# Patient Record
Sex: MALE | Race: WHITE | NOT HISPANIC OR LATINO | Employment: FULL TIME | URBAN - METROPOLITAN AREA
[De-identification: names, ages, dates, MRNs, and addresses within clinical notes are randomized per-mention and may not be internally consistent; named-entity substitution may affect disease eponyms.]

---

## 2018-01-25 ENCOUNTER — OFFICE VISIT (OUTPATIENT)
Dept: FAMILY MEDICINE | Facility: CLINIC | Age: 58
End: 2018-01-25
Payer: COMMERCIAL

## 2018-01-25 ENCOUNTER — LAB VISIT (OUTPATIENT)
Dept: LAB | Facility: HOSPITAL | Age: 58
End: 2018-01-25
Attending: FAMILY MEDICINE
Payer: COMMERCIAL

## 2018-01-25 VITALS
DIASTOLIC BLOOD PRESSURE: 71 MMHG | HEIGHT: 67 IN | SYSTOLIC BLOOD PRESSURE: 144 MMHG | WEIGHT: 121.81 LBS | BODY MASS INDEX: 19.12 KG/M2 | TEMPERATURE: 97 F | HEART RATE: 93 BPM | OXYGEN SATURATION: 96 %

## 2018-01-25 DIAGNOSIS — H10.10 ALLERGIC CONJUNCTIVITIS, UNSPECIFIED LATERALITY: ICD-10-CM

## 2018-01-25 DIAGNOSIS — K40.20 BILATERAL INGUINAL HERNIA WITHOUT OBSTRUCTION OR GANGRENE, RECURRENCE NOT SPECIFIED: ICD-10-CM

## 2018-01-25 DIAGNOSIS — J30.9 ACUTE ALLERGIC RHINITIS, UNSPECIFIED SEASONALITY, UNSPECIFIED TRIGGER: ICD-10-CM

## 2018-01-25 DIAGNOSIS — Z00.00 WELL ADULT EXAM: ICD-10-CM

## 2018-01-25 DIAGNOSIS — Z00.00 WELL ADULT EXAM: Primary | ICD-10-CM

## 2018-01-25 DIAGNOSIS — F17.210 SMOKING GREATER THAN 40 PACK YEARS: ICD-10-CM

## 2018-01-25 DIAGNOSIS — Z72.0 NOT CONSIDERING QUITTING USE OF TOBACCO: ICD-10-CM

## 2018-01-25 DIAGNOSIS — M54.12 CERVICAL RADICULOPATHY: ICD-10-CM

## 2018-01-25 DIAGNOSIS — Z12.11 COLON CANCER SCREENING: ICD-10-CM

## 2018-01-25 LAB
ALBUMIN SERPL BCP-MCNC: 3.8 G/DL
ALP SERPL-CCNC: 80 U/L
ALT SERPL W/O P-5'-P-CCNC: 31 U/L
ANION GAP SERPL CALC-SCNC: 12 MMOL/L
AST SERPL-CCNC: 26 U/L
BASOPHILS # BLD AUTO: 0.04 K/UL
BASOPHILS NFR BLD: 0.5 %
BILIRUB SERPL-MCNC: 0.3 MG/DL
BUN SERPL-MCNC: 17 MG/DL
CALCIUM SERPL-MCNC: 9.5 MG/DL
CHLORIDE SERPL-SCNC: 105 MMOL/L
CHOLEST SERPL-MCNC: 256 MG/DL
CHOLEST/HDLC SERPL: 3.7 {RATIO}
CO2 SERPL-SCNC: 23 MMOL/L
COMPLEXED PSA SERPL-MCNC: 0.31 NG/ML
CREAT SERPL-MCNC: 1 MG/DL
DIFFERENTIAL METHOD: ABNORMAL
EOSINOPHIL # BLD AUTO: 0.3 K/UL
EOSINOPHIL NFR BLD: 3.3 %
ERYTHROCYTE [DISTWIDTH] IN BLOOD BY AUTOMATED COUNT: 12.3 %
EST. GFR  (AFRICAN AMERICAN): >60 ML/MIN/1.73 M^2
EST. GFR  (NON AFRICAN AMERICAN): >60 ML/MIN/1.73 M^2
ESTIMATED AVG GLUCOSE: 97 MG/DL
GLUCOSE SERPL-MCNC: 110 MG/DL
HBA1C MFR BLD HPLC: 5 %
HCT VFR BLD AUTO: 39.3 %
HDLC SERPL-MCNC: 70 MG/DL
HDLC SERPL: 27.3 %
HGB BLD-MCNC: 14 G/DL
IMM GRANULOCYTES # BLD AUTO: 0.03 K/UL
IMM GRANULOCYTES NFR BLD AUTO: 0.4 %
LDLC SERPL CALC-MCNC: 124.2 MG/DL
LYMPHOCYTES # BLD AUTO: 2.2 K/UL
LYMPHOCYTES NFR BLD: 29.1 %
MCH RBC QN AUTO: 32.8 PG
MCHC RBC AUTO-ENTMCNC: 35.6 G/DL
MCV RBC AUTO: 92 FL
MONOCYTES # BLD AUTO: 0.5 K/UL
MONOCYTES NFR BLD: 6.8 %
NEUTROPHILS # BLD AUTO: 4.6 K/UL
NEUTROPHILS NFR BLD: 59.9 %
NONHDLC SERPL-MCNC: 186 MG/DL
NRBC BLD-RTO: 0 /100 WBC
PLATELET # BLD AUTO: 306 K/UL
PMV BLD AUTO: 11.2 FL
POTASSIUM SERPL-SCNC: 3.7 MMOL/L
PROT SERPL-MCNC: 7.5 G/DL
RBC # BLD AUTO: 4.27 M/UL
SODIUM SERPL-SCNC: 140 MMOL/L
TRIGL SERPL-MCNC: 309 MG/DL
WBC # BLD AUTO: 7.6 K/UL

## 2018-01-25 PROCEDURE — 80061 LIPID PANEL: CPT

## 2018-01-25 PROCEDURE — 90715 TDAP VACCINE 7 YRS/> IM: CPT | Mod: S$GLB,,, | Performed by: FAMILY MEDICINE

## 2018-01-25 PROCEDURE — 36415 COLL VENOUS BLD VENIPUNCTURE: CPT | Mod: PO

## 2018-01-25 PROCEDURE — 84153 ASSAY OF PSA TOTAL: CPT

## 2018-01-25 PROCEDURE — 83036 HEMOGLOBIN GLYCOSYLATED A1C: CPT

## 2018-01-25 PROCEDURE — 90686 IIV4 VACC NO PRSV 0.5 ML IM: CPT | Mod: S$GLB,,, | Performed by: FAMILY MEDICINE

## 2018-01-25 PROCEDURE — 90471 IMMUNIZATION ADMIN: CPT | Mod: S$GLB,,, | Performed by: FAMILY MEDICINE

## 2018-01-25 PROCEDURE — 85025 COMPLETE CBC W/AUTO DIFF WBC: CPT

## 2018-01-25 PROCEDURE — 90472 IMMUNIZATION ADMIN EACH ADD: CPT | Mod: S$GLB,,, | Performed by: FAMILY MEDICINE

## 2018-01-25 PROCEDURE — 80053 COMPREHEN METABOLIC PANEL: CPT

## 2018-01-25 PROCEDURE — 99386 PREV VISIT NEW AGE 40-64: CPT | Mod: 25,S$GLB,, | Performed by: FAMILY MEDICINE

## 2018-01-25 PROCEDURE — 90732 PPSV23 VACC 2 YRS+ SUBQ/IM: CPT | Mod: S$GLB,,, | Performed by: FAMILY MEDICINE

## 2018-01-25 PROCEDURE — 99999 PR PBB SHADOW E&M-NEW PATIENT-LVL IV: CPT | Mod: PBBFAC,,, | Performed by: FAMILY MEDICINE

## 2018-01-25 RX ORDER — LEVOCETIRIZINE DIHYDROCHLORIDE 5 MG/1
5 TABLET, FILM COATED ORAL NIGHTLY
Qty: 30 TABLET | Refills: 11 | Status: SHIPPED | OUTPATIENT
Start: 2018-01-25 | End: 2018-08-31

## 2018-01-25 RX ORDER — FLUTICASONE PROPIONATE 50 MCG
2 SPRAY, SUSPENSION (ML) NASAL DAILY
Qty: 16 G | Refills: 11 | Status: SHIPPED | OUTPATIENT
Start: 2018-01-25 | End: 2018-08-31

## 2018-01-25 RX ORDER — OXYCODONE AND ACETAMINOPHEN 7.5; 325 MG/1; MG/1
1 TABLET ORAL EVERY 8 HOURS PRN
Qty: 14 TABLET | Refills: 0 | Status: SHIPPED | OUTPATIENT
Start: 2018-01-25 | End: 2018-08-31

## 2018-01-25 RX ORDER — AZELASTINE HYDROCHLORIDE 0.5 MG/ML
1 SOLUTION/ DROPS OPHTHALMIC 2 TIMES DAILY
Qty: 4 ML | Refills: 11 | Status: SHIPPED | OUTPATIENT
Start: 2018-01-25 | End: 2018-08-31

## 2018-01-25 NOTE — PROGRESS NOTES
Chief Complaint:    Chief Complaint   Patient presents with    Eye Pain     pain for about 2 weeks    Establish Care       History of Present Illness:  Patient presents today he did establish care,  For the last week with onset symptoms of congestion postnasal drip and itchy eyes discharge from the eyes.  He denies any fever no sinus pain.  He is a smoker 40 pack year history but does not want to quit at this time is not ready for it.  He's not been to a physician in a long time is due for his age appropriate health maintenance colonoscopy and his immunization and labs.    He has chronic neck pain and disc herniation and cervical radiculopathy.  He went to see Dr. Todd Bradley, he did an MRI and offered to do steroid injection and some labs for doing injection but patient does not want to go there.  He is requesting some pain meds and wanted to go to a different physician for MADISON.  He says the pain starts from the neck and radiates to his arms when he turns his head to the left      ROS:  Review of Systems   Constitutional: Negative for activity change, chills, fatigue, fever and unexpected weight change.   HENT: Positive for congestion and rhinorrhea. Negative for ear discharge, ear pain, hearing loss and postnasal drip.    Eyes: Negative for pain and visual disturbance.   Respiratory: Negative for cough, chest tightness and shortness of breath.    Cardiovascular: Negative for chest pain and palpitations.   Gastrointestinal: Negative for abdominal pain, diarrhea and vomiting.   Endocrine: Negative for heat intolerance.   Genitourinary: Negative for dysuria, flank pain, frequency and hematuria.   Musculoskeletal: Negative for back pain, gait problem and neck pain.   Skin: Negative for color change and rash.   Neurological: Negative for dizziness, tremors, seizures, numbness and headaches.   Psychiatric/Behavioral: Negative for agitation, hallucinations, self-injury, sleep disturbance and suicidal ideas. The  "patient is not nervous/anxious.        History reviewed. No pertinent past medical history.    Social History:  Social History     Social History    Marital status:      Spouse name: N/A    Number of children: N/A    Years of education: N/A     Social History Main Topics    Smoking status: Current Every Day Smoker    Smokeless tobacco: Never Used    Alcohol use 3.6 oz/week     6 Cans of beer per week    Drug use: No    Sexual activity: Yes     Partners: Female     Other Topics Concern    None     Social History Narrative    None       Family History:   family history includes Cancer in his father; Heart disease in his mother.    There are no preventive care reminders to display for this patient.    Physical Exam:    Vital Signs  Temp: 96.6 °F (35.9 °C)  Temp src: Tympanic  Pulse: 93  SpO2: 96 %  BP: (!) 144/71  BP Location: Left arm  Patient Position: Sitting  Pain Score:   6  Pain Loc: Eye  Height and Weight  Height: 5' 7" (170.2 cm)  Weight: 55.3 kg (121 lb 12.9 oz)  BSA (Calculated - sq m): 1.62 sq meters  BMI (Calculated): 19.1  Weight in (lb) to have BMI = 25: 159.3]    Body mass index is 19.08 kg/m².    Physical Exam   Constitutional: He is oriented to person, place, and time. He appears well-developed.   HENT:   Head: Normocephalic.   Right Ear: External ear normal.   Left Ear: External ear normal.   Nose: Nose normal.   Mouth/Throat: Oropharynx is clear and moist.   Eyes: Conjunctivae and EOM are normal. Pupils are equal, round, and reactive to light. Right conjunctiva is not injected. Right conjunctiva has no hemorrhage. Left conjunctiva is not injected. Left conjunctiva has no hemorrhage.   Neck: Neck supple. Decreased range of motion present.   Unable to extend the neck tenderness at the base of the neck and at C1 and C2 levels.   Cardiovascular: Normal rate, regular rhythm and normal heart sounds.    No murmur heard.  Pulmonary/Chest: Effort normal and breath sounds normal. No " respiratory distress. He has no wheezes. He has no rales. He exhibits no tenderness.   Abdominal: Soft. He exhibits no distension and no mass. There is no tenderness. There is no guarding. A hernia is present. Hernia confirmed positive in the right inguinal area and confirmed positive in the left inguinal area.   Genitourinary: Prostate normal.         Musculoskeletal: He exhibits no edema or tenderness.   Lymphadenopathy:     He has no cervical adenopathy.   Neurological: He is alert and oriented to person, place, and time. He has normal reflexes.   Skin: Skin is warm and dry.   Psychiatric: He has a normal mood and affect. His behavior is normal. Judgment and thought content normal.       No results found for: CHOL, TRIG, HDL, TOTALCHOLEST, NONHDLCHOL    No results found for: HGBA1C    Assessment:      ICD-10-CM ICD-9-CM   1. Well adult exam Z00.00 V70.0   2. Smoking greater than 40 pack years F17.210 305.1   3. Allergic conjunctivitis, unspecified laterality H10.10 372.14   4. Acute allergic rhinitis, unspecified seasonality, unspecified trigger J30.9 477.9   5. Bilateral inguinal hernia without obstruction or gangrene, recurrence not specified K40.20 550.92   6. Cervical radiculopathy M54.12 723.4   7. Not considering quitting use of tobacco Z72.0 305.1   8. Colon cancer screening Z12.11 V76.51         Plan:  He will be prescribed Optivar, Flonase and levocetirizine.  Discussed with him that inguinal hernia will need to be addressed at some point right now it's very small but heavy lifting can make things worse if he develops severe pain he needs to be seen immediately.  Whenever he is ready to see a surgeon please let us know.  He is not interested in stopping smoking would discussed the hazards of that  We'll send him for screening colonoscopy.  Check labs as below.  Please monitor blood pressure  We will be referred to pain management for consideration for MADISON, will get copy of his MRI from Dr. Bradley's  office.  Limited supply of Percocet given will not be refilled.  #14 tablets.  With 0 refills  Follow up on the labs and he will follow-up in the clinic.  Orders Placed This Encounter   Procedures    (In Office Administered) Tdap Vaccine    (In Office Administered) Pneumococcal Polysaccharide Vaccine (23 Valent) (SQ/IM)    Influenza - Quadrivalent (3 years & older) (PF)    CBC auto differential    Comprehensive metabolic panel    Hemoglobin A1c    Lipid panel    PSA, Screening    Ambulatory referral to Pain Clinic       Current Outpatient Prescriptions   Medication Sig Dispense Refill    azelastine (OPTIVAR) 0.05 % ophthalmic solution Place 1 drop into both eyes 2 (two) times daily. 4 mL 11    fluticasone (FLONASE) 50 mcg/actuation nasal spray 2 sprays (100 mcg total) by Each Nare route once daily. 16 g 11    levocetirizine (XYZAL) 5 MG tablet Take 1 tablet (5 mg total) by mouth every evening. 30 tablet 11    oxyCODONE-acetaminophen (PERCOCET) 7.5-325 mg per tablet Take 1 tablet by mouth every 8 (eight) hours as needed for Pain. 14 tablet 0     No current facility-administered medications for this visit.        There are no discontinued medications.    No Follow-up on file.      Reuben Banuelos MD

## 2018-01-25 NOTE — LETTER
January 25, 2018    Todd Bradley MD               [facility]  [address]  [facility] [address]  Phone: [phone]  Fax: [phone] January 25, 2018     Patient: Leo Roblero    YOB: 1960   Date of Visit: 1/25/2018       To whom it may concern     We are seeing Leo Roblero, YOB: 1960, at [facility]. Rebuen Banuelos MD is their primary care physician. To help with our [place] maintenance records could you please send the following:     MRI cervical and last clinic note.    Please send fax to [phone], Attention Concetta MUNGUIA MA.    Thank-you in advance for your assistance. If you have any questions or concerns, please don't hesitate to contact me at [phone].    Concetta MUNGUIA MA  [facility]  PH. # : [phone]  Fax #: [phone]

## 2018-01-30 ENCOUNTER — TELEPHONE (OUTPATIENT)
Dept: FAMILY MEDICINE | Facility: CLINIC | Age: 58
End: 2018-01-30

## 2018-01-30 DIAGNOSIS — E78.5 HYPERLIPIDEMIA, UNSPECIFIED HYPERLIPIDEMIA TYPE: Primary | ICD-10-CM

## 2018-01-30 NOTE — TELEPHONE ENCOUNTER
----- Message from Giselle Bravo sent at 1/30/2018 12:34 PM CST -----  Contact: Amanda/wife [phone]  States that she is returning call please call back at [phone]//thank you acc

## 2018-04-27 ENCOUNTER — LAB VISIT (OUTPATIENT)
Dept: LAB | Facility: HOSPITAL | Age: 58
End: 2018-04-27
Attending: FAMILY MEDICINE
Payer: COMMERCIAL

## 2018-04-27 DIAGNOSIS — E78.5 HYPERLIPIDEMIA, UNSPECIFIED HYPERLIPIDEMIA TYPE: ICD-10-CM

## 2018-04-27 LAB
CHOLEST SERPL-MCNC: 223 MG/DL
CHOLEST/HDLC SERPL: 3.1 {RATIO}
HDLC SERPL-MCNC: 72 MG/DL
HDLC SERPL: 32.3 %
LDLC SERPL CALC-MCNC: 117.4 MG/DL
NONHDLC SERPL-MCNC: 151 MG/DL
TRIGL SERPL-MCNC: 168 MG/DL

## 2018-04-27 PROCEDURE — 80061 LIPID PANEL: CPT

## 2018-04-27 PROCEDURE — 36415 COLL VENOUS BLD VENIPUNCTURE: CPT | Mod: PO

## 2018-08-29 ENCOUNTER — TELEPHONE (OUTPATIENT)
Dept: FAMILY MEDICINE | Facility: CLINIC | Age: 58
End: 2018-08-29

## 2018-08-29 NOTE — TELEPHONE ENCOUNTER
Returned patient's call and informed him that he would need to be seen for refill since his last visit on 1/2018. Patient verbalized understanding and made an appointment on 8/31/18 @ 9 am per patient's request. Also informed patient that he needs to sign an involvement of care for anyone to speak with his wife about his medical information. Patient verbalized understanding.

## 2018-08-29 NOTE — TELEPHONE ENCOUNTER
----- Message from Amanda Palacios sent at 8/29/2018  3:36 PM CDT -----  Pt wife(Amanda) at [phone]//states pt is having neck and back pain///Dr had given him a prescription for pain//is calling to ask if possible to get a refill////Percocet 7.5/325 mg//the pain has gotten worse//uses//[facility]//please call//thanks//[facility]

## 2018-08-31 ENCOUNTER — OFFICE VISIT (OUTPATIENT)
Dept: FAMILY MEDICINE | Facility: CLINIC | Age: 58
End: 2018-08-31
Payer: COMMERCIAL

## 2018-08-31 VITALS
HEIGHT: 67 IN | WEIGHT: 116.75 LBS | TEMPERATURE: 97 F | BODY MASS INDEX: 18.33 KG/M2 | SYSTOLIC BLOOD PRESSURE: 130 MMHG | DIASTOLIC BLOOD PRESSURE: 78 MMHG | OXYGEN SATURATION: 97 % | HEART RATE: 79 BPM

## 2018-08-31 DIAGNOSIS — M54.2 CHRONIC MIDLINE POSTERIOR NECK PAIN: Primary | ICD-10-CM

## 2018-08-31 DIAGNOSIS — G89.29 CHRONIC MIDLINE POSTERIOR NECK PAIN: Primary | ICD-10-CM

## 2018-08-31 PROCEDURE — 99214 OFFICE O/P EST MOD 30 MIN: CPT | Mod: S$GLB,,, | Performed by: FAMILY MEDICINE

## 2018-08-31 PROCEDURE — 3008F BODY MASS INDEX DOCD: CPT | Mod: CPTII,S$GLB,, | Performed by: FAMILY MEDICINE

## 2018-08-31 PROCEDURE — 99999 PR PBB SHADOW E&M-EST. PATIENT-LVL III: CPT | Mod: PBBFAC,,, | Performed by: FAMILY MEDICINE

## 2018-08-31 RX ORDER — OXYCODONE AND ACETAMINOPHEN 7.5; 325 MG/1; MG/1
1 TABLET ORAL EVERY 12 HOURS PRN
Qty: 14 TABLET | Refills: 0 | Status: SHIPPED | OUTPATIENT
Start: 2018-08-31 | End: 2019-01-22

## 2018-08-31 NOTE — PROGRESS NOTES
Chief Complaint:    Chief Complaint   Patient presents with    Neck Pain     Left    Shoulder Pain     Left       History of Present Illness:      He presents today says been having more neck pain no tingling or numbness in the arms he said it gets worse he is taking 2 Aleve a day.    He has had MRIs in the past and he was referred for MADISON but he never kept his appointment.  Now he wants to do something about it.  Pain could get as worse is 8/10 neck movement can make it worse.    Denies any fever.    ROS:  Review of Systems   HENT: Negative.    Respiratory: Negative.    Cardiovascular: Negative.    Musculoskeletal: Positive for neck pain and neck stiffness.       History reviewed. No pertinent past medical history.    Social History:  Social History     Socioeconomic History    Marital status:      Spouse name: None    Number of children: None    Years of education: None    Highest education level: None   Social Needs    Financial resource strain: None    Food insecurity - worry: None    Food insecurity - inability: None    Transportation needs - medical: None    Transportation needs - non-medical: None   Occupational History    None   Tobacco Use    Smoking status: Current Every Day Smoker    Smokeless tobacco: Never Used   Substance and Sexual Activity    Alcohol use: Yes     Alcohol/week: 3.6 oz     Types: 6 Cans of beer per week    Drug use: No    Sexual activity: Yes     Partners: Female   Other Topics Concern    None   Social History Narrative    None       Family History:   family history includes Cancer in his father; Heart disease in his mother.    Health Maintenance   Topic Date Due    Hepatitis C Screening  1960    Colonoscopy  09/15/2010    Influenza Vaccine  08/01/2018    Lipid Panel  04/27/2023    Pneumococcal PPSV23 (Medium Risk) (2) 09/15/2025    TETANUS VACCINE  01/25/2028       Physical Exam:    Vital Signs  Temp: 97 °F (36.1 °C)  Temp src: Tympanic  Pulse:  "79  SpO2: 97 %  BP: 130/78  BP Location: Right arm  Patient Position: Sitting  Pain Score:   8  Pain Loc: Neck  Height and Weight  Height: 5' 7" (170.2 cm)  Weight: 53 kg (116 lb 11.7 oz)  BSA (Calculated - sq m): 1.58 sq meters  BMI (Calculated): 18.3  Weight in (lb) to have BMI = 25: 159.3]    Body mass index is 18.28 kg/m².    Physical Exam   Neck: Decreased range of motion present.         Range of motion is there in all quadrants but it is not a complete range of motion and there is some tenderness as shown some pain upon range of motion.    There is no evidence of any cervical radiculopathy at this time.    Left shoulder impingement sign is negative full range of motion.         Assessment:      ICD-10-CM ICD-9-CM   1. Chronic midline posterior neck pain M54.2 723.1    G89.29 338.29         Plan:      Patient will be referred for MADISON he has some degenerative disc disease recommend that he take copy of his old MRI.    Recommend physical therapy   Patient given Percocet 14.  looked up.      Orders Placed This Encounter   Procedures    Ambulatory referral to Pain Clinic    Ambulatory Referral to Physical/Occupational Therapy       Current Outpatient Medications   Medication Sig Dispense Refill    oxyCODONE-acetaminophen (PERCOCET) 7.5-325 mg per tablet Take 1 tablet by mouth every 12 (twelve) hours as needed for Pain. 14 tablet 0     No current facility-administered medications for this visit.        Medications Discontinued During This Encounter   Medication Reason    fluticasone (FLONASE) 50 mcg/actuation nasal spray Patient no longer taking    levocetirizine (XYZAL) 5 MG tablet Patient no longer taking    oxyCODONE-acetaminophen (PERCOCET) 7.5-325 mg per tablet Patient no longer taking    azelastine (OPTIVAR) 0.05 % ophthalmic solution Patient no longer taking       No Follow-up on file.      Reuben Banuelos MD              "

## 2018-11-06 ENCOUNTER — TELEPHONE (OUTPATIENT)
Dept: FAMILY MEDICINE | Facility: CLINIC | Age: 58
End: 2018-11-06

## 2018-11-06 NOTE — TELEPHONE ENCOUNTER
Received letter from [facility] stating they attempted to patient multiple times to set up therapy with no success.

## 2019-01-22 ENCOUNTER — OFFICE VISIT (OUTPATIENT)
Dept: FAMILY MEDICINE | Facility: CLINIC | Age: 59
End: 2019-01-22
Payer: COMMERCIAL

## 2019-01-22 ENCOUNTER — TELEPHONE (OUTPATIENT)
Dept: FAMILY MEDICINE | Facility: CLINIC | Age: 59
End: 2019-01-22

## 2019-01-22 VITALS
HEART RATE: 86 BPM | OXYGEN SATURATION: 97 % | BODY MASS INDEX: 19.03 KG/M2 | TEMPERATURE: 98 F | DIASTOLIC BLOOD PRESSURE: 82 MMHG | SYSTOLIC BLOOD PRESSURE: 139 MMHG | WEIGHT: 121.5 LBS

## 2019-01-22 DIAGNOSIS — M77.8 RIGHT SHOULDER TENDONITIS: Primary | ICD-10-CM

## 2019-01-22 DIAGNOSIS — J40 BRONCHITIS: ICD-10-CM

## 2019-01-22 DIAGNOSIS — F17.200 CURRENT SMOKER: ICD-10-CM

## 2019-01-22 PROCEDURE — 3008F BODY MASS INDEX DOCD: CPT | Mod: CPTII,S$GLB,, | Performed by: FAMILY MEDICINE

## 2019-01-22 PROCEDURE — 3008F PR BODY MASS INDEX (BMI) DOCUMENTED: ICD-10-PCS | Mod: CPTII,S$GLB,, | Performed by: FAMILY MEDICINE

## 2019-01-22 PROCEDURE — 99214 OFFICE O/P EST MOD 30 MIN: CPT | Mod: S$GLB,,, | Performed by: FAMILY MEDICINE

## 2019-01-22 PROCEDURE — 99999 PR PBB SHADOW E&M-EST. PATIENT-LVL III: ICD-10-PCS | Mod: PBBFAC,,, | Performed by: FAMILY MEDICINE

## 2019-01-22 PROCEDURE — 99214 PR OFFICE/OUTPT VISIT, EST, LEVL IV, 30-39 MIN: ICD-10-PCS | Mod: S$GLB,,, | Performed by: FAMILY MEDICINE

## 2019-01-22 PROCEDURE — 99999 PR PBB SHADOW E&M-EST. PATIENT-LVL III: CPT | Mod: PBBFAC,,, | Performed by: FAMILY MEDICINE

## 2019-01-22 RX ORDER — AZITHROMYCIN 250 MG/1
TABLET, FILM COATED ORAL
Qty: 6 TABLET | Refills: 0 | Status: SHIPPED | OUTPATIENT
Start: 2019-01-22 | End: 2019-03-28

## 2019-01-22 RX ORDER — BENZONATATE 100 MG/1
100 CAPSULE ORAL 3 TIMES DAILY PRN
Qty: 30 CAPSULE | Refills: 0 | Status: SHIPPED | OUTPATIENT
Start: 2019-01-22 | End: 2019-02-01

## 2019-01-22 RX ORDER — OXYCODONE AND ACETAMINOPHEN 7.5; 325 MG/1; MG/1
1 TABLET ORAL EVERY 4 HOURS PRN
Qty: 12 TABLET | Refills: 0 | Status: SHIPPED | OUTPATIENT
Start: 2019-01-22 | End: 2019-01-25

## 2019-01-22 RX ORDER — METHYLPREDNISOLONE 4 MG/1
TABLET ORAL
Qty: 30 TABLET | Refills: 0 | Status: SHIPPED | OUTPATIENT
Start: 2019-01-22 | End: 2019-03-28

## 2019-01-22 RX ORDER — FLUTICASONE PROPIONATE 50 MCG
1 SPRAY, SUSPENSION (ML) NASAL DAILY PRN
COMMUNITY
Start: 2018-11-26 | End: 2019-08-29 | Stop reason: SDUPTHER

## 2019-01-22 NOTE — TELEPHONE ENCOUNTER
----- Message from Martina Adames sent at 1/22/2019 12:23 PM CST -----  Contact: wife  States the pt has a cough and wants to be worked in today, can be reached at [phone]///thxMW

## 2019-01-22 NOTE — PATIENT INSTRUCTIONS
Viral Upper Respiratory Illness (Adult)  You have a viral upper respiratory illness (URI), which is another term for the common cold. This illness is contagious during the first few days. It is spread through the air by coughing and sneezing. It may also be spread by direct contact (touching the sick person and then touching your own eyes, nose, or mouth). Frequent handwashing will decrease risk of spread. Most viral illnesses go away within 7 to 10 days with rest and simple home remedies. Sometimes the illness may last for several weeks. Antibiotics will not kill a virus, and they are generally not prescribed for this condition.    Home care  · If symptoms are severe, rest at home for the first 2 to 3 days. When you resume activity, don't let yourself get too tired.  · Avoid being exposed to cigarette smoke (yours or others).  · You may use acetaminophen or ibuprofen to control pain and fever, unless another medicine was prescribed. (Note: If you have chronic liver or kidney disease, have ever had a stomach ulcer or gastrointestinal bleeding, or are taking blood-thinning medicines, talk with your healthcare provider before using these medicines.) Aspirin should never be given to anyone under 18 years of age who is ill with a viral infection or fever. It may cause severe liver or brain damage.  · Your appetite may be poor, so a light diet is fine. Avoid dehydration by drinking 6 to 8 glasses of fluids per day (water, soft drinks, juices, tea, or soup). Extra fluids will help loosen secretions in the nose and lungs.  · Over-the-counter cold medicines will not shorten the length of time youre sick, but they may be helpful for the following symptoms: cough, sore throat, and nasal and sinus congestion. (Note: Do not use decongestants if you have high blood pressure.)  Follow-up care  Follow up with your healthcare provider, or as advised.  When to seek medical advice  Call your healthcare provider right away if any  of these occur:  · Cough with lots of colored sputum (mucus)  · Severe headache; face, neck, or ear pain  · Difficulty swallowing due to throat pain  · Fever of 100.4°F (38°C)  Call 911, or get immediate medical care  Call emergency services right away if any of these occur:  · Chest pain, shortness of breath, wheezing, or difficulty breathing  · Coughing up blood  · Inability to swallow due to throat pain  Date Last Reviewed: 9/13/2015  © 2149-2236 [employer]. [address] [place] [address]. All rights reserved. This information is not intended as a substitute for professional medical care. Always follow your healthcare professional's instructions.

## 2019-01-22 NOTE — PROGRESS NOTES
Subjective:       Patient ID: Leo Roblero is a 58 y.o. male.    Chief Complaint:Persistent cough and right shoulder pain.      HPI     Cough      Additional comments: coughing up thick yellow sputum              Nasal Congestion      Additional comments: blowing yellow mucus          Last edited by Martha Muñiz LPN on 1/22/2019  2:59 PM. (History)     Upper Respiratory Infection x 2 weeks:  Patient complains of symptoms of a URI. Symptoms include nasal  congestion and cough. Onset of symptoms was 2 weeks ago, gradually worsening since that time. He also c/o no  fever but sinus pressure, ear popping and malaise.  He is drinking plenty of fluids. Evaluation to date: none. Treatment to date: nasal steroids.    Right Shoulder pain: acute on chronic. Present for years. Anterior/lateral shoulder pain with radiation to the upper arm and base of the neck. 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   Denies numbness and tingling to the arm. Over the counter med have n ot been effective  History reviewed. No pertinent past medical history.  Family History   Problem Relation Age of Onset    Heart disease Mother     Cancer Father      Social History     Socioeconomic History    Marital status:      Spouse name: Not on file    Number of children: Not on file    Years of education: Not on file    Highest education level: Not on file   Social Needs    Financial resource strain: Not on file    Food insecurity - worry: Not on file    Food insecurity - inability: Not on file    Transportation needs - medical: Not on file    Transportation needs - non-medical: Not on file   Occupational History    Not on file   Tobacco Use    Smoking status: Current Every Day Smoker    Smokeless tobacco: Never Used   Substance and Sexual Activity    Alcohol use: Yes     Alcohol/week: 3.6 oz     Types: 6 Cans of beer per week    Drug use: No    Sexual activity: Yes     Partners: Female   Other Topics Concern    Not on  file   Social History Narrative    Not on file     Outpatient Encounter Medications as of 1/22/2019   Medication Sig Dispense Refill    azithromycin (Z-ANNE MARIE) 250 MG tablet Take 2 tablets by mouth on day 1; Take 1 tablet by mouth on days 2-5 6 tablet 0    benzonatate (TESSALON) 100 MG capsule Take 1 capsule (100 mg total) by mouth 3 (three) times daily as needed for Cough. 30 capsule 0    fluticasone (FLONASE) 50 mcg/actuation nasal spray 1 spray daily as needed.       methylPREDNISolone (MEDROL DOSEPACK) 4 mg tablet Take as directed 30 tablet 0    oxyCODONE-acetaminophen (PERCOCET) 7.5-325 mg per tablet Take 1 tablet by mouth every 4 (four) hours as needed for Pain. 12 tablet 0    [DISCONTINUED] oxyCODONE-acetaminophen (PERCOCET) 7.5-325 mg per tablet Take 1 tablet by mouth every 12 (twelve) hours as needed for Pain. 14 tablet 0     No facility-administered encounter medications on file as of 1/22/2019.        Review of Systems   Constitutional: Negative for appetite change and fever.   HENT: Positive for postnasal drip, rhinorrhea, sinus pressure and sneezing. Negative for congestion, facial swelling, sinus pain, sore throat and voice change.    Eyes: Negative for discharge and itching.   Respiratory: Positive for cough. Negative for chest tightness and wheezing.    Cardiovascular: Negative.  Negative for chest pain and leg swelling.   Gastrointestinal: Negative for abdominal pain, nausea and vomiting.   Endocrine: Negative for cold intolerance and heat intolerance.   Genitourinary: Negative for dysuria and flank pain.   Musculoskeletal: Negative for myalgias and neck stiffness.   Skin: Negative for pallor and rash.   Neurological: Negative for facial asymmetry and weakness.   Psychiatric/Behavioral: Negative for agitation and confusion.       Objective:      /82 (BP Location: Right arm, Patient Position: Sitting, BP Method: Medium (Automatic))   Pulse 86   Temp 98.2 °F (36.8 °C) (Oral)   Wt 55.1 kg  (121 lb 7.6 oz)   SpO2 97%   BMI 19.03 kg/m²   Physical Exam   Constitutional: He is oriented to person, place, and time. He appears well-developed. He appears ill. No distress.   HENT:   Head: Normocephalic and atraumatic.   Right Ear: Tympanic membrane and external ear normal.   Left Ear: Tympanic membrane and external ear normal.   Nose: Mucosal edema present. Right sinus exhibits no maxillary sinus tenderness and no frontal sinus tenderness. Left sinus exhibits no maxillary sinus tenderness and no frontal sinus tenderness.   Mouth/Throat: No posterior oropharyngeal edema or posterior oropharyngeal erythema.   Eyes: Conjunctivae and EOM are normal.   Neck: Neck supple.   Cardiovascular: Normal rate and regular rhythm.   Pulmonary/Chest: Effort normal. No respiratory distress. He has rhonchi.   Abdominal: Soft. Normal appearance and bowel sounds are normal. There is no hepatosplenomegaly.   Genitourinary:   Genitourinary Comments: deferred   Musculoskeletal: He exhibits no edema.   Painful arc pain at 60 degree consistent with subacromial pain   Neurological: He is alert and oriented to person, place, and time.   Skin: Skin is warm and dry.   Psychiatric: He has a normal mood and affect. His behavior is normal.   Nursing note and vitals reviewed.      Results for orders placed or performed in visit on 04/27/18   Lipid panel   Result Value Ref Range    Cholesterol 223 (H) 120 - 199 mg/dL    Triglycerides 168 (H) 30 - 150 mg/dL    HDL 72 40 - 75 mg/dL    LDL Cholesterol 117.4 63.0 - 159.0 mg/dL    HDL/Chol Ratio 32.3 20.0 - 50.0 %    Total Cholesterol/HDL Ratio 3.1 2.0 - 5.0    Non-HDL Cholesterol 151 mg/dL     Assessment:       1. Right shoulder tendonitis    2. Bronchitis    3. Current smoker        Plan:   Bronchitis/current smoker  lingering sxs and ill,  Worsened by smoking  Patient has been counseled  Will treat with abx    Right shoulder tendonitis  Comments:  acute on chronic  Subacromial pain    Other  orders  -     methylPREDNISolone (MEDROL DOSEPACK) 4 mg tablet; Take as directed  Dispense: 30 tablet; Refill: 0  -     benzonatate (TESSALON) 100 MG capsule; Take 1 capsule (100 mg total) by mouth 3 (three) times daily as needed for Cough.  Dispense: 30 capsule; Refill: 0  -     oxyCODONE-acetaminophen (PERCOCET) 7.5-325 mg per tablet; Take 1 tablet by mouth every 4 (four) hours as needed for Pain.  Dispense: 12 tablet; Refill: 0  -     azithromycin (Z-ANNE MARIE) 250 MG tablet; Take 2 tablets by mouth on day 1; Take 1 tablet by mouth on days 2-5  Dispense: 6 tablet; Refill: 0

## 2019-01-22 NOTE — TELEPHONE ENCOUNTER
Spoke with patient and states he is coughing up thick yellow mucus x 1 week and requesting to be seen. Appointment with Dr Chawla made for today at 340 pm due to patient not sure what time he'll get here this afternoon.

## 2019-03-27 ENCOUNTER — TELEPHONE (OUTPATIENT)
Dept: FAMILY MEDICINE | Facility: CLINIC | Age: 59
End: 2019-03-27

## 2019-03-27 NOTE — TELEPHONE ENCOUNTER
Spoke with caregiver/wife Aamnda and offered the patient an appointment with Dr Chawla today but patient refused. Scheduled him an appointment with Dr Chawla tomorrow at 8 am per patient's request.

## 2019-03-27 NOTE — TELEPHONE ENCOUNTER
----- Message from Amanda Palacios sent at 3/27/2019  1:10 PM CDT -----  Type:  Needs Medical Advice    Who Called:  Pt Wife  (Amanda)  Symptoms (please be specific): Pain in has back/arms/shoulders  How long has patient had these symptoms:   For a long time//has gotten worse  Pharmacy name and phone #:    Would the patient rather a call back or a response via [facility]?  Call back  Best Call Back Number:  [phone]  Additional Information:  States she is calling to ask your office about pt seeing a dr for the pain in his back/arms and shoulders//he cannot raise his arms because of the pain//please call to discuss//thanks/josie

## 2019-03-28 ENCOUNTER — HOSPITAL ENCOUNTER (OUTPATIENT)
Dept: RADIOLOGY | Facility: HOSPITAL | Age: 59
Discharge: HOME | End: 2019-03-28
Attending: FAMILY MEDICINE
Payer: COMMERCIAL

## 2019-03-28 ENCOUNTER — OFFICE VISIT (OUTPATIENT)
Dept: FAMILY MEDICINE | Facility: CLINIC | Age: 59
End: 2019-03-28
Payer: COMMERCIAL

## 2019-03-28 VITALS
DIASTOLIC BLOOD PRESSURE: 77 MMHG | BODY MASS INDEX: 18.36 KG/M2 | WEIGHT: 121.13 LBS | SYSTOLIC BLOOD PRESSURE: 159 MMHG | TEMPERATURE: 99 F | OXYGEN SATURATION: 98 % | HEIGHT: 68 IN | HEART RATE: 77 BPM

## 2019-03-28 DIAGNOSIS — F17.200 CURRENT SMOKER: ICD-10-CM

## 2019-03-28 DIAGNOSIS — M25.519 ACROMIOCLAVICULAR JOINT PAIN, UNSPECIFIED LATERALITY: Primary | ICD-10-CM

## 2019-03-28 DIAGNOSIS — M47.812 SPONDYLOSIS OF CERVICAL REGION WITHOUT MYELOPATHY OR RADICULOPATHY: ICD-10-CM

## 2019-03-28 DIAGNOSIS — R03.0 ELEVATED BP WITHOUT DIAGNOSIS OF HYPERTENSION: ICD-10-CM

## 2019-03-28 DIAGNOSIS — Z12.11 SCREENING FOR COLON CANCER: ICD-10-CM

## 2019-03-28 DIAGNOSIS — M75.51 SUBACROMIAL BURSITIS OF RIGHT SHOULDER JOINT: ICD-10-CM

## 2019-03-28 DIAGNOSIS — E78.2 MIXED HYPERLIPIDEMIA: ICD-10-CM

## 2019-03-28 DIAGNOSIS — Z12.5 SCREENING PSA (PROSTATE SPECIFIC ANTIGEN): ICD-10-CM

## 2019-03-28 DIAGNOSIS — Z11.59 ENCOUNTER FOR HEPATITIS C SCREENING TEST FOR LOW RISK PATIENT: ICD-10-CM

## 2019-03-28 PROCEDURE — 20610 DRAIN/INJ JOINT/BURSA W/O US: CPT | Mod: RT,S$GLB,, | Performed by: FAMILY MEDICINE

## 2019-03-28 PROCEDURE — 3008F BODY MASS INDEX DOCD: CPT | Mod: CPTII,S$GLB,, | Performed by: FAMILY MEDICINE

## 2019-03-28 PROCEDURE — 99999 PR PBB SHADOW E&M-EST. PATIENT-LVL IV: ICD-10-PCS | Mod: PBBFAC,,, | Performed by: FAMILY MEDICINE

## 2019-03-28 PROCEDURE — 3008F PR BODY MASS INDEX (BMI) DOCUMENTED: ICD-10-PCS | Mod: CPTII,S$GLB,, | Performed by: FAMILY MEDICINE

## 2019-03-28 PROCEDURE — 73030 X-RAY EXAM OF SHOULDER: CPT | Mod: TC,FY,PO,RT

## 2019-03-28 PROCEDURE — 20610 LARGE JOINT ASPIRATION/INJECTION: R GLENOHUMERAL: ICD-10-PCS | Mod: RT,S$GLB,, | Performed by: FAMILY MEDICINE

## 2019-03-28 PROCEDURE — 99999 PR PBB SHADOW E&M-EST. PATIENT-LVL IV: CPT | Mod: PBBFAC,,, | Performed by: FAMILY MEDICINE

## 2019-03-28 PROCEDURE — 99215 PR OFFICE/OUTPT VISIT, EST, LEVL V, 40-54 MIN: ICD-10-PCS | Mod: 25,S$GLB,, | Performed by: FAMILY MEDICINE

## 2019-03-28 PROCEDURE — 73030 XR SHOULDER COMPLETE 2 OR MORE VIEWS RIGHT: ICD-10-PCS | Mod: 26,RT,, | Performed by: RADIOLOGY

## 2019-03-28 PROCEDURE — 73030 X-RAY EXAM OF SHOULDER: CPT | Mod: 26,RT,, | Performed by: RADIOLOGY

## 2019-03-28 PROCEDURE — 99215 OFFICE O/P EST HI 40 MIN: CPT | Mod: 25,S$GLB,, | Performed by: FAMILY MEDICINE

## 2019-03-28 RX ORDER — OMEGA-3 FATTY ACIDS 1000 MG
1 CAPSULE ORAL NIGHTLY
Refills: 0 | COMMUNITY
Start: 2019-03-28 | End: 2022-03-29

## 2019-03-28 RX ORDER — OXYCODONE AND ACETAMINOPHEN 7.5; 325 MG/1; MG/1
1 TABLET ORAL EVERY 6 HOURS PRN
Qty: 14 TABLET | Refills: 0 | Status: SHIPPED | OUTPATIENT
Start: 2019-03-28 | End: 2019-04-01

## 2019-03-28 RX ORDER — TRIAMCINOLONE ACETONIDE 40 MG/ML
40 INJECTION, SUSPENSION INTRA-ARTICULAR; INTRAMUSCULAR
Status: DISCONTINUED | OUTPATIENT
Start: 2019-03-28 | End: 2019-03-28 | Stop reason: HOSPADM

## 2019-03-28 RX ADMIN — TRIAMCINOLONE ACETONIDE 40 MG: 40 INJECTION, SUSPENSION INTRA-ARTICULAR; INTRAMUSCULAR at 08:03

## 2019-03-28 NOTE — PROCEDURES
Large Joint Aspiration/Injection: R glenohumeral  Date/Time: 3/28/2019 8:00 AM  Performed by: Leonarda Chawla MD  Authorized by: Leonarda Chawla MD     Consent Done?:  Yes (Written)  Indications:  Pain  Procedure site marked: Yes    Timeout: Prior to procedure the correct patient, procedure, and site was verified      Location:  Shoulder  Site:  R glenohumeral  Prep: Patient was prepped and draped in usual sterile fashion    Ultrasonic Guidance for needle placement: No  Needle size:  25 G  Approach:  Anterior  Medications:  40 mg triamcinolone acetonide 40 mg/mL (with 1% lidocaine 8ml)  Patient tolerance:  Patient tolerated the procedure well with no immediate complications

## 2019-03-28 NOTE — PROGRESS NOTES
Subjective:       Patient ID: Leo Roblero is a 58 y.o. male.    Chief Complaint: Neck Pain and Shoulder Pain    Right Shoulder pain: Acute on chronic. Associated with stiffness, popping,  unable to raise arm above 30 degrees. Sharp pain that is shooting to the lateral neck. 10/10.  No numbness and tingling.  Neck pain: Posterior. Acute on chronic. Intermittent ache. Present for years. He was seeing Dr Mccrary in the past and laminectomy was suggested at one point.      BP was found to be elevated and he has no diagnosis of HTN . He is in pain at he moment.  History reviewed. No pertinent past medical history.  Family History   Problem Relation Age of Onset    Heart disease Mother     Cancer Father      Social History     Socioeconomic History    Marital status:      Spouse name: Not on file    Number of children: Not on file    Years of education: Not on file    Highest education level: Not on file   Occupational History    Not on file   Social Needs    Financial resource strain: Not on file    Food insecurity:     Worry: Not on file     Inability: Not on file    Transportation needs:     Medical: Not on file     Non-medical: Not on file   Tobacco Use    Smoking status: Current Every Day Smoker    Smokeless tobacco: Never Used   Substance and Sexual Activity    Alcohol use: Yes     Alcohol/week: 3.6 oz     Types: 6 Cans of beer per week    Drug use: No    Sexual activity: Yes     Partners: Female   Lifestyle    Physical activity:     Days per week: Not on file     Minutes per session: Not on file    Stress: Not on file   Relationships    Social connections:     Talks on phone: Not on file     Gets together: Not on file     Attends [employer] service: Not on file     Active member of club or organization: Not on file     Attends meetings of clubs or organizations: Not on file     Relationship status: Not on file    Intimate partner violence:     Fear of current or ex partner: Not on file     " Emotionally abused: Not on file     Physically abused: Not on file     Forced sexual activity: Not on file   Other Topics Concern    Not on file   Social History Narrative    Not on file     Outpatient Encounter Medications as of 3/28/2019   Medication Sig Dispense Refill    fluticasone (FLONASE) 50 mcg/actuation nasal spray 1 spray daily as needed.       omega-3 fatty acids 1,000 mg Cap Take 1 capsule (1,000 mg total) by mouth every evening.  0    [DISCONTINUED] azithromycin (Z-ANNE MARIE) 250 MG tablet Take 2 tablets by mouth on day 1; Take 1 tablet by mouth on days 2-5 6 tablet 0    [DISCONTINUED] methylPREDNISolone (MEDROL DOSEPACK) 4 mg tablet Take as directed 30 tablet 0     No facility-administered encounter medications on file as of 3/28/2019.        Review of Systems   Constitutional: Negative for appetite change and fever.   HENT: Negative for congestion, facial swelling and voice change.    Eyes: Negative for discharge and itching.   Respiratory: Negative for cough, chest tightness and wheezing.    Cardiovascular: Negative.  Negative for chest pain and leg swelling.   Gastrointestinal: Negative for abdominal pain, nausea and vomiting.   Endocrine: Negative for cold intolerance and heat intolerance.   Genitourinary: Negative for dysuria and flank pain.   Musculoskeletal: Negative for myalgias and neck stiffness.   Skin: Negative for pallor and rash.   Neurological: Negative for facial asymmetry and weakness.   Psychiatric/Behavioral: Negative for agitation and confusion.       Objective:      BP (!) 140/76 (BP Location: Right arm, Patient Position: Sitting, BP Method: Medium (Manual))   Pulse 77   Temp 98.7 °F (37.1 °C) (Oral)   Ht 5' 8" (1.727 m)   Wt 54.9 kg (121 lb 2.3 oz)   SpO2 98%   BMI 18.42 kg/m²   Physical Exam   Constitutional: He is oriented to person, place, and time. He appears well-developed. No distress.   HENT:   Head: Normocephalic and atraumatic.   Right Ear: External ear normal. "   Left Ear: External ear normal.   Eyes: Conjunctivae and EOM are normal.   Neck: Neck supple. No thyromegaly present.   Cardiovascular: Normal rate and regular rhythm.   Pulmonary/Chest: Effort normal. No respiratory distress.   Abdominal: Soft. Normal appearance and bowel sounds are normal. He exhibits no distension. There is no hepatosplenomegaly. There is no tenderness.   Genitourinary:   Genitourinary Comments: deferred   Musculoskeletal: He exhibits no edema or deformity.        Right shoulder: He exhibits decreased range of motion, tenderness and crepitus.        Cervical back: He exhibits tenderness.        Back:         Arms:  Painful arc is positive  Neer's test is positive   Lymphadenopathy:        Head (right side): No submandibular adenopathy present.        Head (left side): No submandibular adenopathy present.     He has no cervical adenopathy.   Neurological: He is alert and oriented to person, place, and time.   Skin: Skin is warm and dry.   Psychiatric: He has a normal mood and affect. His behavior is normal.   Nursing note and vitals reviewed.      Results for orders placed or performed in visit on 04/27/18   Lipid panel   Result Value Ref Range    Cholesterol 223 (H) 120 - 199 mg/dL    Triglycerides 168 (H) 30 - 150 mg/dL    HDL 72 40 - 75 mg/dL    LDL Cholesterol 117.4 63.0 - 159.0 mg/dL    HDL/Chol Ratio 32.3 20.0 - 50.0 %    Total Cholesterol/HDL Ratio 3.1 2.0 - 5.0    Non-HDL Cholesterol 151 mg/dL     Assessment:       1. Acromioclavicular joint pain, unspecified laterality    2. Spondylosis of cervical region without myelopathy or radiculopathy    3. Screening for colon cancer    4. Current smoker    5. Screening PSA (prostate specific antigen)    6. Encounter for hepatitis C screening test for low risk patient    7. Elevated BP without diagnosis of hypertension    8. Mixed hyperlipidemia        Plan:     Acromioclavicular joint pain, unspecified laterality  -     X-ray Shoulder 2 or More  Views Right; Future; Expected date: 03/28/2019  -     oxyCODONE-acetaminophen (PERCOCET) 7.5-325 mg per tablet; Take 1 tablet by mouth every 6 (six) hours as needed for Pain.  Dispense: 14 tablet; Refill: 0  -     Large Joint Aspiration/Injection: R glenohumeral  -     triamcinolone acetonide injection 40 mg    Spondylosis of cervical region without myelopathy or radiculopathy; acute on chronic  -     oxyCODONE-acetaminophen (PERCOCET) 7.5-325 mg per tablet; Take 1 tablet by mouth every 6 (six) hours as needed for Pain.  Dispense: 14 tablet; Refill: 0    Screening for colon cancer  -     Fecal Immunochemical Test (iFOBT); Future; Expected date: 03/28/2019    Current smoker  -     Ambulatory referral to Smoking Cessation Program    Screening PSA (prostate specific antigen)  -     PSA, Screening; Future; Expected date: 03/28/2019    Encounter for hepatitis C screening test for low risk patient  -     Hepatitis C antibody; Future; Expected date: 03/28/2019    Elevated BP without diagnosis of hypertension  -     CBC auto differential; Future; Expected date: 03/28/2019  -     Comprehensive metabolic panel; Future; Expected date: 03/28/2019    Mixed hyperlipidemia  -     Lipid panel; Future; Expected date: 03/28/2019    Other orders  -     omega-3 fatty acids 1,000 mg Cap; Take 1 capsule (1,000 mg total) by mouth every evening.; Refill: 0      Tumeric and ginger root for pain for pain  Type of Reading: myself.  Radiology Procedure Done: Right Shoulder.  Interpretation: No Fracture or dislocation noted. Spurring and joint space narrowing consistent with AC joint osteoarthritis        MEDICAL DECISION MAKING: Moderate to high complexity.  Overall, the multiple problems listed are of moderate to high severity that may impact quality of life and activities of daily living. Side effects of medications, treatment plan as well as options and alternatives reviewed and discussed with patient. There was counseling of patient  concerning these issues.

## 2019-03-28 NOTE — PATIENT INSTRUCTIONS
Facet Joint Injection  Back or neck pain may be caused by a problem with your facet joints. If so, a facet joint injection may help. With this treatment, medicine is injected into certain facet joints. The injection can help your doctor find problem joints. It may also relieve your pain.    What is a facet joint?  Bones called vertebrae make up your spine. Each vertebra has facets (flat surfaces) that touch where the vertebrae fit together. These form a structure called a facet joint on each side of the vertebrae.  What is a facet joint injection?  One or more facet joints in your back or neck can become inflamed (swollen and irritated). This may cause pain. During a facet joint injection, medicine is injected into the inflamed joints. This treatment may help reduce inflammation and relieve pain. Pain relief may last for weeks to months or longer. If the pain returns, you may need a repeat injection.    Getting ready  To get ready for your treatment, do the following:  · At least a week before treatment, tell your healthcare provider what medicines you take. This includes aspirin. Ask whether you should stop taking any of them before treatment.  · Tell your provider if you are pregnant or allergic to any medicines.  · Follow any directions you are given for not eating or drinking before the treatment.  · If asked, bring X-rays, MRIs, or other tests with you on the day of your treatment.  Date Last Reviewed: 9/21/2015 © 2000-2017 [employer]. [address] [place] [address]. All rights reserved. This information is not intended as a substitute for professional medical care. Always follow your healthcare professional's instructions.        Shoulder Impingement Syndrome  The rotator cuff is a group of muscles and tendons that surround the shoulder joint. These muscles and tendons hold the arm in its joint. They help the shoulder move. The rotator cuff muscles and tendons can become irritated  from repeated rubbing against the shoulder bone. This is called shoulder impingement syndrome or rotator cuff tendonitis.     If your case is mild, you may only need to rest the shoulder and then do certain exercises to strengthen the muscles. You can also take anti-inflammatory medicines. Steroid injections into the shoulder can ease inflammation. But you can have only a limited number of these. If the condition gets worse, your shoulder muscles may become thin and weak. This can lead to a rotator cuff tear.  Symptoms of shoulder impingement syndrome may include:  · Shoulder pain that gets worse when you raise your arm overhead  · Weakness of the shoulder muscles when you use your arm overhead  · Popping and clicking when you move your shoulder  · Shoulder pain that wakes you up at night, especially when you sleep on the affected shoulder  · Sudden pain in your shoulder when you lift or reach  Home care  Follow these tips to take care of yourself at home:  · Avoid activities that make your pain worse. These include raising your arms overhead, repeating the same motion over and over, or lifting heavy objects.  · Dont hold your arm in one position for a long time. Keep it moving.  · Put an ice pack on the sore area for 20 minutes every 1 to 2 hours for the first day. You can make an ice pack by putting ice cubes in a plastic bag. Wrap the bag in a towel before putting it on your shoulder. A frozen bag of peas or something similar can also be used as an ice pack. Use the ice packs 3 to 4 times a day for the next 2 days. Continue using the ice to relieve of pain and swelling as needed.  · You may take acetaminophen or ibuprofen to control pain, unless another medicine was prescribed. If prednisone was prescribed, dont take anti-inflammatory medicines. If you have chronic liver or kidney disease or ever had a stomach ulcer or gastrointestinal bleeding, talk with your doctor before using these medicines.  · After your  symptoms ease, you may get physical therapy or start a home exercise program. This can strengthen your shoulder muscles and help your range of motion. Talk with your doctor about what is best for your condition.  Follow-up care  Follow up with your healthcare provider, or as advised.  When to seek medical advice  Call your healthcare provider right away if any of these occur:  · Shoulder pain that gets worse and wakes you up at night  · Your shoulder or arm swells  · Numbness, tingling, or pain that travels down the arm to the hand  · Loss of shoulder strength  · Fever or chills  Date Last Reviewed: 8/1/2016  © 8188-2764 [employer]. [address] [place] [address], [place] [address]. All rights reserved. This information is not intended as a substitute for professional medical care. Always follow your healthcare professional's instructions.

## 2019-04-01 ENCOUNTER — TELEPHONE (OUTPATIENT)
Dept: FAMILY MEDICINE | Facility: CLINIC | Age: 59
End: 2019-04-01

## 2019-04-01 NOTE — TELEPHONE ENCOUNTER
----- Message from Maddi Jerez sent at 4/1/2019 11:50 AM CDT -----  Contact: mireya  Name of Who is Calling: mireya      What is the request in detail: patient is requesting a call back concerning his blood test results       Can the clinic reply by [facility]: no      What Number to Call Back if not in [facility]: [phone]

## 2019-04-04 ENCOUNTER — APPOINTMENT (OUTPATIENT)
Dept: LAB | Facility: HOSPITAL | Age: 59
End: 2019-04-04
Attending: FAMILY MEDICINE
Payer: COMMERCIAL

## 2019-04-04 ENCOUNTER — OFFICE VISIT (OUTPATIENT)
Dept: FAMILY MEDICINE | Facility: CLINIC | Age: 59
End: 2019-04-04
Payer: COMMERCIAL

## 2019-04-04 VITALS
SYSTOLIC BLOOD PRESSURE: 144 MMHG | HEART RATE: 80 BPM | TEMPERATURE: 99 F | OXYGEN SATURATION: 100 % | DIASTOLIC BLOOD PRESSURE: 77 MMHG | WEIGHT: 119.69 LBS | BODY MASS INDEX: 18.2 KG/M2

## 2019-04-04 DIAGNOSIS — E78.00 PURE HYPERCHOLESTEROLEMIA: ICD-10-CM

## 2019-04-04 DIAGNOSIS — R03.0 ELEVATED BP WITHOUT DIAGNOSIS OF HYPERTENSION: Primary | ICD-10-CM

## 2019-04-04 DIAGNOSIS — F17.200 CURRENT SMOKER: ICD-10-CM

## 2019-04-04 PROCEDURE — 99214 OFFICE O/P EST MOD 30 MIN: CPT | Mod: S$GLB,,, | Performed by: FAMILY MEDICINE

## 2019-04-04 PROCEDURE — 3008F PR BODY MASS INDEX (BMI) DOCUMENTED: ICD-10-PCS | Mod: CPTII,S$GLB,, | Performed by: FAMILY MEDICINE

## 2019-04-04 PROCEDURE — 99999 PR PBB SHADOW E&M-EST. PATIENT-LVL III: CPT | Mod: PBBFAC,,, | Performed by: FAMILY MEDICINE

## 2019-04-04 PROCEDURE — 99214 PR OFFICE/OUTPT VISIT, EST, LEVL IV, 30-39 MIN: ICD-10-PCS | Mod: S$GLB,,, | Performed by: FAMILY MEDICINE

## 2019-04-04 PROCEDURE — 3008F BODY MASS INDEX DOCD: CPT | Mod: CPTII,S$GLB,, | Performed by: FAMILY MEDICINE

## 2019-04-04 PROCEDURE — 99999 PR PBB SHADOW E&M-EST. PATIENT-LVL III: ICD-10-PCS | Mod: PBBFAC,,, | Performed by: FAMILY MEDICINE

## 2019-04-04 RX ORDER — IBUPROFEN 200 MG
1 TABLET ORAL DAILY
Qty: 28 PATCH | Refills: 3 | COMMUNITY
Start: 2019-04-04 | End: 2019-05-14

## 2019-04-04 RX ORDER — PRAVASTATIN SODIUM 10 MG/1
10 TABLET ORAL DAILY
Qty: 90 TABLET | Refills: 1 | Status: SHIPPED | OUTPATIENT
Start: 2019-04-04 | End: 2019-07-11 | Stop reason: SINTOL

## 2019-04-04 RX ORDER — ASPIRIN 81 MG/1
81 TABLET ORAL DAILY
Refills: 0 | COMMUNITY
Start: 2019-04-04 | End: 2022-11-29

## 2019-04-04 RX ORDER — OXYCODONE AND ACETAMINOPHEN 7.5; 325 MG/1; MG/1
1 TABLET ORAL EVERY 6 HOURS PRN
COMMUNITY
End: 2019-08-29

## 2019-04-04 NOTE — PATIENT INSTRUCTIONS
How to Quit Smoking  Smoking is one of the hardest habits to break. About half of all people who have ever smoked have been able to quit. Most people who still smoke want to quit. Here are some of the best ways to stop smoking.    Keep trying  Most smokers make many attempts at quitting before they are successful. Its important not to give up.  Go cold turkey  Most former smokers quit cold turkey (all at once). Trying to cut back gradually doesn't seem to work as well, perhaps because it continues the smoking habit. Also, it is possible to inhale more while smoking fewer cigarettes. This results in the same amount of nicotine in your body.  Get support  Support programs can be a big help, especially for heavy smokers. These groups offer lectures, ways to change behavior, and peer support. Here are some ways to find a support program:  · Free national quitline: 800-QUIT-NOW (002-730-7066).  · Hospital quit-smoking programs.  · American Lung Association: (788.187.3539).  · American Cancer Society (432-034-3307).  Support at home is important too. Nonsmokers can offer praise and encouragement. If the smoker in your life finds it hard to quit, encourage them to keep trying.  Over-the-counter medicines  Nicotine replacement therapy may make quitting easier. Certain aids, such as the nicotine patch, gum, and lozenges, are available without a prescription. It is best to use these under a doctors care, though. The skin patch provides a steady supply of nicotine. Nicotine gum and lozenges give temporary bursts of low levels of nicotine. Both methods reduce the craving for cigarettes. Warning: If you have nausea, vomiting, dizziness, weakness, or a fast heartbeat, stop using these products and see your doctor.  Prescription medicines  After reviewing your smoking patterns and past attempts to quit, your doctor may offer a prescription medicine such as bupropion, varenicline, a nicotine inhaler, or nasal spray. Each has  "advantages and side effects. Your doctor can review these with you.  Health benefits of quitting  The benefits of quitting start right away and keep improving the longer you go without smoking. These benefits occur at any age.  So whether you are 17 or 70, quitting is a good decision. Some of the benefits include:  · 20 minutes: Blood pressure and pulse return to normal.  · 8 hours: Oxygen levels return to normal.  · 2 days: Ability to smell and taste begin to improve as damaged nerves regrow.  · 2 to 3 weeks: Circulation and lung function improve.  · 1 to 9 months: Coughing, congestion, and shortness of breath decrease; tiredness decreases.  · 1 year: Risk of heart attack decreases by half.  · 5 years: Risk of lung cancer decreases by half; risk of stroke becomes the same as a nonsmokers.  For more on how to quit smoking, try these online resources:   · Smokefree.gov  · "Clearing the Air" booklet from the National [employer] [place]: smokefree.gov/sites/default/files/pdf/clearing-the-air-accessible.pdf  Date Last Reviewed: 3/1/2017  © 5103-6209 [employer]. [address] [place] [address]. All rights reserved. This information is not intended as a substitute for professional medical care. Always follow your healthcare professional's instructions.        "

## 2019-04-04 NOTE — PROGRESS NOTES
Subjective:       Patient ID: Leo Roblero is a 58 y.o. male.    Chief Complaint: FU Right AC joint osteo, Hypercholesterolemia and elevated BP    Hyperlipidemia: Recent lab was reviewed with patient. Cholesterol is high at 233  And your calculated 10 year ASCVD risk for MI and Stroke is 11.9% due to presence of cig smoking and elevated BP.  He started taking omega 3 FA last week.    Current smoker: He has more than 40 pack smoking history. Smokes 11/2 pack per day and have been for 40 years.  He is very active and works for [employer]    Elevated BP:BP was elevated on previous visit. Today, it is better but still in the hypertensive range. He does not have hypertension .    Right AC joint osteoarthritis: Reports that his shoulder feels so much better after the injection a week ago and he can move it now.  History reviewed. No pertinent past medical history.  Family History   Problem Relation Age of Onset    Heart disease Mother     Cancer Father      Social History     Socioeconomic History    Marital status:      Spouse name: Not on file    Number of children: Not on file    Years of education: Not on file    Highest education level: Not on file   Occupational History    Not on file   Social Needs    Financial resource strain: Not on file    Food insecurity:     Worry: Not on file     Inability: Not on file    Transportation needs:     Medical: Not on file     Non-medical: Not on file   Tobacco Use    Smoking status: Current Every Day Smoker    Smokeless tobacco: Never Used   Substance and Sexual Activity    Alcohol use: Yes     Alcohol/week: 3.6 oz     Types: 6 Cans of beer per week    Drug use: No    Sexual activity: Yes     Partners: Female   Lifestyle    Physical activity:     Days per week: Not on file     Minutes per session: Not on file    Stress: Not on file   Relationships    Social connections:     Talks on phone: Not on file     Gets together: Not on file     Attends  Buddhist service: Not on file     Active member of club or organization: Not on file     Attends meetings of clubs or organizations: Not on file     Relationship status: Not on file   Other Topics Concern    Not on file   Social History Narrative    Not on file     Outpatient Encounter Medications as of 4/4/2019   Medication Sig Dispense Refill    fluticasone (FLONASE) 50 mcg/actuation nasal spray 1 spray daily as needed.       omega-3 fatty acids 1,000 mg Cap Take 1 capsule (1,000 mg total) by mouth every evening.  0    oxyCODONE-acetaminophen (PERCOCET) 7.5-325 mg per tablet Take 1 tablet by mouth every 6 (six) hours as needed for Pain.      aspirin (ECOTRIN) 81 MG EC tablet Take 1 tablet (81 mg total) by mouth once daily.  0    nicotine (NICODERM CQ) 21 mg/24 hr Place 1 patch onto the skin once daily. 28 patch 3    pravastatin (PRAVACHOL) 10 MG tablet Take 1 tablet (10 mg total) by mouth once daily. 90 tablet 1     No facility-administered encounter medications on file as of 4/4/2019.        Review of Systems   Constitutional: Negative for appetite change and fever.   HENT: Negative for congestion, facial swelling and voice change.    Eyes: Negative for discharge and itching.   Respiratory: Negative for cough, chest tightness and wheezing.    Cardiovascular: Negative.  Negative for chest pain and leg swelling.   Gastrointestinal: Negative for abdominal pain, nausea and vomiting.   Endocrine: Negative for cold intolerance and heat intolerance.   Genitourinary: Negative for dysuria and flank pain.   Musculoskeletal: Negative for myalgias and neck stiffness.   Skin: Negative for pallor and rash.   Neurological: Negative for facial asymmetry and weakness.   Psychiatric/Behavioral: Negative for agitation and confusion.       Objective:      BP (!) 144/77 (BP Location: Right arm, Patient Position: Sitting, BP Method: Medium (Automatic))   Pulse 80   Temp 98.5 °F (36.9 °C) (Oral)   Wt 54.3 kg (119 lb 11.4  oz)   SpO2 100%   BMI 18.20 kg/m²   Physical Exam   Constitutional: He is oriented to person, place, and time. He appears well-developed. No distress.   HENT:   Head: Normocephalic and atraumatic.   Right Ear: External ear normal.   Left Ear: External ear normal.   Eyes: Conjunctivae and EOM are normal.   Neck: Neck supple.   Cardiovascular: Normal rate and regular rhythm.   Pulmonary/Chest: Effort normal. No respiratory distress.   Abdominal: Soft. Normal appearance and bowel sounds are normal. There is no hepatosplenomegaly.   Genitourinary:   Genitourinary Comments: deferred   Musculoskeletal: He exhibits no edema.   Neurological: He is alert and oriented to person, place, and time.   Skin: Skin is warm and dry.   Psychiatric: He has a normal mood and affect. His behavior is normal.   Nursing note and vitals reviewed.      Results for orders placed or performed in visit on 03/28/19   CBC auto differential   Result Value Ref Range    WBC 6.00 3.90 - 12.70 K/uL    RBC 4.62 4.60 - 6.20 M/uL    Hemoglobin 15.0 14.0 - 18.0 g/dL    Hematocrit 44.6 40.0 - 54.0 %    MCV 97 82 - 98 fL    MCH 32.5 (H) 27.0 - 31.0 pg    MCHC 33.6 32.0 - 36.0 g/dL    RDW 12.9 11.5 - 14.5 %    Platelets 356 (H) 150 - 350 K/uL    MPV 11.6 9.2 - 12.9 fL    Immature Granulocytes 0.2 0.0 - 0.5 %    Gran # (ANC) 2.7 1.8 - 7.7 K/uL    Immature Grans (Abs) 0.01 0.00 - 0.04 K/uL    Lymph # 2.3 1.0 - 4.8 K/uL    Mono # 0.6 0.3 - 1.0 K/uL    Eos # 0.3 0.0 - 0.5 K/uL    Baso # 0.05 0.00 - 0.20 K/uL    nRBC 0 0 /100 WBC    Gran% 45.2 38.0 - 73.0 %    Lymph% 38.8 18.0 - 48.0 %    Mono% 10.5 4.0 - 15.0 %    Eosinophil% 4.5 0.0 - 8.0 %    Basophil% 0.8 0.0 - 1.9 %    Differential Method Automated    Comprehensive metabolic panel   Result Value Ref Range    Sodium 138 136 - 145 mmol/L    Potassium 4.2 3.5 - 5.1 mmol/L    Chloride 103 95 - 110 mmol/L    CO2 27 23 - 29 mmol/L    Glucose 87 70 - 110 mg/dL    BUN, Bld 13 6 - 20 mg/dL    Creatinine 0.8 0.5 -  1.4 mg/dL    Calcium 9.9 8.7 - 10.5 mg/dL    Total Protein 7.3 6.0 - 8.4 g/dL    Albumin 4.1 3.5 - 5.2 g/dL    Total Bilirubin 0.7 0.1 - 1.0 mg/dL    Alkaline Phosphatase 87 55 - 135 U/L    AST 27 10 - 40 U/L    ALT 28 10 - 44 U/L    Anion Gap 8 8 - 16 mmol/L    eGFR if African American >60.0 >60 mL/min/1.73 m^2    eGFR if non African American >60.0 >60 mL/min/1.73 m^2   Lipid panel   Result Value Ref Range    Cholesterol 233 (H) 120 - 199 mg/dL    Triglycerides 117 30 - 150 mg/dL    HDL 76 (H) 40 - 75 mg/dL    LDL Cholesterol 133.6 63.0 - 159.0 mg/dL    HDL/Chol Ratio 32.6 20.0 - 50.0 %    Total Cholesterol/HDL Ratio 3.1 2.0 - 5.0    Non-HDL Cholesterol 157 mg/dL   PSA, Screening   Result Value Ref Range    PSA, SCREEN 0.27 0.00 - 4.00 ng/mL   Hepatitis C antibody   Result Value Ref Range    Hepatitis C Ab Negative      Assessment:       1. Elevated BP without diagnosis of hypertension    2. Pure hypercholesterolemia    3. Current smoker        Plan:   Pure hypercholesterolemia  You will benefit from taking ASA 81 mg and medication for cholesterol  And reduce your risk further by quitting to smoke, eating a healthy low salt diet and walking for at least 35 mins 5 times a week.  Side effect of statin and monitoring were discussed. He verbalized understanding of info given. He wants to try the nicotine patch first.   -     Lipid panel; Future; Expected date: 07/04/2019  -     Hepatic function panel; Future; Expected date: 05/04/2019  -     pravastatin (PRAVACHOL) 10 MG tablet; Take 1 tablet (10 mg total) by mouth once daily.  Dispense: 90 tablet; Refill: 1    Current smoker  See note above  Elevated BP without diagnosis of hypertension  See note above  Other orders  -     aspirin (ECOTRIN) 81 MG EC tablet; Take 1 tablet (81 mg total) by mouth once daily.; Refill: 0        -     Nicotine (NICODERM CQ) 21 mg/24 hr

## 2019-04-05 ENCOUNTER — TELEPHONE (OUTPATIENT)
Dept: FAMILY MEDICINE | Facility: CLINIC | Age: 59
End: 2019-04-05

## 2019-04-05 NOTE — TELEPHONE ENCOUNTER
----- Message from Danna Ponce sent at 4/5/2019  1:01 PM CDT -----  Contact: Patient   Type:  Patient Returning Call    Who Called: Leo  Who Left Message for Patient: Not sure someone else took the message  Does the patient know what this is regarding?: He is not sure   Would the patient rather a call back or a response via [facility]? Call back   Best Call Back Number: Please call him at [phone]  Additional Information: n/a

## 2019-04-16 ENCOUNTER — CLINICAL SUPPORT (OUTPATIENT)
Dept: FAMILY MEDICINE | Facility: CLINIC | Age: 59
End: 2019-04-16
Payer: COMMERCIAL

## 2019-04-16 VITALS — SYSTOLIC BLOOD PRESSURE: 136 MMHG | DIASTOLIC BLOOD PRESSURE: 73 MMHG

## 2019-04-16 DIAGNOSIS — R03.0 ELEVATED BP WITHOUT DIAGNOSIS OF HYPERTENSION: Primary | ICD-10-CM

## 2019-04-16 NOTE — PROGRESS NOTES
bp checked. Patient denies c/o dizziness/headache/light headedness at this time. Patient states he took his meds early this morning. Patient instructed to continue to take meds as directed by md. Patient verbalized understanding and left in stable condition.

## 2019-05-06 ENCOUNTER — LAB VISIT (OUTPATIENT)
Dept: LAB | Facility: HOSPITAL | Age: 59
End: 2019-05-06
Attending: FAMILY MEDICINE
Payer: COMMERCIAL

## 2019-05-06 DIAGNOSIS — E78.00 PURE HYPERCHOLESTEROLEMIA: ICD-10-CM

## 2019-05-06 LAB
ALBUMIN SERPL BCP-MCNC: 3.9 G/DL (ref 3.5–5.2)
ALP SERPL-CCNC: 84 U/L (ref 55–135)
ALT SERPL W/O P-5'-P-CCNC: 33 U/L (ref 10–44)
AST SERPL-CCNC: 37 U/L (ref 10–40)
BILIRUB DIRECT SERPL-MCNC: 0.3 MG/DL (ref 0.1–0.3)
BILIRUB SERPL-MCNC: 0.7 MG/DL (ref 0.1–1)
PROT SERPL-MCNC: 6.8 G/DL (ref 6–8.4)

## 2019-05-06 PROCEDURE — 36415 COLL VENOUS BLD VENIPUNCTURE: CPT | Mod: PO

## 2019-05-06 PROCEDURE — 80076 HEPATIC FUNCTION PANEL: CPT

## 2019-05-14 ENCOUNTER — OFFICE VISIT (OUTPATIENT)
Dept: FAMILY MEDICINE | Facility: CLINIC | Age: 59
End: 2019-05-14
Payer: COMMERCIAL

## 2019-05-14 VITALS
SYSTOLIC BLOOD PRESSURE: 128 MMHG | WEIGHT: 119.06 LBS | HEART RATE: 85 BPM | HEIGHT: 68 IN | DIASTOLIC BLOOD PRESSURE: 70 MMHG | BODY MASS INDEX: 18.04 KG/M2 | TEMPERATURE: 98 F | OXYGEN SATURATION: 99 %

## 2019-05-14 DIAGNOSIS — M75.80 ROTATOR CUFF TENDINITIS, UNSPECIFIED LATERALITY: ICD-10-CM

## 2019-05-14 DIAGNOSIS — F17.200 CURRENT SMOKER: ICD-10-CM

## 2019-05-14 DIAGNOSIS — Z00.00 WELL ADULT EXAM: Primary | ICD-10-CM

## 2019-05-14 DIAGNOSIS — E78.2 MIXED HYPERLIPIDEMIA: ICD-10-CM

## 2019-05-14 PROCEDURE — 99396 PREV VISIT EST AGE 40-64: CPT | Mod: S$GLB,,, | Performed by: FAMILY MEDICINE

## 2019-05-14 PROCEDURE — 99999 PR PBB SHADOW E&M-EST. PATIENT-LVL III: ICD-10-PCS | Mod: PBBFAC,,, | Performed by: FAMILY MEDICINE

## 2019-05-14 PROCEDURE — 99396 PR PREVENTIVE VISIT,EST,40-64: ICD-10-PCS | Mod: S$GLB,,, | Performed by: FAMILY MEDICINE

## 2019-05-14 PROCEDURE — 99999 PR PBB SHADOW E&M-EST. PATIENT-LVL III: CPT | Mod: PBBFAC,,, | Performed by: FAMILY MEDICINE

## 2019-05-14 RX ORDER — VARENICLINE TARTRATE 0.5 (11)-1
KIT ORAL
Qty: 1 PACKAGE | Refills: 0 | Status: SHIPPED | OUTPATIENT
Start: 2019-05-14 | End: 2019-08-29

## 2019-05-14 NOTE — PROGRESS NOTES
Chief Complaint:    Chief Complaint   Patient presents with    Follow-up       History of Present Illness:  Presents today he is still having trouble with his shoulders he has got a steroid injection the right shoulder  Continues to smoke he does have some desire to quit he is willing to try Chantix  He is on statin therapy denies any side effects , his chemistry panel is normal      ROS:  Review of Systems   Constitutional: Negative for activity change, chills, fatigue, fever and unexpected weight change.   HENT: Negative for congestion, ear discharge, ear pain, hearing loss, postnasal drip and rhinorrhea.    Eyes: Negative for pain and visual disturbance.   Respiratory: Negative for cough, chest tightness and shortness of breath.    Cardiovascular: Negative for chest pain and palpitations.   Gastrointestinal: Negative for abdominal pain, diarrhea and vomiting.   Endocrine: Negative for heat intolerance.   Genitourinary: Negative for dysuria, flank pain, frequency and hematuria.   Musculoskeletal: Negative for back pain, gait problem and neck pain.   Skin: Negative for color change and rash.   Neurological: Negative for dizziness, tremors, seizures, numbness and headaches.   Psychiatric/Behavioral: Negative for agitation, hallucinations, self-injury, sleep disturbance and suicidal ideas. The patient is not nervous/anxious.        History reviewed. No pertinent past medical history.    Social History:  Social History     Socioeconomic History    Marital status:      Spouse name: Not on file    Number of children: Not on file    Years of education: Not on file    Highest education level: Not on file   Occupational History    Not on file   Social Needs    Financial resource strain: Not on file    Food insecurity:     Worry: Not on file     Inability: Not on file    Transportation needs:     Medical: Not on file     Non-medical: Not on file   Tobacco Use    Smoking status: Current Every Day Smoker  "   Smokeless tobacco: Never Used   Substance and Sexual Activity    Alcohol use: Yes     Alcohol/week: 3.6 oz     Types: 6 Cans of beer per week    Drug use: No    Sexual activity: Yes     Partners: Female   Lifestyle    Physical activity:     Days per week: Not on file     Minutes per session: Not on file    Stress: Not on file   Relationships    Social connections:     Talks on phone: Not on file     Gets together: Not on file     Attends Worship service: Not on file     Active member of club or organization: Not on file     Attends meetings of clubs or organizations: Not on file     Relationship status: Not on file   Other Topics Concern    Not on file   Social History Narrative    Not on file       Family History:   family history includes Cancer in his father; Heart disease in his mother.    Health Maintenance   Topic Date Due    Influenza Vaccine  08/01/2019    Fecal Occult Blood Test (FOBT)/FitKit  04/04/2020    Lipid Panel  03/28/2024    TETANUS VACCINE  01/25/2028    Hepatitis C Screening  Completed    Pneumococcal Vaccine (Medium Risk)  Completed       Physical Exam:    Vital Signs  Temp: 98 °F (36.7 °C)  Temp src: Oral  Pulse: 85  SpO2: 99 %  BP: 128/70  BP Location: Left arm  Patient Position: Sitting  Pain Score:   8  Pain Loc: Shoulder  Height and Weight  Height: 5' 8" (172.7 cm)  Weight: 54 kg (119 lb 0.8 oz)  BSA (Calculated - sq m): 1.61 sq meters  BMI (Calculated): 18.1  Weight in (lb) to have BMI = 25: 164.1]    Body mass index is 18.1 kg/m².    Physical Exam   Constitutional: He is oriented to person, place, and time. He appears well-developed.   HENT:   Mouth/Throat: Oropharynx is clear and moist.   Eyes: Pupils are equal, round, and reactive to light. Conjunctivae are normal.   Neck: Normal range of motion. Neck supple.   Cardiovascular: Normal rate, regular rhythm and normal heart sounds.   No murmur heard.  Pulmonary/Chest: Effort normal and breath sounds normal. No " respiratory distress. He has no wheezes. He has no rales. He exhibits no tenderness.   Abdominal: Soft. He exhibits no distension and no mass. There is no tenderness. There is no guarding.   Musculoskeletal: He exhibits no edema or tenderness.   Painful range of motion impingement sign is minimally positive.   Lymphadenopathy:     He has no cervical adenopathy.   Neurological: He is alert and oriented to person, place, and time. He has normal reflexes.   Skin: Skin is warm and dry.   Psychiatric: He has a normal mood and affect. His behavior is normal. Judgment and thought content normal.         Assessment:      ICD-10-CM ICD-9-CM   1. Well adult exam Z00.00 V70.0   2. Rotator cuff tendinitis, unspecified laterality M75.80 726.10   3. Current smoker F17.200 305.1   4. Mixed hyperlipidemia E78.2 272.2         Plan:    Refer for physical therapy for rotator cuff tendinitis for at least 6 weeks  Start on Chantix, side effects of Chantix discussed with patient, watch for nightmares suicidal thoughts or ideation should that happen stop the medicine call emergency services or call office  Continue pravastatin  Do recommend that he get a colonoscopy at some point.      Orders Placed This Encounter   Procedures    Ambulatory Referral to Physical/Occupational Therapy       Current Outpatient Medications   Medication Sig Dispense Refill    aspirin (ECOTRIN) 81 MG EC tablet Take 1 tablet (81 mg total) by mouth once daily.  0    fluticasone (FLONASE) 50 mcg/actuation nasal spray 1 spray daily as needed.       omega-3 fatty acids 1,000 mg Cap Take 1 capsule (1,000 mg total) by mouth every evening.  0    oxyCODONE-acetaminophen (PERCOCET) 7.5-325 mg per tablet Take 1 tablet by mouth every 6 (six) hours as needed for Pain.      pravastatin (PRAVACHOL) 10 MG tablet Take 1 tablet (10 mg total) by mouth once daily. 90 tablet 1    varenicline (CHANTIX STARTING MONTH BOX) 0.5 mg (11)- 1 mg (42) tablet Take one 0.5mg tab by  mouth once daily X3 days,then increase to one 0.5mg tab twice daily X4 days,then increase to one 1mg tab twice daily 1 Package 0     No current facility-administered medications for this visit.        Medications Discontinued During This Encounter   Medication Reason    nicotine (NICODERM CQ) 21 mg/24 hr Patient no longer taking       Follow up in about 6 months (around 11/14/2019).      Reuben Banuelos MD

## 2019-05-31 RX ORDER — FLUTICASONE PROPIONATE 50 MCG
SPRAY, SUSPENSION (ML) NASAL
Qty: 16 G | Refills: 0 | Status: SHIPPED | OUTPATIENT
Start: 2019-05-31 | End: 2019-05-31 | Stop reason: SDUPTHER

## 2019-05-31 RX ORDER — FLUTICASONE PROPIONATE 50 MCG
SPRAY, SUSPENSION (ML) NASAL
Qty: 16 G | Refills: 1 | Status: SHIPPED | OUTPATIENT
Start: 2019-05-31 | End: 2023-11-14 | Stop reason: SDUPTHER

## 2019-06-19 ENCOUNTER — TELEPHONE (OUTPATIENT)
Dept: FAMILY MEDICINE | Facility: CLINIC | Age: 59
End: 2019-06-19

## 2019-06-19 NOTE — TELEPHONE ENCOUNTER
Received fax from [facility] stating they tried contacting him several times to set up therapy from an order for PT from 5/14/19 with no answer/no return call.

## 2019-07-05 ENCOUNTER — LAB VISIT (OUTPATIENT)
Dept: LAB | Facility: HOSPITAL | Age: 59
End: 2019-07-05
Attending: FAMILY MEDICINE
Payer: COMMERCIAL

## 2019-07-05 DIAGNOSIS — E78.00 PURE HYPERCHOLESTEROLEMIA: ICD-10-CM

## 2019-07-05 LAB
CHOLEST SERPL-MCNC: 205 MG/DL (ref 120–199)
CHOLEST/HDLC SERPL: 3.2 {RATIO} (ref 2–5)
HDLC SERPL-MCNC: 65 MG/DL (ref 40–75)
HDLC SERPL: 31.7 % (ref 20–50)
LDLC SERPL CALC-MCNC: 87.4 MG/DL (ref 63–159)
NONHDLC SERPL-MCNC: 140 MG/DL
TRIGL SERPL-MCNC: 263 MG/DL (ref 30–150)

## 2019-07-05 PROCEDURE — 80061 LIPID PANEL: CPT

## 2019-07-05 PROCEDURE — 36415 COLL VENOUS BLD VENIPUNCTURE: CPT | Mod: PO

## 2019-07-10 ENCOUNTER — TELEPHONE (OUTPATIENT)
Dept: FAMILY MEDICINE | Facility: CLINIC | Age: 59
End: 2019-07-10

## 2019-07-10 NOTE — TELEPHONE ENCOUNTER
"Patient is c/o that when he takes his pravastatin 10 mg he gets a "cramp" in his chest, described as like a bubble in his chest . This only happens after taking that pill, he has been taking it every other day due to this but wanted you to know and if he shouldn't take it anymore   "

## 2019-07-11 NOTE — TELEPHONE ENCOUNTER
Spoke with pt's wife and he will stop the medication. He will eat a low fat diet and watch his carbs

## 2019-08-29 ENCOUNTER — OFFICE VISIT (OUTPATIENT)
Dept: FAMILY MEDICINE | Facility: CLINIC | Age: 59
End: 2019-08-29
Payer: COMMERCIAL

## 2019-08-29 VITALS
BODY MASS INDEX: 17.43 KG/M2 | DIASTOLIC BLOOD PRESSURE: 79 MMHG | HEART RATE: 85 BPM | TEMPERATURE: 99 F | OXYGEN SATURATION: 98 % | HEIGHT: 68 IN | WEIGHT: 115 LBS | SYSTOLIC BLOOD PRESSURE: 130 MMHG

## 2019-08-29 DIAGNOSIS — G89.29 CHRONIC PAIN OF BOTH SHOULDERS: Primary | ICD-10-CM

## 2019-08-29 DIAGNOSIS — M25.511 CHRONIC PAIN OF BOTH SHOULDERS: Primary | ICD-10-CM

## 2019-08-29 DIAGNOSIS — M54.2 CHRONIC NECK PAIN: ICD-10-CM

## 2019-08-29 DIAGNOSIS — G89.29 CHRONIC NECK PAIN: ICD-10-CM

## 2019-08-29 DIAGNOSIS — M25.512 CHRONIC PAIN OF BOTH SHOULDERS: Primary | ICD-10-CM

## 2019-08-29 PROCEDURE — 99213 OFFICE O/P EST LOW 20 MIN: CPT | Mod: 25,S$GLB,, | Performed by: FAMILY MEDICINE

## 2019-08-29 PROCEDURE — 3008F PR BODY MASS INDEX (BMI) DOCUMENTED: ICD-10-PCS | Mod: CPTII,S$GLB,, | Performed by: FAMILY MEDICINE

## 2019-08-29 PROCEDURE — 99213 PR OFFICE/OUTPT VISIT, EST, LEVL III, 20-29 MIN: ICD-10-PCS | Mod: 25,S$GLB,, | Performed by: FAMILY MEDICINE

## 2019-08-29 PROCEDURE — 20551 PR INJECT TENDON ORIGIN/INSERT: ICD-10-PCS | Mod: S$GLB,,, | Performed by: FAMILY MEDICINE

## 2019-08-29 PROCEDURE — 99999 PR PBB SHADOW E&M-EST. PATIENT-LVL III: CPT | Mod: PBBFAC,,, | Performed by: FAMILY MEDICINE

## 2019-08-29 PROCEDURE — 20551 NJX 1 TENDON ORIGIN/INSJ: CPT | Mod: S$GLB,,, | Performed by: FAMILY MEDICINE

## 2019-08-29 PROCEDURE — 3008F BODY MASS INDEX DOCD: CPT | Mod: CPTII,S$GLB,, | Performed by: FAMILY MEDICINE

## 2019-08-29 PROCEDURE — 99999 PR PBB SHADOW E&M-EST. PATIENT-LVL III: ICD-10-PCS | Mod: PBBFAC,,, | Performed by: FAMILY MEDICINE

## 2019-08-29 RX ORDER — METHYLPREDNISOLONE ACETATE 40 MG/ML
40 INJECTION, SUSPENSION INTRA-ARTICULAR; INTRALESIONAL; INTRAMUSCULAR; SOFT TISSUE
Status: COMPLETED | OUTPATIENT
Start: 2019-08-29 | End: 2019-08-29

## 2019-08-29 RX ORDER — OXYCODONE AND ACETAMINOPHEN 7.5; 325 MG/1; MG/1
1 TABLET ORAL EVERY 6 HOURS PRN
Qty: 14 TABLET | Refills: 0 | Status: SHIPPED | OUTPATIENT
Start: 2019-08-29 | End: 2020-02-14

## 2019-08-29 RX ADMIN — METHYLPREDNISOLONE ACETATE 40 MG: 40 INJECTION, SUSPENSION INTRA-ARTICULAR; INTRALESIONAL; INTRAMUSCULAR; SOFT TISSUE at 09:08

## 2019-08-29 NOTE — PROGRESS NOTES
Chief Complaint:    Chief Complaint   Patient presents with    Shoulder Pain    Neck Pain       History of Present Illness:    Presents today complaining of pain in the neck and the shoulder he said this is a chronic pain and tears of sometimes turning the neck makes it worse moving the shoulder causes the pain denies any tingling numbness or weakness.  He works at a [employer] and is using his arms pretty actively.  He was asked to see pain management 1 time and he never did.      ROS:  Review of Systems   Constitutional: Negative for activity change, chills, fatigue, fever and unexpected weight change.   HENT: Negative for congestion, ear discharge, ear pain, hearing loss, postnasal drip and rhinorrhea.    Eyes: Negative for pain and visual disturbance.   Respiratory: Negative for cough, chest tightness and shortness of breath.    Cardiovascular: Negative for chest pain and palpitations.   Gastrointestinal: Negative for abdominal pain, diarrhea and vomiting.   Endocrine: Negative for heat intolerance.   Genitourinary: Negative for dysuria, flank pain, frequency and hematuria.   Musculoskeletal: Negative for back pain, gait problem and neck pain.   Skin: Negative for color change and rash.   Neurological: Negative for dizziness, tremors, seizures, numbness and headaches.   Psychiatric/Behavioral: Negative for agitation, hallucinations, self-injury, sleep disturbance and suicidal ideas. The patient is not nervous/anxious.        History reviewed. No pertinent past medical history.    Social History:  Social History     Socioeconomic History    Marital status:      Spouse name: Not on file    Number of children: Not on file    Years of education: Not on file    Highest education level: Not on file   Occupational History    Not on file   Social Needs    Financial resource strain: Not on file    Food insecurity:     Worry: Not on file     Inability: Not on file    Transportation needs:      "Medical: Not on file     Non-medical: Not on file   Tobacco Use    Smoking status: Current Every Day Smoker     Packs/day: 1.00     Years: 40.00     Pack years: 40.00     Types: Cigarettes    Smokeless tobacco: Never Used   Substance and Sexual Activity    Alcohol use: Yes     Alcohol/week: 3.6 oz     Types: 6 Cans of beer per week    Drug use: No    Sexual activity: Yes     Partners: Female   Lifestyle    Physical activity:     Days per week: Not on file     Minutes per session: Not on file    Stress: Not on file   Relationships    Social connections:     Talks on phone: Not on file     Gets together: Not on file     Attends Yazidism service: Not on file     Active member of club or organization: Not on file     Attends meetings of clubs or organizations: Not on file     Relationship status: Not on file   Other Topics Concern    Not on file   Social History Narrative    Not on file       Family History:   family history includes Cancer in his father; Heart disease in his mother.    Health Maintenance   Topic Date Due    LDCT Lung Screen  09/15/2015    Fecal Occult Blood Test (FOBT)/FitKit  04/04/2020    Lipid Panel  07/05/2024    TETANUS VACCINE  01/25/2028    Hepatitis C Screening  Completed    Pneumococcal Vaccine (Medium Risk)  Completed       Physical Exam:    Vital Signs  Temp: 98.8 °F (37.1 °C)  Temp src: Temporal  Pulse: 85  SpO2: 98 %  BP: 130/79  BP Location: Left arm  Patient Position: Sitting  Pain Score:   8  Pain Loc: Neck(Shoulder)  Height and Weight  Height: 5' 8" (172.7 cm)  Weight: 52.2 kg (114 lb 15.5 oz)  BSA (Calculated - sq m): 1.58 sq meters  BMI (Calculated): 17.5  Weight in (lb) to have BMI = 25: 164.1]    Body mass index is 17.48 kg/m².    Physical Exam   Neck: Full passive range of motion without pain. Spinous process tenderness and muscular tenderness present.       Musculoskeletal:   Bilateral shoulder muscle significant atrophy.  Tenderness to palpation of the " subacromial tendon bilaterally and the posterior lateral rotator cuff.  Impingement sign is mildly positive on the left.         Assessment:      ICD-10-CM ICD-9-CM   1. Chronic pain of both shoulders M25.511 719.41    G89.29 338.29    M25.512    2. Chronic neck pain M54.2 723.1    G89.29 338.29         Plan:  Offered steroid injection of his shoulder he accepted  Depo-Medrol injection done in the subacromial tendon bilaterally patient tolerated procedure well  Do recommend physical therapy  Offered steroid injection, risk of steroid injection discussed with the patient which include but not limited to,  1.  Infection  2.  Bleeding  3.  Tendon disruption  4.  Elevation of blood sugar  5.  Fat atrophy  6.  Worsening pain and other unforeseen complication.  Treatment alternatives were also discussed, patient likes to proceed with the steroid injection.  Depo-Medrol 40 mg and lidocaine 2% without epi Cc was used for injection, and the area was identified prepped and injection done sterile condition, patient tolerated procedure well.    Recommend that has see pain clinic for consideration for epidural steroid injection/facet arthropathy injection in the neck.  1 time prescription for Percocet given             Orders Placed This Encounter   Procedures    Ambulatory referral to Pain Clinic       Current Outpatient Medications   Medication Sig Dispense Refill    aspirin (ECOTRIN) 81 MG EC tablet Take 1 tablet (81 mg total) by mouth once daily.  0    fluticasone propionate (FLONASE) 50 mcg/actuation nasal spray 2 PUFFS EACH NOSTRIL ONCE DAILY 16 g 1    omega-3 fatty acids 1,000 mg Cap Take 1 capsule (1,000 mg total) by mouth every evening.  0    oxyCODONE-acetaminophen (PERCOCET) 7.5-325 mg per tablet Take 1 tablet by mouth every 6 (six) hours as needed for Pain. 14 tablet 0     Current Facility-Administered Medications   Medication Dose Route Frequency Provider Last Rate Last Dose    methylPREDNISolone acetate  injection 40 mg  40 mg Intramuscular 1 time in Clinic/HOD Reuben Banuelos MD           Medications Discontinued During This Encounter   Medication Reason    fluticasone (FLONASE) 50 mcg/actuation nasal spray Duplicate Order    oxyCODONE-acetaminophen (PERCOCET) 7.5-325 mg per tablet Patient no longer taking    varenicline (CHANTIX STARTING MONTH BOX) 0.5 mg (11)- 1 mg (42) tablet Patient no longer taking       No follow-ups on file.      Reuben Banuelos MD

## 2019-11-14 ENCOUNTER — OFFICE VISIT (OUTPATIENT)
Dept: FAMILY MEDICINE | Facility: CLINIC | Age: 59
End: 2019-11-14
Payer: COMMERCIAL

## 2019-11-14 VITALS
BODY MASS INDEX: 17.76 KG/M2 | WEIGHT: 117.19 LBS | HEART RATE: 66 BPM | OXYGEN SATURATION: 99 % | SYSTOLIC BLOOD PRESSURE: 128 MMHG | HEIGHT: 68 IN | DIASTOLIC BLOOD PRESSURE: 70 MMHG | TEMPERATURE: 99 F

## 2019-11-14 DIAGNOSIS — Z12.9 SCREENING FOR CANCER: ICD-10-CM

## 2019-11-14 DIAGNOSIS — F17.200 CURRENT SMOKER: Primary | ICD-10-CM

## 2019-11-14 DIAGNOSIS — Z12.2 ENCOUNTER FOR SCREENING FOR LUNG CANCER: ICD-10-CM

## 2019-11-14 DIAGNOSIS — Z78.9 STATIN INTOLERANCE: ICD-10-CM

## 2019-11-14 DIAGNOSIS — E78.2 MIXED HYPERLIPIDEMIA: ICD-10-CM

## 2019-11-14 DIAGNOSIS — Z12.5 SCREENING PSA (PROSTATE SPECIFIC ANTIGEN): ICD-10-CM

## 2019-11-14 DIAGNOSIS — Z23 NEED FOR SHINGLES VACCINE: ICD-10-CM

## 2019-11-14 PROCEDURE — 99999 PR PBB SHADOW E&M-EST. PATIENT-LVL IV: CPT | Mod: PBBFAC,,, | Performed by: FAMILY MEDICINE

## 2019-11-14 PROCEDURE — 99999 PR PBB SHADOW E&M-EST. PATIENT-LVL IV: ICD-10-PCS | Mod: PBBFAC,,, | Performed by: FAMILY MEDICINE

## 2019-11-14 PROCEDURE — 90686 FLU VACCINE (QUAD) GREATER THAN OR EQUAL TO 3YO PRESERVATIVE FREE IM: ICD-10-PCS | Mod: S$GLB,,, | Performed by: FAMILY MEDICINE

## 2019-11-14 PROCEDURE — 99214 PR OFFICE/OUTPT VISIT, EST, LEVL IV, 30-39 MIN: ICD-10-PCS | Mod: 25,S$GLB,, | Performed by: FAMILY MEDICINE

## 2019-11-14 PROCEDURE — 90471 FLU VACCINE (QUAD) GREATER THAN OR EQUAL TO 3YO PRESERVATIVE FREE IM: ICD-10-PCS | Mod: S$GLB,,, | Performed by: FAMILY MEDICINE

## 2019-11-14 PROCEDURE — 3008F PR BODY MASS INDEX (BMI) DOCUMENTED: ICD-10-PCS | Mod: CPTII,S$GLB,, | Performed by: FAMILY MEDICINE

## 2019-11-14 PROCEDURE — 99214 OFFICE O/P EST MOD 30 MIN: CPT | Mod: 25,S$GLB,, | Performed by: FAMILY MEDICINE

## 2019-11-14 PROCEDURE — 3008F BODY MASS INDEX DOCD: CPT | Mod: CPTII,S$GLB,, | Performed by: FAMILY MEDICINE

## 2019-11-14 PROCEDURE — 90686 IIV4 VACC NO PRSV 0.5 ML IM: CPT | Mod: S$GLB,,, | Performed by: FAMILY MEDICINE

## 2019-11-14 PROCEDURE — 90471 IMMUNIZATION ADMIN: CPT | Mod: S$GLB,,, | Performed by: FAMILY MEDICINE

## 2019-11-14 RX ORDER — ROSUVASTATIN CALCIUM 10 MG/1
10 TABLET, COATED ORAL DAILY
Qty: 90 TABLET | Refills: 3 | Status: SHIPPED | OUTPATIENT
Start: 2019-11-14 | End: 2022-03-29

## 2019-11-22 ENCOUNTER — HOSPITAL ENCOUNTER (OUTPATIENT)
Dept: RADIOLOGY | Facility: HOSPITAL | Age: 59
Discharge: HOME | End: 2019-11-22
Attending: FAMILY MEDICINE
Payer: COMMERCIAL

## 2019-11-22 DIAGNOSIS — Z12.9 SCREENING FOR CANCER: ICD-10-CM

## 2019-11-22 PROCEDURE — G0297 LDCT FOR LUNG CA SCREEN: HCPCS | Mod: TC

## 2019-11-22 PROCEDURE — G0297 LDCT FOR LUNG CA SCREEN: HCPCS | Mod: 26,,, | Performed by: RADIOLOGY

## 2019-11-22 PROCEDURE — G0297 CT CHEST LUNG SCREENING LOW DOSE: ICD-10-PCS | Mod: 26,,, | Performed by: RADIOLOGY

## 2019-12-05 ENCOUNTER — OFFICE VISIT (OUTPATIENT)
Dept: FAMILY MEDICINE | Facility: CLINIC | Age: 59
End: 2019-12-05
Payer: COMMERCIAL

## 2019-12-05 VITALS
WEIGHT: 117.19 LBS | OXYGEN SATURATION: 99 % | HEIGHT: 68 IN | TEMPERATURE: 99 F | BODY MASS INDEX: 17.76 KG/M2 | SYSTOLIC BLOOD PRESSURE: 134 MMHG | DIASTOLIC BLOOD PRESSURE: 72 MMHG | HEART RATE: 78 BPM

## 2019-12-05 DIAGNOSIS — M75.81 RIGHT ROTATOR CUFF TENDINITIS: Primary | ICD-10-CM

## 2019-12-05 DIAGNOSIS — M75.82 ROTATOR CUFF TENDINITIS, LEFT: ICD-10-CM

## 2019-12-05 PROCEDURE — 99999 PR PBB SHADOW E&M-EST. PATIENT-LVL III: ICD-10-PCS | Mod: PBBFAC,,, | Performed by: FAMILY MEDICINE

## 2019-12-05 PROCEDURE — 99213 OFFICE O/P EST LOW 20 MIN: CPT | Mod: 25,S$GLB,, | Performed by: FAMILY MEDICINE

## 2019-12-05 PROCEDURE — 99999 PR PBB SHADOW E&M-EST. PATIENT-LVL III: CPT | Mod: PBBFAC,,, | Performed by: FAMILY MEDICINE

## 2019-12-05 PROCEDURE — 20551 PR INJECT TENDON ORIGIN/INSERT: ICD-10-PCS | Mod: 59,LT,S$GLB, | Performed by: FAMILY MEDICINE

## 2019-12-05 PROCEDURE — 99213 PR OFFICE/OUTPT VISIT, EST, LEVL III, 20-29 MIN: ICD-10-PCS | Mod: 25,S$GLB,, | Performed by: FAMILY MEDICINE

## 2019-12-05 PROCEDURE — 3008F BODY MASS INDEX DOCD: CPT | Mod: CPTII,S$GLB,, | Performed by: FAMILY MEDICINE

## 2019-12-05 PROCEDURE — 20551 NJX 1 TENDON ORIGIN/INSJ: CPT | Mod: RT,S$GLB,, | Performed by: FAMILY MEDICINE

## 2019-12-05 PROCEDURE — 3008F PR BODY MASS INDEX (BMI) DOCUMENTED: ICD-10-PCS | Mod: CPTII,S$GLB,, | Performed by: FAMILY MEDICINE

## 2019-12-05 RX ORDER — METHYLPREDNISOLONE ACETATE 40 MG/ML
40 INJECTION, SUSPENSION INTRA-ARTICULAR; INTRALESIONAL; INTRAMUSCULAR; SOFT TISSUE
Status: COMPLETED | OUTPATIENT
Start: 2019-12-05 | End: 2019-12-05

## 2019-12-05 RX ORDER — MELOXICAM 7.5 MG/1
7.5 TABLET ORAL DAILY
Qty: 30 TABLET | Refills: 0 | Status: SHIPPED | OUTPATIENT
Start: 2019-12-05 | End: 2020-02-19 | Stop reason: ALTCHOICE

## 2019-12-05 RX ADMIN — METHYLPREDNISOLONE ACETATE 40 MG: 40 INJECTION, SUSPENSION INTRA-ARTICULAR; INTRALESIONAL; INTRAMUSCULAR; SOFT TISSUE at 05:12

## 2019-12-05 NOTE — PROGRESS NOTES
Chief Complaint:    Chief Complaint   Patient presents with    Shoulder Pain       History of Present Illness:    Presents today complaining of pain in the neck and the shoulder he said this is a chronic pain and tears of sometimes turning the neck makes it worse moving the shoulder causes the pain denies any tingling numbness or weakness.  He works at a [employer] and is using his arms pretty actively.  He was asked to see pain management 1 time and he never did.      ROS:  Review of Systems   Constitutional: Negative for activity change, chills, fatigue, fever and unexpected weight change.   HENT: Negative for congestion, ear discharge, ear pain, hearing loss, postnasal drip and rhinorrhea.    Eyes: Negative for pain and visual disturbance.   Respiratory: Negative for cough, chest tightness and shortness of breath.    Cardiovascular: Negative for chest pain and palpitations.   Gastrointestinal: Negative for abdominal pain, diarrhea and vomiting.   Endocrine: Negative for heat intolerance.   Genitourinary: Negative for dysuria, flank pain, frequency and hematuria.   Musculoskeletal: Negative for back pain, gait problem and neck pain.   Skin: Negative for color change and rash.   Neurological: Negative for dizziness, tremors, seizures, numbness and headaches.   Psychiatric/Behavioral: Negative for agitation, hallucinations, self-injury, sleep disturbance and suicidal ideas. The patient is not nervous/anxious.        History reviewed. No pertinent past medical history.    Social History:  Social History     Socioeconomic History    Marital status:      Spouse name: Not on file    Number of children: Not on file    Years of education: Not on file    Highest education level: Not on file   Occupational History    Not on file   Social Needs    Financial resource strain: Not on file    Food insecurity:     Worry: Not on file     Inability: Not on file    Transportation needs:     Medical: Not on  "file     Non-medical: Not on file   Tobacco Use    Smoking status: Current Every Day Smoker     Packs/day: 1.00     Years: 40.00     Pack years: 40.00     Types: Cigarettes    Smokeless tobacco: Never Used   Substance and Sexual Activity    Alcohol use: Yes     Alcohol/week: 6.0 standard drinks     Types: 6 Cans of beer per week    Drug use: No    Sexual activity: Yes     Partners: Female   Lifestyle    Physical activity:     Days per week: Not on file     Minutes per session: Not on file    Stress: Not on file   Relationships    Social connections:     Talks on phone: Not on file     Gets together: Not on file     Attends Am[employer] service: Not on file     Active member of club or organization: Not on file     Attends meetings of clubs or organizations: Not on file     Relationship status: Not on file   Other Topics Concern    Not on file   Social History Narrative    Not on file       Family History:   family history includes Cancer in his father; Heart disease in his mother.    Health Maintenance   Topic Date Due    Fecal Occult Blood Test (FOBT)/FitKit  04/04/2020    LDCT Lung Screen  11/22/2020    Lipid Panel  07/05/2024    TETANUS VACCINE  01/25/2028    Hepatitis C Screening  Completed    Pneumococcal Vaccine (Medium Risk)  Completed       Physical Exam:    Vital Signs  Temp: 98.8 °F (37.1 °C)  Temp src: Temporal  Pulse: 78  SpO2: 99 %  BP: 134/72  BP Location: Left arm  Patient Position: Sitting  Pain Score:   8  Pain Loc: Shoulder  Height and Weight  Height: 5' 8" (172.7 cm)  Weight: 53.1 kg (117 lb 2.8 oz)  BSA (Calculated - sq m): 1.6 sq meters  BMI (Calculated): 17.8  Weight in (lb) to have BMI = 25: 164.1]    Body mass index is 17.82 kg/m².    Physical Exam   Neck: Full passive range of motion without pain. Spinous process tenderness and muscular tenderness present.       Musculoskeletal:   Bilateral shoulder muscle significant atrophy.  Tenderness to palpation of the subacromial tendon " bilaterally and the posterior lateral rotator cuff.  Impingement sign is mildly positive on the left.         Assessment:      ICD-10-CM ICD-9-CM   1. Right rotator cuff tendinitis M75.81 726.10   2. Rotator cuff tendinitis, left M75.82 726.10         Plan:  Offered steroid injection of his shoulder he accepted  Depo-Medrol injection done in the subacromial tendon bilaterally patient tolerated procedure well  Do recommend physical therapy  Offered steroid injection, risk of steroid injection discussed with the patient which include but not limited to,  1.  Infection  2.  Bleeding  3.  Tendon disruption  4.  Elevation of blood sugar  5.  Fat atrophy  6.  Worsening pain and other unforeseen complication.  Treatment alternatives were also discussed, patient likes to proceed with the steroid injection.  Depo-Medrol 40 mg and lidocaine 2% without epi Cc was used for injection, and the area was identified prepped and injection done sterile condition, patient tolerated procedure well.    R    Injection done in both shoulders in the subacromial tendon  Recommend physical therapy  Meloxicam given for pain        Orders Placed This Encounter   Procedures    Ambulatory Referral to Physical/Occupational Therapy       Current Outpatient Medications   Medication Sig Dispense Refill    aspirin (ECOTRIN) 81 MG EC tablet Take 1 tablet (81 mg total) by mouth once daily.  0    fluticasone propionate (FLONASE) 50 mcg/actuation nasal spray 2 PUFFS EACH NOSTRIL ONCE DAILY 16 g 1    omega-3 fatty acids 1,000 mg Cap Take 1 capsule (1,000 mg total) by mouth every evening.  0    oxyCODONE-acetaminophen (PERCOCET) 7.5-325 mg per tablet Take 1 tablet by mouth every 6 (six) hours as needed for Pain. 14 tablet 0    rosuvastatin (CRESTOR) 10 MG tablet Take 1 tablet (10 mg total) by mouth once daily. 90 tablet 3    meloxicam (MOBIC) 7.5 MG tablet Take 1 tablet (7.5 mg total) by mouth once daily. 30 tablet 0     No current  facility-administered medications for this visit.        There are no discontinued medications.    No follow-ups on file.      Reuben Banuelos MD

## 2019-12-23 ENCOUNTER — LAB VISIT (OUTPATIENT)
Dept: LAB | Facility: HOSPITAL | Age: 59
End: 2019-12-23
Attending: FAMILY MEDICINE
Payer: COMMERCIAL

## 2019-12-23 DIAGNOSIS — E78.2 MIXED HYPERLIPIDEMIA: ICD-10-CM

## 2019-12-23 LAB
ALBUMIN SERPL BCP-MCNC: 3.9 G/DL (ref 3.5–5.2)
ALP SERPL-CCNC: 91 U/L (ref 55–135)
ALT SERPL W/O P-5'-P-CCNC: 33 U/L (ref 10–44)
ANION GAP SERPL CALC-SCNC: 10 MMOL/L (ref 8–16)
AST SERPL-CCNC: 25 U/L (ref 10–40)
BILIRUB SERPL-MCNC: 1 MG/DL (ref 0.1–1)
BUN SERPL-MCNC: 15 MG/DL (ref 6–20)
CALCIUM SERPL-MCNC: 9.3 MG/DL (ref 8.7–10.5)
CHLORIDE SERPL-SCNC: 103 MMOL/L (ref 95–110)
CHOLEST SERPL-MCNC: 198 MG/DL (ref 120–199)
CHOLEST/HDLC SERPL: 2.5 {RATIO} (ref 2–5)
CK SERPL-CCNC: 71 U/L (ref 20–200)
CO2 SERPL-SCNC: 25 MMOL/L (ref 23–29)
CREAT SERPL-MCNC: 0.8 MG/DL (ref 0.5–1.4)
EST. GFR  (AFRICAN AMERICAN): >60 ML/MIN/1.73 M^2
EST. GFR  (NON AFRICAN AMERICAN): >60 ML/MIN/1.73 M^2
GLUCOSE SERPL-MCNC: 131 MG/DL (ref 70–110)
HDLC SERPL-MCNC: 78 MG/DL (ref 40–75)
HDLC SERPL: 39.4 % (ref 20–50)
LDLC SERPL CALC-MCNC: 95.2 MG/DL (ref 63–159)
NONHDLC SERPL-MCNC: 120 MG/DL
POTASSIUM SERPL-SCNC: 3.8 MMOL/L (ref 3.5–5.1)
PROT SERPL-MCNC: 7 G/DL (ref 6–8.4)
SODIUM SERPL-SCNC: 138 MMOL/L (ref 136–145)
TRIGL SERPL-MCNC: 124 MG/DL (ref 30–150)

## 2019-12-23 PROCEDURE — 80053 COMPREHEN METABOLIC PANEL: CPT

## 2019-12-23 PROCEDURE — 80061 LIPID PANEL: CPT

## 2019-12-23 PROCEDURE — 36415 COLL VENOUS BLD VENIPUNCTURE: CPT | Mod: PO

## 2019-12-23 PROCEDURE — 82550 ASSAY OF CK (CPK): CPT

## 2020-02-03 ENCOUNTER — OFFICE VISIT (OUTPATIENT)
Dept: FAMILY MEDICINE | Facility: CLINIC | Age: 60
End: 2020-02-03
Payer: COMMERCIAL

## 2020-02-03 ENCOUNTER — TELEPHONE (OUTPATIENT)
Dept: FAMILY MEDICINE | Facility: CLINIC | Age: 60
End: 2020-02-03

## 2020-02-03 VITALS
TEMPERATURE: 99 F | OXYGEN SATURATION: 96 % | BODY MASS INDEX: 17.26 KG/M2 | SYSTOLIC BLOOD PRESSURE: 130 MMHG | DIASTOLIC BLOOD PRESSURE: 80 MMHG | WEIGHT: 113.56 LBS | HEART RATE: 89 BPM

## 2020-02-03 DIAGNOSIS — M67.911 DISORDER OF ROTATOR CUFF OF BOTH SHOULDERS: Primary | ICD-10-CM

## 2020-02-03 DIAGNOSIS — M67.912 DISORDER OF ROTATOR CUFF OF BOTH SHOULDERS: Primary | ICD-10-CM

## 2020-02-03 PROCEDURE — 3008F BODY MASS INDEX DOCD: CPT | Mod: CPTII,S$GLB,, | Performed by: FAMILY MEDICINE

## 2020-02-03 PROCEDURE — 99213 OFFICE O/P EST LOW 20 MIN: CPT | Mod: S$GLB,,, | Performed by: FAMILY MEDICINE

## 2020-02-03 PROCEDURE — 3008F PR BODY MASS INDEX (BMI) DOCUMENTED: ICD-10-PCS | Mod: CPTII,S$GLB,, | Performed by: FAMILY MEDICINE

## 2020-02-03 PROCEDURE — 99213 PR OFFICE/OUTPT VISIT, EST, LEVL III, 20-29 MIN: ICD-10-PCS | Mod: S$GLB,,, | Performed by: FAMILY MEDICINE

## 2020-02-03 PROCEDURE — 99999 PR PBB SHADOW E&M-EST. PATIENT-LVL III: ICD-10-PCS | Mod: PBBFAC,,, | Performed by: FAMILY MEDICINE

## 2020-02-03 PROCEDURE — 99999 PR PBB SHADOW E&M-EST. PATIENT-LVL III: CPT | Mod: PBBFAC,,, | Performed by: FAMILY MEDICINE

## 2020-02-03 NOTE — PROGRESS NOTES
Chief Complaint:    Chief Complaint   Patient presents with    Injections       History of Present Illness:  He had received steroid injection of the left shoulder he says he continues to have pain that not see much relief it did not go for physical therapy.  This is a pain he had for long time.    ROS:  Review of Systems    History reviewed. No pertinent past medical history.    Social History:  Social History     Socioeconomic History    Marital status:      Spouse name: Not on file    Number of children: Not on file    Years of education: Not on file    Highest education level: Not on file   Occupational History    Not on file   Social Needs    Financial resource strain: Not on file    Food insecurity:     Worry: Not on file     Inability: Not on file    Transportation needs:     Medical: Not on file     Non-medical: Not on file   Tobacco Use    Smoking status: Current Every Day Smoker     Packs/day: 1.00     Years: 40.00     Pack years: 40.00     Types: Cigarettes    Smokeless tobacco: Never Used   Substance and Sexual Activity    Alcohol use: Yes     Alcohol/week: 6.0 standard drinks     Types: 6 Cans of beer per week    Drug use: No    Sexual activity: Yes     Partners: Female   Lifestyle    Physical activity:     Days per week: Not on file     Minutes per session: Not on file    Stress: Not on file   Relationships    Social connections:     Talks on phone: Not on file     Gets together: Not on file     Attends Anabaptism service: Not on file     Active member of club or organization: Not on file     Attends meetings of clubs or organizations: Not on file     Relationship status: Not on file   Other Topics Concern    Not on file   Social History Narrative    Not on file       Family History:   family history includes Cancer in his father; Heart disease in his mother.    Health Maintenance   Topic Date Due    Fecal Occult Blood Test (FOBT)/FitKit  04/04/2020    LDCT Lung Screen   11/22/2020    Lipid Panel  12/23/2024    TETANUS VACCINE  01/25/2028    Hepatitis C Screening  Completed    Pneumococcal Vaccine (Medium Risk)  Completed       Physical Exam:    Vital Signs  Temp: 98.8 °F (37.1 °C)  Temp src: Tympanic  Pulse: 89  SpO2: 96 %  BP: 130/80  BP Location: Left arm  Patient Position: Sitting  Pain Score: 10-Worst pain ever  Pain Loc: Shoulder  Height and Weight  Weight: 51.5 kg (113 lb 8.6 oz)]    Body mass index is 17.26 kg/m².    Physical Exam   Musculoskeletal:   Impingement sign is positive, significant limitation of range of motion of the left shoulder.         Assessment:      ICD-10-CM ICD-9-CM   1. Disorder of rotator cuff of both shoulders M67.911 726.10    M67.912          Plan:        Suspect rotator cuff tear will get MRI of the shoulder and orthopedic consult thereafter.        Orders Placed This Encounter   Procedures    MRI Shoulder With Contrast Left       Current Outpatient Medications   Medication Sig Dispense Refill    aspirin (ECOTRIN) 81 MG EC tablet Take 1 tablet (81 mg total) by mouth once daily.  0    fluticasone propionate (FLONASE) 50 mcg/actuation nasal spray 2 PUFFS EACH NOSTRIL ONCE DAILY 16 g 1    meloxicam (MOBIC) 7.5 MG tablet Take 1 tablet (7.5 mg total) by mouth once daily. 30 tablet 0    omega-3 fatty acids 1,000 mg Cap Take 1 capsule (1,000 mg total) by mouth every evening.  0    oxyCODONE-acetaminophen (PERCOCET) 7.5-325 mg per tablet Take 1 tablet by mouth every 6 (six) hours as needed for Pain. 14 tablet 0    rosuvastatin (CRESTOR) 10 MG tablet Take 1 tablet (10 mg total) by mouth once daily. 90 tablet 3     No current facility-administered medications for this visit.        There are no discontinued medications.    No follow-ups on file.      Reuben Banuelos MD

## 2020-02-03 NOTE — TELEPHONE ENCOUNTER
----- Message from Yuki Pierre sent at 2/3/2020  8:43 AM CST -----  Contact: pt wife   Type:  Same Day Appointment Request    Caller is requesting a same day appointment.  Caller declined first available appointment listed below.    Name of Caller: pt wife   When is the first available appointment?  Symptoms:shoulder injection  Best Call Back Number: [phone] (home)   Additional Information:   Caller is requesting a call back from the nurse in regards to the pt needing to schedule his shoulder injection for today

## 2020-02-10 ENCOUNTER — HOSPITAL ENCOUNTER (OUTPATIENT)
Dept: RADIOLOGY | Facility: HOSPITAL | Age: 60
Discharge: HOME | End: 2020-02-10
Attending: FAMILY MEDICINE
Payer: COMMERCIAL

## 2020-02-10 DIAGNOSIS — M67.911 DISORDER OF ROTATOR CUFF OF BOTH SHOULDERS: ICD-10-CM

## 2020-02-10 DIAGNOSIS — M67.912 DISORDER OF ROTATOR CUFF OF BOTH SHOULDERS: ICD-10-CM

## 2020-02-10 PROCEDURE — 73221 MRI JOINT UPR EXTREM W/O DYE: CPT | Mod: TC,LT

## 2020-02-12 ENCOUNTER — TELEPHONE (OUTPATIENT)
Dept: ORTHOPEDICS | Facility: CLINIC | Age: 60
End: 2020-02-12

## 2020-02-12 ENCOUNTER — TELEPHONE (OUTPATIENT)
Dept: FAMILY MEDICINE | Facility: CLINIC | Age: 60
End: 2020-02-12

## 2020-02-12 DIAGNOSIS — S46.019A TRAUMATIC ROTATOR CUFF TEAR, INITIAL ENCOUNTER: Primary | ICD-10-CM

## 2020-02-12 NOTE — TELEPHONE ENCOUNTER
Called in regards to pt's ortho referral and Spoke with pt's wife. She scheduled apt and were informed to arrive 30 min early for x-ray.

## 2020-02-13 DIAGNOSIS — M25.512 LEFT SHOULDER PAIN, UNSPECIFIED CHRONICITY: Primary | ICD-10-CM

## 2020-02-14 ENCOUNTER — HOSPITAL ENCOUNTER (OUTPATIENT)
Dept: RADIOLOGY | Facility: HOSPITAL | Age: 60
Discharge: HOME | End: 2020-02-14
Attending: ORTHOPAEDIC SURGERY
Payer: COMMERCIAL

## 2020-02-14 ENCOUNTER — OFFICE VISIT (OUTPATIENT)
Dept: ORTHOPEDICS | Facility: CLINIC | Age: 60
End: 2020-02-14
Payer: COMMERCIAL

## 2020-02-14 VITALS
SYSTOLIC BLOOD PRESSURE: 153 MMHG | WEIGHT: 113 LBS | DIASTOLIC BLOOD PRESSURE: 84 MMHG | HEIGHT: 68 IN | HEART RATE: 65 BPM | BODY MASS INDEX: 17.13 KG/M2

## 2020-02-14 DIAGNOSIS — M75.21 TENDONITIS OF UPPER BICEPS TENDON OF RIGHT SHOULDER: ICD-10-CM

## 2020-02-14 DIAGNOSIS — M75.22 TENDONITIS OF UPPER BICEPS TENDON OF LEFT SHOULDER: ICD-10-CM

## 2020-02-14 DIAGNOSIS — M54.12 CERVICAL RADICULOPATHY: ICD-10-CM

## 2020-02-14 DIAGNOSIS — M54.12 CERVICAL RADICULOPATHY: Primary | ICD-10-CM

## 2020-02-14 DIAGNOSIS — S46.019A TRAUMATIC ROTATOR CUFF TEAR, INITIAL ENCOUNTER: Primary | ICD-10-CM

## 2020-02-14 DIAGNOSIS — M25.512 LEFT SHOULDER PAIN, UNSPECIFIED CHRONICITY: ICD-10-CM

## 2020-02-14 PROCEDURE — 99999 PR PBB SHADOW E&M-EST. PATIENT-LVL IV: ICD-10-PCS | Mod: PBBFAC,,, | Performed by: ORTHOPAEDIC SURGERY

## 2020-02-14 PROCEDURE — 73030 X-RAY EXAM OF SHOULDER: CPT | Mod: TC,LT

## 2020-02-14 PROCEDURE — 99999 PR PBB SHADOW E&M-EST. PATIENT-LVL IV: CPT | Mod: PBBFAC,,, | Performed by: ORTHOPAEDIC SURGERY

## 2020-02-14 PROCEDURE — 73030 X-RAY EXAM OF SHOULDER: CPT | Mod: 26,LT,, | Performed by: RADIOLOGY

## 2020-02-14 PROCEDURE — 73030 XR SHOULDER COMPLETE 2 OR MORE VIEWS LEFT: ICD-10-PCS | Mod: 26,LT,, | Performed by: RADIOLOGY

## 2020-02-14 PROCEDURE — 99244 OFF/OP CNSLTJ NEW/EST MOD 40: CPT | Mod: 25,S$GLB,, | Performed by: ORTHOPAEDIC SURGERY

## 2020-02-14 PROCEDURE — 96372 PR INJECTION,THERAP/PROPH/DIAG2ST, IM OR SUBCUT: ICD-10-PCS | Mod: S$GLB,,, | Performed by: ORTHOPAEDIC SURGERY

## 2020-02-14 PROCEDURE — 96372 THER/PROPH/DIAG INJ SC/IM: CPT | Mod: S$GLB,,, | Performed by: ORTHOPAEDIC SURGERY

## 2020-02-14 PROCEDURE — 99244 PR OFFICE CONSULTATION,LEVEL IV: ICD-10-PCS | Mod: 25,S$GLB,, | Performed by: ORTHOPAEDIC SURGERY

## 2020-02-14 RX ORDER — KETOROLAC TROMETHAMINE 30 MG/ML
30 INJECTION, SOLUTION INTRAMUSCULAR; INTRAVENOUS
Status: COMPLETED | OUTPATIENT
Start: 2020-02-14 | End: 2020-02-14

## 2020-02-14 RX ADMIN — KETOROLAC TROMETHAMINE 30 MG: 30 INJECTION, SOLUTION INTRAMUSCULAR; INTRAVENOUS at 08:02

## 2020-02-14 NOTE — PATIENT INSTRUCTIONS
If you are experiencing pain/discomfort or have questions after 5pm and would like to be connected to the [facility] on call team, please call this number [phone] and specify which Orthopedics/Sports Medicine provider is treating you.

## 2020-02-14 NOTE — LETTER
February 14, 2020      Reuben Banuelos MD  [address] [address] [address]           [facility]  [address] [facility] [address]  Phone: [phone]  Fax: [phone]          Patient: Leo Roblero   MR Number: [id number]   YOB: 1960   Date of Visit: 2/14/2020       Dear Dr. Reuben Banuelos:    Thank you for referring Leo Roblero to me for evaluation. Attached you will find relevant portions of my assessment and plan of care.    If you have questions, please do not hesitate to call me. I look forward to following Leo Roblero along with you.    Sincerely,    Kranthi Fernandez MD    Enclosure  CC:  No Recipients    If you would like to receive this communication electronically, please contact [email or web address] or [phone] to request more information on [facility] access.    For providers and/or their staff who would like to refer a patient to [facility], please contact us through our one-stop-shop provider referral line, [facility] , at [phone].    If you feel you have received this communication in error or would no longer like to receive these types of communications, please e-mail [email or web address]

## 2020-02-14 NOTE — PROGRESS NOTES
Subjective:     Patient ID: Leo Roblero is a 59 y.o. male.    Chief Complaint: Pain of the Left Shoulder    Consult from Dr. Reuben Banuelos  will receive report electronically     Leo Roblero, a 59 y.o. MALE is coing in today for LEFT shoulder pain. Patient received a CSI from his PCP on 12/5/19. Patient states that it did not give him any relief. Patient states that it may have lasted him about three weeks. Patient states that he has sharp and burning sensations in his shoulder. Does auto body work.  No hx of PT.     Shoulder Pain    The pain is present in the left shoulder. This is a recurrent problem. The current episode started more than 1 month ago. The problem occurs constantly. The problem has been rapidly worsening. The quality of the pain is described as aching, sharp, shooting, throbbing and tingling. The pain is at a severity of 9/10. Associated symptoms include a limited range of motion and stiffness. Pertinent negatives include no fever or numbness. The symptoms are aggravated by activity, lifting, lying down, twisting and extension. He has tried injection treatment and heat for the symptoms. The treatment provided no relief. Physical therapy was not tried.      History reviewed. No pertinent past medical history.  History reviewed. No pertinent surgical history.  Family History   Problem Relation Age of Onset    Heart disease Mother     Cancer Father      Social History     Socioeconomic History    Marital status:      Spouse name: Not on file    Number of children: Not on file    Years of education: Not on file    Highest education level: Not on file   Occupational History    Not on file   Social Needs    Financial resource strain: Not on file    Food insecurity:     Worry: Not on file     Inability: Not on file    Transportation needs:     Medical: Not on file     Non-medical: Not on file   Tobacco Use    Smoking status: Current Every Day Smoker     Packs/day: 1.00     Years: 40.00      Pack years: 40.00     Types: Cigarettes    Smokeless tobacco: Never Used   Substance and Sexual Activity    Alcohol use: Yes     Alcohol/week: 6.0 standard drinks     Types: 6 Cans of beer per week    Drug use: No    Sexual activity: Yes     Partners: Female   Lifestyle    Physical activity:     Days per week: Not on file     Minutes per session: Not on file    Stress: Not on file   Relationships    Social connections:     Talks on phone: Not on file     Gets together: Not on file     Attends Yazdanism service: Not on file     Active member of club or organization: Not on file     Attends meetings of clubs or organizations: Not on file     Relationship status: Not on file   Other Topics Concern    Not on file   Social History Narrative    Not on file     Medication List with Changes/Refills   Current Medications    ASPIRIN (ECOTRIN) 81 MG EC TABLET    Take 1 tablet (81 mg total) by mouth once daily.    FLUTICASONE PROPIONATE (FLONASE) 50 MCG/ACTUATION NASAL SPRAY    2 PUFFS EACH NOSTRIL ONCE DAILY    MELOXICAM (MOBIC) 7.5 MG TABLET    Take 1 tablet (7.5 mg total) by mouth once daily.    OMEGA-3 FATTY ACIDS 1,000 MG CAP    Take 1 capsule (1,000 mg total) by mouth every evening.    ROSUVASTATIN (CRESTOR) 10 MG TABLET    Take 1 tablet (10 mg total) by mouth once daily.   Discontinued Medications    OXYCODONE-ACETAMINOPHEN (PERCOCET) 7.5-325 MG PER TABLET    Take 1 tablet by mouth every 6 (six) hours as needed for Pain.     Review of patient's allergies indicates:  No Known Allergies  Review of Systems   Constitution: Negative for chills and fever.   HENT: Negative for ear discharge and hearing loss.    Eyes: Negative for blurred vision and visual disturbance.   Cardiovascular: Negative for chest pain and leg swelling.   Respiratory: Negative for cough and shortness of breath.    Endocrine: Negative for polyuria.   Hematologic/Lymphatic: Negative for bleeding problem.   Skin: Negative for rash.    Musculoskeletal: Positive for joint pain, muscle cramps, muscle weakness and stiffness. Negative for back pain and joint swelling.   Gastrointestinal: Negative for nausea and vomiting.   Genitourinary: Negative for hematuria.   Neurological: Negative for loss of balance, numbness and paresthesias.   Psychiatric/Behavioral: Negative for altered mental status.       Objective:   Body mass index is 17.18 kg/m².  Vitals:    02/14/20 0744   BP: (!) 153/84   Pulse: 65                General    Vitals reviewed.  Constitutional: He is oriented to person, place, and time. He appears well-developed and well-nourished. No distress.   HENT:   Head: Atraumatic.   Nose: Nose normal.   Eyes: EOM are normal. Right eye exhibits no discharge. Left eye exhibits no discharge.   Neck: Neck supple.   Cardiovascular: Normal rate and intact distal pulses.    Pulmonary/Chest: Effort normal. No respiratory distress.   Neurological: He is alert and oriented to person, place, and time. Coordination normal.   Psychiatric: He has a normal mood and affect. His behavior is normal. Judgment and thought content normal.         Right Shoulder Exam     Inspection/Observation   Swelling: absent  Bruising: absent  Scars: absent  Deformity: absent  Scapular Winging: absent  Scapular Dyskinesia: negative  Atrophy: present    Tenderness   The patient is tender to palpation of the biceps tendon.    Range of Motion   Active abduction: 90   Passive abduction: 100   Extension: 0   Forward Flexion: 160   Forward Elevation: 160Adduction: 40   External Rotation 0 degrees: 50   Internal rotation 0 degrees: T8     Tests & Signs   Drop arm: negative  Graces test: negative  Impingement: negative  Lift Off Sign: negative  Active Compression Test ([place]'s Sign): positive  Speed's Test: positive    Other   Sensation: normal    Left Shoulder Exam     Inspection/Observation   Swelling: absent  Bruising: absent  Scars: absent  Deformity: absent  Scapular Winging:  absent  Scapular Dyskinesia: negative  Atrophy: present    Tenderness   The patient is tender to palpation of the biceps tendon.    Range of Motion   Active abduction: 90   Passive abduction: 100   Extension: 0   Forward Flexion: 160   Forward Elevation: 160Adduction: 40   External Rotation 0 degrees: 50   Internal rotation 0 degrees: T8     Tests & Signs   Drop arm: negative  Garces test: positive  Impingement: positive  Lift Off Sign: negative  Active Compression Test ([place]'s Sign): positive  Speed's Test: positive  Bear Hug: negative    Other   Sensation: normal       Muscle Strength   Right Upper Extremity   Shoulder Abduction: 5/5   Shoulder Internal Rotation: 5/5   Shoulder External Rotation: 5/5   Supraspinatus: 4/5/5   Subscapularis: 5/5/5   Biceps: 5/5/5   Left Upper Extremity  Shoulder Abduction: 5/5   Shoulder Internal Rotation: 5/5   Shoulder External Rotation: 5/5   Supraspinatus: 4/5/5   Subscapularis: 5/5/5   Biceps: 5/5/5     Reflexes     Left Side  Biceps:  2+  Triceps:  2+    Right Side   Biceps:  2+  Triceps:  2+    Vascular Exam     Right Pulses      Radial:                    2+      Left Pulses      Radial:                    2+      Capillary Refill  Right Hand: normal capillary refill  Left Hand: normal capillary refill      Relevant imaging results reviewed and interpreted by me, discussed with the patient and / or family today MRI Shoulder With Contrast Left  Narrative: EXAMINATION:  MRI SHOULDER WITH CONTRAST LEFT    CLINICAL HISTORY:  Shoulder pain, prior xray, rotator cuff tear / impingement suspected;  Unspecified disorder of synovium and tendon, right shoulder    TECHNIQUE:  MR left shoulder performed multiplanar proton density and T2 weighted sequences.    COMPARISON:  None    FINDINGS:  There is moderate rotator cuff tendinosis, supraspinatus and infraspinatus, with superimposed moderate grade partial thickness tear with fluid-filled defect posterior supraspinatus footprint with  bursal and interstitial fiber tearing.  Supraspinatus tendon articular surface fraying is also noted.    There is also low-grade interstitial partial-thickness tear at the blending of the supraspinatus and infraspinatus.    There are few clustered small subcortical cysts and associated minimal edema like signal greater tuberosity.    The subscapularis tendon is unremarkable.  Long head biceps tendon is intact with mild intra-articular tendinosis.    Superior labral degenerative signal with a sublabral foramen versus SLAP tear.  Labrum is otherwise unremarkable.  Glenohumeral chondral thinning.  No joint effusion.  Normal IGHL.    Type 2 acromion without tilting minimal AC joint spurring.  There is no subacromial subdeltoid bursal fluid collection.    The bone marrow signal intensity is otherwise unremarkable.  The signal intensity of the muscle groups is normal.  No atrophy.  Impression: 1. Moderate supraspinatus and infraspinatus tendinosis.  Moderate grade partial thickness tear posterior supraspinatus footprint with bursal and interstitial fiber fluid-filled defect.  Supraspinatus articular sided fraying.  Low-grade partial-thickness tearing at the blending of the supraspinatus and infraspinatus tendons.  2. Superior labral degenerative signal with a sublabral foramen versus SLAP tear.  3. Mild long head biceps tendinosis.  Minimal AC joint spurring.    Electronically signed by: Derek Jackson MD  Date:    02/11/2020  Time:    07:53     Assessment:     Encounter Diagnoses   Name Primary?    Traumatic rotator cuff tear, initial encounter     Tendonitis of upper biceps tendon of right shoulder Yes    Tendonitis of upper biceps tendon of left shoulder         Plan:     We reviewed with Leo today, the pathology and natural history of his diagnosis. We had an extensive discussion as to the conservative treatment and management of their condition. We also discussed the variety of treatment options to include  medication, physical therapy, diagnostic testing as well as other treatments.The decision was made to go forward with:    -PT/OT  -Toradol injection today  -F/up 4-6wk  -Interventional pain to eval cervical radiculopathy    Procedure Note:  After time out was performed, including verification of patient ID, procedure, site and side, availability of information and equipment, review of safety issues, and agreement with consent, the procedure site was marked and the patient was prepped aseptically. Pt was given Toradol 30 mg IM in right deltoid  The patient had no adverse reactions to the medication. Pain decreased. The patient was instructed to avoid strenuous activities for 24-36 hours following the injection. Patient was warned to not take any additional NSAIDs for 48 hours. Following that time, patient can resume regular activities.               Disclaimer: This note was prepared using a voice recognition system and is likely to have sound alike errors within the text.

## 2020-02-17 ENCOUNTER — TELEPHONE (OUTPATIENT)
Dept: ORTHOPEDICS | Facility: CLINIC | Age: 60
End: 2020-02-17

## 2020-02-17 NOTE — TELEPHONE ENCOUNTER
Faxed PT referral to [phone] with confirmation received, KATHIE WRIGHT.     ----- Message from Syeda Holliday sent at 2/17/2020  1:55 PM CST -----  Contact: Amanda-wife   Would like to inform nurse that pt picked [facility]([phone]) to go do his therapy at. Please give a call back at [phone].          Thanks,  Syeda NOLAN

## 2020-02-17 NOTE — TELEPHONE ENCOUNTER
Unable to contact [facility] to inform them is it both shoulder he needs to be worked on.        .----- Message from Lilian Moore sent at 2/17/2020  2:41 PM CST -----  Contact: [facility]   [facility] is calling in reference to pt rotator cuff, is it right or left shoulder. .[phone] (home) [phone] (work)        .Thank You  Lilian Moore

## 2020-02-19 ENCOUNTER — OFFICE VISIT (OUTPATIENT)
Dept: PAIN MEDICINE | Facility: CLINIC | Age: 60
End: 2020-02-19
Payer: COMMERCIAL

## 2020-02-19 ENCOUNTER — HOSPITAL ENCOUNTER (OUTPATIENT)
Dept: RADIOLOGY | Facility: HOSPITAL | Age: 60
Discharge: HOME | End: 2020-02-19
Attending: PHYSICAL MEDICINE & REHABILITATION
Payer: COMMERCIAL

## 2020-02-19 VITALS
SYSTOLIC BLOOD PRESSURE: 133 MMHG | BODY MASS INDEX: 17.68 KG/M2 | WEIGHT: 116.63 LBS | HEART RATE: 85 BPM | HEIGHT: 68 IN | DIASTOLIC BLOOD PRESSURE: 82 MMHG

## 2020-02-19 DIAGNOSIS — M54.12 CERVICAL RADICULOPATHY: ICD-10-CM

## 2020-02-19 DIAGNOSIS — M75.21 TENDONITIS OF UPPER BICEPS TENDON OF RIGHT SHOULDER: ICD-10-CM

## 2020-02-19 DIAGNOSIS — M47.22 CERVICAL SPONDYLOSIS WITH RADICULOPATHY: ICD-10-CM

## 2020-02-19 DIAGNOSIS — F17.200 CURRENT SMOKER: ICD-10-CM

## 2020-02-19 DIAGNOSIS — S46.012S TRAUMATIC INCOMPLETE TEAR OF LEFT ROTATOR CUFF, SEQUELA: Primary | ICD-10-CM

## 2020-02-19 DIAGNOSIS — M75.22 TENDONITIS OF UPPER BICEPS TENDON OF LEFT SHOULDER: ICD-10-CM

## 2020-02-19 DIAGNOSIS — M79.12 MYALGIA OF AUXILIARY MUSCLES, HEAD AND NECK: ICD-10-CM

## 2020-02-19 DIAGNOSIS — S46.012S TRAUMATIC INCOMPLETE TEAR OF LEFT ROTATOR CUFF, SEQUELA: ICD-10-CM

## 2020-02-19 DIAGNOSIS — M50.30 DDD (DEGENERATIVE DISC DISEASE), CERVICAL: ICD-10-CM

## 2020-02-19 DIAGNOSIS — M54.12 CERVICAL RADICULOPATHY: Primary | ICD-10-CM

## 2020-02-19 PROCEDURE — 99999 PR PBB SHADOW E&M-EST. PATIENT-LVL III: ICD-10-PCS | Mod: PBBFAC,,, | Performed by: PHYSICAL MEDICINE & REHABILITATION

## 2020-02-19 PROCEDURE — 72052 X-RAY EXAM NECK SPINE 6/>VWS: CPT | Mod: TC

## 2020-02-19 PROCEDURE — 99244 OFF/OP CNSLTJ NEW/EST MOD 40: CPT | Mod: S$GLB,,, | Performed by: PHYSICAL MEDICINE & REHABILITATION

## 2020-02-19 PROCEDURE — 72052 XR CERVICAL SPINE 5 VIEW WITH FLEX AND EXT: ICD-10-PCS | Mod: 26,,, | Performed by: RADIOLOGY

## 2020-02-19 PROCEDURE — 99999 PR PBB SHADOW E&M-EST. PATIENT-LVL III: CPT | Mod: PBBFAC,,, | Performed by: PHYSICAL MEDICINE & REHABILITATION

## 2020-02-19 PROCEDURE — 99244 PR OFFICE CONSULTATION,LEVEL IV: ICD-10-PCS | Mod: S$GLB,,, | Performed by: PHYSICAL MEDICINE & REHABILITATION

## 2020-02-19 PROCEDURE — 72052 X-RAY EXAM NECK SPINE 6/>VWS: CPT | Mod: 26,,, | Performed by: RADIOLOGY

## 2020-02-19 RX ORDER — ACETAMINOPHEN 500 MG
500 TABLET ORAL EVERY 8 HOURS PRN
Qty: 90 TABLET | Refills: 11 | Status: SHIPPED | OUTPATIENT
Start: 2020-02-19

## 2020-02-19 RX ORDER — NAPROXEN 500 MG/1
500 TABLET ORAL 2 TIMES DAILY PRN
Qty: 60 TABLET | Refills: 1 | Status: SHIPPED | OUTPATIENT
Start: 2020-02-19 | End: 2021-12-07

## 2020-02-19 NOTE — PATIENT INSTRUCTIONS
- obtain x-ray of the cervical spine for further evaluation  - Continue current medications as prescribed, with the exception of discontinuing Mobic and changing to naproxen 500 mg up to 2 times daily as needed.  Also considered taking acetaminophen 500 mg paired with a naproxen as the may enhance each others pain relieving effects.  - agree with physical therapy prescription  - Continue home based exercises and discussed the importance of a healthy and active lifestyle  - continue follow-up with orthopedics  - Return for follow up on an as-needed basis.    Please do not hesitate to contact the clinic [phone] if you have any questions regarding your treatment plan.     Tavares Monet MD  Interventional Pain Medicine  [facility] [place]

## 2020-02-19 NOTE — LETTER
February 19, 2020      Kranthi Fernandez MD  [address]  [place] [address]           [facility]  [address] [facility] [address]  Phone: [phone]  Fax: [phone]          Patient: Leo Roblero   MR Number: [id number]   YOB: 1960   Date of Visit: 2/19/2020       Dear Dr. Kranthi Fernandez:    Thank you for referring Leo Roblero to me for evaluation. Attached you will find relevant portions of my assessment and plan of care.    If you have questions, please do not hesitate to call me. I look forward to following Leo Roblero along with you.    Sincerely,    Tavares Monet MD    Enclosure  CC:  No Recipients    If you would like to receive this communication electronically, please contact [email or web address] or [phone] to request more information on [employer] access.    For providers and/or their staff who would like to refer a patient to [facility], please contact us through our one-stop-shop provider referral line, [facility], at [phone].    If you feel you have received this communication in error or would no longer like to receive these types of communications, please e-mail [email or web address]

## 2020-02-19 NOTE — PROGRESS NOTES
New Patient Chronic Pain Note (Initial Visit)    Referring Physician: Kranthi Fernandez, *    PCP: Reuben Banuelos MD    Chief Complaint:   Chief Complaint   Patient presents with    Consult    Neck Pain        SUBJECTIVE:  Leo Roblero is a 59 y.o. male who presents to the clinic for the evaluation of neck and bilateral shoulder pain. He was referred by the Orthopedics Department for further evaluation and management of neck pain. Of note, patient has a past medical history of hyperlipidemia, and tobacco abuse, traumatic rotator cuff tear, tendinitis of the bilateral biceps tendon, and other medical comorbidities as listed below.  The pain started several years ago without any significant injury or trauma and symptoms have been worsening.  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  The pain is described as shooting and throbbing and is rated as 6/10. The pain is rated with a score of  4/10 on the BEST day and a score of 10/10 on the WORST day.  Symptoms interfere with daily activity, sleeping and work. The pain is exacerbated by work.  The pain is mitigated by rest. The patient reports spending less than 2 hours per day reclining. The patient reports 6-8 hours of uninterrupted sleep per night.  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    Patient denies night fever/night sweats, urinary incontinence, bowel incontinence, significant weight loss, significant motor weakness and loss of sensations.    Pain Disability Index Review:     Last 3 PDI Scores 2/19/2020   Pain Disability Index (PDI) 11       Non-Pharmacologic Treatments:  Physical Therapy/Home Exercise:  No, pending start  Ice/Heat:yes  TENS: no  Acupuncture: no  Massage: no  Chiropractic: no    Other: no      Pain  Medications:  - Opioids: Percocet (Oxycodone/Acetaminophen)  - Adjuvant Medications: Mobic  - Anti-Coagulants: Aspirin     report:  Reviewed and consistent with medication use as prescribed.        Pain Procedures:   -12/05/2019:  Bilateral subacromial bursa injections, limited relief  -02/14/2020:  IM Toradol to the right deltoid    Imaging:   MRI cervical spine 06/01/2017 ([facility]):  There is a mild cervical levoscoliosis with slight straightening of normal cervical lordosis in the superior aspect of the cervical spine. Cervical vertebral body stature is preserved. No prevertebral edema is noted.    The visualized anatomy at the skull base is normal.    C2-C3: Mild disc space narrowing. Mild bilateral posterior facet and ligamentum flavum hypertrophy which is effacing the posterior theca and causing subtle posterior impression on the cervical spinal cord, although the thecal sac still measures 1.2 cm in AP dimension. There is minimal posterior spondylosis.  C3-C4: Disc desiccation with right greater than left posterior facet arthropathy and with right uncinate hypertrophy, with mild disc space narrowing. There is mild ligamentum flavum hypertrophy. There is mild AP narrowing of the thecal sac, consistent with a spinal stenosis, but there is no significant deformity on the cervical spinal cord. There is right lateral recess/foraminal origin stenosis.  C4-C5: Mild to moderate disc space narrowing with disc desiccation and with right greater than left posterior facet hypertrophy. There is right uncinate hypertrophy at this level. There is bilateral ligamentum flavum hypertrophy. This combination of findings is causing a spinal stenosis, thecal sac measuring only 7.7 mm in AP dimension, and with a trefoil deformity of the thecal sac. There is also some mass effect and deformity on the cervical spinal cord. In addition to a spinal stenosis, there is right greater than left lateral recess/foraminal origin  stenosis.  C5-C6: Prominent disc space narrowing with disc desiccation, bridging anterior bony spurring, right greater than left posterior facet arthropathy, and mild right uncinate hypertrophy. There is a broad-based posterior osteophyte disc complex which effaces the anterior theca and narrows the AP dimension of the cervical thecal sac, but which does not cause any significant mass effect on the cervical spinal cord. There is mild bilateral lateral recess/foraminal origin stenosis at this level, right side greater than left.  C6-C7: Prominent disc space narrowing with disc desiccation, anterior and posterior spondylosis, and right uncinate hypertrophy. There is a broad-based, posterior osteophyte disc complex which partially effaces the anterior theca but which does not deform the cervical spinal cord. There is a mild spinal stenosis with mild, bilateral foraminal origin stenosis, slightly greater on the left.  C7-T1: Mild to moderate, bilateral posterior facet arthropathy. There is mild disc desiccation and disc space narrowing with approximately 2 mm of anterolisthesis C7 on T1. There is no obvious stenosis.    Signal intensity in the cervical thecal sac is normal. There is no convincing evidence of any significant edema or gliosis in the cervical spinal cord.    X-ray left shoulder 02/14/2020:  There is no radiographic evidence of acute osseous, articular, or soft tissue abnormality.  Joint spaces are preserved.    MRI left shoulder 2/10/20:  There is moderate rotator cuff tendinosis, supraspinatus and infraspinatus, with superimposed moderate grade partial thickness tear with fluid-filled defect posterior supraspinatus footprint with bursal and interstitial fiber tearing.  Supraspinatus tendon articular surface fraying is also noted.    There is also low-grade interstitial partial-thickness tear at the blending of the supraspinatus and infraspinatus.    There are few clustered small subcortical cysts and  associated minimal edema like signal greater tuberosity.    The subscapularis tendon is unremarkable.  Long head biceps tendon is intact with mild intra-articular tendinosis.    Superior labral degenerative signal with a sublabral foramen versus SLAP tear.  Labrum is otherwise unremarkable.  Glenohumeral chondral thinning.  No joint effusion.  Normal IGHL.    Type 2 acromion without tilting minimal AC joint spurring.  There is no subacromial subdeltoid bursal fluid collection.    The bone marrow signal intensity is otherwise unremarkable.  The signal intensity of the muscle groups is normal.  No atrophy.    History reviewed. No pertinent past medical history.  History reviewed. No pertinent surgical history.  Social History     Socioeconomic History    Marital status:      Spouse name: Not on file    Number of children: Not on file    Years of education: Not on file    Highest education level: Not on file   Occupational History    Not on file   Social Needs    Financial resource strain: Not on file    Food insecurity:     Worry: Not on file     Inability: Not on file    Transportation needs:     Medical: Not on file     Non-medical: Not on file   Tobacco Use    Smoking status: Current Every Day Smoker     Packs/day: 1.00     Years: 40.00     Pack years: 40.00     Types: Cigarettes    Smokeless tobacco: Never Used   Substance and Sexual Activity    Alcohol use: Yes     Alcohol/week: 6.0 standard drinks     Types: 6 Cans of beer per week    Drug use: No    Sexual activity: Yes     Partners: Female   Lifestyle    Physical activity:     Days per week: Not on file     Minutes per session: Not on file    Stress: Not on file   Relationships    Social connections:     Talks on phone: Not on file     Gets together: Not on file     Attends Latter day service: Not on file     Active member of club or organization: Not on file     Attends meetings of clubs or organizations: Not on file     Relationship  "status: Not on file   Other Topics Concern    Not on file   Social History Narrative    Not on file     Family History   Problem Relation Age of Onset    Heart disease Mother     Cancer Father        Review of patient's allergies indicates:  No Known Allergies    Current Outpatient Medications   Medication Sig    aspirin (ECOTRIN) 81 MG EC tablet Take 1 tablet (81 mg total) by mouth once daily.    fluticasone propionate (FLONASE) 50 mcg/actuation nasal spray 2 PUFFS EACH NOSTRIL ONCE DAILY    omega-3 fatty acids 1,000 mg Cap Take 1 capsule (1,000 mg total) by mouth every evening.    rosuvastatin (CRESTOR) 10 MG tablet Take 1 tablet (10 mg total) by mouth once daily.    acetaminophen (TYLENOL) 500 MG tablet Take 1 tablet (500 mg total) by mouth every 8 (eight) hours as needed for Pain.    naproxen (NAPROSYN) 500 MG tablet Take 1 tablet (500 mg total) by mouth 2 (two) times daily as needed (pain).     No current facility-administered medications for this visit.        Review of Systems     GENERAL:  No weight loss, malaise or fevers.  HEENT:   No recent changes in vision or hearing  NECK:  Negative for lumps, no difficulty with swallowing.  RESPIRATORY:  Negative for cough, wheezing or shortness of breath, patient denies any recent URI.  CARDIOVASCULAR:  Negative for chest pain, leg swelling or palpitations.  GI:  Negative for abdominal discomfort, blood in stools or black stools or change in bowel habits.  MUSCULOSKELETAL:  See HPI.  SKIN:  Negative for lesions, rash, and itching.  PSYCH:  No mood disorder or recent psychosocial stressors.  Patients sleep is not disturbed secondary to pain.  HEMATOLOGY/LYMPHOLOGY:  Negative for prolonged bleeding, bruising easily or swollen nodes.  Patient is currently taking anti-coagulants - ASA  NEURO:   No history of headaches, syncope, paralysis, seizures or tremors.  All other reviewed and negative other than HPI.    OBJECTIVE:    /82   Pulse 85   Ht 5' 8" " (1.727 m)   Wt 52.9 kg (116 lb 10 oz)   BMI 17.73 kg/m²         Physical Exam    GENERAL: Well appearing, in no acute distress, alert and oriented x3.  PSYCH:  Mood and affect appropriate.  SKIN: Skin color, texture, turgor normal, no rashes or lesions.  HEAD/FACE:  Normocephalic, atraumatic. Cranial nerves grossly intact.  NECK: No pain to palpation over the cervical paraspinous muscles. Spurling Negative. No pain with neck flexion, extension, or lateral flexion.   CV: RRR with palpation of the radial artery.  PULM: No evidence of respiratory difficulty, symmetric chest rise.  GI:  Soft and non-tender.  EXTREMITIES: Peripheral joint ROM is full and pain free without obvious instability or laxity in all four extremities with the exception of both shoulders having limited active range of motion with flexion and shoulder AB duction to 100° and 110° respectively on the left and 120 and 100 on the right.  There is also lateral winging of the scapula on the left.. No deformities, edema, or skin discoloration. Good capillary refill.  MUSCULOSKELETAL: Shoulder, hip, and knee provocative maneuvers are negative with the exception of positive impingement signs of both shoulders with Garces, Neer's, and empty can.  There is also a positive [place] test of the left shoulder.  There is tenderness over the bilateral biceps tendons and also positive speed's and Yergason's test bilaterally..  There is no pain with palpation over the sacroiliac joints bilaterally.  FABERs test is negative.  FADIRs test is negative.   Bilateral upper and lower extremity strength is normal and symmetric.  No atrophy or tone abnormalities are noted.  NEURO: Bilateral upper and lower extremity coordination and muscle stretch reflexes are physiologic and symmetric.  Plantar response are downgoing. No clonus.  No loss of sensation is noted.  GAIT: normal.      LABS:  Lab Results   Component Value Date    WBC 6.00 03/28/2019    HGB 15.0 03/28/2019     HCT 44.6 03/28/2019    MCV 97 03/28/2019     (H) 03/28/2019       CMP  Sodium   Date Value Ref Range Status   12/23/2019 138 136 - 145 mmol/L Final     Potassium   Date Value Ref Range Status   12/23/2019 3.8 3.5 - 5.1 mmol/L Final     Chloride   Date Value Ref Range Status   12/23/2019 103 95 - 110 mmol/L Final     CO2   Date Value Ref Range Status   12/23/2019 25 23 - 29 mmol/L Final     Glucose   Date Value Ref Range Status   12/23/2019 131 (H) 70 - 110 mg/dL Final     BUN, Bld   Date Value Ref Range Status   12/23/2019 15 6 - 20 mg/dL Final     Creatinine   Date Value Ref Range Status   12/23/2019 0.8 0.5 - 1.4 mg/dL Final     Calcium   Date Value Ref Range Status   12/23/2019 9.3 8.7 - 10.5 mg/dL Final     Total Protein   Date Value Ref Range Status   12/23/2019 7.0 6.0 - 8.4 g/dL Final     Albumin   Date Value Ref Range Status   12/23/2019 3.9 3.5 - 5.2 g/dL Final     Total Bilirubin   Date Value Ref Range Status   12/23/2019 1.0 0.1 - 1.0 mg/dL Final     Comment:     For infants and newborns, interpretation of results should be based  on gestational age, weight and in agreement with clinical  observations.  Premature Infant recommended reference ranges:  Up to 24 hours.............<8.0 mg/dL  Up to 48 hours............<12.0 mg/dL  3-5 days..................<15.0 mg/dL  6-29 days.................<15.0 mg/dL       Alkaline Phosphatase   Date Value Ref Range Status   12/23/2019 91 55 - 135 U/L Final     AST   Date Value Ref Range Status   12/23/2019 25 10 - 40 U/L Final     ALT   Date Value Ref Range Status   12/23/2019 33 10 - 44 U/L Final     Anion Gap   Date Value Ref Range Status   12/23/2019 10 8 - 16 mmol/L Final     eGFR if    Date Value Ref Range Status   12/23/2019 >60.0 >60 mL/min/1.73 m^2 Final     eGFR if non    Date Value Ref Range Status   12/23/2019 >60.0 >60 mL/min/1.73 m^2 Final     Comment:     Calculation used to obtain the estimated glomerular  filtration  rate (eGFR) is the CKD-EPI equation.          Lab Results   Component Value Date    HGBA1C 5.0 01/25/2018             ASSESSMENT: 59 y.o. year old male with neck and shoulder pain, consistent with     1. Traumatic incomplete tear of left rotator cuff, sequela  X-Ray Cervical Spine 5 View W Flex Extxt    naproxen (NAPROSYN) 500 MG tablet    acetaminophen (TYLENOL) 500 MG tablet   2. DDD (degenerative disc disease), cervical  X-Ray Cervical Spine 5 View W Flex Extxt    naproxen (NAPROSYN) 500 MG tablet    acetaminophen (TYLENOL) 500 MG tablet   3. Myalgia of auxiliary muscles, head and neck  X-Ray Cervical Spine 5 View W Flex Extxt    naproxen (NAPROSYN) 500 MG tablet    acetaminophen (TYLENOL) 500 MG tablet   4. Cervical spondylosis with radiculopathy  X-Ray Cervical Spine 5 View W Flex Extxt    naproxen (NAPROSYN) 500 MG tablet    acetaminophen (TYLENOL) 500 MG tablet   5. Cervical radiculopathy  Ambulatory referral/consult to Pain Clinic    X-Ray Cervical Spine 5 View W Flex Extxt    naproxen (NAPROSYN) 500 MG tablet    acetaminophen (TYLENOL) 500 MG tablet   6. Tendonitis of upper biceps tendon of right shoulder     7. Tendonitis of upper biceps tendon of left shoulder     8. Current smoker           PLAN:   - Interventions: None at this time. Has failed subacromial bursa injections with limited relief for only 3 weeks.    - Anticoagulation use: yes aspirin     - Medications:I have counseled the patient on importance of smoking cessation and specifically poor outcomes related to spine and spine surgery.,  I have stressed the importance of physical activity and a home exercise plan to help with pain and improve health. and Patient can continue with medications for now since they are providing benefits, using them appropriately, and without side effects.  Have patient continue on NSAIDs, but change to naproxen 500 mg up to 2 times daily and discontinue Mobic.  Also advised patient to consider use of  acetaminophen 500 mg paired with the naproxen as it may enhance their pain relieving affects.      - Therapy:  Agree with formal physical therapy which is scheduled start at the end of this week to focus on both shoulders    - Labs:  Reviewed    - Imaging: Reviewed available imaging with patient and answered any questions they had regarding study.Order Flexion-Extension X-rays of the cervical spine to rule out any instability and also evaluate level of arthritic changes.    - Consults/Referrals:  None at this time, continue follow-up with orthopedics    - Records:  Reviewed/Obtain old records from outside physicians and imaging    - Follow up visit: return to clinic on an as-needed basis    - Counseled patient regarding the importance of activity modification, smoking cessation and physical therapy    - This condition does not require this patient to take time off of work, and the primary goal of our Pain Management services is to improve the patient's functional capacity.    - Patient Questions: Answered all of the patient's questions regarding diagnosis, therapy, and treatment        The above plan and management options were discussed at length with patient. Patient is in agreement with the above and verbalized understanding.    I discussed the goals of interventional chronic pain management with the patient on today's visit.  I explained the utility of injections for diagnostic and therapeutic purposes.  We discussed a multimodal approach to pain including treating the patient's given worst pain at any given time.  We will use a systematic approach to addressing pain.  We will also adopt a multimodal approach that includes injections, adjuvant medications, physical therapy, at times psychiatry.  There may be a limited role for opioid use intermittently in the treatment of pain, more particularly for acute pain although no one approach can be used as a sole treatment modality.    I emphasized the importance of  regular exercise, core strengthening and stretching, diet and weight loss as a cornerstone of long-term pain management.      Tavares Monet MD  Interventional Pain Management  [facility] [place]    Disclaimer:  This note was prepared using voice recognition system and is likely to have sound alike errors that may have been overlooked even after proof reading.  Please call me with any questions

## 2020-03-06 ENCOUNTER — OFFICE VISIT (OUTPATIENT)
Dept: PHYSICAL MEDICINE AND REHAB | Facility: CLINIC | Age: 60
End: 2020-03-06
Payer: COMMERCIAL

## 2020-03-06 VITALS
RESPIRATION RATE: 14 BRPM | HEART RATE: 77 BPM | BODY MASS INDEX: 17.43 KG/M2 | HEIGHT: 68 IN | DIASTOLIC BLOOD PRESSURE: 87 MMHG | WEIGHT: 115 LBS | SYSTOLIC BLOOD PRESSURE: 162 MMHG

## 2020-03-06 DIAGNOSIS — G56.03 BILATERAL CARPAL TUNNEL SYNDROME: ICD-10-CM

## 2020-03-06 DIAGNOSIS — M54.12 CERVICAL RADICULOPATHY: Primary | ICD-10-CM

## 2020-03-06 PROCEDURE — 3008F PR BODY MASS INDEX (BMI) DOCUMENTED: ICD-10-PCS | Mod: CPTII,S$GLB,, | Performed by: PHYSICAL MEDICINE & REHABILITATION

## 2020-03-06 PROCEDURE — 3008F BODY MASS INDEX DOCD: CPT | Mod: CPTII,S$GLB,, | Performed by: PHYSICAL MEDICINE & REHABILITATION

## 2020-03-06 PROCEDURE — 95913 NRV CNDJ TEST 13/> STUDIES: CPT | Mod: S$GLB,,, | Performed by: PHYSICAL MEDICINE & REHABILITATION

## 2020-03-06 PROCEDURE — 95885 PR MUSC TST DONE W/NERV TST LIM: ICD-10-PCS | Mod: S$GLB,,, | Performed by: PHYSICAL MEDICINE & REHABILITATION

## 2020-03-06 PROCEDURE — 99999 PR PBB SHADOW E&M-EST. PATIENT-LVL III: CPT | Mod: PBBFAC,,, | Performed by: PHYSICAL MEDICINE & REHABILITATION

## 2020-03-06 PROCEDURE — 95913 PR NERVE CONDUCTION STUDY; 13 OR MORE STUDIES: ICD-10-PCS | Mod: S$GLB,,, | Performed by: PHYSICAL MEDICINE & REHABILITATION

## 2020-03-06 PROCEDURE — 99999 PR PBB SHADOW E&M-EST. PATIENT-LVL III: ICD-10-PCS | Mod: PBBFAC,,, | Performed by: PHYSICAL MEDICINE & REHABILITATION

## 2020-03-06 PROCEDURE — 99204 OFFICE O/P NEW MOD 45 MIN: CPT | Mod: 25,S$GLB,, | Performed by: PHYSICAL MEDICINE & REHABILITATION

## 2020-03-06 PROCEDURE — 95885 MUSC TST DONE W/NERV TST LIM: CPT | Mod: S$GLB,,, | Performed by: PHYSICAL MEDICINE & REHABILITATION

## 2020-03-06 PROCEDURE — 99204 PR OFFICE/OUTPT VISIT, NEW, LEVL IV, 45-59 MIN: ICD-10-PCS | Mod: 25,S$GLB,, | Performed by: PHYSICAL MEDICINE & REHABILITATION

## 2020-03-06 NOTE — PATIENT INSTRUCTIONS
Carpal Tunnel Syndrome    Carpal tunnel syndrome is a painful condition of the wrist and arm. It is caused by pressure on the median nerve.  The median nerve is one of the nerves that give feeling and movement to the hand. It passes through a tunnel in the wrist called the carpal tunnel. This tunnel is made up of bones and ligaments. Narrowing of this tunnel or swelling of the tissues inside the tunnel puts pressure on the median nerve. This causes numbness, pins and needles, or electric shooting pains in your hand and forearm. Often the pain is worse at night and may wake you when you are asleep.  Carpal tunnel syndrome may occur during pregnancy and with use of birth control pills. It is more common in workers who must often bend their wrists. It is also common in people who work with power tools that cause strong vibrations.  Home care  · Rest the painful wrist. Avoid repeated bending of the wrist back and forth. This puts pressure on the median nerve. Avoid using power tools with strong vibrations.  · If you were given a splint, wear it at night while you sleep. You may also wear it during the day for comfort.  · Move your fingers and wrists often to avoid stiffness.  · Elevate your arms on pillows when you lie down.  · Try using the unaffected hand more.  · Try not to hold your wrists in a bent, downward position.  · Sometimes changes in the work place may ease symptoms. If you type most of the day, it may help to change the position of your keyboard or add a wrist support. Your wrist should be in a neutral position and not bent back when typing.  · You may use over-the-counter pain medicine to treat pain and inflammation, unless another medicine was prescribed. Anti-inflammatory pain medicines, such as ibuprofen or naproxen may be more effective than acetaminophen, which treats pain, but not inflammation. If you have chronic liver or kidney disease or ever had a stomach ulcer or GI bleeding, talk with your  doctor before using these medicines.  · Opioid pain medicine will only give temporary relief and does not treat the problem. If pain continues, you may need a shot of a steroid drug into your wrist.  · If the above methods fail, you may need surgery. This will open the carpal tunnel and release the pressure on the trapped nerve.  Follow-up care  Follow up with your healthcare provider, or as advised, if the pain doesnt begin to improve within the next week.  If X-rays were taken, you will be notified of any new findings that may affect your care.  When to seek medical advice  Call your healthcare provider right away if any of these occur:  · Pain not improving with the above treatment  · Fingers or hand become cold, blue, numb, or tingly  · Your whole arm becomes swollen or weak  Date Last Reviewed: 11/23/2015  © 1501-1219 [employer]. [address] [place] [address]. All rights reserved. This information is not intended as a substitute for professional medical care. Always follow your healthcare professional's instructions.        Carpal Tunnel Syndrome Prevention Tips  Some repetitive hand activities put you at higher risk for carpal tunnel syndrome (CTS). But you can reduce your risk. Learn how to change the way you use your hands. Below are tips for at home and on the job. Be sure to also follow the hand and wrist safety policies at your workplace.      Keep your wrist in a neutral (straight) position when exercising.      Keep your wrist in neutral  Keep a neutral (straight) wrist position as often as you can. Dont use your wrist in a bent (flexed) position for long periods of time. This includes extended or twisted positions.  Watch your   Dont just use your thumb and index finger to grasp or lift. This can put stress on your wrist. When you can, use your whole hand and all its fingers to grasp an object.  Minimize repetition  Dont move your arms or hands or hold an object in  the same way for long periods of time. Even simple, light tasks can cause injury this way. Instead, alternate tasks or switch hands.  Rest your hands  Give your hands a break from time to time with a rest. Even a few minutes once an hour can help.  Reduce speed and force  Slow down the speed in which you do a forceful, repetitive motion. This gives your wrist time to recover from the effort. Use power tools to help reduce the force.  Strengthen the muscles  Weak muscles may lead to a poor wrist or arm position. Exercises will make your hand and arm muscles stronger. This can help you keep a better position.  Date Last Reviewed: 9/11/2015 © 2000-2017 [employer] [address] [place] [address], [place] [address]. All rights reserved. This information is not intended as a substitute for professional medical care. Always follow your healthcare professional's instructions.        Understanding Carpal Tunnel Syndrome    The carpal tunnel is a narrow space inside the wrist. It is ringed by bone and a band of tough tissue called the transverse carpal ligament. A major nerve called the median nerve runs from the forearm into the hand through the carpal tunnel. Tendons also run through the carpal tunnel.  With carpal tunnel syndrome, the tendons or nearby tissues within the carpal tunnel may swell or thicken. Or the transverse carpal ligament may harden and shorten. This narrows the space in the carpal tunnel and puts pressure on the median nerve. This pressure leads to tingling and numbness of the hand and wrist. In time, the condition can make even simple tasks hard to do.  What causes carpal tunnel syndrome?  Doctors arent entirely clear why the condition occurs. Certain things may make a person more likely to have it. These include:  · Being female  · Being pregnant  · Being overweight  · Having diabetes or rheumatoid arthritis  Symptoms of carpal tunnel syndrome  Symptoms often come and go. At first, symptoms  may occur mainly at night. Later, they may be noticed during the day as well. They may get worse with activities such as driving, reading, typing, or holding a phone. Symptoms can include:  · Tingling and numbness in the hand or wrist  · Sharp pain that shoots up the arm or down to the fingers  · Hand stiffness or cramping, especially in the morning  · Trouble making a fist  · Hand weakness and clumsiness  Treatment for carpal tunnel syndrome  Certain treatments help reduce the pressure on the median nerve and relieve symptoms. Choices for treatment may include one or more of the following:  · Wrist splint. This involves wearing a special brace on the wrist and hand. The splint holds the wrist straight, in a neutral position. This helps keep the carpal tunnel as open as possible.  · Cortisone shots. Cortisone is a medicine that helps reduce swelling. It is injected directly into the wrist. It helps shrink tissues inside the carpal tunnel. This relieves symptoms for a time.  · Pain medicines. You may take over-the-counter or prescription medicines to help reduce swelling and relieve symptoms.  · Surgery. If the condition doesnt respond to other treatments and doesnt go away on its own, you may need surgery. During surgery, the surgeon cuts the transverse carpal ligament to relieve pressure on the median nerve.     When to call your healthcare provider  Call your healthcare provider right away if you have any of these:  · Fever of 100.4°F (38°C) or higher, or as directed  · Symptoms that dont get better, or get worse  · New symptoms   Date Last Reviewed: 3/10/2016  © 0786-0557 [employer]. [address] [place] [address], [place] [address]. All rights reserved. This information is not intended as a substitute for professional medical care. Always follow your healthcare professional's instructions.        Understanding Cervical Radiculopathy    Cervical radiculopathy is irritation or inflammation of a nerve  root in the neck. It causes neck pain and other symptoms that may spread into the chest or down the arm. To understand this condition, it helps to understand the parts of the spine:  · Vertebrae. These are bones that stack to form the spine. The cervical spine contains the 7 vertebrae in the neck.  · Disks. These are soft pads of tissue between the vertebrae. They act as shock absorbers for the spine.  · The spinal canal. This is a tunnel formed within the stacked vertebrae. The spinal cord runs through this canal.  · Nerves. These branch off the spinal cord. As they leave the spinal canal, nerves pass through openings between the vertebrae. The nerve root is the part of the nerve that is closest to the spinal cord.   With cervical radiculopathy, nerve roots in the neck become irritated. This leads to pain and symptoms that can travel to the nerves that go from the spinal cord down the arms and into the torso.  What causes cervical radiculopathy?  Aging, injury, poor posture, and other issues can lead to problems in the neck. These problems may then irritate nerve roots. These include:  · Damage to a disk in the cervical spine. The damaged disk may then press on nearby nerve roots.  · Degeneration from wear and tear, and aging. This can lead to narrowing (stenosis) of the openings between the vertebrae. The narrowed openings press on nerve roots as they leave the spinal canal.  · An unstable spine. This is when a vertebra slips forward. It can then press on a nerve root.  There are other, less common causes of pressure on nerves in the neck. These include infection, cysts, and tumors.  Symptoms of cervical radiculopathy  These include:  · Neck pain  · Pain, numbness, tingling, or weakness that travels down the arm  · Loss of neck movement  · Muscle spasms  Treatment for cervical radiculopathy  In most cases, your healthcare provider will first try treatments that help relieve symptoms. These may  include:  · Prescription or over-the-counter pain medicines. These help relieve pain and swelling.  · Cold packs. These help reduce pain.  · Resting. This involves avoiding positions and activities that increase pain.  · Neck brace (cervical collar). This can help relieve inflammation and pain.  · Physical therapy, including exercises and stretches. This can help decrease pain and increase movement and function.  · Shots of medicinesaround the nerve roots. This is done to help relieve symptoms for a time.  In some cases, your healthcare provider may advise surgery to fix the underlying problem. This depends on the cause, the symptoms, and how long the pain has lasted.  Possible complications  Over time, an irritated and inflamed nerve may become damaged. This may lead to long-lasting (permanent) numbness or weakness. If symptoms change suddenly or get worse, be sure to let your healthcare provider know.     When to call your healthcare provider  Call your healthcare provider right away if you have any of these:  · New pain or pain that gets worse  · New or increasing weakness, numbness, or tingling in your arm or hand  · Bowel or bladder changes   Date Last Reviewed: 3/10/2016  © 0290-1098 [employer]. [address] [place] [address]. All rights reserved. This information is not intended as a substitute for professional medical care. Always follow your healthcare professional's instructions.        Nonsurgical Treatment of Cervical Spine Problems  Cervical spine problems can often be treated without surgery. Choices include rest, medicines, or injections. Your healthcare provider may also suggest physical therapy or certain exercises. These may all help to relieve your symptoms. These treatments are often successful. But if your symptoms dont subside, talk to your healthcare provider. He or she may tell you that surgery is your best choice.  Relieving your symptoms  Your healthcare provider  may recommend:  · Medicines. These help to reduce pain and inflammation.  · Epidural steroid injections. These are injections into the spinal canal near the spinal cord. They may relieve severe pain and reduce inflammation.  · Restricting aggravating activities. This can help to give your cervical spine time to heal.  · A soft cervical collar. This can help to support your head while keeping your cervical spine aligned.  · Traction. This may help relieve pressure on the irritated nerves.  Restoring mobility and strength  Your healthcare provider may recommend that you work with a physical therapist. This can help you regain mobility and strength in your neck. Physical therapy may last for 4 to 8 weeks. It may include:  · Exercises to improve your necks range of motion and strength.  · Evaluation and correction of posture and body movements. This can correct problems that affect your cervical spine.  · Heat, massage, and traction to help relieve your symptoms.  Self-care  Youll take an active role in your own therapy. To protect your neck from further injury:  · Follow any exercise program given to you by your healthcare provider or physical therapist.  · Practice good posture while sitting, standing, or moving.  · Have your workspace evaluated. Rearrange it if needed.  · When lying down, support your neck. You can use a special cervical pillow or rolled-up towel.   Date Last Reviewed: 10/5/2015  © 6887-6371 [employer]. [address] [place] [address], [place] [address]. All rights reserved. This information is not intended as a substitute for professional medical care. Always follow your healthcare professional's instructions.

## 2020-03-06 NOTE — PROGRESS NOTES
[facility]  Department of Physiatry  [facility]   [address] [facility] [place]  [place] [address]           Full Name: mireya umaña YOB: 1960  Patient ID: [id number]      Visit Date: 3/6/2020 09:48  Age: 59 Years 5 Months Old  Examining Physician: Emiliana Henry MD  Referring Physician:   History: ue pain      Chief Complaint   Patient presents with    Shoulder Pain     bilateral, left is the worst       HPI: This is a 59 y.o.  male being seen in clinic today for evaluation of chronic shoulder pain with limited ROM and achy/sharp pain.  He has additional spasms that worse with increased arm usage and occasional tingling into his hands.  Rest provide some relief.     History obtained from patient    Past family, medical, social, and surgical history reviewed in chart    Review of Systems:     General- denies lethargy, weight change, fever, chills  Head/neck- denies swallowing difficulties  ENT- denies hearing changes  Cardiovascular-denies chest pain  Pulmonary- denies shortness of breath  GI- denies constipation or bowel incontinence  - denies bladder incontinence  Skin- denies wounds or rashes  Musculoskeletal- +weakness, +pain  Neurologic- denies numbness and tingling  Psychiatric- denies depressive or psychotic features, denies anxiety  Lymphatic-denies swelling  Endocrine- denies hypoglycemic symptoms/DM history  All other pertinent systems negative     Physical Examination:  General: Well developed, well nourished male, NAD  HEENT:NCAT EOMI bilaterally   Pulmonary:Normal respirations    Spinal Examination: CERVICAL  Active ROM is within normal limits.  Inspection: No deformity of spinal alignment.      Spinal Examination: LUMBAR or THORACIC  Active ROM is within normal limits.  Inspection: No deformity of spinal alignment.      Musculoskeletal Tests:  Phalen: + bilateral  Elbow compression (ulnar):   Tinels at wrist:     Bilateral Upper and Lower  Extremities:  Pulses are 2+ at radial bilaterally.  Shoulder/Elbow/Wrist/Hand ROM wnl except limited in shoulder ROM above horizontal  Hip/Knee/Ankle ROM   Bilateral Extremities show normal capillary refill.  No signs of cyanosis, rubor, edema, skin changes, or dysvascular changes of appendages.  Nails appear intact.    Neurological Exam:  Cranial Nerves:  II-XII grossly intact    Manual Muscle Testing: (Motor 5=normal)  5/5 strength bilateral upper extremities    No focal atrophy is noted of either upper  extremity.    Bilateral Reflexes:1+bic tric br  Alexander's response is absent bilaterally.    Sensation: tested to light touch  - intact in arms   Gait: Narrow base and good arm swing.      Entire procedure explained to patient prior to proceeding.  Verbal consent obtained        Sensory NCS      Nerve / Sites Rec. Site Onset Lat Peak Lat NP Amp PP Amp Segments Distance Velocity     ms ms µV µV  cm m/s   L Median - Digit II (Antidromic)      Wrist Dig II 4.01 4.90 6.7 29.3 Wrist - Dig II 14 35   R Median - Digit II (Antidromic)      Wrist Dig II 3.49 4.38 10.2 20.2 Wrist - Dig II 14 40   L Ulnar - Digit V (Antidromic)      Wrist Dig V 3.96 5.00 16.6 9.0 Wrist - Dig V 14 35   R Ulnar - Digit V (Antidromic)      Wrist Dig V 4.01 5.16 5.3 26.1 Wrist - Dig V 14 35   R Radial - Anatomical snuff box (Forearm)      Forearm Wrist 2.81 3.39 2.7 21.8 Forearm - Wrist 10 36   L Radial      forearm Wrist 2.92 3.80 9.0 12.1 Forearm - Wrist 10 48       Combined Sensory Index      Nerve / Sites Rec. Site Peak Lat NP Amp PP Amp Segments Peak Diff     ms µV µV  ms   L Median - CSI      Median Thumb 4.01 21.8 3.7 Median - Radial 0.05      Radial Thumb 3.96 3.4 13.3 Median - Ulnar 1.46      Median Ring 5.10 5.6 11.6 Median palm - Ulnar palm       Ulnar Ring 3.65 0.15 1.7        CSI     CSI 1.51   R Median - CSI      Median Thumb 3.96 7.9 32.5 Median - Radial 0.47      Radial Thumb 3.49 10.4 2.4 Median - Ulnar 1.67      Median Ring  4.69 3.2 21.8 Median palm - Ulnar palm       Ulnar Ring 3.02 2.0 3.7        CSI     CSI 2.14       Motor NCS      Nerve / Sites Muscle Latency Amplitude Amp % Duration Segments Distance Lat Diff Velocity     ms mV % ms  cm ms m/s   L Median - APB      Wrist APB 4.11 12.9 100 7.55 Wrist - APB 8        Elbow APB 8.75 11.9 92 7.71 Elbow - Wrist 23 4.64 50   R Median - APB      Wrist APB 4.64 14.1 100 7.60 Wrist - APB 8        Elbow APB 8.85 14.8 105 7.81 Elbow - Wrist 23 4.22 55   L Ulnar - ADM      Wrist ADM 3.49 12.7 100 8.13 Wrist - ADM 8        B.Elbow ADM 7.50 12.3 97.5 7.76 B.Elbow - Wrist 23 4.01 57      A.Elbow ADM 9.90 11.7 92.6 7.92 A.Elbow - B.Elbow 12 2.40 50   R Ulnar - ADM      Wrist ADM 3.54 10.8 100 6.30 Wrist - ADM 8        B.Elbow ADM 7.60 9.0 83 6.41 B.Elbow - Wrist 23 4.06 57      A.Elbow ADM 9.74 9.6 88.7 7.45 A.Elbow - B.Elbow 11 2.14 52       EMG Summary Table     Spontaneous MUAP Recruitment   Muscle IA Fib PSW Fasc H.F. Amp Dur. PPP Pattern   L. First dorsal interosseous N None None None None N N 1+ sl red   L. Abductor pollicis brevis N None None None None N N 1+ sl red   L. Pronator teres N None None None None N N 1+ sl red   L. Biceps brachii N None None None None N N N N   L. Triceps brachii N None None None None N N 1+ sl red                                                INTERPRETATION  -Bilateral median motor nerve conduction study showed prolonged latency on the right, normal amplitude, and conduction velocity  -Bilateral median sensory nerve conduction study showed prolonged peak latency and normal amplitude  -Bilateral ulnar motor nerve conduction study showed normal latency, amplitude, and conduction velocity  -Bilateral ulnar sensory nerve conduction study showed prolonged peak latency and normal amplitude  -Bilateral radial sensory nerve conduction study showed prolonged peak latency and normal amplitude  -Bilateral combined sensory index showed a significant latency  difference  -Needle EMG examination performed to above mentioned muscles       IMPRESSION  1. ABNORMAL study  2. 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  There is additional evidence of a CHRONIC radiculopathy of the C7,C8, T1 nerve roots    PLAN  1. Follow up with referring provider: Dr. Kranthi Fernandez  2. Handouts on Cervical radic, CTS, etc provided   3. This study is good for one year. If symptoms worsen or do not improve, please re-consult.    Emiliana Henry M.D.  Physical Medicine and Rehab

## 2020-03-06 NOTE — LETTER
March 6, 2020      Kranthi Fernandez MD  [address] [facility]  [place] [address]           [facility]  [address] [facility]  [place] [address]  Phone: [phone]  Fax: [phone]          Patient: Leo Roblero   MR Number: [id number]   YOB: 1960   Date of Visit: 3/6/2020       Dear Dr. Kranthi Fernandez:    Thank you for referring Leo Roblero to me for evaluation. Attached you will find relevant portions of my assessment and plan of care.    If you have questions, please do not hesitate to call me. I look forward to following Leo Roblero along with you.    Sincerely,    Emiliana Henry MD    Enclosure  CC:  No Recipients    If you would like to receive this communication electronically, please contact [email or web address] or [phone] to request more information on [employer] Link access.    For providers and/or their staff who would like to refer a patient to [facility], please contact us through our one-stop-shop provider referral line, [facility], at [phone].    If you feel you have received this communication in error or would no longer like to receive these types of communications, please e-mail [email or web address]

## 2020-03-19 ENCOUNTER — TELEPHONE (OUTPATIENT)
Dept: ORTHOPEDICS | Facility: CLINIC | Age: 60
End: 2020-03-19

## 2020-03-19 NOTE — TELEPHONE ENCOUNTER
Spoke w/ patient's wife in regards to the patient's upcoming appt. Informed her that due to the recent changes to the policy and procedures regarding Covid-19 we were ask to reschedule all of our non-urgent cases to a later date. Patient's wife verbalized understanding and rescheduled his appt. Patient's wife verbalized understanding and was grateful for the call_DD

## 2020-03-27 ENCOUNTER — TELEPHONE (OUTPATIENT)
Dept: ORTHOPEDICS | Facility: CLINIC | Age: 60
End: 2020-03-27

## 2020-03-27 NOTE — TELEPHONE ENCOUNTER
1st attempt to contact patient regarding policy changes due to covid-19, needs to inform patient that [facility] system wide has been asked to cancel all non urgent appt until further notice or offer telehealth visit, patient did not answer. Left vm for patient to return call_DD

## 2020-03-27 NOTE — TELEPHONE ENCOUNTER
Attempted to scheduled pt for TM visit. Spoke with pt's wife who agreed. Sent Portal sign up instructions to pt's wife's phone at [phone]. Will change to TM visit once Portal is active, AL, LPN.

## 2020-03-30 ENCOUNTER — TELEPHONE (OUTPATIENT)
Dept: ORTHOPEDICS | Facility: CLINIC | Age: 60
End: 2020-03-30

## 2020-03-30 NOTE — TELEPHONE ENCOUNTER
Made several attempts to assist in guiding the patients wife on how to download the [facility] joleen for the telemed visit. I informed the pt that we would reach out to Dr. Fernandez to see if we could do a regular phone call pts wife agreed . Informed her that I would call her back if Dr. Fernandez approves.     ----- Message from Giselle Bravo sent at 3/30/2020  2:07 PM CDT -----  Contact: Amanda/wife [phone]  Type:  Patient Returning Call    Who Called:Amanda/wife  Who Left Message for Patient:Bonnie  Does the patient know what this is regarding?: video appt  Would the patient rather a call back or a response via [facility]?  Call back  Best Call Back Number:[phone]  Additional Information:

## 2020-03-31 ENCOUNTER — TELEPHONE (OUTPATIENT)
Dept: ORTHOPEDICS | Facility: CLINIC | Age: 60
End: 2020-03-31

## 2020-03-31 NOTE — TELEPHONE ENCOUNTER
Spoke w/ patient's wife to help set up [facility] through her phone to be able to have a telemedicine visit with the provider due to the recent changes in policy regarding covid 19. Patient's wife expresses that she will have her family member help her and she will call me in the morning to finish the process. Patient's wife would like to keep patient's appt but by video. Informed patient that we would finish setting up the the telehealth visit tomorrow morning. Patient's wife verbalized understanding and was grateful for the call_DD      ----- Message from Micki Small sent at 3/31/2020 11:17 AM CDT -----  Contact: Amanda Nicole set something up on her smart phone    Type:  Patient Returning Call    Who Called:Amanda Roblero  Who Left Message for Patient:Bonnie  Does the patient know what this is regarding?:setting something up on her smartphone  Would the patient rather a call back or a response via [facility]? Call back  Best Call Back Number:[phone]  Additional Information: .    Thank you

## 2020-04-14 ENCOUNTER — PATIENT MESSAGE (OUTPATIENT)
Dept: ORTHOPEDICS | Facility: CLINIC | Age: 60
End: 2020-04-14

## 2020-04-23 ENCOUNTER — PATIENT MESSAGE (OUTPATIENT)
Dept: ORTHOPEDICS | Facility: CLINIC | Age: 60
End: 2020-04-23

## 2020-04-23 ENCOUNTER — TELEPHONE (OUTPATIENT)
Dept: ORTHOPEDICS | Facility: CLINIC | Age: 60
End: 2020-04-23

## 2020-04-23 NOTE — TELEPHONE ENCOUNTER
Left voice message for patient to give a call back to go over the pre-charting information for patient's appointment on Monday morning.

## 2020-04-24 ENCOUNTER — PATIENT OUTREACH (OUTPATIENT)
Dept: ADMINISTRATIVE | Facility: OTHER | Age: 60
End: 2020-04-24

## 2020-04-24 DIAGNOSIS — Z12.11 ENCOUNTER FOR FECAL IMMUNOCHEMICAL TEST SCREENING: Primary | ICD-10-CM

## 2020-04-27 ENCOUNTER — PATIENT MESSAGE (OUTPATIENT)
Dept: ORTHOPEDICS | Facility: CLINIC | Age: 60
End: 2020-04-27

## 2020-05-22 ENCOUNTER — TELEPHONE (OUTPATIENT)
Dept: ORTHOPEDICS | Facility: CLINIC | Age: 60
End: 2020-05-22

## 2020-05-22 NOTE — TELEPHONE ENCOUNTER
Called patient in regards to rescheduling ortho appt. Patient declined appt and stated that he would call back. MS

## 2020-06-12 ENCOUNTER — TELEPHONE (OUTPATIENT)
Dept: ORTHOPEDICS | Facility: CLINIC | Age: 60
End: 2020-06-12

## 2020-06-12 ENCOUNTER — OFFICE VISIT (OUTPATIENT)
Dept: FAMILY MEDICINE | Facility: CLINIC | Age: 60
End: 2020-06-12

## 2020-06-12 VITALS
DIASTOLIC BLOOD PRESSURE: 86 MMHG | SYSTOLIC BLOOD PRESSURE: 120 MMHG | RESPIRATION RATE: 18 BRPM | TEMPERATURE: 98 F | BODY MASS INDEX: 17.43 KG/M2 | HEART RATE: 88 BPM | OXYGEN SATURATION: 97 % | WEIGHT: 114.63 LBS

## 2020-06-12 DIAGNOSIS — F43.9 STRESS: ICD-10-CM

## 2020-06-12 DIAGNOSIS — R03.0 ELEVATED BLOOD PRESSURE READING WITHOUT DIAGNOSIS OF HYPERTENSION: ICD-10-CM

## 2020-06-12 DIAGNOSIS — G56.03 BILATERAL CARPAL TUNNEL SYNDROME: ICD-10-CM

## 2020-06-12 DIAGNOSIS — M75.82 ROTATOR CUFF TENDINITIS, LEFT: Primary | ICD-10-CM

## 2020-06-12 PROCEDURE — 99214 OFFICE O/P EST MOD 30 MIN: CPT | Mod: S$PBB,,, | Performed by: FAMILY MEDICINE

## 2020-06-12 PROCEDURE — 99999 PR PBB SHADOW E&M-EST. PATIENT-LVL IV: ICD-10-PCS | Mod: PBBFAC,,, | Performed by: FAMILY MEDICINE

## 2020-06-12 PROCEDURE — 99214 OFFICE O/P EST MOD 30 MIN: CPT | Mod: PBBFAC,PO | Performed by: FAMILY MEDICINE

## 2020-06-12 PROCEDURE — 99999 PR PBB SHADOW E&M-EST. PATIENT-LVL IV: CPT | Mod: PBBFAC,,, | Performed by: FAMILY MEDICINE

## 2020-06-12 PROCEDURE — 99214 PR OFFICE/OUTPT VISIT, EST, LEVL IV, 30-39 MIN: ICD-10-PCS | Mod: S$PBB,,, | Performed by: FAMILY MEDICINE

## 2020-06-12 RX ORDER — CITALOPRAM 20 MG/1
20 TABLET, FILM COATED ORAL DAILY
Qty: 30 TABLET | Refills: 11 | Status: SHIPPED | OUTPATIENT
Start: 2020-06-12 | End: 2022-03-29

## 2020-06-12 RX ORDER — LORAZEPAM 0.5 MG/1
0.5 TABLET ORAL EVERY 6 HOURS PRN
Qty: 14 TABLET | Refills: 1 | Status: SHIPPED | OUTPATIENT
Start: 2020-06-12 | End: 2022-03-29

## 2020-06-13 NOTE — PROGRESS NOTES
Chief Complaint:    Chief Complaint   Patient presents with    Shoulder Pain       History of Present Illness:    He presents today he has been having lot of shoulder pain he was supposed to have surgery by the orthopedic but no other orthopedic is gone that he was seeing Dr. Fernandez  He had nerve conduction study that showed cervical radiculopathy and carpal tunnel syndrome and he also has a tear of his rotator cuff.  He is extremely stressed out because he has not worked in a few months of course part of the had to do with the COVID-19 situation but he is not sure if he will be able to go back to work when things open of because of his chronic pain that he has as he lays in bed and tosses and turns.    ROS:  Review of Systems   Constitutional: Negative for activity change, chills, fatigue, fever and unexpected weight change.   HENT: Negative for congestion, ear discharge, ear pain, hearing loss, postnasal drip and rhinorrhea.    Eyes: Negative for pain and visual disturbance.   Respiratory: Negative for cough, chest tightness and shortness of breath.    Cardiovascular: Negative for chest pain and palpitations.   Gastrointestinal: Negative for abdominal pain, diarrhea and vomiting.   Endocrine: Negative for heat intolerance.   Genitourinary: Negative for dysuria, flank pain, frequency and hematuria.   Musculoskeletal: Negative for back pain, gait problem and neck pain.   Skin: Negative for color change and rash.   Neurological: Negative for dizziness, tremors, seizures, numbness and headaches.   Psychiatric/Behavioral: Negative for agitation, hallucinations, self-injury, sleep disturbance and suicidal ideas. The patient is not nervous/anxious.        History reviewed. No pertinent past medical history.    Social History:  Social History     Socioeconomic History    Marital status:      Spouse name: Not on file    Number of children: Not on file    Years of education: Not on file    Highest education  level: Not on file   Occupational History    Not on file   Social Needs    Financial resource strain: Not on file    Food insecurity     Worry: Not on file     Inability: Not on file    Transportation needs     Medical: Not on file     Non-medical: Not on file   Tobacco Use    Smoking status: Current Every Day Smoker     Packs/day: 1.00     Years: 40.00     Pack years: 40.00     Types: Cigarettes    Smokeless tobacco: Never Used   Substance and Sexual Activity    Alcohol use: Yes     Alcohol/week: 6.0 standard drinks     Types: 6 Cans of beer per week    Drug use: No    Sexual activity: Yes     Partners: Female   Lifestyle    Physical activity     Days per week: Not on file     Minutes per session: Not on file    Stress: Not on file   Relationships    Social connections     Talks on phone: Not on file     Gets together: Not on file     Attends Jainism service: Not on file     Active member of club or organization: Not on file     Attends meetings of clubs or organizations: Not on file     Relationship status: Not on file   Other Topics Concern    Not on file   Social History Narrative    Not on file       Family History:   family history includes Cancer in his father; Heart disease in his mother.    Health Maintenance   Topic Date Due    LDCT Lung Screen  11/22/2020    Lipid Panel  12/23/2024    TETANUS VACCINE  01/25/2028    Hepatitis C Screening  Completed    Pneumococcal Vaccine (Medium Risk)  Completed       Physical Exam:    Vital Signs  Temp: 98 °F (36.7 °C)  Temp src: Oral  Pulse: 88  Resp: 18  SpO2: 97 %  BP: 120/86  BP Location: Left arm  Patient Position: Sitting  Pain Score: 10-Worst pain ever  Pain Loc: Shoulder  Height and Weight  Weight: 52 kg (114 lb 10.2 oz)]    Body mass index is 17.43 kg/m².    Physical Exam  Neck:      Musculoskeletal: Full passive range of motion without pain. Spinous process tenderness and muscular tenderness present.     Musculoskeletal:      Comments:  Bilateral shoulder muscle significant atrophy.  Tenderness to palpation of the subacromial tendon bilaterally and the posterior lateral rotator cuff.  Impingement sign is mildly positive on the left.           Assessment:      ICD-10-CM ICD-9-CM   1. Rotator cuff tendinitis, left  M75.82 726.10   2. Bilateral carpal tunnel syndrome  G56.03 354.0   3. Stress  F43.9 V62.89   4. Elevated blood pressure reading without diagnosis of hypertension  R03.0 796.2         Plan:  Start him on Celexa and he will take Ativan as needed at nighttime to help him relax and rest explained to him that Ativan is only for short-term as it could be habit-forming should not be used on a daily basis  Will have him referred to Dr. Ascencio orthopedic surgeon for repair of the rotator cuff.  And carpal tunnel  He had seen pain management for his neck pain he needs to get back with them for possible MADISON in the neck.  He says he is self-pay so he will wait on that  Monitor home blood pressure readings carefully        Orders Placed This Encounter   Procedures    Ambulatory referral/consult to Orthopedics       Current Outpatient Medications   Medication Sig Dispense Refill    acetaminophen (TYLENOL) 500 MG tablet Take 1 tablet (500 mg total) by mouth every 8 (eight) hours as needed for Pain. 90 tablet 11    aspirin (ECOTRIN) 81 MG EC tablet Take 1 tablet (81 mg total) by mouth once daily.  0    fluticasone propionate (FLONASE) 50 mcg/actuation nasal spray 2 PUFFS EACH NOSTRIL ONCE DAILY 16 g 1    naproxen (NAPROSYN) 500 MG tablet Take 1 tablet (500 mg total) by mouth 2 (two) times daily as needed (pain). 60 tablet 1    omega-3 fatty acids 1,000 mg Cap Take 1 capsule (1,000 mg total) by mouth every evening.  0    rosuvastatin (CRESTOR) 10 MG tablet Take 1 tablet (10 mg total) by mouth once daily. 90 tablet 3    citalopram (CELEXA) 20 MG tablet Take 1 tablet (20 mg total) by mouth once daily. 30 tablet 11    LORazepam (ATIVAN) 0.5 MG  tablet Take 1 tablet (0.5 mg total) by mouth every 6 (six) hours as needed for Anxiety. 14 tablet 1     No current facility-administered medications for this visit.        There are no discontinued medications.    Follow up in about 2 weeks (around 6/26/2020).      Reuben Banuelos MD

## 2020-06-15 ENCOUNTER — PATIENT OUTREACH (OUTPATIENT)
Dept: ADMINISTRATIVE | Facility: OTHER | Age: 60
End: 2020-06-15

## 2020-06-16 ENCOUNTER — TELEPHONE (OUTPATIENT)
Dept: ORTHOPEDICS | Facility: CLINIC | Age: 60
End: 2020-06-16

## 2020-06-16 ENCOUNTER — OFFICE VISIT (OUTPATIENT)
Dept: ORTHOPEDICS | Facility: CLINIC | Age: 60
End: 2020-06-16

## 2020-06-16 VITALS
WEIGHT: 119.06 LBS | DIASTOLIC BLOOD PRESSURE: 82 MMHG | SYSTOLIC BLOOD PRESSURE: 137 MMHG | HEIGHT: 68 IN | BODY MASS INDEX: 18.04 KG/M2 | HEART RATE: 73 BPM

## 2020-06-16 DIAGNOSIS — M75.81 RIGHT ROTATOR CUFF TENDINITIS: ICD-10-CM

## 2020-06-16 DIAGNOSIS — M25.512 CHRONIC PAIN OF BOTH SHOULDERS: ICD-10-CM

## 2020-06-16 DIAGNOSIS — M75.42 IMPINGEMENT SYNDROME OF LEFT SHOULDER: ICD-10-CM

## 2020-06-16 DIAGNOSIS — M25.511 CHRONIC PAIN OF BOTH SHOULDERS: ICD-10-CM

## 2020-06-16 DIAGNOSIS — M54.12 CERVICAL RADICULOPATHY: ICD-10-CM

## 2020-06-16 DIAGNOSIS — M75.41 IMPINGEMENT SYNDROME OF RIGHT SHOULDER: ICD-10-CM

## 2020-06-16 DIAGNOSIS — G89.29 CHRONIC PAIN OF BOTH SHOULDERS: ICD-10-CM

## 2020-06-16 DIAGNOSIS — M75.112 INCOMPLETE TEAR OF LEFT ROTATOR CUFF, UNSPECIFIED WHETHER TRAUMATIC: Primary | ICD-10-CM

## 2020-06-16 PROCEDURE — 99214 OFFICE O/P EST MOD 30 MIN: CPT | Mod: PBBFAC | Performed by: PHYSICIAN ASSISTANT

## 2020-06-16 PROCEDURE — 99213 OFFICE O/P EST LOW 20 MIN: CPT | Mod: S$PBB,,, | Performed by: PHYSICIAN ASSISTANT

## 2020-06-16 PROCEDURE — 99999 PR PBB SHADOW E&M-EST. PATIENT-LVL IV: ICD-10-PCS | Mod: PBBFAC,,, | Performed by: PHYSICIAN ASSISTANT

## 2020-06-16 PROCEDURE — 99999 PR PBB SHADOW E&M-EST. PATIENT-LVL IV: CPT | Mod: PBBFAC,,, | Performed by: PHYSICIAN ASSISTANT

## 2020-06-16 PROCEDURE — 99213 PR OFFICE/OUTPT VISIT, EST, LEVL III, 20-29 MIN: ICD-10-PCS | Mod: S$PBB,,, | Performed by: PHYSICIAN ASSISTANT

## 2020-06-16 RX ORDER — HYDROCODONE BITARTRATE AND ACETAMINOPHEN 10; 325 MG/1; MG/1
1 TABLET ORAL 3 TIMES DAILY PRN
Qty: 20 TABLET | Refills: 0 | Status: SHIPPED | OUTPATIENT
Start: 2020-06-16 | End: 2020-07-29

## 2020-06-16 NOTE — TELEPHONE ENCOUNTER
----- Message from Dmitry Fulton sent at 6/16/2020  2:17 PM CDT -----  Pt would like return call; [facility] has not received prescription for pain medication.  Please backl at [phone]

## 2020-06-16 NOTE — LETTER
June 16, 2020      Reuben Banuelos MD  [address] [address] [address]           [facility]  [address] [place] [address]  Phone: [phone]  Fax: [phone]          Patient: Leo Roblero   MR Number: [id number]   YOB: 1960   Date of Visit: 6/16/2020       Dear Dr. Reuben Banuelos:    Thank you for referring Leo Roblero to me for evaluation. Attached you will find relevant portions of my assessment and plan of care.    If you have questions, please do not hesitate to call me. I look forward to following Leo Roblero along with you.    Sincerely,    Katie Briseno PA-C    Enclosure  CC:  No Recipients    If you would like to receive this communication electronically, please contact [email or web address] or [phone] to request more information on [employer] Link access.    For providers and/or their staff who would like to refer a patient to [facility], please contact us through our one-stop-shop provider referral line, [facility], at [phone].    If you feel you have received this communication in error or would no longer like to receive these types of communications, please e-mail [email or web address]

## 2020-06-16 NOTE — PROGRESS NOTES
Chart reviewed.   Immunizations: Triggered Imm Registry     Orders placed: n/a  Upcoming appts to satisfy BLAS topics: n/a

## 2020-06-16 NOTE — PROGRESS NOTES
Subjective:      Patient ID: Leo Roblero is a 59 y.o. male.    Chief Complaint: Pain of the Left Shoulder and Pain of the Right Shoulder      HPI: Leo Roblero  is a 59 y.o. male who c/o Pain of the Left Shoulder and Pain of the Right Shoulder   for duration of about a year now.  He was seeing his primary care doctor for left shoulder pain.  His primary care doctor gave him a corticosteroid injection into left shoulder back in August 2019.  This gave him relief of symptoms for approximately 3 weeks.  He has also had physical therapy ordered in the past.  His PCP ordered an MRI in December due to continued symptoms despite more conservative treatment and was referred to the Orthopedic Department..      He saw Dr. Fernandez in February of this year in for the shoulder.  He was again ordered physical therapy.  He was given a Toradol injection.  The Toradol injection did nothing to alleviate his symptoms.  He is unable to follow through with physical therapy in February due to COVID-19.  Additionally, Dr. Fernandez referred him to interventional pain management to be evaluated for cervical radiculopathy.  He saw Dr. Monet in February who also had recommended physical therapy at the time.  Since those appointments, he had an EMG nerve conduction study in March which was significant for cervical radiculopathy.  He has not had follow-up since then.    Pain today is 10/10.  Left shoulder hurts more than the right shoulder.  He is also having some pain in the right shoulder as well.  Quality is sharp and burning.  It is constant.  It is worsened with range of motion above shoulder level.  It is improved with Percocet in the past.  He has only had minimal relief from corticosteroid injections in the past.  Unfortunately, the patient has been laid off due to COVID-19.  He lost his health insurance and is currently uninsured.  He has pain for today's visit out of pocket.    History reviewed. No pertinent past medical  history.  History reviewed. No pertinent surgical history.  Family History   Problem Relation Age of Onset    Heart disease Mother     Cancer Father      Social History     Socioeconomic History    Marital status:      Spouse name: Not on file    Number of children: Not on file    Years of education: Not on file    Highest education level: Not on file   Occupational History    Not on file   Social Needs    Financial resource strain: Not on file    Food insecurity     Worry: Not on file     Inability: Not on file    Transportation needs     Medical: Not on file     Non-medical: Not on file   Tobacco Use    Smoking status: Current Every Day Smoker     Packs/day: 1.00     Years: 40.00     Pack years: 40.00     Types: Cigarettes    Smokeless tobacco: Never Used   Substance and Sexual Activity    Alcohol use: Yes     Alcohol/week: 6.0 standard drinks     Types: 6 Cans of beer per week    Drug use: No    Sexual activity: Yes     Partners: Female   Lifestyle    Physical activity     Days per week: Not on file     Minutes per session: Not on file    Stress: Not on file   Relationships    Social connections     Talks on phone: Not on file     Gets together: Not on file     Attends Sabianism service: Not on file     Active member of club or organization: Not on file     Attends meetings of clubs or organizations: Not on file     Relationship status: Not on file   Other Topics Concern    Not on file   Social History Narrative    Not on file     Medication List with Changes/Refills   New Medications    HYDROCODONE-ACETAMINOPHEN (NORCO)  MG PER TABLET    Take 1 tablet by mouth 3 (three) times daily as needed for Pain (pain).   Current Medications    ACETAMINOPHEN (TYLENOL) 500 MG TABLET    Take 1 tablet (500 mg total) by mouth every 8 (eight) hours as needed for Pain.    ASPIRIN (ECOTRIN) 81 MG EC TABLET    Take 1 tablet (81 mg total) by mouth once daily.    CITALOPRAM (CELEXA) 20 MG TABLET     Take 1 tablet (20 mg total) by mouth once daily.    FLUTICASONE PROPIONATE (FLONASE) 50 MCG/ACTUATION NASAL SPRAY    2 PUFFS EACH NOSTRIL ONCE DAILY    LORAZEPAM (ATIVAN) 0.5 MG TABLET    Take 1 tablet (0.5 mg total) by mouth every 6 (six) hours as needed for Anxiety.    NAPROXEN (NAPROSYN) 500 MG TABLET    Take 1 tablet (500 mg total) by mouth 2 (two) times daily as needed (pain).    OMEGA-3 FATTY ACIDS 1,000 MG CAP    Take 1 capsule (1,000 mg total) by mouth every evening.    ROSUVASTATIN (CRESTOR) 10 MG TABLET    Take 1 tablet (10 mg total) by mouth once daily.     Review of patient's allergies indicates:  No Known Allergies    ROS      Objective:                Right Shoulder Exam     Tests & Signs   Garces test: positive  Impingement: positive  Rotator Cuff Painful Arc/Range: moderate  Active Compression Test (Jessamine's Sign): negative  Speed's Test: negative    Comments:  Limited ROM d/t pain      Left Shoulder Exam     Tests & Signs   Drop arm: negative  Garces test: positive  Impingement: positive  Rotator Cuff Painful Arc/Range: severe  Active Compression Test (Jessamine's Sign): positive  Speed's Test: positive    Comments:  Limited ROM d/t severe pain      Muscle Strength   Right Upper Extremity   Shoulder Abduction: 5/5   Shoulder Internal Rotation: 5/5   Shoulder External Rotation: 5/5   Left Upper Extremity  Shoulder Abduction: 4/5   Shoulder Internal Rotation: 5/5   Shoulder External Rotation: 5/5     Vascular Exam     Right Pulses      Radial:                    2+      Left Pulses      Radial:                    2+      Capillary Refill  Right Hand: normal capillary refill  Left Hand: normal capillary refill              Xray images and report were reviewed today.  I agree with the radiologist's interpretation.    X-Ray Shoulder 2 or More Views Left  Order: [id number]  Status:  Final result   Visible to patient:  Yes (Patient Portal)   Next appt:  06/30/2020 at 08:00 AM in Family Medicine Orquidea TENORIO  MD [place])   Dx:  Left shoulder pain, unspecified chron...  Details    Reading Physician Reading Date Result Priority   Julio Mills MD  [phone] 2/14/2020 Routine      Narrative & Impression     EXAMINATION:  XR SHOULDER COMPLETE 2 OR MORE VIEWS LEFT     CLINICAL HISTORY:  Pain in left shoulder     TECHNIQUE:  Two or three views of the left shoulder were performed.     COMPARISON:  None     FINDINGS:  There is no radiographic evidence of acute osseous, articular, or soft tissue abnormality.  Joint spaces are preserved.     Impression:     No acute findings        Electronically signed by: Julio Mills MD  Date:                                            02/14/2020  Time:                                           08:00           X-Ray Cervical Spine 5 View W Flex Extxt  Narrative: EXAMINATION:  XR CERVICAL SPINE 5 VIEW WITH FLEX AND EXT    CLINICAL HISTORY:  Cervical Spondylosis;  Strain of muscle(s) and tendon(s) of the rotator cuff of left shoulder, sequela    TECHNIQUE:  Five views of the cervical spine plus flexion and extension views were performed.    COMPARISON:  None.    FINDINGS:  Reversal of normal cervical lordosis.  Grade 1 anterolisthesis of C3 on C4 and C4 on C5.  These appear to mostly reduce with extension maneuver.  Grade 1 anterolisthesis of C7 on T1 which does not change with provocative maneuvers.  No acute fracture.  Prevertebral soft tissues are maintained.  Multilevel discogenic degenerative changes are seen with findings most severe at C5-C6 and C6-C7 with disc space narrowing marginal spurring.  Intact odontoid.  Multilevel bilateral areas of neural foraminal encroachment, right greater than left.    Clear lung apices.  Calcification of the bilateral carotids is seen.  Impression: As above    Electronically signed by: Edwin Julian MD  Date:    02/19/2020  Time:    08:59    X-ray Shoulder 2 or More Views Right  Order: [id number]   Status: Final result   Visible to patient: Yes  (Patient Portal)   Next appt: 06/30/2020 at 08:00 AM in Family Medicine (Leonarda Chawla MD)   Dx: Subacromial bursitis of right shoulde...   Details   Reading Physician Reading Date Result Priority   Demetrio Shine MD  [phone] 3/28/2019        Narrative & Impression          EXAMINATION:   XR SHOULDER COMPLETE 2 OR MORE VIEWS RIGHT   CLINICAL HISTORY:   Bursitis of right shoulder   TECHNIQUE:   Two or three views of the right shoulder were performed.   COMPARISON:   None   FINDINGS:   No acute osseous abnormality. AC joint degenerative changes present including undersurface osteophyte spurring. Visualized lung parenchyma clear.   IMPRESSION:   As above   Electronically signed by: Demetrio Shine MD   Date: 03/28/2019   Time: 08:51      MRI Shoulder With Contrast Left  Order: [id number]  Status:  Final result   Visible to patient:  Yes (Patient Portal)   Next appt:  06/30/2020 at 08:00 AM in Family Medicine (Leonarda Chawla MD)   Dx:  Disorder of rotator cuff of both shou...  Details    Reading Physician Reading Date Result Priority   Derek Jackson MD  [phone] 2/11/2020 Routine      Narrative & Impression     EXAMINATION:  MRI SHOULDER WITH CONTRAST LEFT     CLINICAL HISTORY:  Shoulder pain, prior xray, rotator cuff tear / impingement suspected;  Unspecified disorder of synovium and tendon, right shoulder     TECHNIQUE:  MR left shoulder performed multiplanar proton density and T2 weighted sequences.     COMPARISON:  None     FINDINGS:  There is moderate rotator cuff tendinosis, supraspinatus and infraspinatus, with superimposed moderate grade partial thickness tear with fluid-filled defect posterior supraspinatus footprint with bursal and interstitial fiber tearing.  Supraspinatus tendon articular surface fraying is also noted.     There is also low-grade interstitial partial-thickness tear at the blending of the supraspinatus and infraspinatus.     There are few clustered small subcortical cysts  and associated minimal edema like signal greater tuberosity.     The subscapularis tendon is unremarkable.  Long head biceps tendon is intact with mild intra-articular tendinosis.     Superior labral degenerative signal with a sublabral foramen versus SLAP tear.  Labrum is otherwise unremarkable.  Glenohumeral chondral thinning.  No joint effusion.  Normal IGHL.     Type 2 acromion without tilting minimal AC joint spurring.  There is no subacromial subdeltoid bursal fluid collection.     The bone marrow signal intensity is otherwise unremarkable.  The signal intensity of the muscle groups is normal.  No atrophy.     Impression:     1. Moderate supraspinatus and infraspinatus tendinosis.  Moderate grade partial thickness tear posterior supraspinatus footprint with bursal and interstitial fiber fluid-filled defect.  Supraspinatus articular sided fraying.  Low-grade partial-thickness tearing at the blending of the supraspinatus and infraspinatus tendons.  2. Superior labral degenerative signal with a sublabral foramen versus SLAP tear.  3. Mild long head biceps tendinosis.  Minimal AC joint spurring.        Electronically signed by: Derek Jackson MD  Date:                                            02/11/2020  Time:                                           07:53              EMG/NCS from 3/2020  1. ABNORMAL study   2. 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 There is additional evidence of a CHRONIC radiculopathy of the C7,C8, T1 nerve roots        Assessment:       Encounter Diagnoses   Name Primary?    Incomplete tear of left rotator cuff, unspecified whether traumatic Yes    Impingement syndrome of left shoulder     Chronic pain of both shoulders     Cervical radiculopathy     Right rotator cuff tendinitis     Impingement syndrome of right shoulder           Plan:        Leo was seen today for pain and pain.    Diagnoses and all orders for this visit:    Incomplete tear of left rotator cuff, unspecified whether traumatic  -     Ambulatory referral/consult to Orthopedics  -     HYDROcodone-acetaminophen (NORCO)  mg per tablet; Take 1 tablet by mouth 3 (three) times daily as needed for Pain (pain).    Impingement syndrome of left shoulder  -     HYDROcodone-acetaminophen (NORCO)  mg per tablet; Take 1 tablet by mouth 3 (three) times daily as needed for Pain (pain).    Chronic pain of both shoulders  -     HYDROcodone-acetaminophen (NORCO)  mg per tablet; Take 1 tablet by mouth 3 (three) times daily as needed for Pain (pain).    Cervical radiculopathy  -     HYDROcodone-acetaminophen (NORCO)  mg per tablet; Take 1 tablet by mouth 3 (three) times daily as needed for Pain (pain).    Right rotator cuff tendinitis  -     HYDROcodone-acetaminophen (NORCO)  mg per tablet; Take 1 tablet by mouth 3 (three) times daily as needed for Pain (pain).    Impingement syndrome of right shoulder  -     HYDROcodone-acetaminophen (NORCO)  mg per tablet; Take 1 tablet by mouth 3 (three) times daily as needed for Pain (pain).        Leo Roblero is established patient presenting to my clinic with new problems as above.  At this point, we discussed repeating corticosteroid injections as well as reordering physical therapy.  Unfortunately, finances are struggle for him right now given that he is currently uninsured.  Ultimately, he may benefit from left shoulder arthroscopy with decompression.  He has some stiffness on exam which I certainly think needs to be addressed through physical therapy.  Unfortunately, he cannot pay for that out of pocket.  We have discussed potential referral to [facility] for further evaluation and definitive management.  He is agreeable.  We will submit that referral As far as the right shoulder is concerned he has some stiffness bear as  well.  He would benefit from physical therapy and potential injections.  I would not recommend further workup with an MRI until the 1st addresses the left shoulder which is more problematic for him.  For him.  I told him I would give him 1 prescription for Norco 5.  I would not refill it.  Additionally, he would benefit from an oral NSAID.  He will notify the office if I can be of further assistance.  In the meantime I will also put him on a home exercise program for range of motion and cuff strengthening.    Additionally, he has cervical radiculopathy.  This would require follow up with interventional pain management.  I would be happy to help get him back and see Dr. Monet.  He will let me know if he needs assistance.  He verbalized understanding and agree.    Follow up for consult at [facility].          The patient understands, chooses and consents to this plan and accepts all   the risks which include but are not limited to the risks mentioned above.     Disclaimer: This note was prepared using a voice recognition system and is likely to have sound alike errors within the text.

## 2020-07-07 DIAGNOSIS — G89.29 CHRONIC PAIN OF BOTH SHOULDERS: ICD-10-CM

## 2020-07-07 DIAGNOSIS — M75.42 IMPINGEMENT SYNDROME OF LEFT SHOULDER: ICD-10-CM

## 2020-07-07 DIAGNOSIS — M54.12 CERVICAL RADICULOPATHY: ICD-10-CM

## 2020-07-07 DIAGNOSIS — M75.81 RIGHT ROTATOR CUFF TENDINITIS: ICD-10-CM

## 2020-07-07 DIAGNOSIS — M75.112 INCOMPLETE TEAR OF LEFT ROTATOR CUFF, UNSPECIFIED WHETHER TRAUMATIC: ICD-10-CM

## 2020-07-07 DIAGNOSIS — M75.41 IMPINGEMENT SYNDROME OF RIGHT SHOULDER: ICD-10-CM

## 2020-07-07 DIAGNOSIS — M25.512 CHRONIC PAIN OF BOTH SHOULDERS: ICD-10-CM

## 2020-07-07 DIAGNOSIS — M25.511 CHRONIC PAIN OF BOTH SHOULDERS: ICD-10-CM

## 2020-07-07 RX ORDER — HYDROCODONE BITARTRATE AND ACETAMINOPHEN 10; 325 MG/1; MG/1
1 TABLET ORAL 3 TIMES DAILY PRN
Qty: 20 TABLET | Refills: 0 | Status: CANCELLED | OUTPATIENT
Start: 2020-07-07

## 2020-07-07 NOTE — TELEPHONE ENCOUNTER
----- Message from Melissa Mooney sent at 7/7/2020  2:51 PM CDT -----  Contact: self/[phone]  Pt called in regards to checking the status of his paper work sent to [facility] and Rx refill for     Hydrocodone-acetaminophen (NORCO)  mg per tablet 20 tablet 0 6/16/2020  --  Sig - Route: Take 1 tablet by mouth 3 (three) times daily as needed for Pain (pain).     [facility] [address] [facility] [address] [phone]     Please advise

## 2020-07-07 NOTE — TELEPHONE ENCOUNTER
----- Message from Katie Briseno PA-C sent at 7/7/2020  4:00 PM CDT -----  Lucio I referred this patient to [facility].  He is requesting more Norco.  Can you touch base with him to check on the status of that referral.  If he has not heard from any body, we need to get in touch with some body over there to help schedule.  I do not plan on prescribing him a long-term pain medications especially for problem that will ultimately be treated by another provider.

## 2020-07-07 NOTE — TELEPHONE ENCOUNTER
----- Message from Melissa Mooney sent at 7/7/2020  2:51 PM CDT -----  Contact: self/[phone]  Pt called in regards to checking the status of his paper work sent to [facility] and Rx refill for     Hydrocodone-acetaminophen (NORCO)  mg per tablet 20 tablet 0 6/16/2020  --  Sig - Route: Take 1 tablet by mouth 3 (three) times daily as needed for Pain (pain).     [facility] [address] Count includes the [facility] 16 [phone]     Please advise

## 2020-07-07 NOTE — TELEPHONE ENCOUNTER
Spoke with patient and scheduled an follow up appointment with Dr Monet, informed him of the department Narcotic protocal and sent refill request for authorization. Patient verbalized understanding.

## 2020-07-08 ENCOUNTER — TELEPHONE (OUTPATIENT)
Dept: PAIN MEDICINE | Facility: CLINIC | Age: 60
End: 2020-07-08

## 2020-07-08 NOTE — TELEPHONE ENCOUNTER
----- Message from Bozena Marlow sent at 7/8/2020  4:03 PM CDT -----  Contact: pt wife - stefan  Type:  RX Refill Request    Who Called:  stefan   Refill or New Rx:refill   RX Name and Strength: norco 10 mg ( pt wife not sure )  How is the patient currently taking it? (ex. 1XDay): as needed 1/2 in morning and if needed the other half at night   Is this a 30 day or 90 day RX:up to the  ( has an appt on 07/29 for the shoulder )  Preferred Pharmacy with phone number: [facility]  Local or Mail Order: local   Ordering Provider:Dr Monet   Would the patient rather a call back or a response via [facility]? phone  Best Call Back Number:[phone] or cell [phone]  Additional Information:

## 2020-07-09 ENCOUNTER — TELEPHONE (OUTPATIENT)
Dept: PAIN MEDICINE | Facility: CLINIC | Age: 60
End: 2020-07-09

## 2020-07-09 ENCOUNTER — TELEPHONE (OUTPATIENT)
Dept: ORTHOPEDICS | Facility: CLINIC | Age: 60
End: 2020-07-09

## 2020-07-09 ENCOUNTER — TELEPHONE (OUTPATIENT)
Dept: FAMILY MEDICINE | Facility: CLINIC | Age: 60
End: 2020-07-09

## 2020-07-09 NOTE — TELEPHONE ENCOUNTER
Spoke with patient's mother and informed her that his prescription was denied by Katie Briseno PA-C.

## 2020-07-09 NOTE — TELEPHONE ENCOUNTER
Informed pt he needs to be seen in clinic to discuss any medications, offered earlier appt than current appt , pt declined and asked can  give any medication without appt . Informed him that  wants to see him to discuss that . Pt stated he would wait . Pt understood. All questions answered.   Danny Cervantes MA  [facility]

## 2020-07-09 NOTE — TELEPHONE ENCOUNTER
----- Message from Laura Maddox sent at 7/9/2020  4:09 PM CDT -----  Regarding: Medication refill  Contact: Pt  Type:  RX Refill Request    Who Called: pt spouse   Refill or New Rx:refill  RX Name and Strength:HYDROcodone-acetaminophen (NORCO)  mg per tablet  How is the patient currently taking it? (ex. 1XDay):as needed for pain (half of a pill)  Is this a 30 day or 90 day RX:  Preferred Pharmacy with phone number:  [place], [place] - [address] [facility]  [address] [address] [address]  Phone: [phone] Fax: [phone]  Local or Mail Order:Local  Ordering Provider: originally ([facility] )   Would the patient rather a call back or a response via [facility]? Call back  Best Call Back Number:[phone] (home) [phone] (work)  Additional Information:  Caller is asking for at least a few days until his upcoming appointment. Caller stated that the pt is still trying to work and is in pain.

## 2020-07-09 NOTE — TELEPHONE ENCOUNTER
----- Message from Arely Witt sent at 7/9/2020  3:34 PM CDT -----  Regarding: refill  .Type:  RX Refill Request    Who Called:  spouse    Refill or New Rx:refill   RX Name and Strength: narco   How is the patient currently taking it? (ex. 1XDay):   Is this a 30 day or 90 day RX:   Preferred Pharmacy with phone number:     [place], [place] - [address] [facility]  [address] [address] [address]  Phone: [phone] Fax: [phone]    Local or Mail Order:  local   Ordering Provider:   Would the patient rather a call back or a response via [facility]?  Call back   Best Call Back Number: [phone] or [phone]    Additional Information:  pt in pain needing medication , pt has an appt with Dr. Monet on 7/29

## 2020-07-09 NOTE — TELEPHONE ENCOUNTER
----- Message from Laura Maddox sent at 7/9/2020  4:09 PM CDT -----  Regarding: Medication refill  Contact: Pt  Type:  RX Refill Request    Who Called: pt spouse   Refill or New Rx:refill  RX Name and Strength:HYDROcodone-acetaminophen (NORCO)  mg per tablet  How is the patient currently taking it? (ex. 1XDay):as needed for pain (half of a pill)  Is this a 30 day or 90 day RX:  Preferred Pharmacy with phone number:  [place], [place] - [address] [facility]  [address] [address] [address]  Phone: [phone] Fax: [phone]  Local or Mail Order:Local  [facility]   Would the patient rather a call back or a response via [facility]? Call back  Best Call Back Number:[phone] (home) [phone] (work)  Additional Information:  Caller is asking for at least a few days until his upcoming appointment. Caller stated that the pt is still trying to work and is in pain.

## 2020-07-09 NOTE — TELEPHONE ENCOUNTER
Tried to call to inform pt he needs an appt to discuss pain as he has not been seen since February and  will not give any controlled medications without discussing first . No answer. Left   Danny Cervantes MA  [facility]   [facility]

## 2020-07-27 ENCOUNTER — PATIENT OUTREACH (OUTPATIENT)
Dept: ADMINISTRATIVE | Facility: HOSPITAL | Age: 60
End: 2020-07-27

## 2020-07-28 ENCOUNTER — PATIENT OUTREACH (OUTPATIENT)
Dept: ADMINISTRATIVE | Facility: OTHER | Age: 60
End: 2020-07-28

## 2020-07-28 NOTE — PROGRESS NOTES
Requested updates within [facility] Everywhere.  Patient's chart was reviewed for overdue BLAS topics.  Immunizations not reconciled due to LINKS not responding.

## 2020-07-29 ENCOUNTER — OFFICE VISIT (OUTPATIENT)
Dept: PAIN MEDICINE | Facility: CLINIC | Age: 60
End: 2020-07-29

## 2020-07-29 VITALS
DIASTOLIC BLOOD PRESSURE: 79 MMHG | WEIGHT: 115.19 LBS | HEIGHT: 68 IN | BODY MASS INDEX: 17.46 KG/M2 | SYSTOLIC BLOOD PRESSURE: 126 MMHG | HEART RATE: 78 BPM

## 2020-07-29 DIAGNOSIS — S46.012S TRAUMATIC INCOMPLETE TEAR OF LEFT ROTATOR CUFF, SEQUELA: ICD-10-CM

## 2020-07-29 DIAGNOSIS — M75.22 TENDONITIS OF UPPER BICEPS TENDON OF LEFT SHOULDER: ICD-10-CM

## 2020-07-29 DIAGNOSIS — M75.21 TENDONITIS OF UPPER BICEPS TENDON OF RIGHT SHOULDER: ICD-10-CM

## 2020-07-29 DIAGNOSIS — M54.12 CERVICAL RADICULOPATHY: ICD-10-CM

## 2020-07-29 DIAGNOSIS — M79.12 MYALGIA OF AUXILIARY MUSCLES, HEAD AND NECK: ICD-10-CM

## 2020-07-29 DIAGNOSIS — M50.30 DDD (DEGENERATIVE DISC DISEASE), CERVICAL: Primary | ICD-10-CM

## 2020-07-29 PROCEDURE — 99213 OFFICE O/P EST LOW 20 MIN: CPT | Mod: PBBFAC | Performed by: PHYSICAL MEDICINE & REHABILITATION

## 2020-07-29 PROCEDURE — 99214 PR OFFICE/OUTPT VISIT, EST, LEVL IV, 30-39 MIN: ICD-10-PCS | Mod: S$PBB,,, | Performed by: PHYSICAL MEDICINE & REHABILITATION

## 2020-07-29 PROCEDURE — 99999 PR PBB SHADOW E&M-EST. PATIENT-LVL III: CPT | Mod: PBBFAC,,, | Performed by: PHYSICAL MEDICINE & REHABILITATION

## 2020-07-29 PROCEDURE — 99999 PR PBB SHADOW E&M-EST. PATIENT-LVL III: ICD-10-PCS | Mod: PBBFAC,,, | Performed by: PHYSICAL MEDICINE & REHABILITATION

## 2020-07-29 PROCEDURE — 99214 OFFICE O/P EST MOD 30 MIN: CPT | Mod: S$PBB,,, | Performed by: PHYSICAL MEDICINE & REHABILITATION

## 2020-07-29 RX ORDER — HYDROCODONE BITARTRATE AND ACETAMINOPHEN 5; 325 MG/1; MG/1
1 TABLET ORAL EVERY 12 HOURS PRN
Qty: 60 TABLET | Refills: 0 | Status: SHIPPED | OUTPATIENT
Start: 2020-08-28 | End: 2020-09-27

## 2020-07-29 RX ORDER — HYDROCODONE BITARTRATE AND ACETAMINOPHEN 5; 325 MG/1; MG/1
1 TABLET ORAL EVERY 12 HOURS PRN
Qty: 60 TABLET | Refills: 0 | Status: SHIPPED | OUTPATIENT
Start: 2020-07-29 | End: 2020-08-28

## 2020-07-29 NOTE — PROGRESS NOTES
Established Patient Chronic Pain Note (Follow up visit)    Chief Complaint:   Chief Complaint   Patient presents with    Neck Pain     F/U       SUBJECTIVE:    Leo Roblero is a 59 y.o. male who presents to the clinic for a follow-up appointment for neck and bilateral shoulder pain.  At the last clinic visit, I expressed to the patient that he could continue on NSAIDs and Tylenol for his chronic pain control and to start with physical therapy to help stabilize his shoulder musculature.  He had failed subacromial bursa injections without much relief.  Since the last visit, Leo Roblero states the pain has been persistant. Current pain intensity is 10/10.    Patient denies night fever/night sweats, urinary incontinence, bowel incontinence, significant weight loss, significant motor weakness and loss of sensations.    Pain Disability Index Review:  Last 3 PDI Scores 2/19/2020   Pain Disability Index (PDI) 11       Initial HPI 02/19/2020:  Leo Roblero is a 59 y.o. male who presents to the clinic for the evaluation of neck and bilateral shoulder pain. He was referred by the Orthopedics Department for further evaluation and management of neck pain. Of note, patient has a past medical history of hyperlipidemia, and tobacco abuse, traumatic rotator cuff tear, tendinitis of the bilateral biceps tendon, and other medical comorbidities as listed below.  The pain started several years ago without any significant injury or trauma and symptoms have been worsening.  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  The pain is described as shooting and throbbing and is rated as 6/10. The pain is rated with a score of  4/10 on the BEST day and a score of 10/10 on the WORST day.  Symptoms interfere with daily  activity, sleeping and work. The pain is exacerbated by work.  The pain is mitigated by rest. The patient reports spending less than 2 hours per day reclining. The patient reports 6-8 hours of uninterrupted sleep per night.  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       Non-Pharmacologic Treatments:  Physical Therapy/Home Exercise:  No, pending start  Ice/Heat:yes  TENS: no  Acupuncture: no  Massage: no  Chiropractic: no    Other: no        Pain Medications:  - Opioids: Percocet (Oxycodone/Acetaminophen)  - Adjuvant Medications: Mobic  - Anti-Coagulants: Aspirin      report:  Reviewed and consistent with medication use as prescribed.       Pain Procedures:   -12/05/2019:  Bilateral subacromial bursa injections, limited relief  -02/14/2020:  IM Toradol to the right deltoid     Imaging & EMG/NCS:   EMG/NCS bilateral upper extremities 03/06/2020:   INTERPRETATION  -Bilateral median motor nerve conduction study showed prolonged latency on the right, normal amplitude, and conduction velocity  -Bilateral median sensory nerve conduction study showed prolonged peak latency and normal amplitude  -Bilateral ulnar motor nerve conduction study showed normal latency, amplitude, and conduction velocity  -Bilateral ulnar sensory nerve conduction study showed prolonged peak latency and normal amplitude  -Bilateral radial sensory nerve conduction study showed prolonged peak latency and normal amplitude  -Bilateral combined sensory index showed a significant latency difference  -Needle EMG examination performed to above mentioned muscles         IMPRESSION  1. ABNORMAL study  2. 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  There is additional evidence of a CHRONIC radiculopathy of the C7,C8, T1 nerve roots    X-ray cervical spine 02/19/2020:  Reversal of normal cervical lordosis.   Grade 1 anterolisthesis of C3 on C4 and C4 on C5.  These appear to mostly reduce with extension maneuver.  Grade 1 anterolisthesis of C7 on T1 which does not change with provocative maneuvers.  No acute fracture.  Prevertebral soft tissues are maintained.  Multilevel discogenic degenerative changes are seen with findings most severe at C5-C6 and C6-C7 with disc space narrowing marginal spurring.  Intact odontoid.  Multilevel bilateral areas of neural foraminal encroachment, right greater than left.     Clear lung apices.  Calcification of the bilateral carotids is seen.    MRI cervical spine 06/01/2017 (via [facility]):  There is a mild cervical levoscoliosis with slight straightening of normal cervical lordosis in the superior aspect of the cervical spine. Cervical vertebral body stature is preserved. No prevertebral edema is noted.    The visualized anatomy at the skull base is normal.    C2-C3: Mild disc space narrowing. Mild bilateral posterior facet and ligamentum flavum hypertrophy which is effacing the posterior theca and causing subtle posterior impression on the cervical spinal cord, although the thecal sac still measures 1.2 cm in AP dimension. There is minimal posterior spondylosis.  C3-C4: Disc desiccation with right greater than left posterior facet arthropathy and with right uncinate hypertrophy, with mild disc space narrowing. There is mild ligamentum flavum hypertrophy. There is mild AP narrowing of the thecal sac, consistent with a spinal stenosis, but there is no significant deformity on the cervical spinal cord. There is right lateral recess/foraminal origin stenosis.  C4-C5: Mild to moderate disc space narrowing with disc desiccation and with right greater than left posterior facet hypertrophy. There is right uncinate hypertrophy at this level. There is bilateral ligamentum flavum hypertrophy. This combination of findings is causing a spinal stenosis, thecal sac measuring only 7.7 mm in AP  dimension, and with a trefoil deformity of the thecal sac. There is also some mass effect and deformity on the cervical spinal cord. In addition to a spinal stenosis, there is right greater than left lateral recess/foraminal origin stenosis.  C5-C6: Prominent disc space narrowing with disc desiccation, bridging anterior bony spurring, right greater than left posterior facet arthropathy, and mild right uncinate hypertrophy. There is a broad-based posterior osteophyte disc complex which effaces the anterior theca and narrows the AP dimension of the cervical thecal sac, but which does not cause any significant mass effect on the cervical spinal cord. There is mild bilateral lateral recess/foraminal origin stenosis at this level, right side greater than left.  C6-C7: Prominent disc space narrowing with disc desiccation, anterior and posterior spondylosis, and right uncinate hypertrophy. There is a broad-based, posterior osteophyte disc complex which partially effaces the anterior theca but which does not deform the cervical spinal cord. There is a mild spinal stenosis with mild, bilateral foraminal origin stenosis, slightly greater on the left.  C7-T1: Mild to moderate, bilateral posterior facet arthropathy. There is mild disc desiccation and disc space narrowing with approximately 2 mm of anterolisthesis C7 on T1. There is no obvious stenosis.     Signal intensity in the cervical thecal sac is normal. There is no convincing evidence of any significant edema or gliosis in the cervical spinal cord.     X-ray left shoulder 02/14/2020:  There is no radiographic evidence of acute osseous, articular, or soft tissue abnormality.  Joint spaces are preserved.     MRI left shoulder 2/10/20:  There is moderate rotator cuff tendinosis, supraspinatus and infraspinatus, with superimposed moderate grade partial thickness tear with fluid-filled defect posterior supraspinatus footprint with bursal and interstitial fiber tearing.   Supraspinatus tendon articular surface fraying is also noted.    There is also low-grade interstitial partial-thickness tear at the blending of the supraspinatus and infraspinatus.    There are few clustered small subcortical cysts and associated minimal edema like signal greater tuberosity.    The subscapularis tendon is unremarkable.  Long head biceps tendon is intact with mild intra-articular tendinosis.    Superior labral degenerative signal with a sublabral foramen versus SLAP tear.  Labrum is otherwise unremarkable.  Glenohumeral chondral thinning.  No joint effusion.  Normal IGHL.    Type 2 acromion without tilting minimal AC joint spurring.  There is no subacromial subdeltoid bursal fluid collection.    The bone marrow signal intensity is otherwise unremarkable.  The signal intensity of the muscle groups is normal.  No atrophy.          PMHx,PSHx, Social history, and Family history:  I have reviewed the patient's medical, surgical, social, and family history in detail and updated the computerized patient record.          Review of patient's allergies indicates:  No Known Allergies    Current Outpatient Medications   Medication Sig    acetaminophen (TYLENOL) 500 MG tablet Take 1 tablet (500 mg total) by mouth every 8 (eight) hours as needed for Pain.    citalopram (CELEXA) 20 MG tablet Take 1 tablet (20 mg total) by mouth once daily.    fluticasone propionate (FLONASE) 50 mcg/actuation nasal spray 2 PUFFS EACH NOSTRIL ONCE DAILY    naproxen (NAPROSYN) 500 MG tablet Take 1 tablet (500 mg total) by mouth 2 (two) times daily as needed (pain).    rosuvastatin (CRESTOR) 10 MG tablet Take 1 tablet (10 mg total) by mouth once daily.    aspirin (ECOTRIN) 81 MG EC tablet Take 1 tablet (81 mg total) by mouth once daily.    HYDROcodone-acetaminophen (NORCO) 5-325 mg per tablet Take 1 tablet by mouth every 12 (twelve) hours as needed for Pain.    [START ON 8/28/2020] HYDROcodone-acetaminophen (NORCO) 5-325 mg  "per tablet Take 1 tablet by mouth every 12 (twelve) hours as needed for Pain.    LORazepam (ATIVAN) 0.5 MG tablet Take 1 tablet (0.5 mg total) by mouth every 6 (six) hours as needed for Anxiety.    omega-3 fatty acids 1,000 mg Cap Take 1 capsule (1,000 mg total) by mouth every evening.     No current facility-administered medications for this visit.          REVIEW OF SYSTEMS:    GENERAL:  No weight loss, malaise or fevers.  HEENT:   No recent changes in vision or hearing  NECK:  Negative for lumps, no difficulty with swallowing.  RESPIRATORY:  Negative for cough, wheezing or shortness of breath, patient denies any recent URI.  CARDIOVASCULAR:  Negative for chest pain, leg swelling or palpitations.  GI:  Negative for abdominal discomfort, blood in stools or black stools or change in bowel habits.  MUSCULOSKELETAL:  See HPI.  SKIN:  Negative for lesions, rash, and itching.  PSYCH:  No mood disorder or recent psychosocial stressors.  Patients sleep is not disturbed secondary to pain.  HEMATOLOGY/LYMPHOLOGY:  Negative for prolonged bleeding, bruising easily or swollen nodes.  Patient is not currently taking any anti-coagulants  NEURO:   No history of headaches, syncope, paralysis, seizures or tremors.  All other reviewed and negative other than HPI.    OBJECTIVE:    /79   Pulse 78   Ht 5' 8" (1.727 m)   Wt 52.3 kg (115 lb 3.1 oz)   BMI 17.51 kg/m²     PHYSICAL EXAMINATION:    GENERAL: Well appearing, in no acute distress, alert and oriented x3.  PSYCH:  Mood and affect appropriate.  SKIN: Skin color, texture, turgor normal, no rashes or lesions.  HEAD/FACE:  Normocephalic, atraumatic. Cranial nerves grossly intact.  NECK:  Mild pain to palpation over the cervical paraspinous muscles. Spurling equivocal.  Mild pain with neck flexion, extension, and lateral flexion.   CV: RRR with palpation of the radial artery.  PULM: No evidence of respiratory difficulty, symmetric chest rise.  GI:  Soft and " non-tender.  EXTREMITIES: Peripheral joint ROM is full and pain free without obvious instability or laxity in all four extremities with the exception of both shoulders having limited active range of motion with flexion and shoulder AB duction to 100° and 110° respectively on the left and 120 and 100 on the right.  There is also lateral winging of the scapula on the left.. No deformities, edema, or skin discoloration. Good capillary refill.  MUSCULOSKELETAL: Shoulder, hip, and knee provocative maneuvers are negative with the exception of positive impingement signs of both shoulders with Garces, Neer's, and empty can.  There is also a positive Lycoming test of the left shoulder.  There is tenderness over the bilateral biceps tendons and also positive speed's and Yergason's test bilaterally.   Bilateral upper and lower extremity strength is normal and symmetric.  No atrophy or tone abnormalities are noted.  NEURO: Bilateral upper and lower extremity coordination and muscle stretch reflexes are physiologic and symmetric.  Plantar response are downgoing. No clonus.  No loss of sensation is noted.  GAIT: normal.      LABS:  Lab Results   Component Value Date    WBC 6.00 03/28/2019    HGB 15.0 03/28/2019    HCT 44.6 03/28/2019    MCV 97 03/28/2019     (H) 03/28/2019       CMP  Sodium   Date Value Ref Range Status   12/23/2019 138 136 - 145 mmol/L Final     Potassium   Date Value Ref Range Status   12/23/2019 3.8 3.5 - 5.1 mmol/L Final     Chloride   Date Value Ref Range Status   12/23/2019 103 95 - 110 mmol/L Final     CO2   Date Value Ref Range Status   12/23/2019 25 23 - 29 mmol/L Final     Glucose   Date Value Ref Range Status   12/23/2019 131 (H) 70 - 110 mg/dL Final     BUN, Bld   Date Value Ref Range Status   12/23/2019 15 6 - 20 mg/dL Final     Creatinine   Date Value Ref Range Status   12/23/2019 0.8 0.5 - 1.4 mg/dL Final     Calcium   Date Value Ref Range Status   12/23/2019 9.3 8.7 - 10.5 mg/dL Final      Total Protein   Date Value Ref Range Status   12/23/2019 7.0 6.0 - 8.4 g/dL Final     Albumin   Date Value Ref Range Status   12/23/2019 3.9 3.5 - 5.2 g/dL Final     Total Bilirubin   Date Value Ref Range Status   12/23/2019 1.0 0.1 - 1.0 mg/dL Final     Comment:     For infants and newborns, interpretation of results should be based  on gestational age, weight and in agreement with clinical  observations.  Premature Infant recommended reference ranges:  Up to 24 hours.............<8.0 mg/dL  Up to 48 hours............<12.0 mg/dL  3-5 days..................<15.0 mg/dL  6-29 days.................<15.0 mg/dL       Alkaline Phosphatase   Date Value Ref Range Status   12/23/2019 91 55 - 135 U/L Final     AST   Date Value Ref Range Status   12/23/2019 25 10 - 40 U/L Final     ALT   Date Value Ref Range Status   12/23/2019 33 10 - 44 U/L Final     Anion Gap   Date Value Ref Range Status   12/23/2019 10 8 - 16 mmol/L Final     eGFR if    Date Value Ref Range Status   12/23/2019 >60.0 >60 mL/min/1.73 m^2 Final     eGFR if non    Date Value Ref Range Status   12/23/2019 >60.0 >60 mL/min/1.73 m^2 Final     Comment:     Calculation used to obtain the estimated glomerular filtration  rate (eGFR) is the CKD-EPI equation.          Lab Results   Component Value Date    HGBA1C 5.0 01/25/2018             ASSESSMENT: 59 y.o. year old male with neck and bilateral shoulder pain, consistent with     1. DDD (degenerative disc disease), cervical  HYDROcodone-acetaminophen (NORCO) 5-325 mg per tablet    HYDROcodone-acetaminophen (NORCO) 5-325 mg per tablet   2. Myalgia of auxiliary muscles, head and neck  HYDROcodone-acetaminophen (NORCO) 5-325 mg per tablet    HYDROcodone-acetaminophen (NORCO) 5-325 mg per tablet   3. Tendonitis of upper biceps tendon of right shoulder  HYDROcodone-acetaminophen (NORCO) 5-325 mg per tablet    HYDROcodone-acetaminophen (NORCO) 5-325 mg per tablet   4. Tendonitis of upper  biceps tendon of left shoulder  HYDROcodone-acetaminophen (NORCO) 5-325 mg per tablet    HYDROcodone-acetaminophen (NORCO) 5-325 mg per tablet   5. Traumatic incomplete tear of left rotator cuff, sequela  HYDROcodone-acetaminophen (NORCO) 5-325 mg per tablet    HYDROcodone-acetaminophen (NORCO) 5-325 mg per tablet   6. Cervical radiculopathy           PLAN:   - Interventions: Patient would likely benefit from cervical MADISON; however, patient recently lost insurance and would not be able to afford the procedure..   - Anticoagulation: yes, aspirin  - Medications:I have counseled the patient on importance of smoking cessation and specifically poor outcomes related to spine and spine surgery. and  I have stressed the importance of physical activity and a home exercise plan to help with pain and improve health.  Will provide Norco 5/325 mg Q 12 p.r.n. severe pain, # 60 tablets, 0 refills with fill dates for 07/29/2020 and 08/28/2020. An opioid pain contract was completed today after having an extensive conversation about chronic opioid use for pain management. We discussed the risks and benefits of opioids.  I discouraged the patient from escalation of opioid use and try to minimize its use to decrease chance of dependence, tolerance, and opioid-based hyperalgesia.  I reviewed the  in great detail and there are no inconsistencies and it is appropriate with patient's history.  There are no signs of aberrant drug behavior.  The opioid risk tool was also completed, low risk.  Pt counseled about the side effects of long term use of opioids including dependence, tolerance, addiction, respiratory depression, somnelence , immune and endocrine dysfunction.  The patient expressed understanding.  - Therapy:  Advised patient continue with exercises and activities as tolerated  - Labs:  Reviewed  - Imaging:  Reviewed imaging available  - Consults/Referrals:  Orthopedics, [facility] referred [facility] due to lack  of insurance  - Records:  Reviewed/Obtain old records from outside physicians and imaging  - Follow up visit: return to clinic in 8 weeks or as needed  - Counseled patient regarding the importance of activity modification, smoking cessation and physical therapy  - This condition does not require this patient to take time off of work, and the primary goal of our Pain Management services is to improve the patient's functional capacity.  - Patient Questions: Answered all of the patient's questions regarding diagnosis, therapy, and treatment    The above plan and management options were discussed at length with patient. Patient is in agreement with the above and verbalized understanding.      Tavares Monet MD  Interventional Pain Management  [facility]    Disclaimer:  This note was prepared using voice recognition system and is likely to have sound alike errors that may have been overlooked even after proof reading.  Please call me with any questions

## 2020-08-03 ENCOUNTER — PATIENT OUTREACH (OUTPATIENT)
Dept: ADMINISTRATIVE | Facility: HOSPITAL | Age: 60
End: 2020-08-03

## 2020-09-01 ENCOUNTER — TELEPHONE (OUTPATIENT)
Dept: PAIN MEDICINE | Facility: CLINIC | Age: 60
End: 2020-09-01

## 2020-09-01 NOTE — TELEPHONE ENCOUNTER
----- Message from Amanda Palacios sent at 9/1/2020  8:30 AM CDT -----  Type:  RX Refill Request    Who Called:  Pt wife (Amanda)  Refill or New Rx:  refill   RX Name and Strength:  Hydrocodone  5 mg  How is the patient currently taking it? (ex. 1XDay):  takes one in morning and one in evening  Is this a 30 day or 90 day RX:    Preferred Pharmacy with phone number:  [facility]   Local or Mail Order:  local   Ordering Provider:  Dr Monet   Would the patient rather a call back or a response via [facility]?   Call back   Best Call Back Number:  [phone] or [phone]  Additional Information:  Pt does not have an appt with Dr Monet until 9-23-20//and pt has returned to work//he is needing a refill until his appt//please call to discuss//thanks/lh

## 2020-09-15 ENCOUNTER — TELEPHONE (OUTPATIENT)
Dept: PAIN MEDICINE | Facility: CLINIC | Age: 60
End: 2020-09-15

## 2020-09-15 NOTE — TELEPHONE ENCOUNTER
Spoke with wife , moved appt on 09/23 with socorro at 9 am at [facility] , so pt can get med refill. Pt understood. All questions answered.   [place] Segura[facility]

## 2020-09-23 ENCOUNTER — PATIENT OUTREACH (OUTPATIENT)
Dept: ADMINISTRATIVE | Facility: OTHER | Age: 60
End: 2020-09-23

## 2020-09-23 ENCOUNTER — OFFICE VISIT (OUTPATIENT)
Dept: PAIN MEDICINE | Facility: CLINIC | Age: 60
End: 2020-09-23

## 2020-09-23 VITALS
HEIGHT: 68 IN | WEIGHT: 112 LBS | BODY MASS INDEX: 16.97 KG/M2 | RESPIRATION RATE: 18 BRPM | SYSTOLIC BLOOD PRESSURE: 139 MMHG | DIASTOLIC BLOOD PRESSURE: 82 MMHG | HEART RATE: 69 BPM

## 2020-09-23 DIAGNOSIS — S46.012S TRAUMATIC INCOMPLETE TEAR OF LEFT ROTATOR CUFF, SEQUELA: ICD-10-CM

## 2020-09-23 DIAGNOSIS — M79.12 MYALGIA OF AUXILIARY MUSCLES, HEAD AND NECK: ICD-10-CM

## 2020-09-23 DIAGNOSIS — M50.30 DDD (DEGENERATIVE DISC DISEASE), CERVICAL: Primary | ICD-10-CM

## 2020-09-23 DIAGNOSIS — M54.12 CERVICAL RADICULOPATHY: ICD-10-CM

## 2020-09-23 DIAGNOSIS — M75.22 TENDONITIS OF UPPER BICEPS TENDON OF LEFT SHOULDER: ICD-10-CM

## 2020-09-23 DIAGNOSIS — M75.21 TENDONITIS OF UPPER BICEPS TENDON OF RIGHT SHOULDER: ICD-10-CM

## 2020-09-23 PROCEDURE — 99214 PR OFFICE/OUTPT VISIT, EST, LEVL IV, 30-39 MIN: ICD-10-PCS | Mod: S$PBB,,, | Performed by: PHYSICIAN ASSISTANT

## 2020-09-23 PROCEDURE — 99213 OFFICE O/P EST LOW 20 MIN: CPT | Mod: PBBFAC | Performed by: PHYSICIAN ASSISTANT

## 2020-09-23 PROCEDURE — 99214 OFFICE O/P EST MOD 30 MIN: CPT | Mod: S$PBB,,, | Performed by: PHYSICIAN ASSISTANT

## 2020-09-23 PROCEDURE — 99999 PR PBB SHADOW E&M-EST. PATIENT-LVL III: ICD-10-PCS | Mod: PBBFAC,,, | Performed by: PHYSICIAN ASSISTANT

## 2020-09-23 PROCEDURE — 99999 PR PBB SHADOW E&M-EST. PATIENT-LVL III: CPT | Mod: PBBFAC,,, | Performed by: PHYSICIAN ASSISTANT

## 2020-09-23 RX ORDER — HYDROCODONE BITARTRATE AND ACETAMINOPHEN 5; 325 MG/1; MG/1
1 TABLET ORAL EVERY 12 HOURS PRN
Qty: 60 TABLET | Refills: 0 | Status: SHIPPED | OUTPATIENT
Start: 2020-10-31 | End: 2020-11-30

## 2020-09-23 RX ORDER — HYDROCODONE BITARTRATE AND ACETAMINOPHEN 5; 325 MG/1; MG/1
1 TABLET ORAL EVERY 12 HOURS PRN
Qty: 60 TABLET | Refills: 0 | Status: SHIPPED | OUTPATIENT
Start: 2020-10-01 | End: 2020-10-31

## 2020-09-23 RX ORDER — GABAPENTIN 300 MG/1
CAPSULE ORAL
Qty: 60 CAPSULE | Refills: 1 | Status: SHIPPED | OUTPATIENT
Start: 2020-09-23 | End: 2021-01-22 | Stop reason: DRUGHIGH

## 2020-09-23 NOTE — PROGRESS NOTES
Health Maintenance Due   Topic Date Due    HIV Screening  09/15/1975    Shingles Vaccine (1 of 2) 09/15/2010    Colorectal Cancer Screening  04/05/2019    Influenza Vaccine (1) 08/01/2020     Updates were requested from care everywhere.  Chart was reviewed for overdue Proactive [facility] Encounters (BLAS) topics (CRS, Breast Cancer Screening, Eye exam)  Health Maintenance has been updated.  LINKS immunization registry triggered.  Immunizations were reconciled.

## 2020-09-23 NOTE — PROGRESS NOTES
Established Patient Chronic Pain Note (Follow up visit)    Chief Complaint:   Chief Complaint   Patient presents with    Neck Pain       SUBJECTIVE:    Interval History (9/23/2020): Patient was last seen on 7/29/2020. At that visit, the plan was to Refill Norco 5/325mg BID.  Patient reports pain as 10/10 today.  He is requesting a higher dose of the Norco today.  He continues to take naproxen and Tylenol as adjuvant medications.  He reports that the pain seems to be worse at night.    Patient denies night fever/night sweats, urinary incontinence, bowel incontinence, significant weight loss, significant motor weakness and loss of sensations.    Pain Disability Index Review:  Last 3 PDI Scores 2/19/2020   Pain Disability Index (PDI) 11       Interval HPI (7/29/2020):  Leo Roblero is a 60 y.o. male who presents to the clinic for a follow-up appointment for neck and bilateral shoulder pain.  At the last clinic visit, I expressed to the patient that he could continue on NSAIDs and Tylenol for his chronic pain control and to start with physical therapy to help stabilize his shoulder musculature.  He had failed subacromial bursa injections without much relief.  Since the last visit, Leo Roblero states the pain has been persistant. Current pain intensity is 10/10.    Initial HPI 02/19/2020:  Leo Roblero is a 59 y.o. male who presents to the clinic for the evaluation of neck and bilateral shoulder pain. He was referred by the Orthopedics Department for further evaluation and management of neck pain. Of note, patient has a past medical history of hyperlipidemia, and tobacco abuse, traumatic rotator cuff tear, tendinitis of the bilateral biceps tendon, and other medical comorbidities as listed below.  The pain started several years ago without any significant injury or trauma and symptoms have been worsening.  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  The pain is described as shooting and throbbing and is rated as 6/10. The pain is rated with a score of  4/10 on the BEST day and a score of 10/10 on the WORST day.  Symptoms interfere with daily activity, sleeping and work. The pain is exacerbated by work.  The pain is mitigated by rest. The patient reports spending less than 2 hours per day reclining. The patient reports 6-8 hours of uninterrupted sleep per night.  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       Non-Pharmacologic Treatments:  Physical Therapy/Home Exercise:  No, pending start  Ice/Heat:yes  TENS: no  Acupuncture: no  Massage: no  Chiropractic: no    Other: no        Pain Medications:  - Opioids: Percocet (Oxycodone/Acetaminophen)  - Adjuvant Medications: Mobic  - Anti-Coagulants: Aspirin      report:  Reviewed and consistent with medication use as prescribed.     Pain Procedures:   -12/05/2019:  Bilateral subacromial bursa injections, limited relief  -02/14/2020:  IM Toradol to the right deltoid         Imaging & EMG/NCS (Reviewed on 9/23/2020):    EMG/NCS bilateral upper extremities 03/06/2020:  1. ABNORMAL study  2. 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  There is additional evidence of a CHRONIC radiculopathy of the C7,C8, T1 nerve roots      X-ray cervical spine 02/19/2020:  Reversal of normal cervical lordosis.  Grade 1 anterolisthesis of C3 on C4 and C4 on C5.  These appear to mostly reduce with extension maneuver.  Grade 1 anterolisthesis of C7 on T1 which does not change with provocative maneuvers.  No acute fracture.  Prevertebral soft tissues are maintained.  Multilevel discogenic degenerative changes are seen with findings most  severe at C5-C6 and C6-C7 with disc space narrowing marginal spurring.  Intact odontoid.  Multilevel bilateral areas of neural foraminal encroachment, right greater than left.  Clear lung apices.  Calcification of the bilateral carotids is seen.      MRI cervical spine 06/01/2017 (via [facility]):  There is a mild cervical levoscoliosis with slight straightening of normal cervical lordosis in the superior aspect of the cervical spine. Cervical vertebral body stature is preserved. No prevertebral edema is noted.  The visualized anatomy at the skull base is normal.  C2-C3: Mild disc space narrowing. Mild bilateral posterior facet and ligamentum flavum hypertrophy which is effacing the posterior theca and causing subtle posterior impression on the cervical spinal cord, although the thecal sac still measures 1.2 cm in AP dimension. There is minimal posterior spondylosis.  C3-C4: Disc desiccation with right greater than left posterior facet arthropathy and with right uncinate hypertrophy, with mild disc space narrowing. There is mild ligamentum flavum hypertrophy. There is mild AP narrowing of the thecal sac, consistent with a spinal stenosis, but there is no significant deformity on the cervical spinal cord. There is right lateral recess/foraminal origin stenosis.  C4-C5: Mild to moderate disc space narrowing with disc desiccation and with right greater than left posterior facet hypertrophy. There is right uncinate hypertrophy at this level. There is bilateral ligamentum flavum hypertrophy. This combination of findings is causing a spinal stenosis, thecal sac measuring only 7.7 mm in AP dimension, and with a trefoil deformity of the thecal sac. There is also some mass effect and deformity on the cervical spinal cord. In addition to a spinal stenosis, there is right greater than left lateral recess/foraminal origin stenosis.  C5-C6: Prominent disc space narrowing with disc desiccation, bridging anterior bony  spurring, right greater than left posterior facet arthropathy, and mild right uncinate hypertrophy. There is a broad-based posterior osteophyte disc complex which effaces the anterior theca and narrows the AP dimension of the cervical thecal sac, but which does not cause any significant mass effect on the cervical spinal cord. There is mild bilateral lateral recess/foraminal origin stenosis at this level, right side greater than left.  C6-C7: Prominent disc space narrowing with disc desiccation, anterior and posterior spondylosis, and right uncinate hypertrophy. There is a broad-based, posterior osteophyte disc complex which partially effaces the anterior theca but which does not deform the cervical spinal cord. There is a mild spinal stenosis with mild, bilateral foraminal origin stenosis, slightly greater on the left.  C7-T1: Mild to moderate, bilateral posterior facet arthropathy. There is mild disc desiccation and disc space narrowing with approximately 2 mm of anterolisthesis C7 on T1. There is no obvious stenosis.  Signal intensity in the cervical thecal sac is normal. There is no convincing evidence of any significant edema or gliosis in the cervical spinal cord.       X-ray left shoulder 02/14/2020:  There is no radiographic evidence of acute osseous, articular, or soft tissue abnormality.  Joint spaces are preserved.       MRI left shoulder 2/10/20:  There is moderate rotator cuff tendinosis, supraspinatus and infraspinatus, with superimposed moderate grade partial thickness tear with fluid-filled defect posterior supraspinatus footprint with bursal and interstitial fiber tearing.  Supraspinatus tendon articular surface fraying is also noted.  There is also low-grade interstitial partial-thickness tear at the blending of the supraspinatus and infraspinatus.  There are few clustered small subcortical cysts and associated minimal edema like signal greater tuberosity.  The subscapularis tendon is unremarkable.  " Long head biceps tendon is intact with mild intra-articular tendinosis.  Superior labral degenerative signal with a sublabral foramen versus SLAP tear.  Labrum is otherwise unremarkable.  Glenohumeral chondral thinning.  No joint effusion.  Normal IGHL.  Type 2 acromion without tilting minimal AC joint spurring.  There is no subacromial subdeltoid bursal fluid collection.  The bone marrow signal intensity is otherwise unremarkable.  The signal intensity of the muscle groups is normal.  No atrophy.          REVIEW OF SYSTEMS:    GENERAL:  No weight loss, malaise or fevers.  HEENT:   No recent changes in vision or hearing  NECK:  Negative for lumps, no difficulty with swallowing.  RESPIRATORY:  Negative for cough, wheezing or shortness of breath, patient denies any recent URI.  CARDIOVASCULAR:  Negative for chest pain, leg swelling or palpitations.  GI:  Negative for abdominal discomfort, blood in stools or black stools or change in bowel habits.  MUSCULOSKELETAL:  See HPI.  SKIN:  Negative for lesions, rash, and itching.  PSYCH:  No mood disorder or recent psychosocial stressors.  Patients sleep is not disturbed secondary to pain.  HEMATOLOGY/LYMPHOLOGY:  Negative for prolonged bleeding, bruising easily or swollen nodes.  Patient is not currently taking any anti-coagulants  NEURO:   No history of headaches, syncope, paralysis, seizures or tremors.  All other reviewed and negative other than HPI.        OBJECTIVE:    Vitals:    09/23/20 0910   BP: 139/82   Pulse: 69   Resp: 18   Weight: 50.8 kg (112 lb)   Height: 5' 8" (1.727 m)   PainSc: 10-Worst pain ever   PainLoc: Neck    (reviewed on 9/23/2020)    General: alert and oriented, in no apparent distress.  Gait: normal gait.  Skin: no rashes, no discoloration, no obvious lesions  HEENT: normocephalic, atraumatic. Pupils equal and round.  Cardiovascular: no significant peripheral edema present.  Respiratory: without use of accessory muscles of " respiration.    Musculoskeletal - Cervical Spine:  - Pain on flexion of cervical spine: Present   - Pain on extension of cervical spine: Present   - Cervical facet loading: Present   - TTP over the cervical facet joints: Present   - TTP over the cervical paraspinals: Present   - Spurling's:  Equivocal    Shoulder:  - Pain on abduction: Present   - ROM: decreased secondary to pain  - Flexion: decreased to 100° on left, decreased to 120° on the right  - Abduction:  Decreased to 110° on the left, decreased to 100° on the right  - lateral winging of the scapula on the left  - TTP:  Present over bilateral biceps tendons  - [place] test:  Positive on the left  - Neer's: Positive  - Hawkin's: Positive  - Speed's test: Positive  - Yergason's test: Positive     Neuro - Upper Extremities:  - BUE Strength:R/L: D: 5/5; B: 5/5; T: 5/5; WF: 5/5; WE: 5/5; IO: 5/5  - Extremity Reflexes: Brisk and symmetric throughout  - Sensory: Sensation to light touch intact bilaterally    Neuro - Lower Extremities:  - RLE Strength:     >> 5/5 strength with right hip flexion/ extension    >> 5/5 strength with right knee flexion/ extension    >> 5/5 strength in right ankle with plantar and dorsiflexion  - LLE Strength:     >> 5/5 strength with left hip flexion/ extension    >> 5/5 strength with knee flexion extension on the left     >> 5/5 strength in left ankle with plantar and dorsiflexion  - Extremity Reflexes: Brisk and symmetric throughout  - Sensory: Sensation to light touch intact bilaterally      Psych:  Mood and affect is appropriate            ASSESSMENT: 60 y.o. year old male with neck and bilateral shoulder pain, consistent with     1. DDD (degenerative disc disease), cervical     2. Myalgia of auxiliary muscles, head and neck     3. Tendonitis of upper biceps tendon of right shoulder     4. Tendonitis of upper biceps tendon of left shoulder     5. Traumatic incomplete tear of left rotator cuff, sequela     6. Cervical radiculopathy            PLAN:   1. Interventional: None for now.  Patient would likely benefit from cervical MADISON; however, patient recently lost insurance and would not be able to afford the procedure.     2. Pharmacologic:   - Refill Norco 5/325 mg Q12H PRN (60 tablets) x 2 months. Will e-prescribe to fill on 10/1 & 10/31. Patient tolerating opioids with no side effects, obtaining some pain control with functional improvement.  We have discussed the risks of chronic opioid medication management.  Patient was informed today that we will not increase current dose of medication.   - Will start gabapentin 600mg QHS (with titration instructions, take 1 tablet QHS x 1 week, then increase to 2 capsules at night thereafter, increasing as tolerated). We will further increase if does not provide adequate relief.   - Opioid risk tool completed on 7/29/2020: low risk.  - Anticoagulation use: ASA.   - Opioid contract signed on 7/29/2020.     - LA  reviewed and appropriate. Last filled on 09/01/2020.   - Will order UDS today to ensure medication compliance.     3. Rehabilitative: Encouraged regular exercise.    4. Diagnostic: None for now.    5. Consults/Referrals:  Orthopedics, [facility] referred [facility] due to lack of insurance.    6. Follow up: 8 weeks for med refill.      - This condition does not require this patient to take time off of work, and the primary goal of our Pain Management services is to improve the patient's functional capacity.   - I discussed the risks, benefits, and alternatives to potential treatment options. All questions and concerns were fully addressed today in clinic.           >>UDS:  9/23/2020 :: pending     Disclaimer:  This note was prepared using voice recognition system and is likely to have sound alike errors that may have been overlooked even after proof reading.  Please call me with any questions.

## 2020-09-25 ENCOUNTER — TELEPHONE (OUTPATIENT)
Dept: PAIN MEDICINE | Facility: CLINIC | Age: 60
End: 2020-09-25

## 2020-09-25 NOTE — TELEPHONE ENCOUNTER
Informed pt there is a post date for 10/01. Pt understood. All questions answered.   Danny Cervantes MA  [facility] Interventional pain medicine

## 2020-09-25 NOTE — TELEPHONE ENCOUNTER
----- Message from Herbert Chung sent at 9/25/2020  1:37 PM CDT -----  Regarding: Refill  Contact: Leo  Type:  RX Refill Request    Who Called: Leo Annika  Refill or New Rx:Refill   RX Name and Strength:Hydrocodone 5-325 mg  How is the patient currently taking it? (ex. 1XDay):1xday  Is this a 30 day or 90 day RX:60  Preferred Pharmacy with phone number:  [facility] [address] [facility]  [address] [address] [address]  Phone: [phone] Fax: [phone]  Local or Mail Order:local  Ordering Provider:Dr. Monet  Would the patient rather a call back or a response via [facility]? Call back  Best Call Back Number:[phone]  Additional Information:

## 2020-11-03 ENCOUNTER — TELEPHONE (OUTPATIENT)
Dept: PAIN MEDICINE | Facility: CLINIC | Age: 60
End: 2020-11-03

## 2020-11-03 NOTE — TELEPHONE ENCOUNTER
Informed pt it was sent 10/31, informed pt to check with pharmacy. Pt understood. All questions answered.   Danny Cervantes MA  [facility] Interventional pain medicine

## 2020-11-03 NOTE — TELEPHONE ENCOUNTER
----- Message from Syeda Holliday sent at 11/3/2020  8:37 AM CST -----  .Type:  RX Refill Request    Who Called: pt  Refill or New Rx:refill  RX Name and Strength:Norco   How is the patient currently taking it? (ex. 1XDay):daily  Is this a 30 day or 90 day RX:30  Preferred Pharmacy with phone number.  [facility] [address] [facility]  [address] [address] [address]  Phone: [phone] Fax: [phone]  Local or Mail Order:local  Ordering Provider:Tierney  Would the patient rather a call back or a response via [facility]? Call back  Best Call Back Number:[phone]  Additional Information:

## 2020-11-18 ENCOUNTER — PATIENT OUTREACH (OUTPATIENT)
Dept: ADMINISTRATIVE | Facility: OTHER | Age: 60
End: 2020-11-18

## 2020-11-19 ENCOUNTER — OFFICE VISIT (OUTPATIENT)
Dept: PAIN MEDICINE | Facility: CLINIC | Age: 60
End: 2020-11-19

## 2020-11-19 VITALS
RESPIRATION RATE: 18 BRPM | BODY MASS INDEX: 16.97 KG/M2 | DIASTOLIC BLOOD PRESSURE: 72 MMHG | WEIGHT: 112 LBS | HEIGHT: 68 IN | HEART RATE: 64 BPM | SYSTOLIC BLOOD PRESSURE: 121 MMHG

## 2020-11-19 DIAGNOSIS — M54.12 CERVICAL RADICULOPATHY: ICD-10-CM

## 2020-11-19 DIAGNOSIS — M79.12 MYALGIA OF AUXILIARY MUSCLES, HEAD AND NECK: ICD-10-CM

## 2020-11-19 DIAGNOSIS — Z79.891 OPIOID USE AGREEMENT EXISTS: ICD-10-CM

## 2020-11-19 DIAGNOSIS — M47.22 CERVICAL SPONDYLOSIS WITH RADICULOPATHY: ICD-10-CM

## 2020-11-19 DIAGNOSIS — M50.30 DDD (DEGENERATIVE DISC DISEASE), CERVICAL: Primary | ICD-10-CM

## 2020-11-19 PROCEDURE — 99214 OFFICE O/P EST MOD 30 MIN: CPT | Mod: PBBFAC | Performed by: PHYSICIAN ASSISTANT

## 2020-11-19 PROCEDURE — 99214 OFFICE O/P EST MOD 30 MIN: CPT | Mod: S$PBB,,, | Performed by: PHYSICIAN ASSISTANT

## 2020-11-19 PROCEDURE — 99999 PR PBB SHADOW E&M-EST. PATIENT-LVL IV: CPT | Mod: PBBFAC,,, | Performed by: PHYSICIAN ASSISTANT

## 2020-11-19 PROCEDURE — 99214 PR OFFICE/OUTPT VISIT, EST, LEVL IV, 30-39 MIN: ICD-10-PCS | Mod: S$PBB,,, | Performed by: PHYSICIAN ASSISTANT

## 2020-11-19 PROCEDURE — 99999 PR PBB SHADOW E&M-EST. PATIENT-LVL IV: ICD-10-PCS | Mod: PBBFAC,,, | Performed by: PHYSICIAN ASSISTANT

## 2020-11-19 RX ORDER — HYDROCODONE BITARTRATE AND ACETAMINOPHEN 5; 325 MG/1; MG/1
1 TABLET ORAL EVERY 12 HOURS PRN
Qty: 60 TABLET | Refills: 0 | Status: SHIPPED | OUTPATIENT
Start: 2020-12-31 | End: 2021-01-30

## 2020-11-19 RX ORDER — HYDROCODONE BITARTRATE AND ACETAMINOPHEN 5; 325 MG/1; MG/1
1 TABLET ORAL EVERY 12 HOURS PRN
Qty: 60 TABLET | Refills: 0 | Status: SHIPPED | OUTPATIENT
Start: 2020-12-02 | End: 2021-01-01

## 2020-11-19 NOTE — PROGRESS NOTES
Established Patient Chronic Pain Note (Follow up visit)    Chief Complaint:   Chief Complaint   Patient presents with    Cervical Spine Pain (C-spine)       SUBJECTIVE:    Interval History (9/23/2020): Patient was last seen on 7/29/2020. At that visit, the plan was to Refill Norco 5/325mg BID.  Patient reports pain as 10/10 today.  He is requesting a higher dose of the Norco today.  He continues to take naproxen and Tylenol as adjuvant medications.  He reports that the pain seems to be worse at night.    Interval HPI (7/29/2020):  Leo Roblero is a 60 y.o. male who presents to the clinic for a follow-up appointment for neck and bilateral shoulder pain.  At the last clinic visit, I expressed to the patient that he could continue on NSAIDs and Tylenol for his chronic pain control and to start with physical therapy to help stabilize his shoulder musculature.  He had failed subacromial bursa injections without much relief.  Since the last visit, Leo Roblero states the pain has been persistant. Current pain intensity is 10/10.    Initial HPI 02/19/2020:  Leo Roblero is a 59 y.o. male who presents to the clinic for the evaluation of neck and bilateral shoulder pain. He was referred by the Orthopedics Department for further evaluation and management of neck pain. Of note, patient has a past medical history of hyperlipidemia, and tobacco abuse, traumatic rotator cuff tear, tendinitis of the bilateral biceps tendon, and other medical comorbidities as listed below.  The pain started several years ago without any significant injury or trauma and symptoms have been worsening.  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  The pain is described as shooting and throbbing and is rated as 6/10. The pain  is rated with a score of  4/10 on the BEST day and a score of 10/10 on the WORST day.  Symptoms interfere with daily activity, sleeping and work. The pain is exacerbated by work.  The pain is mitigated by rest. The patient reports spending less than 2 hours per day reclining. The patient reports 6-8 hours of uninterrupted sleep per night.  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    Patient denies night fever/night sweats, urinary incontinence, bowel incontinence, significant weight loss, significant motor weakness and loss of sensations.    Pain Disability Index Review:  Last 3 PDI Scores 11/19/2020 9/23/2020 2/19/2020   Pain Disability Index (PDI) 46 [phone] 11       Non-Pharmacologic Treatments:  Physical Therapy/Home Exercise:  No, pending start  Ice/Heat:yes  TENS: no  Acupuncture: no  Massage: no  Chiropractic: no    Other: no     Pain Medications:  - Opioids: Percocet (Oxycodone/Acetaminophen)  - Adjuvant Medications: Mobic  - Anti-Coagulants: Aspirin      report:  Reviewed and consistent with medication use as prescribed.     Pain Procedures:   -12/05/2019:  Bilateral subacromial bursa injections, limited relief  -02/14/2020:  IM Toradol to the right deltoid         Imaging & EMG/NCS (Reviewed on 11/19/2020):    EMG/NCS bilateral upper extremities 03/06/2020:  1. ABNORMAL study  2. 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  There is additional evidence of a CHRONIC radiculopathy of the C7,C8, T1 nerve roots    X-ray cervical spine 02/19/2020:  Reversal of normal cervical lordosis.  Grade 1 anterolisthesis of C3 on C4 and C4 on C5.  These appear to mostly reduce with extension maneuver.  Grade 1 anterolisthesis of C7 on T1 which does not change with provocative maneuvers.  No acute fracture.  Prevertebral soft tissues are maintained.  Multilevel discogenic  degenerative changes are seen with findings most severe at C5-C6 and C6-C7 with disc space narrowing marginal spurring.  Intact odontoid.  Multilevel bilateral areas of neural foraminal encroachment, right greater than left.  Clear lung apices.  Calcification of the bilateral carotids is seen.    MRI cervical spine 06/01/2017 ([facility]):  There is a mild cervical levoscoliosis with slight straightening of normal cervical lordosis in the superior aspect of the cervical spine. Cervical vertebral body stature is preserved. No prevertebral edema is noted.  The visualized anatomy at the skull base is normal.  C2-C3: Mild disc space narrowing. Mild bilateral posterior facet and ligamentum flavum hypertrophy which is effacing the posterior theca and causing subtle posterior impression on the cervical spinal cord, although the thecal sac still measures 1.2 cm in AP dimension. There is minimal posterior spondylosis.  C3-C4: Disc desiccation with right greater than left posterior facet arthropathy and with right uncinate hypertrophy, with mild disc space narrowing. There is mild ligamentum flavum hypertrophy. There is mild AP narrowing of the thecal sac, consistent with a spinal stenosis, but there is no significant deformity on the cervical spinal cord. There is right lateral recess/foraminal origin stenosis.  C4-C5: Mild to moderate disc space narrowing with disc desiccation and with right greater than left posterior facet hypertrophy. There is right uncinate hypertrophy at this level. There is bilateral ligamentum flavum hypertrophy. This combination of findings is causing a spinal stenosis, thecal sac measuring only 7.7 mm in AP dimension, and with a trefoil deformity of the thecal sac. There is also some mass effect and deformity on the cervical spinal cord. In addition to a spinal stenosis, there is right greater than left lateral recess/foraminal origin stenosis.  C5-C6: Prominent disc space narrowing with  disc desiccation, bridging anterior bony spurring, right greater than left posterior facet arthropathy, and mild right uncinate hypertrophy. There is a broad-based posterior osteophyte disc complex which effaces the anterior theca and narrows the AP dimension of the cervical thecal sac, but which does not cause any significant mass effect on the cervical spinal cord. There is mild bilateral lateral recess/foraminal origin stenosis at this level, right side greater than left.  C6-C7: Prominent disc space narrowing with disc desiccation, anterior and posterior spondylosis, and right uncinate hypertrophy. There is a broad-based, posterior osteophyte disc complex which partially effaces the anterior theca but which does not deform the cervical spinal cord. There is a mild spinal stenosis with mild, bilateral foraminal origin stenosis, slightly greater on the left.  C7-T1: Mild to moderate, bilateral posterior facet arthropathy. There is mild disc desiccation and disc space narrowing with approximately 2 mm of anterolisthesis C7 on T1. There is no obvious stenosis.  Signal intensity in the cervical thecal sac is normal. There is no convincing evidence of any significant edema or gliosis in the cervical spinal cord.     X-ray left shoulder 02/14/2020:  There is no radiographic evidence of acute osseous, articular, or soft tissue abnormality.  Joint spaces are preserved.     MRI left shoulder 2/10/20:  There is moderate rotator cuff tendinosis, supraspinatus and infraspinatus, with superimposed moderate grade partial thickness tear with fluid-filled defect posterior supraspinatus footprint with bursal and interstitial fiber tearing.  Supraspinatus tendon articular surface fraying is also noted.  There is also low-grade interstitial partial-thickness tear at the blending of the supraspinatus and infraspinatus.  There are few clustered small subcortical cysts and associated minimal edema like signal greater tuberosity.  The  "subscapularis tendon is unremarkable.  Long head biceps tendon is intact with mild intra-articular tendinosis.  Superior labral degenerative signal with a sublabral foramen versus SLAP tear.  Labrum is otherwise unremarkable.  Glenohumeral chondral thinning.  No joint effusion.  Normal IGHL.  Type 2 acromion without tilting minimal AC joint spurring.  There is no subacromial subdeltoid bursal fluid collection.  The bone marrow signal intensity is otherwise unremarkable.  The signal intensity of the muscle groups is normal.  No atrophy.          REVIEW OF SYSTEMS:    GENERAL:  No weight loss, malaise or fevers.  HEENT:   No recent changes in vision or hearing  NECK:  Negative for lumps, no difficulty with swallowing.  RESPIRATORY:  Negative for cough, wheezing or shortness of breath, patient denies any recent URI.  CARDIOVASCULAR:  Negative for chest pain, leg swelling or palpitations.  GI:  Negative for abdominal discomfort, blood in stools or black stools or change in bowel habits.  MUSCULOSKELETAL:  See HPI.  SKIN:  Negative for lesions, rash, and itching.  PSYCH:  No mood disorder or recent psychosocial stressors.  Patients sleep is not disturbed secondary to pain.  HEMATOLOGY/LYMPHOLOGY:  Negative for prolonged bleeding, bruising easily or swollen nodes.  Patient is not currently taking any anti-coagulants  NEURO:   No history of headaches, syncope, paralysis, seizures or tremors.  All other reviewed and negative other than HPI.        OBJECTIVE:    Vitals:    11/19/20 1007   Resp: 18   Weight: 50.8 kg (112 lb)   Height: 5' 8" (1.727 m)   PainSc:   9   PainLoc: Neck    (reviewed on 11/19/2020)    General: alert and oriented, in no apparent distress.  Gait: normal gait.  Skin: no rashes, no discoloration, no obvious lesions  HEENT: normocephalic, atraumatic. Pupils equal and round.  Cardiovascular: no significant peripheral edema present.  Respiratory: without use of accessory muscles of " respiration.    Musculoskeletal - Cervical Spine:  - Pain on flexion of cervical spine: Present   - Pain on extension of cervical spine: Present   - Cervical facet loading: Present   - TTP over the cervical facet joints: Present   - TTP over the cervical paraspinals: Present   - Spurling's:  Equivocal    Shoulder:  - Pain on abduction: Present   - ROM: decreased secondary to pain  - Flexion: decreased to 100° on left, decreased to 120° on the right  - Abduction:  Decreased to 110° on the left, decreased to 100° on the right  - lateral winging of the scapula on the left  - TTP:  Present over bilateral biceps tendons  - Grafton test:  Positive on the left  - Neer's: Positive  - Hawkin's: Positive  - Speed's test: Positive  - Yergason's test: Positive     Neuro - Upper Extremities:  - BUE Strength:R/L: D: 5/5; B: 5/5; T: 5/5; WF: 5/5; WE: 5/5; IO: 5/5  - Extremity Reflexes: Brisk and symmetric throughout  - Sensory: Sensation to light touch intact bilaterally    Neuro - Lower Extremities:  - RLE Strength:     >> 5/5 strength with right hip flexion/ extension    >> 5/5 strength with right knee flexion/ extension    >> 5/5 strength in right ankle with plantar and dorsiflexion  - LLE Strength:     >> 5/5 strength with left hip flexion/ extension    >> 5/5 strength with knee flexion extension on the left     >> 5/5 strength in left ankle with plantar and dorsiflexion  - Extremity Reflexes: Brisk and symmetric throughout  - Sensory: Sensation to light touch intact bilaterally      Psych:  Mood and affect is appropriate            ASSESSMENT: 60 y.o. year old male with neck and bilateral shoulder pain, consistent with     1. DDD (degenerative disc disease), cervical     2. Myalgia of auxiliary muscles, head and neck     3. Cervical radiculopathy     4. Cervical spondylosis with radiculopathy     5. Opioid use agreement exists           PLAN:   1. Interventional: None for now.  Patient would likely benefit from cervical MADISON;  however, patient recently lost insurance and would not be able to afford the procedure.     2. Pharmacologic:   - Refill Norco 5/325 mg Q12H PRN (60 tablets) x 2 months. Will e-prescribe to fill on 12/2/20 & 1/1/21. Patient tolerating opioids with no side effects, obtaining some pain control with functional improvement.  We have discussed the risks of chronic opioid medication management.  Patient was informed at last visit that we will not increase current dose of medication; he again asked today.   - Increase gabapentin 300mg to 600mg QHS.  He has only been taking 1 cap at night.  We will further increase if does not provide adequate relief.   - Opioid risk tool completed on 7/29/2020: low risk.  - Anticoagulation use: ASA.   - Opioid contract signed on 7/29/2020.     - LA  reviewed and appropriate.     - Last UDS was appropriate.     3. Rehabilitative: Encouraged regular exercise.    4. Diagnostic: None for now.    5. Consults/Referrals:  Orthopedics, [facility] referred to [facility] due to lack of insurance.    6. Follow up: 8 weeks for med refill.      - This condition does not require this patient to take time off of work, and the primary goal of our Pain Management services is to improve the patient's functional capacity.   - I discussed the risks, benefits, and alternatives to potential treatment options. All questions and concerns were fully addressed today in clinic.           >>UDS:  9/23/2020 :: appropriate     Disclaimer:  This note was prepared using voice recognition system and is likely to have sound alike errors that may have been overlooked even after proof reading.  Please call me with any questions.

## 2020-12-09 ENCOUNTER — TELEPHONE (OUTPATIENT)
Dept: PAIN MEDICINE | Facility: CLINIC | Age: 60
End: 2020-12-09

## 2020-12-09 NOTE — TELEPHONE ENCOUNTER
Attempted to call patient regarding appointment that is scheduled with Elif Dolan PA-C on Thursday 01/21/2021. Starting 01/01/2021 Elif Dolan PA-C will be at the [place] location on Thursdays. We will need to either switch patient to the [place] location or r/s to a different day when she is at our [place] location. Left v/m for patient.

## 2020-12-30 ENCOUNTER — PATIENT OUTREACH (OUTPATIENT)
Dept: ADMINISTRATIVE | Facility: HOSPITAL | Age: 60
End: 2020-12-30

## 2021-01-20 ENCOUNTER — PATIENT OUTREACH (OUTPATIENT)
Dept: ADMINISTRATIVE | Facility: OTHER | Age: 61
End: 2021-01-20

## 2021-01-21 ENCOUNTER — TELEPHONE (OUTPATIENT)
Dept: PAIN MEDICINE | Facility: CLINIC | Age: 61
End: 2021-01-21

## 2021-01-22 ENCOUNTER — OFFICE VISIT (OUTPATIENT)
Dept: PAIN MEDICINE | Facility: CLINIC | Age: 61
End: 2021-01-22

## 2021-01-22 VITALS
WEIGHT: 119 LBS | SYSTOLIC BLOOD PRESSURE: 123 MMHG | DIASTOLIC BLOOD PRESSURE: 80 MMHG | HEART RATE: 80 BPM | HEIGHT: 68 IN | BODY MASS INDEX: 18.04 KG/M2 | RESPIRATION RATE: 18 BRPM

## 2021-01-22 DIAGNOSIS — M47.812 CERVICAL SPONDYLOSIS: ICD-10-CM

## 2021-01-22 DIAGNOSIS — M50.30 DDD (DEGENERATIVE DISC DISEASE), CERVICAL: Primary | ICD-10-CM

## 2021-01-22 DIAGNOSIS — Z79.891 OPIOID USE AGREEMENT EXISTS: ICD-10-CM

## 2021-01-22 DIAGNOSIS — M54.12 CERVICAL RADICULOPATHY: ICD-10-CM

## 2021-01-22 PROCEDURE — 99999 PR PBB SHADOW E&M-EST. PATIENT-LVL IV: ICD-10-PCS | Mod: PBBFAC,,, | Performed by: PHYSICIAN ASSISTANT

## 2021-01-22 PROCEDURE — 99214 OFFICE O/P EST MOD 30 MIN: CPT | Mod: PBBFAC | Performed by: PHYSICIAN ASSISTANT

## 2021-01-22 PROCEDURE — 99999 PR PBB SHADOW E&M-EST. PATIENT-LVL IV: CPT | Mod: PBBFAC,,, | Performed by: PHYSICIAN ASSISTANT

## 2021-01-22 PROCEDURE — 99214 PR OFFICE/OUTPT VISIT, EST, LEVL IV, 30-39 MIN: ICD-10-PCS | Mod: S$PBB,,, | Performed by: PHYSICIAN ASSISTANT

## 2021-01-22 PROCEDURE — 99214 OFFICE O/P EST MOD 30 MIN: CPT | Mod: S$PBB,,, | Performed by: PHYSICIAN ASSISTANT

## 2021-01-22 RX ORDER — OXYCODONE AND ACETAMINOPHEN 5; 325 MG/1; MG/1
1 TABLET ORAL EVERY 12 HOURS PRN
Qty: 60 TABLET | Refills: 0 | Status: SHIPPED | OUTPATIENT
Start: 2021-01-31 | End: 2021-03-02

## 2021-01-22 RX ORDER — OXYCODONE AND ACETAMINOPHEN 5; 325 MG/1; MG/1
1 TABLET ORAL EVERY 12 HOURS PRN
Qty: 60 TABLET | Refills: 0 | Status: SHIPPED | OUTPATIENT
Start: 2021-03-02 | End: 2021-04-01 | Stop reason: SDUPTHER

## 2021-01-22 RX ORDER — GABAPENTIN 300 MG/1
600 CAPSULE ORAL NIGHTLY
Qty: 60 CAPSULE | Refills: 1 | Status: SHIPPED | OUTPATIENT
Start: 2021-01-22 | End: 2021-03-26 | Stop reason: SDUPTHER

## 2021-03-25 ENCOUNTER — TELEPHONE (OUTPATIENT)
Dept: PAIN MEDICINE | Facility: CLINIC | Age: 61
End: 2021-03-25

## 2021-03-25 ENCOUNTER — PATIENT OUTREACH (OUTPATIENT)
Dept: ADMINISTRATIVE | Facility: OTHER | Age: 61
End: 2021-03-25

## 2021-03-26 ENCOUNTER — OFFICE VISIT (OUTPATIENT)
Dept: PAIN MEDICINE | Facility: CLINIC | Age: 61
End: 2021-03-26

## 2021-03-26 VITALS
WEIGHT: 119 LBS | BODY MASS INDEX: 18.04 KG/M2 | HEIGHT: 68 IN | DIASTOLIC BLOOD PRESSURE: 88 MMHG | RESPIRATION RATE: 18 BRPM | SYSTOLIC BLOOD PRESSURE: 150 MMHG | HEART RATE: 91 BPM

## 2021-03-26 DIAGNOSIS — M50.30 DDD (DEGENERATIVE DISC DISEASE), CERVICAL: Primary | ICD-10-CM

## 2021-03-26 DIAGNOSIS — Z79.891 OPIOID USE AGREEMENT EXISTS: ICD-10-CM

## 2021-03-26 DIAGNOSIS — M54.12 CERVICAL RADICULOPATHY: ICD-10-CM

## 2021-03-26 DIAGNOSIS — M47.812 CERVICAL SPONDYLOSIS: ICD-10-CM

## 2021-03-26 PROCEDURE — 99214 OFFICE O/P EST MOD 30 MIN: CPT | Mod: S$PBB,,, | Performed by: PHYSICIAN ASSISTANT

## 2021-03-26 PROCEDURE — 99214 PR OFFICE/OUTPT VISIT, EST, LEVL IV, 30-39 MIN: ICD-10-PCS | Mod: S$PBB,,, | Performed by: PHYSICIAN ASSISTANT

## 2021-03-26 PROCEDURE — 99999 PR PBB SHADOW E&M-EST. PATIENT-LVL III: CPT | Mod: PBBFAC,,, | Performed by: PHYSICIAN ASSISTANT

## 2021-03-26 PROCEDURE — 99213 OFFICE O/P EST LOW 20 MIN: CPT | Mod: PBBFAC | Performed by: PHYSICIAN ASSISTANT

## 2021-03-26 PROCEDURE — 99999 PR PBB SHADOW E&M-EST. PATIENT-LVL III: ICD-10-PCS | Mod: PBBFAC,,, | Performed by: PHYSICIAN ASSISTANT

## 2021-03-26 RX ORDER — GABAPENTIN 300 MG/1
900 CAPSULE ORAL NIGHTLY
Qty: 90 CAPSULE | Refills: 1 | Status: SHIPPED | OUTPATIENT
Start: 2021-03-26 | End: 2021-07-30 | Stop reason: SDUPTHER

## 2021-04-01 ENCOUNTER — TELEPHONE (OUTPATIENT)
Dept: PAIN MEDICINE | Facility: CLINIC | Age: 61
End: 2021-04-01

## 2021-04-01 DIAGNOSIS — M50.30 DDD (DEGENERATIVE DISC DISEASE), CERVICAL: ICD-10-CM

## 2021-04-01 RX ORDER — OXYCODONE AND ACETAMINOPHEN 5; 325 MG/1; MG/1
1 TABLET ORAL EVERY 12 HOURS PRN
Qty: 60 TABLET | Refills: 0 | Status: SHIPPED | OUTPATIENT
Start: 2021-04-07 | End: 2021-05-07

## 2021-04-01 RX ORDER — OXYCODONE AND ACETAMINOPHEN 5; 325 MG/1; MG/1
1 TABLET ORAL EVERY 12 HOURS PRN
Qty: 60 TABLET | Refills: 0 | Status: SHIPPED | OUTPATIENT
Start: 2021-05-06 | End: 2021-05-28 | Stop reason: SDUPTHER

## 2021-05-27 ENCOUNTER — PATIENT OUTREACH (OUTPATIENT)
Dept: ADMINISTRATIVE | Facility: OTHER | Age: 61
End: 2021-05-27

## 2021-05-28 ENCOUNTER — OFFICE VISIT (OUTPATIENT)
Dept: PAIN MEDICINE | Facility: CLINIC | Age: 61
End: 2021-05-28

## 2021-05-28 ENCOUNTER — TELEPHONE (OUTPATIENT)
Dept: PAIN MEDICINE | Facility: CLINIC | Age: 61
End: 2021-05-28

## 2021-05-28 VITALS
HEIGHT: 68 IN | HEART RATE: 82 BPM | WEIGHT: 116 LBS | DIASTOLIC BLOOD PRESSURE: 83 MMHG | RESPIRATION RATE: 18 BRPM | SYSTOLIC BLOOD PRESSURE: 152 MMHG | BODY MASS INDEX: 17.58 KG/M2

## 2021-05-28 DIAGNOSIS — M47.812 CERVICAL SPONDYLOSIS: ICD-10-CM

## 2021-05-28 DIAGNOSIS — M50.30 DDD (DEGENERATIVE DISC DISEASE), CERVICAL: ICD-10-CM

## 2021-05-28 DIAGNOSIS — M50.30 DDD (DEGENERATIVE DISC DISEASE), CERVICAL: Primary | ICD-10-CM

## 2021-05-28 DIAGNOSIS — M54.12 CERVICAL RADICULOPATHY: ICD-10-CM

## 2021-05-28 DIAGNOSIS — Z79.891 OPIOID USE AGREEMENT EXISTS: ICD-10-CM

## 2021-05-28 PROCEDURE — 99214 OFFICE O/P EST MOD 30 MIN: CPT | Mod: S$PBB,,, | Performed by: PHYSICIAN ASSISTANT

## 2021-05-28 PROCEDURE — 99214 PR OFFICE/OUTPT VISIT, EST, LEVL IV, 30-39 MIN: ICD-10-PCS | Mod: S$PBB,,, | Performed by: PHYSICIAN ASSISTANT

## 2021-05-28 PROCEDURE — 99214 OFFICE O/P EST MOD 30 MIN: CPT | Mod: PBBFAC | Performed by: PHYSICIAN ASSISTANT

## 2021-05-28 PROCEDURE — 99999 PR PBB SHADOW E&M-EST. PATIENT-LVL IV: ICD-10-PCS | Mod: PBBFAC,,, | Performed by: PHYSICIAN ASSISTANT

## 2021-05-28 PROCEDURE — 99999 PR PBB SHADOW E&M-EST. PATIENT-LVL IV: CPT | Mod: PBBFAC,,, | Performed by: PHYSICIAN ASSISTANT

## 2021-05-28 RX ORDER — OXYCODONE AND ACETAMINOPHEN 5; 325 MG/1; MG/1
1 TABLET ORAL EVERY 12 HOURS PRN
Qty: 60 TABLET | Refills: 0 | Status: SHIPPED | OUTPATIENT
Start: 2021-06-06 | End: 2021-07-06

## 2021-05-28 RX ORDER — OXYCODONE AND ACETAMINOPHEN 5; 325 MG/1; MG/1
1 TABLET ORAL EVERY 12 HOURS PRN
Qty: 60 TABLET | Refills: 0 | Status: SHIPPED | OUTPATIENT
Start: 2021-07-06 | End: 2021-07-30 | Stop reason: SDUPTHER

## 2021-07-29 ENCOUNTER — PATIENT OUTREACH (OUTPATIENT)
Dept: ADMINISTRATIVE | Facility: OTHER | Age: 61
End: 2021-07-29

## 2021-07-29 DIAGNOSIS — Z12.11 ENCOUNTER FOR FIT (FECAL IMMUNOCHEMICAL TEST) SCREENING: Primary | ICD-10-CM

## 2021-07-30 ENCOUNTER — OFFICE VISIT (OUTPATIENT)
Dept: PAIN MEDICINE | Facility: CLINIC | Age: 61
End: 2021-07-30

## 2021-07-30 VITALS
SYSTOLIC BLOOD PRESSURE: 124 MMHG | DIASTOLIC BLOOD PRESSURE: 81 MMHG | HEART RATE: 82 BPM | HEIGHT: 68 IN | WEIGHT: 113.63 LBS | BODY MASS INDEX: 17.22 KG/M2

## 2021-07-30 DIAGNOSIS — M47.812 CERVICAL SPONDYLOSIS: ICD-10-CM

## 2021-07-30 DIAGNOSIS — M54.12 CERVICAL RADICULOPATHY: ICD-10-CM

## 2021-07-30 DIAGNOSIS — M50.30 DDD (DEGENERATIVE DISC DISEASE), CERVICAL: Primary | ICD-10-CM

## 2021-07-30 DIAGNOSIS — Z79.891 OPIOID USE AGREEMENT EXISTS: ICD-10-CM

## 2021-07-30 DIAGNOSIS — M50.30 DDD (DEGENERATIVE DISC DISEASE), CERVICAL: ICD-10-CM

## 2021-07-30 PROCEDURE — 99999 PR PBB SHADOW E&M-EST. PATIENT-LVL III: CPT | Mod: PBBFAC,,, | Performed by: PHYSICIAN ASSISTANT

## 2021-07-30 PROCEDURE — 99999 PR PBB SHADOW E&M-EST. PATIENT-LVL III: ICD-10-PCS | Mod: PBBFAC,,, | Performed by: PHYSICIAN ASSISTANT

## 2021-07-30 PROCEDURE — 99213 OFFICE O/P EST LOW 20 MIN: CPT | Mod: PBBFAC | Performed by: PHYSICIAN ASSISTANT

## 2021-07-30 PROCEDURE — 99214 OFFICE O/P EST MOD 30 MIN: CPT | Mod: S$PBB,,, | Performed by: PHYSICIAN ASSISTANT

## 2021-07-30 PROCEDURE — 99214 PR OFFICE/OUTPT VISIT, EST, LEVL IV, 30-39 MIN: ICD-10-PCS | Mod: S$PBB,,, | Performed by: PHYSICIAN ASSISTANT

## 2021-07-30 RX ORDER — OXYCODONE AND ACETAMINOPHEN 5; 325 MG/1; MG/1
1 TABLET ORAL EVERY 12 HOURS PRN
Qty: 60 TABLET | Refills: 0 | Status: SHIPPED | OUTPATIENT
Start: 2021-09-05 | End: 2021-10-05

## 2021-07-30 RX ORDER — GABAPENTIN 300 MG/1
900 CAPSULE ORAL NIGHTLY
Qty: 90 CAPSULE | Refills: 1 | Status: SHIPPED | OUTPATIENT
Start: 2021-07-30 | End: 2021-12-07 | Stop reason: SDUPTHER

## 2021-07-30 RX ORDER — OXYCODONE AND ACETAMINOPHEN 5; 325 MG/1; MG/1
1 TABLET ORAL EVERY 12 HOURS PRN
Qty: 60 TABLET | Refills: 0 | Status: SHIPPED | OUTPATIENT
Start: 2021-08-06 | End: 2021-09-05

## 2021-09-27 ENCOUNTER — PATIENT OUTREACH (OUTPATIENT)
Dept: ADMINISTRATIVE | Facility: OTHER | Age: 61
End: 2021-09-27

## 2021-10-07 ENCOUNTER — OFFICE VISIT (OUTPATIENT)
Dept: PAIN MEDICINE | Facility: CLINIC | Age: 61
End: 2021-10-07

## 2021-10-07 VITALS
BODY MASS INDEX: 17.34 KG/M2 | DIASTOLIC BLOOD PRESSURE: 81 MMHG | SYSTOLIC BLOOD PRESSURE: 128 MMHG | HEART RATE: 86 BPM | WEIGHT: 114.44 LBS | HEIGHT: 68 IN | RESPIRATION RATE: 17 BRPM

## 2021-10-07 DIAGNOSIS — Z79.891 OPIOID USE AGREEMENT EXISTS: ICD-10-CM

## 2021-10-07 DIAGNOSIS — M50.30 DDD (DEGENERATIVE DISC DISEASE), CERVICAL: Primary | ICD-10-CM

## 2021-10-07 DIAGNOSIS — M54.12 CERVICAL RADICULOPATHY: ICD-10-CM

## 2021-10-07 DIAGNOSIS — M47.812 CERVICAL SPONDYLOSIS: ICD-10-CM

## 2021-10-07 PROCEDURE — 99213 OFFICE O/P EST LOW 20 MIN: CPT | Mod: PBBFAC | Performed by: PHYSICIAN ASSISTANT

## 2021-10-07 PROCEDURE — 99214 PR OFFICE/OUTPT VISIT, EST, LEVL IV, 30-39 MIN: ICD-10-PCS | Mod: S$PBB,,, | Performed by: PHYSICIAN ASSISTANT

## 2021-10-07 PROCEDURE — 99214 OFFICE O/P EST MOD 30 MIN: CPT | Mod: S$PBB,,, | Performed by: PHYSICIAN ASSISTANT

## 2021-10-07 PROCEDURE — 99999 PR PBB SHADOW E&M-EST. PATIENT-LVL III: ICD-10-PCS | Mod: PBBFAC,,, | Performed by: PHYSICIAN ASSISTANT

## 2021-10-07 PROCEDURE — 99999 PR PBB SHADOW E&M-EST. PATIENT-LVL III: CPT | Mod: PBBFAC,,, | Performed by: PHYSICIAN ASSISTANT

## 2021-10-07 RX ORDER — OXYCODONE AND ACETAMINOPHEN 5; 325 MG/1; MG/1
1 TABLET ORAL EVERY 12 HOURS PRN
Qty: 60 TABLET | Refills: 0 | Status: SHIPPED | OUTPATIENT
Start: 2021-11-06 | End: 2021-12-07 | Stop reason: SDUPTHER

## 2021-10-07 RX ORDER — OXYCODONE AND ACETAMINOPHEN 5; 325 MG/1; MG/1
1 TABLET ORAL EVERY 12 HOURS PRN
Qty: 60 TABLET | Refills: 0 | Status: SHIPPED | OUTPATIENT
Start: 2021-10-07 | End: 2021-12-07 | Stop reason: SDUPTHER

## 2021-11-17 ENCOUNTER — TELEPHONE (OUTPATIENT)
Dept: PAIN MEDICINE | Facility: CLINIC | Age: 61
End: 2021-11-17

## 2021-12-01 ENCOUNTER — PATIENT OUTREACH (OUTPATIENT)
Dept: ADMINISTRATIVE | Facility: OTHER | Age: 61
End: 2021-12-01

## 2021-12-07 ENCOUNTER — OFFICE VISIT (OUTPATIENT)
Dept: PAIN MEDICINE | Facility: CLINIC | Age: 61
End: 2021-12-07

## 2021-12-07 VITALS
RESPIRATION RATE: 17 BRPM | HEIGHT: 68 IN | WEIGHT: 116.94 LBS | BODY MASS INDEX: 17.72 KG/M2 | DIASTOLIC BLOOD PRESSURE: 89 MMHG | SYSTOLIC BLOOD PRESSURE: 152 MMHG | HEART RATE: 78 BPM

## 2021-12-07 DIAGNOSIS — M50.30 DDD (DEGENERATIVE DISC DISEASE), CERVICAL: ICD-10-CM

## 2021-12-07 DIAGNOSIS — M50.30 DDD (DEGENERATIVE DISC DISEASE), CERVICAL: Primary | ICD-10-CM

## 2021-12-07 DIAGNOSIS — M54.50 LUMBAR PAIN: ICD-10-CM

## 2021-12-07 DIAGNOSIS — Z79.891 OPIOID USE AGREEMENT EXISTS: ICD-10-CM

## 2021-12-07 DIAGNOSIS — M47.812 CERVICAL SPONDYLOSIS: ICD-10-CM

## 2021-12-07 PROCEDURE — 99213 OFFICE O/P EST LOW 20 MIN: CPT | Mod: PBBFAC | Performed by: PHYSICIAN ASSISTANT

## 2021-12-07 PROCEDURE — 99214 OFFICE O/P EST MOD 30 MIN: CPT | Mod: S$PBB,,, | Performed by: PHYSICIAN ASSISTANT

## 2021-12-07 PROCEDURE — 99214 PR OFFICE/OUTPT VISIT, EST, LEVL IV, 30-39 MIN: ICD-10-PCS | Mod: S$PBB,,, | Performed by: PHYSICIAN ASSISTANT

## 2021-12-07 PROCEDURE — 99999 PR PBB SHADOW E&M-EST. PATIENT-LVL III: CPT | Mod: PBBFAC,,, | Performed by: PHYSICIAN ASSISTANT

## 2021-12-07 PROCEDURE — 99999 PR PBB SHADOW E&M-EST. PATIENT-LVL III: ICD-10-PCS | Mod: PBBFAC,,, | Performed by: PHYSICIAN ASSISTANT

## 2021-12-07 RX ORDER — GABAPENTIN 300 MG/1
900 CAPSULE ORAL NIGHTLY
Qty: 90 CAPSULE | Refills: 1 | Status: SHIPPED | OUTPATIENT
Start: 2021-12-07 | End: 2022-06-28 | Stop reason: DRUGHIGH

## 2021-12-07 RX ORDER — OXYCODONE AND ACETAMINOPHEN 5; 325 MG/1; MG/1
1 TABLET ORAL EVERY 12 HOURS PRN
Qty: 60 TABLET | Refills: 0 | Status: SHIPPED | OUTPATIENT
Start: 2022-01-06 | End: 2022-03-29 | Stop reason: SDUPTHER

## 2021-12-07 RX ORDER — METHYLPREDNISOLONE 4 MG/1
TABLET ORAL
Qty: 21 EACH | Refills: 0 | Status: SHIPPED | OUTPATIENT
Start: 2021-12-07 | End: 2022-03-29

## 2021-12-07 RX ORDER — KETOROLAC TROMETHAMINE 10 MG/1
10 TABLET, FILM COATED ORAL 2 TIMES DAILY PRN
Qty: 10 TABLET | Refills: 0 | Status: SHIPPED | OUTPATIENT
Start: 2021-12-07 | End: 2022-02-01

## 2021-12-07 RX ORDER — OXYCODONE AND ACETAMINOPHEN 5; 325 MG/1; MG/1
1 TABLET ORAL EVERY 12 HOURS PRN
Qty: 60 TABLET | Refills: 0 | Status: SHIPPED | OUTPATIENT
Start: 2021-12-07 | End: 2022-02-01 | Stop reason: SDUPTHER

## 2022-01-29 ENCOUNTER — PATIENT OUTREACH (OUTPATIENT)
Dept: ADMINISTRATIVE | Facility: OTHER | Age: 62
End: 2022-01-29

## 2022-01-29 NOTE — PROGRESS NOTES
Health Maintenance Due   Topic Date Due    COVID-19 Vaccine (1) Never done    HIV Screening  Never done    Shingles Vaccine (1 of 2) Never done    Colorectal Cancer Screening  04/05/2019    LDCT Lung Screen  11/22/2020    Influenza Vaccine (1) 09/01/2021     Updates were requested from care everywhere.  Chart was reviewed for overdue Proactive [facility] Encounters (BLAS) topics (CRS, Breast Cancer Screening, Eye exam)  Health Maintenance has been updated.  LINKS immunization registry triggered.  Immunizations were reconciled.

## 2022-02-01 ENCOUNTER — OFFICE VISIT (OUTPATIENT)
Dept: PAIN MEDICINE | Facility: CLINIC | Age: 62
End: 2022-02-01

## 2022-02-01 VITALS
BODY MASS INDEX: 17.92 KG/M2 | HEART RATE: 92 BPM | SYSTOLIC BLOOD PRESSURE: 133 MMHG | WEIGHT: 118.25 LBS | DIASTOLIC BLOOD PRESSURE: 79 MMHG | RESPIRATION RATE: 17 BRPM | HEIGHT: 68 IN

## 2022-02-01 DIAGNOSIS — M50.30 DDD (DEGENERATIVE DISC DISEASE), CERVICAL: ICD-10-CM

## 2022-02-01 DIAGNOSIS — M54.50 LUMBAR PAIN: ICD-10-CM

## 2022-02-01 DIAGNOSIS — Z79.891 OPIOID USE AGREEMENT EXISTS: ICD-10-CM

## 2022-02-01 DIAGNOSIS — M50.30 DDD (DEGENERATIVE DISC DISEASE), CERVICAL: Primary | ICD-10-CM

## 2022-02-01 DIAGNOSIS — M47.812 CERVICAL SPONDYLOSIS: ICD-10-CM

## 2022-02-01 PROCEDURE — 99999 PR PBB SHADOW E&M-EST. PATIENT-LVL IV: CPT | Mod: PBBFAC,,, | Performed by: PHYSICIAN ASSISTANT

## 2022-02-01 PROCEDURE — 99214 PR OFFICE/OUTPT VISIT, EST, LEVL IV, 30-39 MIN: ICD-10-PCS | Mod: S$PBB,,, | Performed by: PHYSICIAN ASSISTANT

## 2022-02-01 PROCEDURE — 99214 OFFICE O/P EST MOD 30 MIN: CPT | Mod: PBBFAC | Performed by: PHYSICIAN ASSISTANT

## 2022-02-01 PROCEDURE — 99214 OFFICE O/P EST MOD 30 MIN: CPT | Mod: S$PBB,,, | Performed by: PHYSICIAN ASSISTANT

## 2022-02-01 PROCEDURE — 99999 PR PBB SHADOW E&M-EST. PATIENT-LVL IV: ICD-10-PCS | Mod: PBBFAC,,, | Performed by: PHYSICIAN ASSISTANT

## 2022-02-01 RX ORDER — OXYCODONE AND ACETAMINOPHEN 5; 325 MG/1; MG/1
TABLET ORAL
Qty: 65 TABLET | Refills: 0 | Status: SHIPPED | OUTPATIENT
Start: 2022-03-05 | End: 2022-03-29 | Stop reason: SDUPTHER

## 2022-02-01 RX ORDER — HYDROCODONE BITARTRATE AND ACETAMINOPHEN 5; 325 MG/1; MG/1
TABLET ORAL
Qty: 65 TABLET | Refills: 0 | Status: SHIPPED | OUTPATIENT
Start: 2022-02-04 | End: 2022-05-27 | Stop reason: ALTCHOICE

## 2022-02-01 NOTE — PROGRESS NOTES
Established Patient Chronic Pain Note (Follow up visit)    Chief Complaint:   Chief Complaint   Patient presents with    Low-back Pain    Leg Pain     Left        SUBJECTIVE:    Interval History (2/1/2022):  Leo Roblero presents today for follow-up visit.  Patient was last seen on 12/7/2021.  He reports some pain relief with medication, but he does not feel that the pain is completely controlled.  He also feels that he is getting used to the Percocet.  Patient reports pain as 9/10 today.    Interval History (12/7/2021):  Leo Roblero presents today for follow-up visit for medication refill.  Patient was last seen on 10/7/2021.  He reports that he has been having low back pain for the past 3 weeks or so.  He has had low back pain in the past, but it has not been a real issue lately.  He does not feel that the pain is currently controlled.  Patient reports pain as 9/10 today.    Interval History (10/7/2021): Patient was last seen on 7/30/2021. he presents today for medication refill. Patient reports pain as 9/10 today.   No major changes; patient is stable overall. Medication is providing adequate pain relief.     Interval History (7/30/2021): Patient was last seen on 5/28/2021. No major changes; patient is stable overall. Medication is providing adequate pain relief. Patient reports pain as 8/10 today. he presents today for medication refill.      Interval History (5/28/2021): Patient was last seen on 3/26/2021, and he presents today for medication refill. Patient reports pain as 9/10 today.   Medication is providing adequate pain relief.     Interval History (3/26/2021): Patient was last seen on 1/22/2021. he presents today for medication refill. At last visit, medication was changed from Norco to Percocet, which he reports better pain relief with.  Although he is asking for increased dose today.  Medication is providing some pain relief. Patient reports pain as 9/10 today.  He continues to take gabapentin 600 mg  at night.  He also takes Tylenol 500 mg during the day, but he usually only takes 1 tablet.    Interval History (1/22/2021): Patient was last seen on 11/19/2020. He is here today for medication Refill but reports medication is not providing adequate pain relief. Patient reports pain as 9/10 today.    Interval History (9/23/2020): Patient was last seen on 7/29/2020. At that visit, the plan was to Refill Norco 5/325mg BID.  Patient reports pain as 10/10 today.  He is requesting a higher dose of the Norco today.  He continues to take naproxen and Tylenol as adjuvant medications.  He reports that the pain seems to be worse at night.    Interval HPI (7/29/2020):  Leo Roblero is a 60 y.o. male who presents to the clinic for a follow-up appointment for neck and bilateral shoulder pain.  At the last clinic visit, I expressed to the patient that he could continue on NSAIDs and Tylenol for his chronic pain control and to start with physical therapy to help stabilize his shoulder musculature.  He had failed subacromial bursa injections without much relief.  Since the last visit, Leo Roblero states the pain has been persistant. Current pain intensity is 10/10.    Initial HPI 02/19/2020:  Leo Roblero is a 59 y.o. male who presents to the clinic for the evaluation of neck and bilateral shoulder pain. He was referred by the Orthopedics Department for further evaluation and management of neck pain. Of note, patient has a past medical history of hyperlipidemia, and tobacco abuse, traumatic rotator cuff tear, tendinitis of the bilateral biceps tendon, and other medical comorbidities as listed below.  The pain started several years ago without any significant injury or trauma and symptoms have been worsening.  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  The pain is described as shooting and throbbing and is rated as 6/10. The pain is rated with a score of  4/10 on the BEST day and a score of 10/10 on the WORST day.  Symptoms interfere with daily activity, sleeping and work. The pain is exacerbated by work.  The pain is mitigated by rest. The patient reports spending less than 2 hours per day reclining. The patient reports 6-8 hours of uninterrupted sleep per night.  He works at a [employer] and is very active.  Patient denies night fever/night sweats, urinary incontinence, bowel incontinence, significant weight loss, significant motor weakness and loss of sensations.    Pain Disability Index Review:  Last 3 PDI Scores 2/1/2022 12/7/2021 10/7/2021   Pain Disability Index (PDI) 63 15 64       Non-Pharmacologic Treatments:  Physical Therapy/Home Exercise:  No, pending start  Ice/Heat:yes  TENS: no  Acupuncture: no  Massage: no  Chiropractic: no    Other: no     Pain Medications:  - Opioids: Percocet (Oxycodone/Acetaminophen)  - Adjuvant Medications: Mobic  - Anti-Coagulants: Aspirin      report:  Reviewed and consistent with medication use as prescribed.     Pain Procedures:   -12/05/2019:  Bilateral subacromial bursa injections, limited relief  -02/14/2020:  IM Toradol to the right deltoid         Imaging & EMG/NCS (Reviewed on 2/1/2022):    EMG/NCS bilateral upper extremities 03/06/2020:  1. ABNORMAL study  2. 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  There is additional evidence of a CHRONIC radiculopathy of the C7,C8, T1 nerve roots    X-ray cervical spine 02/19/2020:  Reversal of normal cervical lordosis.  Grade 1 anterolisthesis of C3 on C4 and C4 on C5.  These appear to mostly reduce with extension maneuver.  Grade 1 anterolisthesis of C7 on T1 which does not  change with provocative maneuvers.  No acute fracture.  Prevertebral soft tissues are maintained.  Multilevel discogenic degenerative changes are seen with findings most severe at C5-C6 and C6-C7 with disc space narrowing marginal spurring.  Intact odontoid.  Multilevel bilateral areas of neural foraminal encroachment, right greater than left.  Clear lung apices.  Calcification of the bilateral carotids is seen.    MRI cervical spine 06/01/2017 ([facility]):  There is a mild cervical levoscoliosis with slight straightening of normal cervical lordosis in the superior aspect of the cervical spine. Cervical vertebral body stature is preserved. No prevertebral edema is noted.  The visualized anatomy at the skull base is normal.  C2-C3: Mild disc space narrowing. Mild bilateral posterior facet and ligamentum flavum hypertrophy which is effacing the posterior theca and causing subtle posterior impression on the cervical spinal cord, although the thecal sac still measures 1.2 cm in AP dimension. There is minimal posterior spondylosis.  C3-C4: Disc desiccation with right greater than left posterior facet arthropathy and with right uncinate hypertrophy, with mild disc space narrowing. There is mild ligamentum flavum hypertrophy. There is mild AP narrowing of the thecal sac, consistent with a spinal stenosis, but there is no significant deformity on the cervical spinal cord. There is right lateral recess/foraminal origin stenosis.  C4-C5: Mild to moderate disc space narrowing with disc desiccation and with right greater than left posterior facet hypertrophy. There is right uncinate hypertrophy at this level. There is bilateral ligamentum flavum hypertrophy. This combination of findings is causing a spinal stenosis, thecal sac measuring only 7.7 mm in AP dimension, and with a trefoil deformity of the thecal sac. There is also some mass effect and deformity on the cervical spinal cord. In addition to a spinal stenosis,  there is right greater than left lateral recess/foraminal origin stenosis.  C5-C6: Prominent disc space narrowing with disc desiccation, bridging anterior bony spurring, right greater than left posterior facet arthropathy, and mild right uncinate hypertrophy. There is a broad-based posterior osteophyte disc complex which effaces the anterior theca and narrows the AP dimension of the cervical thecal sac, but which does not cause any significant mass effect on the cervical spinal cord. There is mild bilateral lateral recess/foraminal origin stenosis at this level, right side greater than left.  C6-C7: Prominent disc space narrowing with disc desiccation, anterior and posterior spondylosis, and right uncinate hypertrophy. There is a broad-based, posterior osteophyte disc complex which partially effaces the anterior theca but which does not deform the cervical spinal cord. There is a mild spinal stenosis with mild, bilateral foraminal origin stenosis, slightly greater on the left.  C7-T1: Mild to moderate, bilateral posterior facet arthropathy. There is mild disc desiccation and disc space narrowing with approximately 2 mm of anterolisthesis C7 on T1. There is no obvious stenosis.  Signal intensity in the cervical thecal sac is normal. There is no convincing evidence of any significant edema or gliosis in the cervical spinal cord.     X-ray left shoulder 02/14/2020:  There is no radiographic evidence of acute osseous, articular, or soft tissue abnormality.  Joint spaces are preserved.     MRI left shoulder 2/10/20:  There is moderate rotator cuff tendinosis, supraspinatus and infraspinatus, with superimposed moderate grade partial thickness tear with fluid-filled defect posterior supraspinatus footprint with bursal and interstitial fiber tearing.  Supraspinatus tendon articular surface fraying is also noted.  There is also low-grade interstitial partial-thickness tear at the blending of the supraspinatus and  "infraspinatus.  There are few clustered small subcortical cysts and associated minimal edema like signal greater tuberosity.  The subscapularis tendon is unremarkable.  Long head biceps tendon is intact with mild intra-articular tendinosis.  Superior labral degenerative signal with a sublabral foramen versus SLAP tear.  Labrum is otherwise unremarkable.  Glenohumeral chondral thinning.  No joint effusion.  Normal IGHL.  Type 2 acromion without tilting minimal AC joint spurring.  There is no subacromial subdeltoid bursal fluid collection.  The bone marrow signal intensity is otherwise unremarkable.  The signal intensity of the muscle groups is normal.  No atrophy.          REVIEW OF SYSTEMS:    GENERAL:  No weight loss, malaise or fevers.  HEENT:   No recent changes in vision or hearing  NECK:  Negative for lumps, no difficulty with swallowing.  RESPIRATORY:  Negative for cough, wheezing or shortness of breath, patient denies any recent URI.  CARDIOVASCULAR:  Negative for chest pain, leg swelling or palpitations.  GI:  Negative for abdominal discomfort, blood in stools or black stools or change in bowel habits.  MUSCULOSKELETAL:  See HPI.  SKIN:  Negative for lesions, rash, and itching.  PSYCH:  No mood disorder or recent psychosocial stressors.  Patients sleep is not disturbed secondary to pain.  HEMATOLOGY/LYMPHOLOGY:  Negative for prolonged bleeding, bruising easily or swollen nodes.  Patient is not currently taking any anti-coagulants  NEURO:   No history of headaches, syncope, paralysis, seizures or tremors.  All other reviewed and negative other than HPI.        OBJECTIVE:  Vitals:    02/01/22 0955   BP: 133/79   Pulse: 92   Resp: 17   Weight: 53.7 kg (118 lb 4.4 oz)   Height: 5' 8" (1.727 m)   PainSc:   9    Body mass index is 17.98 kg/m².   (reviewed on 2/1/2022)    General: alert and oriented, in no apparent distress.  Gait: normal gait.  Skin: no rashes, no discoloration, no obvious lesions  HEENT: " normocephalic, atraumatic. Pupils equal and round.  Respiratory: without use of accessory muscles of respiration.    Musculoskeletal - Cervical Spine:  - Limited ROM secondary to pain reproduction   - Pain on flexion of cervical spine: Present   - Pain on extension of cervical spine: Present   - Cervical facet loading: Present   - TTP over the cervical facet joints: Present   - TTP over the cervical paraspinals: Present   - Spurling's:  Equivocal    Shoulder:  - Pain on abduction: Present bilaterally   - ROM: decreased secondary to pain  - Flexion: decreased to 100° on left, decreased to 120° on the right  - Abduction:  Decreased to 110° on the left, decreased to 100° on the right  - lateral winging of the scapula on the left  - TTP:  Present over bilateral biceps tendons  - [place] test:  Positive on the left  - Neer's: Positive  - Hawkin's: Positive  - Speed's test: Positive  - Yergason's test: Positive    Lumbar:  - pain with extension  - pain over facet joints & SIJ      Neuro - Upper Extremities:  - BUE Strength:R/L: D: 5/5; B: 5/5; T: 5/5; WF: 5/5; WE: 5/5; IO: 5/5  - Extremity Reflexes: Brisk and symmetric throughout  - Sensory: Sensation to light touch intact bilaterally    Neuro - Lower Extremities:  - RLE Strength:     >> 5/5 strength with right hip flexion/ extension    >> 5/5 strength with right knee flexion/ extension    >> 5/5 strength in right ankle with plantar and dorsiflexion  - LLE Strength:     >> 5/5 strength with left hip flexion/ extension    >> 5/5 strength with knee flexion extension on the left     >> 5/5 strength in left ankle with plantar and dorsiflexion  - Extremity Reflexes: Brisk and symmetric throughout  - Sensory: Sensation to light touch intact bilaterally      Psych:  Mood and affect is appropriate            ASSESSMENT: 61 y.o. year old male with neck and bilateral shoulder pain, consistent with     1. DDD (degenerative disc disease), cervical     2. Cervical spondylosis     3.  Lumbar pain     4. Opioid use agreement exists           PLAN:   1. Interventional: None for now.  Patient would likely benefit from cervical MADISON; however, patient recently lost insurance and would not be able to afford the procedure.     2. Pharmacologic:   - Will give opioid vacation of Norco 5/325mg BID with additional 5 tablets for bad days (65 tablets). Will e-prescribe to fill on 02/04/2022.  - Refill Percocet 5/325mg Q12H PRN pain with additional 5 tablets for bad days (65 tablets).  Will e-prescribe to fill on 3/5/22.  Patient understands this is the highest dose that we prescribe.  Patient tolerating opioids with no side effects, obtaining some pain control with functional improvement.  We have discussed the risks of chronic opioid medication management.    - Refill gabapentin 900 mg QHS.    - Can take additional 4 tablets of Tylenol 500mg during the day in addition to Percocet 5/325mg (3x) per day. Total of acetaminophen daily - cannot exceed 3000mg; patient verbalized understanding.  - Anticoagulation use: ASA.   - Opioid contract signed on 7/29/2020.   Opioid risk tool completed on 7/29/2020: low risk.  - LA  reviewed and appropriate.     3. Rehabilitative: Encouraged regular exercise.    4. Diagnostic: None for now.    5. Consults/Referrals:  Orthopedics referral sent at previous visit/ [facility] referred to [facility] due to lack of insurance.    6. Follow up: 8 weeks for med refill.      - This condition does not require this patient to take time off of work, and the primary goal of our Pain Management services is to improve the patient's functional capacity.   - I discussed the risks, benefits, and alternatives to potential treatment options. All questions and concerns were fully addressed today in clinic.           >>UDS:  9/23/2020 :: appropriate   1/22/2021 :: appropriate   5/28/2021 :: appropriate  12/7/2021 :: appropriate      Disclaimer:  This note was prepared using voice  recognition system and is likely to have sound alike errors that may have been overlooked even after proof reading.  Please call me with any questions.

## 2022-03-08 ENCOUNTER — TELEPHONE (OUTPATIENT)
Dept: PAIN MEDICINE | Facility: CLINIC | Age: 62
End: 2022-03-08

## 2022-03-08 NOTE — TELEPHONE ENCOUNTER
----- Message from Elif Dolan PA-C sent at 3/8/2022 10:22 AM CST -----  Contact: Amanda  He should have a Rx Refill at pharmacy [facility].  ----- Message -----  From: Rizwan Haley  Sent: 3/8/2022   9:30 AM CST  To: [facility] Staff    Amanda (wife) would like to consult with nurse regarding recent refill request for Oxycodone.  The patient is out of this medication.  Please contact Amanda @ [phone].  Thanks/As

## 2022-03-28 NOTE — PROGRESS NOTES
Established Patient Chronic Pain Note (Follow up visit)    Chief Complaint:   Chief Complaint   Patient presents with    Neck Pain     Radiating into bilateral shoulder    Low-back Pain     Radiates intermittently in bilateral lower ext.        SUBJECTIVE:    Interval History (3/29/2022): Patient was last seen on 2/1/2022. At last visit, we switched to norco for opioid vacation, which seemed to help his pain medication work better. he presents today for medication refill.  No major changes; patient is stable overall. Medication is providing adequate pain relief. Patient reports pain as 8/10 today.     Interval History (2/1/2022):  Leo Roblero presents today for follow-up visit.  Patient was last seen on 12/7/2021.  He reports some pain relief with medication, but he does not feel that the pain is completely controlled.  He also feels that he is getting used to the Percocet.  Patient reports pain as 9/10 today.    Interval History (12/7/2021):  Leo Roblero presents today for follow-up visit for medication refill.  Patient was last seen on 10/7/2021.  He reports that he has been having low back pain for the past 3 weeks or so.  He has had low back pain in the past, but it has not been a real issue lately.  He does not feel that the pain is currently controlled.  Patient reports pain as 9/10 today.    Interval History (10/7/2021): Patient was last seen on 7/30/2021. he presents today for medication refill. Patient reports pain as 9/10 today.   No major changes; patient is stable overall. Medication is providing adequate pain relief.     Interval History (7/30/2021): Patient was last seen on 5/28/2021. No major changes; patient is stable overall. Medication is providing adequate pain relief. Patient reports pain as 8/10 today. he presents today for medication refill.      Interval History (5/28/2021): Patient was last seen on 3/26/2021, and he presents today for medication refill. Patient reports pain as 9/10 today.    Medication is providing adequate pain relief.     Interval History (3/26/2021): Patient was last seen on 1/22/2021. he presents today for medication refill. At last visit, medication was changed from Norco to Percocet, which he reports better pain relief with.  Although he is asking for increased dose today.  Medication is providing some pain relief. Patient reports pain as 9/10 today.  He continues to take gabapentin 600 mg at night.  He also takes Tylenol 500 mg during the day, but he usually only takes 1 tablet.    Interval History (1/22/2021): Patient was last seen on 11/19/2020. He is here today for medication Refill but reports medication is not providing adequate pain relief. Patient reports pain as 9/10 today.    Interval History (9/23/2020): Patient was last seen on 7/29/2020. At that visit, the plan was to Refill Norco 5/325mg BID.  Patient reports pain as 10/10 today.  He is requesting a higher dose of the Norco today.  He continues to take naproxen and Tylenol as adjuvant medications.  He reports that the pain seems to be worse at night.    Interval HPI (7/29/2020):  Leo Roblero is a 60 y.o. male who presents to the clinic for a follow-up appointment for neck and bilateral shoulder pain.  At the last clinic visit, I expressed to the patient that he could continue on NSAIDs and Tylenol for his chronic pain control and to start with physical therapy to help stabilize his shoulder musculature.  He had failed subacromial bursa injections without much relief.  Since the last visit, Leo Roblero states the pain has been persistant. Current pain intensity is 10/10.    Initial HPI 02/19/2020:  Leo Roblero is a 59 y.o. male who presents to the clinic for the evaluation of neck and bilateral shoulder pain. He was referred by the Orthopedics Department for further evaluation and management of neck pain. Of note, patient has a past medical history of hyperlipidemia, and tobacco abuse, traumatic rotator cuff  tear, tendinitis of the bilateral biceps tendon, and other medical comorbidities as listed below.  The pain started several years ago without any significant injury or trauma and symptoms have been worsening.  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  The pain is described as shooting and throbbing and is rated as 6/10. The pain is rated with a score of  4/10 on the BEST day and a score of 10/10 on the WORST day.  Symptoms interfere with daily activity, sleeping and work. The pain is exacerbated by work.  The pain is mitigated by rest. The patient reports spending less than 2 hours per day reclining. The patient reports 6-8 hours of uninterrupted sleep per night.  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  Patient denies night fever/night sweats, urinary incontinence, bowel incontinence, significant weight loss, significant motor weakness and loss of sensations.    Pain Disability Index Review:  Last 3 PDI Scores 2/1/2022 12/7/2021 10/7/2021   Pain Disability Index (PDI) 63 15 64       Non-Pharmacologic Treatments:  Physical Therapy/Home Exercise:  No, pending start  Ice/Heat:yes  TENS: no  Acupuncture: no  Massage: no  Chiropractic: no    Other: no     Pain Medications:  - Opioids: Percocet (Oxycodone/Acetaminophen)  - Adjuvant Medications: Mobic  - Anti-Coagulants: Aspirin      report:  Reviewed and consistent with medication use as prescribed.     Pain Procedures:   -12/05/2019:  Bilateral subacromial bursa injections, limited relief  -02/14/2020:  IM Toradol to the right deltoid         Imaging & EMG/NCS (Reviewed on 3/29/2022):    EMG/NCS bilateral upper extremities 03/06/2020:  1. ABNORMAL study  2. There is electrodiagnostic evidence of a MODERATE demyelinating median  neuropathy (Carpal tunnel syndrome) across the RIGHT wrist and a MILD demyelinating CTS across the LEFT wrist.  There is additional evidence of a CHRONIC radiculopathy of the C7,C8, T1 nerve roots    X-ray cervical spine 02/19/2020:  Reversal of normal cervical lordosis.  Grade 1 anterolisthesis of C3 on C4 and C4 on C5.  These appear to mostly reduce with extension maneuver.  Grade 1 anterolisthesis of C7 on T1 which does not change with provocative maneuvers.  No acute fracture.  Prevertebral soft tissues are maintained.  Multilevel discogenic degenerative changes are seen with findings most severe at C5-C6 and C6-C7 with disc space narrowing marginal spurring.  Intact odontoid.  Multilevel bilateral areas of neural foraminal encroachment, right greater than left.  Clear lung apices.  Calcification of the bilateral carotids is seen.    MRI cervical spine 06/01/2017 (via [facility]):  There is a mild cervical levoscoliosis with slight straightening of normal cervical lordosis in the superior aspect of the cervical spine. Cervical vertebral body stature is preserved. No prevertebral edema is noted.  The visualized anatomy at the skull base is normal.  C2-C3: Mild disc space narrowing. Mild bilateral posterior facet and ligamentum flavum hypertrophy which is effacing the posterior theca and causing subtle posterior impression on the cervical spinal cord, although the thecal sac still measures 1.2 cm in AP dimension. There is minimal posterior spondylosis.  C3-C4: Disc desiccation with right greater than left posterior facet arthropathy and with right uncinate hypertrophy, with mild disc space narrowing. There is mild ligamentum flavum hypertrophy. There is mild AP narrowing of the thecal sac, consistent with a spinal stenosis, but there is no significant deformity on the cervical spinal cord. There is right lateral recess/foraminal origin stenosis.  C4-C5: Mild to moderate disc space narrowing with disc  desiccation and with right greater than left posterior facet hypertrophy. There is right uncinate hypertrophy at this level. There is bilateral ligamentum flavum hypertrophy. This combination of findings is causing a spinal stenosis, thecal sac measuring only 7.7 mm in AP dimension, and with a trefoil deformity of the thecal sac. There is also some mass effect and deformity on the cervical spinal cord. In addition to a spinal stenosis, there is right greater than left lateral recess/foraminal origin stenosis.  C5-C6: Prominent disc space narrowing with disc desiccation, bridging anterior bony spurring, right greater than left posterior facet arthropathy, and mild right uncinate hypertrophy. There is a broad-based posterior osteophyte disc complex which effaces the anterior theca and narrows the AP dimension of the cervical thecal sac, but which does not cause any significant mass effect on the cervical spinal cord. There is mild bilateral lateral recess/foraminal origin stenosis at this level, right side greater than left.  C6-C7: Prominent disc space narrowing with disc desiccation, anterior and posterior spondylosis, and right uncinate hypertrophy. There is a broad-based, posterior osteophyte disc complex which partially effaces the anterior theca but which does not deform the cervical spinal cord. There is a mild spinal stenosis with mild, bilateral foraminal origin stenosis, slightly greater on the left.  C7-T1: Mild to moderate, bilateral posterior facet arthropathy. There is mild disc desiccation and disc space narrowing with approximately 2 mm of anterolisthesis C7 on T1. There is no obvious stenosis.  Signal intensity in the cervical thecal sac is normal. There is no convincing evidence of any significant edema or gliosis in the cervical spinal cord.     X-ray left shoulder 02/14/2020:  There is no radiographic evidence of acute osseous, articular, or soft tissue abnormality.  Joint spaces are  preserved.     MRI left shoulder 2/10/20:  There is moderate rotator cuff tendinosis, supraspinatus and infraspinatus, with superimposed moderate grade partial thickness tear with fluid-filled defect posterior supraspinatus footprint with bursal and interstitial fiber tearing.  Supraspinatus tendon articular surface fraying is also noted.  There is also low-grade interstitial partial-thickness tear at the blending of the supraspinatus and infraspinatus.  There are few clustered small subcortical cysts and associated minimal edema like signal greater tuberosity.  The subscapularis tendon is unremarkable.  Long head biceps tendon is intact with mild intra-articular tendinosis.  Superior labral degenerative signal with a sublabral foramen versus SLAP tear.  Labrum is otherwise unremarkable.  Glenohumeral chondral thinning.  No joint effusion.  Normal IGHL.  Type 2 acromion without tilting minimal AC joint spurring.  There is no subacromial subdeltoid bursal fluid collection.  The bone marrow signal intensity is otherwise unremarkable.  The signal intensity of the muscle groups is normal.  No atrophy.          REVIEW OF SYSTEMS:    GENERAL:  No weight loss, malaise or fevers.  HEENT:   No recent changes in vision or hearing  NECK:  Negative for lumps, no difficulty with swallowing.  RESPIRATORY:  Negative for cough, wheezing or shortness of breath, patient denies any recent URI.  CARDIOVASCULAR:  Negative for chest pain, leg swelling or palpitations.  GI:  Negative for abdominal discomfort, blood in stools or black stools or change in bowel habits.  MUSCULOSKELETAL:  See HPI.  SKIN:  Negative for lesions, rash, and itching.  PSYCH:  No mood disorder or recent psychosocial stressors.  Patients sleep is not disturbed secondary to pain.  HEMATOLOGY/LYMPHOLOGY:  Negative for prolonged bleeding, bruising easily or swollen nodes.  Patient is not currently taking any anti-coagulants  NEURO:   No history of headaches, syncope,  "paralysis, seizures or tremors.  All other reviewed and negative other than HPI.        OBJECTIVE:  Vitals:    03/29/22 0956   BP: 139/81   Pulse: 81   Weight: 52.4 kg (115 lb 10.1 oz)   Height: 5' 8" (1.727 m)   PainSc:   8   PainLoc: Back    Body mass index is 17.58 kg/m².   (reviewed on 3/29/2022)    General: alert and oriented, in no apparent distress.  Gait: normal gait.  Skin: no rashes, no discoloration, no obvious lesions  HEENT: normocephalic, atraumatic. Pupils equal and round.  Respiratory: without use of accessory muscles of respiration.    Musculoskeletal - Cervical Spine:  - Pain on flexion of cervical spine: Present   - Pain on extension of cervical spine: Present   - Cervical facet loading: Present   - TTP over the cervical facet joints: Present   - TTP over the cervical paraspinals: Present   - Spurling's:  Equivocal    Shoulder:  - Pain on abduction: Present bilaterally   - Flexion: decreased to 100° on left, decreased to 120° on the right  - Abduction:  Decreased to 110° on the left, decreased to 100° on the right  - lateral winging of the scapula on the left  - TTP:  Present over bilateral biceps tendons  - Holloway test:  Positive on the left  - Neer's: Positive  - Hawkin's: Positive  - Speed's test: Positive  - Yergason's test: Positive    Lumbar:  - pain with extension  - pain over facet joints & SIJ      Neuro - Upper Extremities:  - BUE Strength:R/L: D: 5/5; B: 5/5; T: 5/5; WF: 5/5; WE: 5/5; IO: 5/5  - Extremity Reflexes: Brisk and symmetric throughout  - Sensory: Sensation to light touch intact bilaterally    Neuro - Lower Extremities:  - RLE Strength:     >> 5/5 strength with right hip flexion/ extension    >> 5/5 strength with right knee flexion/ extension    >> 5/5 strength in right ankle with plantar and dorsiflexion  - LLE Strength:     >> 5/5 strength with left hip flexion/ extension    >> 5/5 strength with knee flexion extension on the left     >> 5/5 strength in left ankle with " plantar and dorsiflexion  - Extremity Reflexes: Brisk and symmetric throughout  - Sensory: Sensation to light touch intact bilaterally      Psych:  Mood and affect is appropriate            ASSESSMENT: 61 y.o. year old male with neck and bilateral shoulder pain, consistent with     1. DDD (degenerative disc disease), cervical     2. Cervical spondylosis     3. Lumbar pain     4. Opioid use agreement exists           PLAN:   1. Interventional: None for now.  Patient would likely benefit from cervical MADISON; however, patient recently lost insurance and would not be able to afford the procedure.     2. Pharmacologic:   - Refill Percocet 5/325mg Q12H PRN pain with additional 5 tablets to take TID on those 5 bad days per month (65 tablets).  Will e-prescribe to fill on 4/7/22 and 5/6/22.  Patient understands this is the highest dose that we prescribe.  Patient tolerating opioids with no side effects, obtaining some pain control with functional improvement.  We have discussed the risks of chronic opioid medication management.    - Refill gabapentin 600 mg QHS.    - Can take additional 4 tablets of Tylenol 500mg during the day in addition to Percocet 5/325mg (3x) per day. Total of acetaminophen daily - cannot exceed 3000mg; patient verbalized understanding.  - Anticoagulation use: ASA.   - Opioid contract signed on 7/29/2020.   Opioid risk tool completed on 7/29/2020: low risk.  - LA  reviewed and appropriate.     - Last UDS was compliant for medications prescribed.     3. Rehabilitative: Encouraged regular exercise.    4. Diagnostic: None for now.    5. Consults/Referrals:  Orthopedics referral sent at previous visit/ [facility] referred to [facility] due to lack of insurance.    6. Follow up: 8 weeks for med refill.      - This condition does not require this patient to take time off of work, and the primary goal of our Pain Management services is to improve the patient's functional capacity.   - I  discussed the risks, benefits, and alternatives to potential treatment options. All questions and concerns were fully addressed today in clinic.           >>UDS:  9/23/2020 :: appropriate   1/22/2021 :: appropriate   5/28/2021 :: appropriate  12/7/2021 :: appropriate      Disclaimer:  This note was prepared using voice recognition system and is likely to have sound alike errors that may have been overlooked even after proof reading.  Please call me with any questions.

## 2022-03-29 ENCOUNTER — OFFICE VISIT (OUTPATIENT)
Dept: PAIN MEDICINE | Facility: CLINIC | Age: 62
End: 2022-03-29

## 2022-03-29 VITALS
WEIGHT: 115.63 LBS | HEART RATE: 81 BPM | HEIGHT: 68 IN | SYSTOLIC BLOOD PRESSURE: 139 MMHG | BODY MASS INDEX: 17.52 KG/M2 | DIASTOLIC BLOOD PRESSURE: 81 MMHG

## 2022-03-29 DIAGNOSIS — Z79.891 OPIOID USE AGREEMENT EXISTS: ICD-10-CM

## 2022-03-29 DIAGNOSIS — M50.30 DDD (DEGENERATIVE DISC DISEASE), CERVICAL: Primary | ICD-10-CM

## 2022-03-29 DIAGNOSIS — M47.812 CERVICAL SPONDYLOSIS: ICD-10-CM

## 2022-03-29 DIAGNOSIS — M50.30 DDD (DEGENERATIVE DISC DISEASE), CERVICAL: ICD-10-CM

## 2022-03-29 DIAGNOSIS — M54.50 LUMBAR PAIN: ICD-10-CM

## 2022-03-29 PROCEDURE — 99999 PR PBB SHADOW E&M-EST. PATIENT-LVL IV: CPT | Mod: PBBFAC,,, | Performed by: PHYSICIAN ASSISTANT

## 2022-03-29 PROCEDURE — 99999 PR PBB SHADOW E&M-EST. PATIENT-LVL IV: ICD-10-PCS | Mod: PBBFAC,,, | Performed by: PHYSICIAN ASSISTANT

## 2022-03-29 PROCEDURE — 99214 OFFICE O/P EST MOD 30 MIN: CPT | Mod: S$PBB,,, | Performed by: PHYSICIAN ASSISTANT

## 2022-03-29 PROCEDURE — 99214 OFFICE O/P EST MOD 30 MIN: CPT | Mod: PBBFAC | Performed by: PHYSICIAN ASSISTANT

## 2022-03-29 PROCEDURE — 99214 PR OFFICE/OUTPT VISIT, EST, LEVL IV, 30-39 MIN: ICD-10-PCS | Mod: S$PBB,,, | Performed by: PHYSICIAN ASSISTANT

## 2022-03-29 RX ORDER — OXYCODONE AND ACETAMINOPHEN 5; 325 MG/1; MG/1
1 TABLET ORAL EVERY 12 HOURS PRN
Qty: 60 TABLET | Refills: 0 | Status: SHIPPED | OUTPATIENT
Start: 2022-04-07 | End: 2022-05-27 | Stop reason: ALTCHOICE

## 2022-03-29 RX ORDER — OXYCODONE AND ACETAMINOPHEN 5; 325 MG/1; MG/1
TABLET ORAL
Qty: 65 TABLET | Refills: 0 | Status: SHIPPED | OUTPATIENT
Start: 2022-05-06 | End: 2022-06-28 | Stop reason: SDUPTHER

## 2022-05-19 ENCOUNTER — PATIENT OUTREACH (OUTPATIENT)
Dept: ADMINISTRATIVE | Facility: HOSPITAL | Age: 62
End: 2022-05-19

## 2022-05-19 NOTE — PROGRESS NOTES
Working Report not seen in > 12 months:     Called pt to discuss scheduling annual exam. No answer, left message. No HM updates in CE

## 2022-05-27 ENCOUNTER — TELEPHONE (OUTPATIENT)
Dept: PAIN MEDICINE | Facility: CLINIC | Age: 62
End: 2022-05-27

## 2022-05-27 DIAGNOSIS — M50.30 DDD (DEGENERATIVE DISC DISEASE), CERVICAL: ICD-10-CM

## 2022-05-27 NOTE — TELEPHONE ENCOUNTER
Moved his appt to June per Elif Dolan PA-C. Elif Dolan PA-C stated she would do a refill of medication for June.

## 2022-05-30 RX ORDER — OXYCODONE AND ACETAMINOPHEN 5; 325 MG/1; MG/1
TABLET ORAL
Qty: 65 TABLET | Refills: 0 | Status: SHIPPED | OUTPATIENT
Start: 2022-06-04 | End: 2022-06-28 | Stop reason: SDUPTHER

## 2022-06-27 NOTE — PROGRESS NOTES
Established Patient Chronic Pain Note (Follow up visit)    Chief Complaint:   Chief Complaint   Patient presents with    Low-back Pain    Shoulder Pain     Low back pain, shoulder pain       SUBJECTIVE:    Interval History (6/28/2022): Patient was last seen on 3/29/2022. he presents today for follow-up for medication refill.  Medication is providing adequate pain relief. he feels the pain medication reduces the negative effects of chronic pain that affects day-to-day function and quality of life. No major changes since previous visit; patient is stable overall. Patient reports pain as 9/10 today.     Interval History (3/29/2022): Patient was last seen on 2/1/2022. At last visit, we switched to norco for opioid vacation, which seemed to help his pain medication work better. he presents today for medication refill.  No major changes; patient is stable overall. Medication is providing adequate pain relief. Patient reports pain as 8/10 today.     Interval History (2/1/2022):  Leo Roblero presents today for follow-up visit.  Patient was last seen on 12/7/2021.  He reports some pain relief with medication, but he does not feel that the pain is completely controlled.  He also feels that he is getting used to the Percocet.  Patient reports pain as 9/10 today.    Interval History (12/7/2021):  Leo Roblero presents today for follow-up visit for medication refill.  Patient was last seen on 10/7/2021.  He reports that he has been having low back pain for the past 3 weeks or so.  He has had low back pain in the past, but it has not been a real issue lately.  He does not feel that the pain is currently controlled.  Patient reports pain as 9/10 today.    Interval History (10/7/2021): Patient was last seen on 7/30/2021. he presents today for medication refill. Patient reports pain as 9/10 today.   No major changes; patient is stable overall. Medication is providing adequate pain relief.     Interval History (7/30/2021): Patient  was last seen on 5/28/2021. No major changes; patient is stable overall. Medication is providing adequate pain relief. Patient reports pain as 8/10 today. he presents today for medication refill.      Interval History (5/28/2021): Patient was last seen on 3/26/2021, and he presents today for medication refill. Patient reports pain as 9/10 today.   Medication is providing adequate pain relief.     Interval History (3/26/2021): Patient was last seen on 1/22/2021. he presents today for medication refill. At last visit, medication was changed from Norco to Percocet, which he reports better pain relief with.  Although he is asking for increased dose today.  Medication is providing some pain relief. Patient reports pain as 9/10 today.  He continues to take gabapentin 600 mg at night.  He also takes Tylenol 500 mg during the day, but he usually only takes 1 tablet.    Interval History (1/22/2021): Patient was last seen on 11/19/2020. He is here today for medication Refill but reports medication is not providing adequate pain relief. Patient reports pain as 9/10 today.    Interval History (9/23/2020): Patient was last seen on 7/29/2020. At that visit, the plan was to Refill Norco 5/325mg BID.  Patient reports pain as 10/10 today.  He is requesting a higher dose of the Norco today.  He continues to take naproxen and Tylenol as adjuvant medications.  He reports that the pain seems to be worse at night.    Interval HPI (7/29/2020):  Leo Roblero is a 60 y.o. male who presents to the clinic for a follow-up appointment for neck and bilateral shoulder pain.  At the last clinic visit, I expressed to the patient that he could continue on NSAIDs and Tylenol for his chronic pain control and to start with physical therapy to help stabilize his shoulder musculature.  He had failed subacromial bursa injections without much relief.  Since the last visit, Leo Roblero states the pain has been persistant. Current pain intensity is  10/10.    Initial HPI 02/19/2020:  Leo Roblero is a 59 y.o. male who presents to the clinic for the evaluation of neck and bilateral shoulder pain. He was referred by the Orthopedics Department for further evaluation and management of neck pain. Of note, patient has a past medical history of hyperlipidemia, and tobacco abuse, traumatic rotator cuff tear, tendinitis of the bilateral biceps tendon, and other medical comorbidities as listed below.  The pain started several years ago without any significant injury or trauma and symptoms have been worsening.  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  The pain is described as shooting and throbbing and is rated as 6/10. The pain is rated with a score of  4/10 on the BEST day and a score of 10/10 on the WORST day.  Symptoms interfere with daily activity, sleeping and work. The pain is exacerbated by work.  The pain is mitigated by rest. The patient reports spending less than 2 hours per day reclining. The patient reports 6-8 hours of uninterrupted sleep per night.  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  Patient denies night fever/night sweats, urinary incontinence, bowel incontinence, significant weight loss, significant motor weakness and loss of sensations.    Pain Disability Index Review:  Last 3 PDI Scores 2/1/2022 12/7/2021 10/7/2021   Pain Disability Index (PDI) 63 15 64       Non-Pharmacologic Treatments:  Physical Therapy/Home Exercise:  No, pending start  Ice/Heat:yes  TENS: no  Acupuncture: no  Massage: no  Chiropractic: no    Other: no     Pain Medications:  - Opioids: Percocet (Oxycodone/Acetaminophen)  - Adjuvant Medications: Mobic  - Anti-Coagulants: Aspirin      report:  Reviewed and consistent with medication use  as prescribed.     Pain Procedures:   -12/05/2019:  Bilateral subacromial bursa injections, limited relief  -02/14/2020:  IM Toradol to the right deltoid         Imaging & EMG/NCS (Reviewed on 6/28/2022):    EMG/NCS bilateral upper extremities 03/06/2020:  1. ABNORMAL study  2. 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  There is additional evidence of a CHRONIC radiculopathy of the C7,C8, T1 nerve roots    X-ray cervical spine 02/19/2020:  Reversal of normal cervical lordosis.  Grade 1 anterolisthesis of C3 on C4 and C4 on C5.  These appear to mostly reduce with extension maneuver.  Grade 1 anterolisthesis of C7 on T1 which does not change with provocative maneuvers.  No acute fracture.  Prevertebral soft tissues are maintained.  Multilevel discogenic degenerative changes are seen with findings most severe at C5-C6 and C6-C7 with disc space narrowing marginal spurring.  Intact odontoid.  Multilevel bilateral areas of neural foraminal encroachment, right greater than left.  Clear lung apices.  Calcification of the bilateral carotids is seen.    MRI cervical spine 06/01/2017 ([facility]):  There is a mild cervical levoscoliosis with slight straightening of normal cervical lordosis in the superior aspect of the cervical spine. Cervical vertebral body stature is preserved. No prevertebral edema is noted.  The visualized anatomy at the skull base is normal.  C2-C3: Mild disc space narrowing. Mild bilateral posterior facet and ligamentum flavum hypertrophy which is effacing the posterior theca and causing subtle posterior impression on the cervical spinal cord, although the thecal sac still measures 1.2 cm in AP dimension. There is minimal posterior spondylosis.  C3-C4: Disc desiccation with right greater than left posterior facet arthropathy and with right uncinate  hypertrophy, with mild disc space narrowing. There is mild ligamentum flavum hypertrophy. There is mild AP narrowing of the thecal sac, consistent with a spinal stenosis, but there is no significant deformity on the cervical spinal cord. There is right lateral recess/foraminal origin stenosis.  C4-C5: Mild to moderate disc space narrowing with disc desiccation and with right greater than left posterior facet hypertrophy. There is right uncinate hypertrophy at this level. There is bilateral ligamentum flavum hypertrophy. This combination of findings is causing a spinal stenosis, thecal sac measuring only 7.7 mm in AP dimension, and with a trefoil deformity of the thecal sac. There is also some mass effect and deformity on the cervical spinal cord. In addition to a spinal stenosis, there is right greater than left lateral recess/foraminal origin stenosis.  C5-C6: Prominent disc space narrowing with disc desiccation, bridging anterior bony spurring, right greater than left posterior facet arthropathy, and mild right uncinate hypertrophy. There is a broad-based posterior osteophyte disc complex which effaces the anterior theca and narrows the AP dimension of the cervical thecal sac, but which does not cause any significant mass effect on the cervical spinal cord. There is mild bilateral lateral recess/foraminal origin stenosis at this level, right side greater than left.  C6-C7: Prominent disc space narrowing with disc desiccation, anterior and posterior spondylosis, and right uncinate hypertrophy. There is a broad-based, posterior osteophyte disc complex which partially effaces the anterior theca but which does not deform the cervical spinal cord. There is a mild spinal stenosis with mild, bilateral foraminal origin stenosis, slightly greater on the left.  C7-T1: Mild to moderate, bilateral posterior facet arthropathy. There is mild disc desiccation and disc space narrowing with approximately 2 mm of anterolisthesis C7  on T1. There is no obvious stenosis.  Signal intensity in the cervical thecal sac is normal. There is no convincing evidence of any significant edema or gliosis in the cervical spinal cord.     X-ray left shoulder 02/14/2020:  There is no radiographic evidence of acute osseous, articular, or soft tissue abnormality.  Joint spaces are preserved.     MRI left shoulder 2/10/20:  There is moderate rotator cuff tendinosis, supraspinatus and infraspinatus, with superimposed moderate grade partial thickness tear with fluid-filled defect posterior supraspinatus footprint with bursal and interstitial fiber tearing.  Supraspinatus tendon articular surface fraying is also noted.  There is also low-grade interstitial partial-thickness tear at the blending of the supraspinatus and infraspinatus.  There are few clustered small subcortical cysts and associated minimal edema like signal greater tuberosity.  The subscapularis tendon is unremarkable.  Long head biceps tendon is intact with mild intra-articular tendinosis.  Superior labral degenerative signal with a sublabral foramen versus SLAP tear.  Labrum is otherwise unremarkable.  Glenohumeral chondral thinning.  No joint effusion.  Normal IGHL.  Type 2 acromion without tilting minimal AC joint spurring.  There is no subacromial subdeltoid bursal fluid collection.  The bone marrow signal intensity is otherwise unremarkable.  The signal intensity of the muscle groups is normal.  No atrophy.          REVIEW OF SYSTEMS:    GENERAL:  No weight loss, malaise or fevers.  HEENT:   No recent changes in vision or hearing  NECK:  Negative for lumps, no difficulty with swallowing.  RESPIRATORY:  Negative for cough, wheezing or shortness of breath, patient denies any recent URI.  CARDIOVASCULAR:  Negative for chest pain, leg swelling or palpitations.  GI:  Negative for abdominal discomfort, blood in stools or black stools or change in bowel habits.  MUSCULOSKELETAL:  See HPI.  SKIN:   "Negative for lesions, rash, and itching.  PSYCH:  No mood disorder or recent psychosocial stressors.  Patients sleep is not disturbed secondary to pain.  HEMATOLOGY/LYMPHOLOGY:  Negative for prolonged bleeding, bruising easily or swollen nodes.  Patient is not currently taking any anti-coagulants  NEURO:   No history of headaches, syncope, paralysis, seizures or tremors.  All other reviewed and negative other than HPI.        OBJECTIVE:  Vitals:    06/28/22 0906   BP: (!) 141/76   Pulse: 88   Weight: 50.8 kg (111 lb 14.1 oz)   Height: 5' 8" (1.727 m)   PainSc:   9   PainLoc: Back    Body mass index is 17.01 kg/m².   (reviewed on 6/28/2022)    General: alert and oriented, in no apparent distress.  Gait: normal gait.  Skin: no rashes, no discoloration, no obvious lesions  HEENT: normocephalic, atraumatic. Pupils equal and round.  Respiratory: without use of accessory muscles of respiration.    Musculoskeletal - Cervical Spine:  - Pain on flexion of cervical spine: Present   - Pain on extension of cervical spine: Present   - Cervical facet loading: Present   - TTP over the cervical facet joints: Present   - TTP over the cervical paraspinals: Present   - Spurling's:  Equivocal    Shoulder:  - Pain on abduction: Present bilaterally   - Flexion: decreased to 100° on left, decreased to 120° on the right  - Abduction:  Decreased to 110° on the left, decreased to 100° on the right  - lateral winging of the scapula on the left  - TTP:  Present over bilateral biceps tendons  - [place] test:  Positive on the left  - Neer's: Positive  - Hawkin's: Positive  - Speed's test: Positive  - Yergason's test: Positive    Lumbar:  - pain with extension  - pain over facet joints & SIJ      Neuro - Upper Extremities:  - BUE Strength:R/L: D: 5/5; B: 5/5; T: 5/5; WF: 5/5; WE: 5/5; IO: 5/5  - Extremity Reflexes: Brisk and symmetric throughout  - Sensory: Sensation to light touch intact bilaterally    Neuro - Lower Extremities:  - RLE Strength: "     >> 5/5 strength with right hip flexion/ extension    >> 5/5 strength with right knee flexion/ extension    >> 5/5 strength in right ankle with plantar and dorsiflexion  - LLE Strength:     >> 5/5 strength with left hip flexion/ extension    >> 5/5 strength with knee flexion extension on the left     >> 5/5 strength in left ankle with plantar and dorsiflexion  - Extremity Reflexes: Brisk and symmetric throughout  - Sensory: Sensation to light touch intact bilaterally      Psych:  Mood and affect is appropriate            ASSESSMENT: 61 y.o. year old male with neck and bilateral shoulder pain, consistent with     1. Cervical radiculopathy  gabapentin (NEURONTIN) 300 MG capsule    DISCONTINUED: gabapentin (NEURONTIN) 300 MG capsule   2. DDD (degenerative disc disease), cervical     3. Cervical spondylosis     4. Lumbar pain     5. Opioid use agreement exists         PLAN:   1. Interventional: None for now.  Patient would likely benefit from cervical MADISON; however, patient recently lost insurance and would not be able to afford the procedure.     2. Pharmacologic:   - Refill Percocet 5/325mg Q12H PRN pain with additional 10 tablets to take TID on those 5 bad days per month (70 tablets).  Will e-prescribe to fill on 7/5/22 and 8/4/22.  Patient understands this is the highest dose that we prescribe.  Patient tolerating opioids with no side effects, obtaining some pain control with functional improvement.  We have discussed the risks of chronic opioid medication management.    - Increase gabapentin 600 mg QHS to gabapentin 300mg QAM/ 300mg at lunch/ 600mg QHS  - Can take additional 4 tablets of Tylenol 500mg during the day in addition to Percocet 5/325mg (3x) per day. Total of acetaminophen daily - cannot exceed 3000mg; patient verbalized understanding.  - Anticoagulation use: ASA.   - Opioid contract signed on 7/29/2020.   Opioid risk tool completed on 7/29/2020: low risk.  - LA  reviewed and appropriate.     - Will  order drug screen (UDS or oral swab) today to ensure med compliance.     3. Rehabilitative: Encouraged regular exercise.    4. Diagnostic: None for now.    5. Consults/Referrals:  Orthopedics referral sent at previous visit/ [facility] referred to [facility] orthopedics due to lack of insurance.    6. Follow up: 8-9 weeks for med refill.      - This condition does not require this patient to take time off of work, and the primary goal of our Pain Management services is to improve the patient's functional capacity.   - I discussed the risks, benefits, and alternatives to potential treatment options. All questions and concerns were fully addressed today in clinic.           >>UDS:  9/23/2020 :: appropriate   1/22/2021 :: appropriate   5/28/2021 :: appropriate  12/7/2021 :: appropriate  6/28/2022 :: pending       Disclaimer:  This note was prepared using voice recognition system and is likely to have sound alike errors that may have been overlooked even after proof reading.  Please call me with any questions.

## 2022-06-28 ENCOUNTER — OFFICE VISIT (OUTPATIENT)
Dept: PAIN MEDICINE | Facility: CLINIC | Age: 62
End: 2022-06-28

## 2022-06-28 VITALS
SYSTOLIC BLOOD PRESSURE: 141 MMHG | BODY MASS INDEX: 16.96 KG/M2 | WEIGHT: 111.88 LBS | DIASTOLIC BLOOD PRESSURE: 76 MMHG | HEART RATE: 88 BPM | HEIGHT: 68 IN

## 2022-06-28 DIAGNOSIS — M47.812 CERVICAL SPONDYLOSIS: ICD-10-CM

## 2022-06-28 DIAGNOSIS — Z79.891 OPIOID USE AGREEMENT EXISTS: ICD-10-CM

## 2022-06-28 DIAGNOSIS — M54.50 LUMBAR PAIN: ICD-10-CM

## 2022-06-28 DIAGNOSIS — M54.12 CERVICAL RADICULOPATHY: Primary | ICD-10-CM

## 2022-06-28 DIAGNOSIS — M50.30 DDD (DEGENERATIVE DISC DISEASE), CERVICAL: ICD-10-CM

## 2022-06-28 PROCEDURE — 99999 PR PBB SHADOW E&M-EST. PATIENT-LVL III: ICD-10-PCS | Mod: PBBFAC,,, | Performed by: PHYSICIAN ASSISTANT

## 2022-06-28 PROCEDURE — 99213 OFFICE O/P EST LOW 20 MIN: CPT | Mod: PBBFAC | Performed by: PHYSICIAN ASSISTANT

## 2022-06-28 PROCEDURE — 99214 OFFICE O/P EST MOD 30 MIN: CPT | Mod: S$PBB,,, | Performed by: PHYSICIAN ASSISTANT

## 2022-06-28 PROCEDURE — 99999 PR PBB SHADOW E&M-EST. PATIENT-LVL III: CPT | Mod: PBBFAC,,, | Performed by: PHYSICIAN ASSISTANT

## 2022-06-28 PROCEDURE — 99214 PR OFFICE/OUTPT VISIT, EST, LEVL IV, 30-39 MIN: ICD-10-PCS | Mod: S$PBB,,, | Performed by: PHYSICIAN ASSISTANT

## 2022-06-28 RX ORDER — GABAPENTIN 300 MG/1
CAPSULE ORAL
Qty: 120 CAPSULE | Refills: 1 | Status: SHIPPED | OUTPATIENT
Start: 2022-06-28 | End: 2022-11-29 | Stop reason: SDUPTHER

## 2022-06-28 RX ORDER — OXYCODONE AND ACETAMINOPHEN 5; 325 MG/1; MG/1
TABLET ORAL
Qty: 70 TABLET | Refills: 0 | Status: SHIPPED | OUTPATIENT
Start: 2022-08-04 | End: 2022-11-02 | Stop reason: SDUPTHER

## 2022-06-28 RX ORDER — OXYCODONE AND ACETAMINOPHEN 5; 325 MG/1; MG/1
TABLET ORAL
Qty: 70 TABLET | Refills: 0 | Status: SHIPPED | OUTPATIENT
Start: 2022-07-05 | End: 2022-08-30 | Stop reason: SDUPTHER

## 2022-06-28 RX ORDER — GABAPENTIN 300 MG/1
CAPSULE ORAL
Qty: 120 CAPSULE | Refills: 1 | Status: SHIPPED | OUTPATIENT
Start: 2022-06-28 | End: 2022-06-28

## 2022-08-30 ENCOUNTER — OFFICE VISIT (OUTPATIENT)
Dept: PAIN MEDICINE | Facility: CLINIC | Age: 62
End: 2022-08-30

## 2022-08-30 VITALS
DIASTOLIC BLOOD PRESSURE: 86 MMHG | HEART RATE: 76 BPM | WEIGHT: 113.19 LBS | BODY MASS INDEX: 17.15 KG/M2 | HEIGHT: 68 IN | SYSTOLIC BLOOD PRESSURE: 159 MMHG | TEMPERATURE: 98 F

## 2022-08-30 DIAGNOSIS — M79.10 MUSCLE PAIN: ICD-10-CM

## 2022-08-30 DIAGNOSIS — Z79.891 OPIOID USE AGREEMENT EXISTS: ICD-10-CM

## 2022-08-30 DIAGNOSIS — M54.50 LUMBAR PAIN: ICD-10-CM

## 2022-08-30 DIAGNOSIS — M50.30 DDD (DEGENERATIVE DISC DISEASE), CERVICAL: ICD-10-CM

## 2022-08-30 DIAGNOSIS — M50.30 DDD (DEGENERATIVE DISC DISEASE), CERVICAL: Primary | ICD-10-CM

## 2022-08-30 DIAGNOSIS — M47.812 CERVICAL SPONDYLOSIS: ICD-10-CM

## 2022-08-30 PROCEDURE — 99214 OFFICE O/P EST MOD 30 MIN: CPT | Mod: PBBFAC | Performed by: PHYSICIAN ASSISTANT

## 2022-08-30 PROCEDURE — 99214 OFFICE O/P EST MOD 30 MIN: CPT | Mod: S$PBB,,, | Performed by: PHYSICIAN ASSISTANT

## 2022-08-30 PROCEDURE — 99214 PR OFFICE/OUTPT VISIT, EST, LEVL IV, 30-39 MIN: ICD-10-PCS | Mod: S$PBB,,, | Performed by: PHYSICIAN ASSISTANT

## 2022-08-30 PROCEDURE — 99999 PR PBB SHADOW E&M-EST. PATIENT-LVL IV: ICD-10-PCS | Mod: PBBFAC,,, | Performed by: PHYSICIAN ASSISTANT

## 2022-08-30 PROCEDURE — 99999 PR PBB SHADOW E&M-EST. PATIENT-LVL IV: CPT | Mod: PBBFAC,,, | Performed by: PHYSICIAN ASSISTANT

## 2022-08-30 RX ORDER — NABUMETONE 500 MG/1
500 TABLET, FILM COATED ORAL 2 TIMES DAILY PRN
Qty: 60 TABLET | Refills: 1 | Status: SHIPPED | OUTPATIENT
Start: 2022-08-30 | End: 2022-09-29

## 2022-08-30 RX ORDER — TIZANIDINE 4 MG/1
2-4 TABLET ORAL 2 TIMES DAILY PRN
Qty: 60 TABLET | Refills: 1 | Status: SHIPPED | OUTPATIENT
Start: 2022-08-30 | End: 2022-11-29 | Stop reason: SDUPTHER

## 2022-08-30 RX ORDER — OXYCODONE AND ACETAMINOPHEN 5; 325 MG/1; MG/1
TABLET ORAL
Qty: 70 TABLET | Refills: 0 | Status: SHIPPED | OUTPATIENT
Start: 2022-09-03 | End: 2022-11-29 | Stop reason: SDUPTHER

## 2022-08-30 RX ORDER — OXYCODONE AND ACETAMINOPHEN 5; 325 MG/1; MG/1
TABLET ORAL
Qty: 70 TABLET | Refills: 0 | Status: SHIPPED | OUTPATIENT
Start: 2022-10-05 | End: 2022-11-29 | Stop reason: SDUPTHER

## 2022-08-30 NOTE — PROGRESS NOTES
Established Patient Chronic Pain Note (Follow up visit)    Chief Complaint:   Chief Complaint   Patient presents with    Neck Pain     Back, shoulder pain         SUBJECTIVE:    Interval History (8/30/2022):  Leo Roblero presents today for follow-up visit. Patient was last seen on 6/28/2022. he presents today for follow-up for medication refill.  Medication is providing adequate pain relief. he feels the pain medication reduces the negative effects of chronic pain that affects day-to-day function and quality of life.Patient reports pain as 8/10 today. Pain has Increased over last month; patient admits to being out of medication today.    Interval History (6/28/2022): Patient was last seen on 3/29/2022. he presents today for follow-up for medication refill.  Medication is providing adequate pain relief. he feels the pain medication reduces the negative effects of chronic pain that affects day-to-day function and quality of life. No major changes since previous visit; patient is stable overall. Patient reports pain as 9/10 today.     Interval History (3/29/2022): Patient was last seen on 2/1/2022. At last visit, we switched to norco for opioid vacation, which seemed to help his pain medication work better. he presents today for medication refill.  No major changes; patient is stable overall. Medication is providing adequate pain relief. Patient reports pain as 8/10 today.     Interval History (2/1/2022):  Leo Roblero presents today for follow-up visit.  Patient was last seen on 12/7/2021.  He reports some pain relief with medication, but he does not feel that the pain is completely controlled.  He also feels that he is getting used to the Percocet.  Patient reports pain as 9/10 today.    Interval History (12/7/2021):  Leo Roblero presents today for follow-up visit for medication refill.  Patient was last seen on 10/7/2021.  He reports that he has been having low back pain for the past 3 weeks or so.  He has had low  back pain in the past, but it has not been a real issue lately.  He does not feel that the pain is currently controlled.  Patient reports pain as 9/10 today.    Interval History (10/7/2021): Patient was last seen on 7/30/2021. he presents today for medication refill. Patient reports pain as 9/10 today.   No major changes; patient is stable overall. Medication is providing adequate pain relief.     Interval History (7/30/2021): Patient was last seen on 5/28/2021. No major changes; patient is stable overall. Medication is providing adequate pain relief. Patient reports pain as 8/10 today. he presents today for medication refill.      Interval History (5/28/2021): Patient was last seen on 3/26/2021, and he presents today for medication refill. Patient reports pain as 9/10 today.   Medication is providing adequate pain relief.     Interval History (3/26/2021): Patient was last seen on 1/22/2021. he presents today for medication refill. At last visit, medication was changed from Norco to Percocet, which he reports better pain relief with.  Although he is asking for increased dose today.  Medication is providing some pain relief. Patient reports pain as 9/10 today.  He continues to take gabapentin 600 mg at night.  He also takes Tylenol 500 mg during the day, but he usually only takes 1 tablet.    Interval History (1/22/2021): Patient was last seen on 11/19/2020. He is here today for medication Refill but reports medication is not providing adequate pain relief. Patient reports pain as 9/10 today.    Interval History (9/23/2020): Patient was last seen on 7/29/2020. At that visit, the plan was to Refill Norco 5/325mg BID.  Patient reports pain as 10/10 today.  He is requesting a higher dose of the Norco today.  He continues to take naproxen and Tylenol as adjuvant medications.  He reports that the pain seems to be worse at night.    Interval HPI (7/29/2020):  Leo Roblero is a 60 y.o. male who presents to the clinic for a  follow-up appointment for neck and bilateral shoulder pain.  At the last clinic visit, I expressed to the patient that he could continue on NSAIDs and Tylenol for his chronic pain control and to start with physical therapy to help stabilize his shoulder musculature.  He had failed subacromial bursa injections without much relief.  Since the last visit, Leo Roblero states the pain has been persistant. Current pain intensity is 10/10.    Initial HPI 02/19/2020:  Leo Roblero is a 59 y.o. male who presents to the clinic for the evaluation of neck and bilateral shoulder pain. He was referred by the Orthopedics Department for further evaluation and management of neck pain. Of note, patient has a past medical history of hyperlipidemia, and tobacco abuse, traumatic rotator cuff tear, tendinitis of the bilateral biceps tendon, and other medical comorbidities as listed below.  The pain started several years ago without any significant injury or trauma and symptoms have been worsening.  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  The pain is described as shooting and throbbing and is rated as 6/10. The pain is rated with a score of  4/10 on the BEST day and a score of 10/10 on the WORST day.  Symptoms interfere with daily activity, sleeping and work. The pain is exacerbated by work.  The pain is mitigated by rest. The patient reports spending less than 2 hours per day reclining. The patient reports 6-8 hours of uninterrupted sleep per night.  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  Patient denies night fever/night sweats, urinary incontinence, bowel incontinence, significant weight loss, significant motor weakness and loss of sensations.    Pain Disability Index Review:  Last 3 PDI  Scores 2/1/2022 12/7/2021 10/7/2021   Pain Disability Index (PDI) 63 15 64       Non-Pharmacologic Treatments:  Physical Therapy/Home Exercise:  No, pending start  Ice/Heat:yes  TENS: no  Acupuncture: no  Massage: no  Chiropractic: no    Other: no     Pain Medications:  - Opioids: Percocet (Oxycodone/Acetaminophen)  - Adjuvant Medications: Mobic  - Anti-Coagulants: Aspirin      report:  Reviewed and consistent with medication use as prescribed.     Pain Procedures:   -12/05/2019:  Bilateral subacromial bursa injections, limited relief  -02/14/2020:  IM Toradol to the right deltoid         Imaging & EMG/NCS (Reviewed on 8/30/2022):    EMG/NCS bilateral upper extremities 03/06/2020:  1. ABNORMAL study  2. 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  There is additional evidence of a CHRONIC radiculopathy of the C7,C8, T1 nerve roots    X-ray cervical spine 02/19/2020:  Reversal of normal cervical lordosis.  Grade 1 anterolisthesis of C3 on C4 and C4 on C5.  These appear to mostly reduce with extension maneuver.  Grade 1 anterolisthesis of C7 on T1 which does not change with provocative maneuvers.  No acute fracture.  Prevertebral soft tissues are maintained.  Multilevel discogenic degenerative changes are seen with findings most severe at C5-C6 and C6-C7 with disc space narrowing marginal spurring.  Intact odontoid.  Multilevel bilateral areas of neural foraminal encroachment, right greater than left.  Clear lung apices.  Calcification of the bilateral carotids is seen.    MRI cervical spine 06/01/2017 ([facility]):  There is a mild cervical levoscoliosis with slight straightening of normal cervical lordosis in the superior aspect of the cervical spine. Cervical vertebral body stature is preserved. No prevertebral edema is noted.  The visualized anatomy at the skull base  is normal.  C2-C3: Mild disc space narrowing. Mild bilateral posterior facet and ligamentum flavum hypertrophy which is effacing the posterior theca and causing subtle posterior impression on the cervical spinal cord, although the thecal sac still measures 1.2 cm in AP dimension. There is minimal posterior spondylosis.  C3-C4: Disc desiccation with right greater than left posterior facet arthropathy and with right uncinate hypertrophy, with mild disc space narrowing. There is mild ligamentum flavum hypertrophy. There is mild AP narrowing of the thecal sac, consistent with a spinal stenosis, but there is no significant deformity on the cervical spinal cord. There is right lateral recess/foraminal origin stenosis.  C4-C5: Mild to moderate disc space narrowing with disc desiccation and with right greater than left posterior facet hypertrophy. There is right uncinate hypertrophy at this level. There is bilateral ligamentum flavum hypertrophy. This combination of findings is causing a spinal stenosis, thecal sac measuring only 7.7 mm in AP dimension, and with a trefoil deformity of the thecal sac. There is also some mass effect and deformity on the cervical spinal cord. In addition to a spinal stenosis, there is right greater than left lateral recess/foraminal origin stenosis.  C5-C6: Prominent disc space narrowing with disc desiccation, bridging anterior bony spurring, right greater than left posterior facet arthropathy, and mild right uncinate hypertrophy. There is a broad-based posterior osteophyte disc complex which effaces the anterior theca and narrows the AP dimension of the cervical thecal sac, but which does not cause any significant mass effect on the cervical spinal cord. There is mild bilateral lateral recess/foraminal origin stenosis at this level, right side greater than left.  C6-C7: Prominent disc space narrowing with disc desiccation, anterior and posterior spondylosis, and right uncinate hypertrophy.  There is a broad-based, posterior osteophyte disc complex which partially effaces the anterior theca but which does not deform the cervical spinal cord. There is a mild spinal stenosis with mild, bilateral foraminal origin stenosis, slightly greater on the left.  C7-T1: Mild to moderate, bilateral posterior facet arthropathy. There is mild disc desiccation and disc space narrowing with approximately 2 mm of anterolisthesis C7 on T1. There is no obvious stenosis.  Signal intensity in the cervical thecal sac is normal. There is no convincing evidence of any significant edema or gliosis in the cervical spinal cord.     X-ray left shoulder 02/14/2020:  There is no radiographic evidence of acute osseous, articular, or soft tissue abnormality.  Joint spaces are preserved.     MRI left shoulder 2/10/20:  There is moderate rotator cuff tendinosis, supraspinatus and infraspinatus, with superimposed moderate grade partial thickness tear with fluid-filled defect posterior supraspinatus footprint with bursal and interstitial fiber tearing.  Supraspinatus tendon articular surface fraying is also noted.  There is also low-grade interstitial partial-thickness tear at the blending of the supraspinatus and infraspinatus.  There are few clustered small subcortical cysts and associated minimal edema like signal greater tuberosity.  The subscapularis tendon is unremarkable.  Long head biceps tendon is intact with mild intra-articular tendinosis.  Superior labral degenerative signal with a sublabral foramen versus SLAP tear.  Labrum is otherwise unremarkable.  Glenohumeral chondral thinning.  No joint effusion.  Normal IGHL.  Type 2 acromion without tilting minimal AC joint spurring.  There is no subacromial subdeltoid bursal fluid collection.  The bone marrow signal intensity is otherwise unremarkable.  The signal intensity of the muscle groups is normal.  No atrophy.          REVIEW OF SYSTEMS:    GENERAL:  No weight loss, malaise or  "fevers.  HEENT:   No recent changes in vision or hearing  NECK:  Negative for lumps, no difficulty with swallowing.  RESPIRATORY:  Negative for cough, wheezing or shortness of breath, patient denies any recent URI.  CARDIOVASCULAR:  Negative for chest pain, leg swelling or palpitations.  GI:  Negative for abdominal discomfort, blood in stools or black stools or change in bowel habits.  MUSCULOSKELETAL:  See HPI.  SKIN:  Negative for lesions, rash, and itching.  PSYCH:  No mood disorder or recent psychosocial stressors.  Patients sleep is not disturbed secondary to pain.  HEMATOLOGY/LYMPHOLOGY:  Negative for prolonged bleeding, bruising easily or swollen nodes.  Patient is not currently taking any anti-coagulants  NEURO:   No history of headaches, syncope, paralysis, seizures or tremors.  All other reviewed and negative other than HPI.        OBJECTIVE:  Vitals:    08/30/22 0957   BP: (!) 159/86   Pulse: 76   Temp: 98 °F (36.7 °C)   Weight: 51.4 kg (113 lb 3.3 oz)   Height: 5' 8" (1.727 m)   PainSc:   8   PainLoc: Neck      Body mass index is 17.21 kg/m².   (reviewed on 8/30/2022)    General: alert and oriented, in no apparent distress.  Gait: normal gait.  Skin: no rashes, no discoloration, no obvious lesions  HEENT: normocephalic, atraumatic. Pupils equal and round.  Respiratory: without use of accessory muscles of respiration.    Musculoskeletal - Cervical Spine:  - Pain on flexion of cervical spine: Present   - Pain on extension of cervical spine: Present   - Cervical facet loading: Present   - TTP over the cervical facet joints: Present   - TTP over the cervical paraspinals: Present   - Spurling's:  Equivocal    Shoulder:  - Pain on abduction: Present bilaterally   - Flexion: decreased to 100° on left, decreased to 120° on the right  - Abduction:  Decreased to 110° on the left, decreased to 100° on the right  - lateral winging of the scapula on the left  - TTP:  Present over bilateral biceps tendons  - Neer's: " Positive  - Hawkin's: Positive    Lumbar:  - pain with extension  - pain over facet joints & SIJ      Neuro - Upper Extremities:  - BUE Strength:R/L: D: 5/5; B: 5/5; T: 5/5; WF: 5/5; WE: 5/5; IO: 5/5  - Extremity Reflexes: Brisk and symmetric throughout  - Sensory: Sensation to light touch intact bilaterally    Neuro - Lower Extremities:  - RLE Strength:     >> 5/5 strength with right hip flexion/ extension    >> 5/5 strength with right knee flexion/ extension    >> 5/5 strength in right ankle with plantar and dorsiflexion  - LLE Strength:     >> 5/5 strength with left hip flexion/ extension    >> 5/5 strength with knee flexion extension on the left     >> 5/5 strength in left ankle with plantar and dorsiflexion  - Extremity Reflexes: Brisk and symmetric throughout  - Sensory: Sensation to light touch intact bilaterally      Psych:  Mood and affect is appropriate            ASSESSMENT: 61 y.o. year old male with neck and bilateral shoulder pain, consistent with     1. DDD (degenerative disc disease), cervical        2. Cervical spondylosis  nabumetone (RELAFEN) 500 MG tablet      3. Lumbar pain        4. Opioid use agreement exists        5. Muscle pain  tiZANidine (ZANAFLEX) 4 MG tablet            PLAN:   1. Interventional: None for now.  Patient would likely benefit from cervical MADISON; however, patient recently lost insurance and would not be able to afford the procedure.     2. Pharmacologic:   - Refill Percocet 5/325mg Q12H PRN pain with additional 10 tablets to take TID on those 10 bad days per month (70 tablets).  Will e-prescribe to fill on  (will allow to fill on 9/3/22 since pharmacy closed on weekend date) and 10/5/22.  Patient understands this is the highest dose that we prescribe.  Patient tolerating opioids with no side effects, obtaining some pain control with functional improvement.  We have discussed the risks of chronic opioid medication management.    - Increase gabapentin 600 mg QHS to gabapentin  300mg QAM/ 300mg at lunch/ 600mg QHS.  - Start nabumetone (Relafen) 500mg BID PRN pain. Take with food.    - Start Zanaflex 4mg to take 1/2 to 1 tab BID PRN (60 tablets). Patient understands this may cause drowsiness.   - Can take additional 4 tablets of Tylenol 500mg during the day in addition to Percocet 5/325mg (3x) per day. Total of acetaminophen daily - cannot exceed 3000mg; patient verbalized understanding.  - Anticoagulation use: ASA.   - Opioid contract signed on 7/29/2020.   Opioid risk tool completed on 7/29/2020: low risk.  - [place]  reviewed and appropriate.     - Last UDS was compliant for medications prescribed.   - Patient given warning today 8/30/2022 for taking more medication than prescribed.     3. Rehabilitative: Encouraged regular exercise.    4. Diagnostic: None for now.    5. Consults/Referrals:  Orthopedics referral sent at previous visit/ [facility] referred to [facility] due to lack of insurance.    6. Follow up: 8-9 weeks for med refill.      - This condition does not require this patient to take time off of work, and the primary goal of our Pain Management services is to improve the patient's functional capacity.   - I discussed the risks, benefits, and alternatives to potential treatment options. All questions and concerns were fully addressed today in clinic.           >>UDS:  9/23/2020 :: appropriate   1/22/2021 :: appropriate   5/28/2021 :: appropriate  12/7/2021 :: appropriate  6/28/2022 :: appropriate    Warning  8/30/2022 - patient given warning for taking more than prescribed        Disclaimer:  This note was prepared using voice recognition system and is likely to have sound alike errors that may have been overlooked even after proof reading.  Please call me with any questions.

## 2022-08-30 NOTE — PATIENT INSTRUCTIONS
- Increase gabapentin 600 mg at night  to gabapentin 300mg morning/ 300mg at lunch/ 600mg (2 caps) at night     You can  your pain medications on  (will allow to fill on 9/3/22 since pharmacy closed on weekend date) and 10/5/22.

## 2022-11-02 ENCOUNTER — TELEPHONE (OUTPATIENT)
Dept: PAIN MEDICINE | Facility: CLINIC | Age: 62
End: 2022-11-02
Payer: MEDICARE

## 2022-11-02 DIAGNOSIS — M50.30 DDD (DEGENERATIVE DISC DISEASE), CERVICAL: ICD-10-CM

## 2022-11-02 RX ORDER — OXYCODONE AND ACETAMINOPHEN 5; 325 MG/1; MG/1
TABLET ORAL
Qty: 70 TABLET | Refills: 0 | Status: SHIPPED | OUTPATIENT
Start: 2022-11-05 | End: 2022-11-29 | Stop reason: SDUPTHER

## 2022-11-02 NOTE — TELEPHONE ENCOUNTER
----- Message from Micki Small sent at 11/2/2022  8:05 AM CDT -----  Type:  RX Refill Request    Who Called: Ellyn Roblero  Refill or New Rx:refill  RX Name and Strength:oxyCODONE-acetaminophen (PERCOCET) 5-325 mg   How is the patient currently taking it? (ex. 1XDay):1XDay  Is this a 30 day or 90 day RX:?  Preferred Pharmacy with phone number:.  [facility] - [place], [place] - [address] [facility]  [address] [address] [address]  Phone: [phone] Fax: [phone]  Local or Mail Order:local  Ordering Provider:Dr Dolan  Would the patient rather a call back or a response via [facility]? call  Best Call Back Number:[phone]  Additional Information: He car is in the shop, so he had to cancel his appt today. He is reschedule on 11/29.     Thank you

## 2022-11-02 NOTE — TELEPHONE ENCOUNTER
Pt cx today apt r/s 11/29  LOV 8/30/22  Refill Percocet 5/325mg Q12H PRN pain with additional 10 tablets to take TID on those 10 bad days per month (70 tablets).  Will e-prescribe to fill on  (will allow to fill on 9/3/22 since pharmacy closed on weekend date) and 10/5/22.  Patient understands this is the highest dose that we prescribe.  Patient tolerating opioids with no side effects, obtaining some pain control with functional improvement.  We have discussed the risks of chronic opioid medication management.    Last UDS was compliant for medications prescribed.   - Patient given warning today 8/30/2022 for taking more medication than prescribed.      Please check  for last refill and advise.

## 2022-11-25 NOTE — PROGRESS NOTES
Established Patient Chronic Pain Note (Follow up visit)    Chief Complaint:   Chief Complaint   Patient presents with    Medication Refill           SUBJECTIVE:    Interval History (11/29/2022): Leo Roblero was last seen on 8/30/2022. he presents today for follow-up for medication refill.  Medication is providing adequate pain relief. he feels the pain medication reduces the negative effects of chronic pain that affects quality of life. Patient reports pain as 9/10 today.  He c/o neck pain that radiates but mostly axial. Also c/o low back pain as well.     Interval History (8/30/2022):  Leo Roblero presents today for follow-up visit. Patient was last seen on 6/28/2022. he presents today for follow-up for medication refill.  Medication is providing adequate pain relief. he feels the pain medication reduces the negative effects of chronic pain that affects day-to-day function and quality of life.Patient reports pain as 8/10 today. Pain has Increased over last month; patient admits to being out of medication today.    Interval History (6/28/2022): Patient was last seen on 3/29/2022. he presents today for follow-up for medication refill.  Medication is providing adequate pain relief. he feels the pain medication reduces the negative effects of chronic pain that affects day-to-day function and quality of life. No major changes since previous visit; patient is stable overall. Patient reports pain as 9/10 today.     Interval History (3/29/2022): Patient was last seen on 2/1/2022. At last visit, we switched to norco for opioid vacation, which seemed to help his pain medication work better. he presents today for medication refill.  No major changes; patient is stable overall. Medication is providing adequate pain relief. Patient reports pain as 8/10 today.     Interval History (2/1/2022):  Leo Roblero presents today for follow-up visit.  Patient was last seen on 12/7/2021.  He reports some pain relief with medication, but  he does not feel that the pain is completely controlled.  He also feels that he is getting used to the Percocet.  Patient reports pain as 9/10 today.    Interval History (12/7/2021):  Leo Roblero presents today for follow-up visit for medication refill.  Patient was last seen on 10/7/2021.  He reports that he has been having low back pain for the past 3 weeks or so.  He has had low back pain in the past, but it has not been a real issue lately.  He does not feel that the pain is currently controlled.  Patient reports pain as 9/10 today.    Interval History (10/7/2021): Patient was last seen on 7/30/2021. he presents today for medication refill. Patient reports pain as 9/10 today.   No major changes; patient is stable overall. Medication is providing adequate pain relief.     Interval History (7/30/2021): Patient was last seen on 5/28/2021. No major changes; patient is stable overall. Medication is providing adequate pain relief. Patient reports pain as 8/10 today. he presents today for medication refill.      Interval History (5/28/2021): Patient was last seen on 3/26/2021, and he presents today for medication refill. Patient reports pain as 9/10 today.   Medication is providing adequate pain relief.     Interval History (3/26/2021): Patient was last seen on 1/22/2021. he presents today for medication refill. At last visit, medication was changed from Norco to Percocet, which he reports better pain relief with.  Although he is asking for increased dose today.  Medication is providing some pain relief. Patient reports pain as 9/10 today.  He continues to take gabapentin 600 mg at night.  He also takes Tylenol 500 mg during the day, but he usually only takes 1 tablet.    Interval History (1/22/2021): Patient was last seen on 11/19/2020. He is here today for medication Refill but reports medication is not providing adequate pain relief. Patient reports pain as 9/10 today.    Interval History (9/23/2020): Patient was  last seen on 7/29/2020. At that visit, the plan was to Refill Norco 5/325mg BID.  Patient reports pain as 10/10 today.  He is requesting a higher dose of the Norco today.  He continues to take naproxen and Tylenol as adjuvant medications.  He reports that the pain seems to be worse at night.    Interval HPI (7/29/2020):  Leo Roblero is a 60 y.o. male who presents to the clinic for a follow-up appointment for neck and bilateral shoulder pain.  At the last clinic visit, I expressed to the patient that he could continue on NSAIDs and Tylenol for his chronic pain control and to start with physical therapy to help stabilize his shoulder musculature.  He had failed subacromial bursa injections without much relief.  Since the last visit, Leo Roblero states the pain has been persistant. Current pain intensity is 10/10.    Initial HPI 02/19/2020:  Leo Roblero is a 59 y.o. male who presents to the clinic for the evaluation of neck and bilateral shoulder pain. He was referred by the Orthopedics Department for further evaluation and management of neck pain. Of note, patient has a past medical history of hyperlipidemia, and tobacco abuse, traumatic rotator cuff tear, tendinitis of the bilateral biceps tendon, and other medical comorbidities as listed below.  The pain started several years ago without any significant injury or trauma and symptoms have been worsening.  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  The pain is described as shooting and throbbing and is rated as 6/10. The pain is rated with a score of  4/10 on the BEST day and a score of 10/10 on the WORST day.  Symptoms interfere with daily activity, sleeping and work. The pain is exacerbated by work.  The pain is mitigated by rest. The  patient reports spending less than 2 hours per day reclining. The patient reports 6-8 hours of uninterrupted sleep per night.  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  Patient denies night fever/night sweats, urinary incontinence, bowel incontinence, significant weight loss, significant motor weakness and loss of sensations.    Pain Disability Index Review:  Last 3 PDI Scores 2/1/2022 12/7/2021 10/7/2021   Pain Disability Index (PDI) 63 15 64       Non-Pharmacologic Treatments:  Physical Therapy/Home Exercise:  No, pending start  Ice/Heat:yes  TENS: no  Acupuncture: no  Massage: no  Chiropractic: no    Other: no     Pain Medications:  - Opioids: Percocet (Oxycodone/Acetaminophen)  - Adjuvant Medications: Mobic  - Anti-Coagulants: Aspirin      report:  Reviewed and consistent with medication use as prescribed.     Pain Procedures:   -12/05/2019:  Bilateral subacromial bursa injections, limited relief  -02/14/2020:  IM Toradol to the right deltoid         Imaging & EMG/NCS (Reviewed on 11/29/2022):    EMG/NCS bilateral upper extremities 03/06/2020:  1. ABNORMAL study  2. 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  There is additional evidence of a CHRONIC radiculopathy of the C7,C8, T1 nerve roots    X-ray cervical spine 02/19/2020:  Reversal of normal cervical lordosis.  Grade 1 anterolisthesis of C3 on C4 and C4 on C5.  These appear to mostly reduce with extension maneuver.  Grade 1 anterolisthesis of C7 on T1 which does not change with provocative maneuvers.  No acute fracture.  Prevertebral soft tissues are maintained.  Multilevel discogenic degenerative changes are seen with findings most severe at C5-C6 and C6-C7 with disc space narrowing marginal spurring.  Intact odontoid.  Multilevel bilateral areas of neural foraminal encroachment, right greater than  left.  Clear lung apices.  Calcification of the bilateral carotids is seen.    MRI cervical spine 06/01/2017 ([facility]):  There is a mild cervical levoscoliosis with slight straightening of normal cervical lordosis in the superior aspect of the cervical spine. Cervical vertebral body stature is preserved. No prevertebral edema is noted.  The visualized anatomy at the skull base is normal.  C2-C3: Mild disc space narrowing. Mild bilateral posterior facet and ligamentum flavum hypertrophy which is effacing the posterior theca and causing subtle posterior impression on the cervical spinal cord, although the thecal sac still measures 1.2 cm in AP dimension. There is minimal posterior spondylosis.  C3-C4: Disc desiccation with right greater than left posterior facet arthropathy and with right uncinate hypertrophy, with mild disc space narrowing. There is mild ligamentum flavum hypertrophy. There is mild AP narrowing of the thecal sac, consistent with a spinal stenosis, but there is no significant deformity on the cervical spinal cord. There is right lateral recess/foraminal origin stenosis.  C4-C5: Mild to moderate disc space narrowing with disc desiccation and with right greater than left posterior facet hypertrophy. There is right uncinate hypertrophy at this level. There is bilateral ligamentum flavum hypertrophy. This combination of findings is causing a spinal stenosis, thecal sac measuring only 7.7 mm in AP dimension, and with a trefoil deformity of the thecal sac. There is also some mass effect and deformity on the cervical spinal cord. In addition to a spinal stenosis, there is right greater than left lateral recess/foraminal origin stenosis.  C5-C6: Prominent disc space narrowing with disc desiccation, bridging anterior bony spurring, right greater than left posterior facet arthropathy, and mild right uncinate hypertrophy. There is a broad-based posterior osteophyte disc complex which effaces the  anterior theca and narrows the AP dimension of the cervical thecal sac, but which does not cause any significant mass effect on the cervical spinal cord. There is mild bilateral lateral recess/foraminal origin stenosis at this level, right side greater than left.  C6-C7: Prominent disc space narrowing with disc desiccation, anterior and posterior spondylosis, and right uncinate hypertrophy. There is a broad-based, posterior osteophyte disc complex which partially effaces the anterior theca but which does not deform the cervical spinal cord. There is a mild spinal stenosis with mild, bilateral foraminal origin stenosis, slightly greater on the left.  C7-T1: Mild to moderate, bilateral posterior facet arthropathy. There is mild disc desiccation and disc space narrowing with approximately 2 mm of anterolisthesis C7 on T1. There is no obvious stenosis.  Signal intensity in the cervical thecal sac is normal. There is no convincing evidence of any significant edema or gliosis in the cervical spinal cord.     X-ray left shoulder 02/14/2020:  There is no radiographic evidence of acute osseous, articular, or soft tissue abnormality.  Joint spaces are preserved.     MRI left shoulder 2/10/20:  There is moderate rotator cuff tendinosis, supraspinatus and infraspinatus, with superimposed moderate grade partial thickness tear with fluid-filled defect posterior supraspinatus footprint with bursal and interstitial fiber tearing.  Supraspinatus tendon articular surface fraying is also noted.  There is also low-grade interstitial partial-thickness tear at the blending of the supraspinatus and infraspinatus.  There are few clustered small subcortical cysts and associated minimal edema like signal greater tuberosity.  The subscapularis tendon is unremarkable.  Long head biceps tendon is intact with mild intra-articular tendinosis.  Superior labral degenerative signal with a sublabral foramen versus SLAP tear.  Labrum is otherwise  "unremarkable.  Glenohumeral chondral thinning.  No joint effusion.  Normal IGHL.  Type 2 acromion without tilting minimal AC joint spurring.  There is no subacromial subdeltoid bursal fluid collection.  The bone marrow signal intensity is otherwise unremarkable.  The signal intensity of the muscle groups is normal.  No atrophy.          REVIEW OF SYSTEMS:    GENERAL:  No weight loss, malaise or fevers.  HEENT:   No recent changes in vision or hearing  NECK:  Negative for lumps, no difficulty with swallowing.  RESPIRATORY:  Negative for cough, wheezing or shortness of breath, patient denies any recent URI.  CARDIOVASCULAR:  Negative for chest pain, leg swelling or palpitations.  GI:  Negative for abdominal discomfort, blood in stools or black stools or change in bowel habits.  MUSCULOSKELETAL:  See HPI.  SKIN:  Negative for lesions, rash, and itching.  PSYCH:  No mood disorder or recent psychosocial stressors.  Patients sleep is not disturbed secondary to pain.  HEMATOLOGY/LYMPHOLOGY:  Negative for prolonged bleeding, bruising easily or swollen nodes.  Patient is not currently taking any anti-coagulants  NEURO:   No history of headaches, syncope, paralysis, seizures or tremors.  All other reviewed and negative other than HPI.        OBJECTIVE:  Vitals:    11/29/22 0925   BP: (!) 161/88   Pulse: 77   Weight: 52.9 kg (116 lb 8.2 oz)   Height: 5' 8" (1.727 m)   PainSc:   9   PainLoc: Back    Body mass index is 17.72 kg/m².   (reviewed on 11/29/2022)    General: alert and oriented, in no apparent distress.  Gait: normal gait.  Skin: no rashes, no discoloration, no obvious lesions  HEENT: normocephalic, atraumatic. Pupils equal and round.  Respiratory: without use of accessory muscles of respiration.    Musculoskeletal - Cervical Spine:  - Pain on flexion of cervical spine: Present   - Pain on extension of cervical spine: Present   - Cervical facet loading: Present bilaterally   - TTP over the cervical facet joints: " Present   - TTP over the cervical paraspinals: Present   - Spurling's:  Equivocal    Shoulder:  - Pain on abduction: Present bilaterally   - Flexion: decreased to 100° on left, decreased to 120° on the right  - Abduction:  Decreased to 110° on the left, decreased to 100° on the right  - lateral winging of the scapula on the left  - TTP:  Present over bilateral biceps tendons  - Neer's: Positive  - Hawkin's: Positive    Lumbar:  - pain with extension  - pain over facet joints & SIJ      Neuro - Upper Extremities:  - BUE Strength:R/L: D: 5/5; B: 5/5; T: 5/5; WF: 5/5; WE: 5/5; IO: 5/5  - Extremity Reflexes: Brisk and symmetric throughout  - Sensory: Sensation to light touch intact bilaterally    Neuro - Lower Extremities:  - RLE Strength:     >> 5/5 strength with right hip flexion/ extension    >> 5/5 strength with right knee flexion/ extension    >> 5/5 strength in right ankle with plantar and dorsiflexion  - LLE Strength:     >> 5/5 strength with left hip flexion/ extension    >> 5/5 strength with knee flexion extension on the left     >> 5/5 strength in left ankle with plantar and dorsiflexion  - Extremity Reflexes: Brisk and symmetric throughout  - Sensory: Sensation to light touch intact bilaterally      Psych:  Mood and affect is appropriate            ASSESSMENT: 62 y.o. year old male with neck and bilateral shoulder pain, consistent with     1. DDD (degenerative disc disease), cervical        2. Cervical spondylosis        3. Lumbar pain        4. Muscle pain        5. Opioid use agreement exists            PLAN:   1. Interventional:   - Schedule diagnostic bilateral C5-7 MBB. Patient is not taking prescription blood thinners or ASA.  Will call for % relief to determine RFA eligibility.  - Patient would likely benefit from cervical MADISON; consider next.   - Also consider lumbar treatment.    2. Pharmacologic:   - Refill Percocet 5/325mg Q12H PRN pain with additional 10 tablets to take TID on those 10 bad days per  month (70 tablets).  Will e-prescribe to fill on 12/7/22 and 1/6/23.  Patient understands this is the highest dose that we prescribe.  Patient tolerating opioids with no side effects, obtaining some pain control with functional improvement.  We have discussed the risks of chronic opioid medication management.    - Refill gabapentin 300mg QAM/ 300mg at lunch/ 600mg QHS.   - Start nabumetone (Relafen) 500mg BID PRN pain. Take with food.  Didn't get after last visit.  - Start Zanaflex 4mg to take 1/2 to 1 tab BID PRN (60 tablets). Patient understands this may cause drowsiness.  Didn't get after last visit.  - Can take additional 4 tablets of Tylenol 500mg during the day in addition to Percocet 5/325mg (3x) per day. Total of acetaminophen daily - cannot exceed 3000mg; patient verbalized understanding.  - Anticoagulation use: None.   - Opioid contract signed on 7/29/2020.   Opioid risk tool completed on 7/29/2020: low risk.  - LA  reviewed and appropriate.     - Will order drug screen (UDS or oral swab) today to ensure med compliance.   - Patient given warning 8/30/2022 for taking more medication than prescribed.     3. Rehabilitative: Encouraged regular exercise.    4. Diagnostic: None for now.    5. Consults/Referrals:  Orthopedics referral sent at previous visit/ [facility] previously referred to [facility] due to lack of insurance. Consider sending new referral now that patient has more insurance coverage.    6. Follow up: 8-9 weeks for med refill.      - This condition does not require this patient to take time off of work, and the primary goal of our Pain Management services is to improve the patient's functional capacity.   - I discussed the risks, benefits, and alternatives to potential treatment options. All questions and concerns were fully addressed today in clinic.           >>UDS:  9/23/2020 :: appropriate   1/22/2021 :: appropriate   5/28/2021 :: appropriate  12/7/2021 ::  appropriate  6/28/2022 :: appropriate  11/29/2022 :: pending     Warnings:  8/30/2022 - patient given warning for taking more than prescribed

## 2022-11-29 ENCOUNTER — OFFICE VISIT (OUTPATIENT)
Dept: PAIN MEDICINE | Facility: CLINIC | Age: 62
End: 2022-11-29
Payer: MEDICARE

## 2022-11-29 VITALS
HEIGHT: 68 IN | SYSTOLIC BLOOD PRESSURE: 161 MMHG | HEART RATE: 77 BPM | WEIGHT: 116.5 LBS | DIASTOLIC BLOOD PRESSURE: 88 MMHG | BODY MASS INDEX: 17.66 KG/M2

## 2022-11-29 DIAGNOSIS — M79.10 MUSCLE PAIN: ICD-10-CM

## 2022-11-29 DIAGNOSIS — M50.30 DDD (DEGENERATIVE DISC DISEASE), CERVICAL: ICD-10-CM

## 2022-11-29 DIAGNOSIS — M50.30 DDD (DEGENERATIVE DISC DISEASE), CERVICAL: Primary | ICD-10-CM

## 2022-11-29 DIAGNOSIS — M54.50 LUMBAR PAIN: ICD-10-CM

## 2022-11-29 DIAGNOSIS — Z79.891 OPIOID USE AGREEMENT EXISTS: ICD-10-CM

## 2022-11-29 DIAGNOSIS — M47.812 CERVICAL SPONDYLOSIS: ICD-10-CM

## 2022-11-29 DIAGNOSIS — M54.12 CERVICAL RADICULAR PAIN: ICD-10-CM

## 2022-11-29 PROCEDURE — 3008F BODY MASS INDEX DOCD: CPT | Mod: CPTII,S$GLB,, | Performed by: PHYSICIAN ASSISTANT

## 2022-11-29 PROCEDURE — 3008F PR BODY MASS INDEX (BMI) DOCUMENTED: ICD-10-PCS | Mod: CPTII,S$GLB,, | Performed by: PHYSICIAN ASSISTANT

## 2022-11-29 PROCEDURE — 1160F PR REVIEW ALL MEDS BY PRESCRIBER/CLIN PHARMACIST DOCUMENTED: ICD-10-PCS | Mod: CPTII,S$GLB,, | Performed by: PHYSICIAN ASSISTANT

## 2022-11-29 PROCEDURE — 3079F PR MOST RECENT DIASTOLIC BLOOD PRESSURE 80-89 MM HG: ICD-10-PCS | Mod: CPTII,S$GLB,, | Performed by: PHYSICIAN ASSISTANT

## 2022-11-29 PROCEDURE — 99999 PR PBB SHADOW E&M-EST. PATIENT-LVL III: CPT | Mod: PBBFAC,,, | Performed by: PHYSICIAN ASSISTANT

## 2022-11-29 PROCEDURE — 99214 OFFICE O/P EST MOD 30 MIN: CPT | Mod: S$GLB,,, | Performed by: PHYSICIAN ASSISTANT

## 2022-11-29 PROCEDURE — 99214 PR OFFICE/OUTPT VISIT, EST, LEVL IV, 30-39 MIN: ICD-10-PCS | Mod: S$GLB,,, | Performed by: PHYSICIAN ASSISTANT

## 2022-11-29 PROCEDURE — 3077F PR MOST RECENT SYSTOLIC BLOOD PRESSURE >= 140 MM HG: ICD-10-PCS | Mod: CPTII,S$GLB,, | Performed by: PHYSICIAN ASSISTANT

## 2022-11-29 PROCEDURE — 1159F PR MEDICATION LIST DOCUMENTED IN MEDICAL RECORD: ICD-10-PCS | Mod: CPTII,S$GLB,, | Performed by: PHYSICIAN ASSISTANT

## 2022-11-29 PROCEDURE — 99999 PR PBB SHADOW E&M-EST. PATIENT-LVL III: ICD-10-PCS | Mod: PBBFAC,,, | Performed by: PHYSICIAN ASSISTANT

## 2022-11-29 PROCEDURE — 3077F SYST BP >= 140 MM HG: CPT | Mod: CPTII,S$GLB,, | Performed by: PHYSICIAN ASSISTANT

## 2022-11-29 PROCEDURE — 1160F RVW MEDS BY RX/DR IN RCRD: CPT | Mod: CPTII,S$GLB,, | Performed by: PHYSICIAN ASSISTANT

## 2022-11-29 PROCEDURE — 3079F DIAST BP 80-89 MM HG: CPT | Mod: CPTII,S$GLB,, | Performed by: PHYSICIAN ASSISTANT

## 2022-11-29 PROCEDURE — 1159F MED LIST DOCD IN RCRD: CPT | Mod: CPTII,S$GLB,, | Performed by: PHYSICIAN ASSISTANT

## 2022-11-29 RX ORDER — NABUMETONE 500 MG/1
500 TABLET, FILM COATED ORAL 2 TIMES DAILY PRN
Qty: 60 TABLET | Refills: 1 | Status: SHIPPED | OUTPATIENT
Start: 2022-11-29 | End: 2023-05-26 | Stop reason: SDUPTHER

## 2022-11-29 RX ORDER — OXYCODONE AND ACETAMINOPHEN 5; 325 MG/1; MG/1
TABLET ORAL
Qty: 70 TABLET | Refills: 0 | Status: SHIPPED | OUTPATIENT
Start: 2023-01-06 | End: 2023-01-25 | Stop reason: SDUPTHER

## 2022-11-29 RX ORDER — OXYCODONE AND ACETAMINOPHEN 5; 325 MG/1; MG/1
TABLET ORAL
Qty: 70 TABLET | Refills: 0 | Status: SHIPPED | OUTPATIENT
Start: 2022-12-07 | End: 2023-01-25 | Stop reason: SDUPTHER

## 2022-11-29 RX ORDER — GABAPENTIN 300 MG/1
CAPSULE ORAL
Qty: 120 CAPSULE | Refills: 1 | Status: SHIPPED | OUTPATIENT
Start: 2022-11-29 | End: 2023-01-25 | Stop reason: SDUPTHER

## 2022-11-29 RX ORDER — TIZANIDINE 4 MG/1
2-4 TABLET ORAL 2 TIMES DAILY PRN
Qty: 60 TABLET | Refills: 1 | Status: SHIPPED | OUTPATIENT
Start: 2022-11-29 | End: 2023-05-26 | Stop reason: SDUPTHER

## 2022-11-29 NOTE — PATIENT INSTRUCTIONS
Ambulatory Care Coordination Note  9/9/2020  CM Risk Score: 4  Charlson 10 Year Mortality Risk Score: 2%     ACC: Les Aleman RN    Summary Note: ACM contacted patient for introduction to [facility]. Patient declines care management at this time, states doing well, just had a urinary tract infection. No other health concerns at this time. ACM provided contact information for self referral if patient would need care management in the future. ACM will sign off at this time. Prior to Admission medications    Medication Sig Start Date End Date Taking? Authorizing Provider   levoFLOXacin (LEVAQUIN) 500 MG tablet Take 1 tablet by mouth daily for 7 days 9/5/20 9/12/20  Trang Lim MD   ibuprofen (ADVIL;MOTRIN) 600 MG tablet Take 1 tablet by mouth every 6 hours as needed for Pain 9/5/20   Trang Lim MD       No future appointments. You can  your pain medications on 12/7/22 and 1/6/23.

## 2022-12-02 NOTE — PRE-PROCEDURE INSTRUCTIONS
Spoke with patient regarding procedure scheduled on 12.8    Arrival time 0600    Has patient been sick with fever or on antibiotics within the last 7 days? No    Does the patient have any open wounds, sores or rashes? No    Does the patient have any recent fractures? no    Has patient received a vaccination within the last 7 days? No    Received the COVID vaccination? yes    Has the patient stopped all medications as directed? NA    Does patient have a pacemaker and or defibrillator? no    Does the patient have a ride to and from procedure and someone reliable to remain with patient? wife    Is the patient diabetic? no    Does the patient have sleep apnea? Or use O2 at home? No and no     Is the patient receiving sedation? yes    Is the patient instructed to remain NPO beginning at midnight the night before their procedure? yes    Procedure location confirmed with patient? Yes    Covid- Denies signs/symptoms. Instructed to notify PAT/MD if any changes.

## 2022-12-05 ENCOUNTER — TELEPHONE (OUTPATIENT)
Dept: PAIN MEDICINE | Facility: CLINIC | Age: 62
End: 2022-12-05
Payer: MEDICARE

## 2022-12-05 NOTE — TELEPHONE ENCOUNTER
"Called pt back to discuss procedure no answer, left vm             ----- Message from Deepthi Jordan sent at 12/5/2022  3:54 PM CST -----  Contact: Amanda/ Wife  Patient wife is calling to speak with nurse regarding patients procedure on 12/8. Reports patent is having pain down back and wanted to know if he can still get "stronger dosage" / injection at procedure to help with his pain or other alternatives and is requesting a call at .[phone]     "

## 2022-12-08 ENCOUNTER — HOSPITAL ENCOUNTER (OUTPATIENT)
Facility: HOSPITAL | Age: 62
Discharge: HOME | End: 2022-12-08
Attending: PHYSICAL MEDICINE & REHABILITATION | Admitting: PHYSICAL MEDICINE & REHABILITATION
Payer: MEDICARE

## 2022-12-08 VITALS
BODY MASS INDEX: 17.26 KG/M2 | OXYGEN SATURATION: 100 % | TEMPERATURE: 98 F | WEIGHT: 113.88 LBS | DIASTOLIC BLOOD PRESSURE: 82 MMHG | HEART RATE: 62 BPM | SYSTOLIC BLOOD PRESSURE: 145 MMHG | HEIGHT: 68 IN | RESPIRATION RATE: 16 BRPM

## 2022-12-08 DIAGNOSIS — M47.812 CERVICAL SPONDYLOSIS: Primary | ICD-10-CM

## 2022-12-08 PROCEDURE — 64491 PR INJ DX/THER AGNT PARAVERT FACET JOINT,IMG GUIDE,CERV/THORAC, 2ND LEVEL: ICD-10-PCS | Mod: 50,,, | Performed by: PHYSICAL MEDICINE & REHABILITATION

## 2022-12-08 PROCEDURE — 99152 MOD SED SAME PHYS/QHP 5/>YRS: CPT | Performed by: PHYSICAL MEDICINE & REHABILITATION

## 2022-12-08 PROCEDURE — 64490 PR INJ DX/THER AGNT PARAVERT FACET JOINT,IMG GUIDE,CERV/THORAC, 1ST LEVEL: ICD-10-PCS | Mod: 50,,, | Performed by: PHYSICAL MEDICINE & REHABILITATION

## 2022-12-08 PROCEDURE — 64491 INJ PARAVERT F JNT C/T 2 LEV: CPT | Mod: 50,,, | Performed by: PHYSICAL MEDICINE & REHABILITATION

## 2022-12-08 PROCEDURE — 64490 INJ PARAVERT F JNT C/T 1 LEV: CPT | Mod: 50,,, | Performed by: PHYSICAL MEDICINE & REHABILITATION

## 2022-12-08 PROCEDURE — 25000003 PHARM REV CODE 250: Performed by: PHYSICAL MEDICINE & REHABILITATION

## 2022-12-08 PROCEDURE — 63600175 PHARM REV CODE 636 W HCPCS: Performed by: PHYSICAL MEDICINE & REHABILITATION

## 2022-12-08 PROCEDURE — 64491 INJ PARAVERT F JNT C/T 2 LEV: CPT | Mod: 50 | Performed by: PHYSICAL MEDICINE & REHABILITATION

## 2022-12-08 PROCEDURE — 64490 INJ PARAVERT F JNT C/T 1 LEV: CPT | Mod: 50 | Performed by: PHYSICAL MEDICINE & REHABILITATION

## 2022-12-08 RX ORDER — MIDAZOLAM HYDROCHLORIDE 1 MG/ML
INJECTION, SOLUTION INTRAMUSCULAR; INTRAVENOUS
Status: DISCONTINUED | OUTPATIENT
Start: 2022-12-08 | End: 2022-12-08 | Stop reason: HOSPADM

## 2022-12-08 RX ORDER — ONDANSETRON 2 MG/ML
4 INJECTION INTRAMUSCULAR; INTRAVENOUS ONCE AS NEEDED
Status: DISCONTINUED | OUTPATIENT
Start: 2022-12-08 | End: 2022-12-08 | Stop reason: HOSPADM

## 2022-12-08 RX ORDER — FENTANYL CITRATE 50 UG/ML
INJECTION, SOLUTION INTRAMUSCULAR; INTRAVENOUS
Status: DISCONTINUED | OUTPATIENT
Start: 2022-12-08 | End: 2022-12-08 | Stop reason: HOSPADM

## 2022-12-08 RX ORDER — BUPIVACAINE HYDROCHLORIDE 5 MG/ML
INJECTION, SOLUTION EPIDURAL; INTRACAUDAL
Status: DISCONTINUED | OUTPATIENT
Start: 2022-12-08 | End: 2022-12-08 | Stop reason: HOSPADM

## 2022-12-08 NOTE — H&P
"HPI  Patient presenting for Procedure(s) (LRB):  diagnostic Bilateral C5-7 MBB with RN IV sedation (Bilateral)     Patient on Anti-coagulation No    No health changes since previous encounter    History reviewed. No pertinent past medical history.  History reviewed. No pertinent surgical history.  Review of patient's allergies indicates:  No Known Allergies     No current facility-administered medications on file prior to encounter.     Current Outpatient Medications on File Prior to Encounter   Medication Sig Dispense Refill    acetaminophen (TYLENOL) 500 MG tablet Take 1 tablet (500 mg total) by mouth every 8 (eight) hours as needed for Pain. 90 tablet 11    fluticasone propionate (FLONASE) 50 mcg/actuation nasal spray 2 PUFFS EACH NOSTRIL ONCE DAILY 16 g 1    gabapentin (NEURONTIN) 300 MG capsule Take 1 capsule (300 mg total) by mouth every morning AND 1 capsule (300 mg total) with lunch AND 2 capsules (600 mg total) every evening. 120 capsule 1    nabumetone (RELAFEN) 500 MG tablet Take 1 tablet (500 mg total) by mouth 2 (two) times daily as needed for Pain. take with food. 60 tablet 1    tiZANidine (ZANAFLEX) 4 MG tablet Take 0.5-1 tablets (2-4 mg total) by mouth 2 (two) times daily as needed (muscle spasms). May cause drowsiness. 60 tablet 1        PMHx, PSHx, Allergies, Medications reviewed in epic    ROS negative except pain complaints in HPI    OBJECTIVE:    BP (!) 162/78   Pulse 76   Temp 97.6 °F (36.4 °C)   Resp 15   Ht 5' 8" (1.727 m)   Wt 51.6 kg (113 lb 13.9 oz)   SpO2 100%   BMI 17.31 kg/m²     PHYSICAL EXAMINATION:    GENERAL: Well appearing, in no acute distress, alert and oriented x3.  PSYCH:  Mood and affect appropriate.  SKIN: Skin color, texture, turgor normal, no rashes or lesions which will impact the procedure.  CV: RRR with palpation of the radial artery.  PULM: No evidence of respiratory difficulty, symmetric chest rise. Clear to auscultation.  NEURO: Cranial nerves grossly " intact.    Plan:    Proceed with procedure as planned Procedure(s) (LRB):  diagnostic Bilateral C5-7 MBB with RN IV sedation (Bilateral)    Tavares Monet MD  12/08/2022

## 2022-12-08 NOTE — DISCHARGE INSTRUCTIONS

## 2022-12-09 ENCOUNTER — TELEPHONE (OUTPATIENT)
Dept: PAIN MEDICINE | Facility: CLINIC | Age: 62
End: 2022-12-09
Payer: MEDICARE

## 2022-12-09 NOTE — TELEPHONE ENCOUNTER
Call pt to get his % relief from his  MBB on 12/08/22. Pt didn't answer left ambika Howard   Medical Assistant

## 2022-12-09 NOTE — TELEPHONE ENCOUNTER
----- Message from Honey Busch MA sent at 11/29/2022  9:45 AM CST -----  Call pt to get relief percentage.

## 2022-12-14 ENCOUNTER — TELEPHONE (OUTPATIENT)
Dept: PAIN MEDICINE | Facility: CLINIC | Age: 62
End: 2022-12-14
Payer: MEDICARE

## 2022-12-20 DIAGNOSIS — M54.12 CERVICAL RADICULOPATHY: Primary | ICD-10-CM

## 2022-12-20 NOTE — Clinical Note
Pain Provider: Tierney Patient Name: Leo Roblero MRN: [id number] Case: CERVICAL INTERLAMINAR EPIDURAL STEROID INJECTION Level: C6/7 Laterality: na Anticoagulation: none  4 week f/u with anyone post procedure

## 2022-12-21 ENCOUNTER — TELEPHONE (OUTPATIENT)
Dept: PAIN MEDICINE | Facility: CLINIC | Age: 62
End: 2022-12-21
Payer: MEDICARE

## 2023-01-09 NOTE — PRE-PROCEDURE INSTRUCTIONS
Spoke with patient regarding procedure scheduled on 1.17    Arrival time 0730    Has patient been sick with fever or on antibiotics within the last 7 days? No    Does the patient have any open wounds, sores or rashes? No    Does the patient have any recent fractures? no    Has patient received a vaccination within the last 7 days? No    Received the COVID vaccination? yes    Has the patient stopped all medications as directed? NA    Does patient have a pacemaker and or defibrillator? no    Does the patient have a ride to and from procedure and someone reliable to remain with patient? Wife     Is the patient diabetic? no    Does the patient have sleep apnea? Or use O2 at home? No and no     Is the patient receiving sedation? yes    Is the patient instructed to remain NPO beginning at midnight the night before their procedure? yes    Procedure location confirmed with patient? Yes    Covid- Denies signs/symptoms. Instructed to notify PAT/MD if any changes.

## 2023-01-17 ENCOUNTER — HOSPITAL ENCOUNTER (OUTPATIENT)
Facility: HOSPITAL | Age: 63
Discharge: HOME | End: 2023-01-17
Attending: PHYSICAL MEDICINE & REHABILITATION | Admitting: PHYSICAL MEDICINE & REHABILITATION
Payer: MEDICARE

## 2023-01-17 VITALS
WEIGHT: 120.44 LBS | DIASTOLIC BLOOD PRESSURE: 74 MMHG | HEIGHT: 68 IN | HEART RATE: 61 BPM | BODY MASS INDEX: 18.25 KG/M2 | TEMPERATURE: 98 F | OXYGEN SATURATION: 100 % | SYSTOLIC BLOOD PRESSURE: 146 MMHG | RESPIRATION RATE: 15 BRPM

## 2023-01-17 DIAGNOSIS — M54.12 CERVICAL RADICULOPATHY: Primary | ICD-10-CM

## 2023-01-17 PROCEDURE — 62321 PR INJ CERV/THORAC, W/GUIDANCE: ICD-10-PCS | Mod: HCNC,,, | Performed by: PHYSICAL MEDICINE & REHABILITATION

## 2023-01-17 PROCEDURE — 25500020 PHARM REV CODE 255: Mod: HCNC | Performed by: PHYSICAL MEDICINE & REHABILITATION

## 2023-01-17 PROCEDURE — 25000003 PHARM REV CODE 250: Mod: HCNC | Performed by: PHYSICAL MEDICINE & REHABILITATION

## 2023-01-17 PROCEDURE — 62321 NJX INTERLAMINAR CRV/THRC: CPT | Mod: HCNC,,, | Performed by: PHYSICAL MEDICINE & REHABILITATION

## 2023-01-17 PROCEDURE — 62321 NJX INTERLAMINAR CRV/THRC: CPT | Mod: HCNC | Performed by: PHYSICAL MEDICINE & REHABILITATION

## 2023-01-17 PROCEDURE — 63600175 PHARM REV CODE 636 W HCPCS: Mod: HCNC | Performed by: PHYSICAL MEDICINE & REHABILITATION

## 2023-01-17 RX ORDER — FENTANYL CITRATE 50 UG/ML
INJECTION, SOLUTION INTRAMUSCULAR; INTRAVENOUS
Status: DISCONTINUED | OUTPATIENT
Start: 2023-01-17 | End: 2023-01-17 | Stop reason: HOSPADM

## 2023-01-17 RX ORDER — BUPIVACAINE HYDROCHLORIDE 2.5 MG/ML
INJECTION, SOLUTION EPIDURAL; INFILTRATION; INTRACAUDAL
Status: DISCONTINUED | OUTPATIENT
Start: 2023-01-17 | End: 2023-01-17 | Stop reason: HOSPADM

## 2023-01-17 RX ORDER — MIDAZOLAM HYDROCHLORIDE 1 MG/ML
INJECTION, SOLUTION INTRAMUSCULAR; INTRAVENOUS
Status: DISCONTINUED | OUTPATIENT
Start: 2023-01-17 | End: 2023-01-17 | Stop reason: HOSPADM

## 2023-01-17 RX ORDER — DEXAMETHASONE SODIUM PHOSPHATE 10 MG/ML
INJECTION INTRAMUSCULAR; INTRAVENOUS
Status: DISCONTINUED | OUTPATIENT
Start: 2023-01-17 | End: 2023-01-17 | Stop reason: HOSPADM

## 2023-01-17 RX ORDER — ONDANSETRON 2 MG/ML
4 INJECTION INTRAMUSCULAR; INTRAVENOUS ONCE AS NEEDED
Status: DISCONTINUED | OUTPATIENT
Start: 2023-01-17 | End: 2023-01-17 | Stop reason: HOSPADM

## 2023-01-17 NOTE — OP NOTE
Cervical Interlaminar Epidural Steroid Injection under Fluoroscopic Guidance.   Time-out taken to identify patient and procedure prior to starting the procedure.     Date of Procedure: 01/17/2023    PROCEDURE: Cervical Interlaminar epidural steroid injection C6-7 under fluoroscopic guidance.     Pre-Op diagnosis: Cervical radiculopathy [M54.12]    Post-Op diagnosis: Cervical radiculopathy [M54.12]    PHYSICIAN: Tavares Monet MD    ASSISTANTS: None     SEDATION: Conscious sedation provided by M.D    The patient was monitored with continuous pulse oximetry, EKG, and intermittent blood pressure monitors, immediately prior to administration of sedation.  The patient was hemodynamically stable throughout the entire process was responsive to voice, and breathing spontaneously.  Supplemental O2 was provided at 2L/min via nasal cannula.  Patient was comfortable for the duration of the procedure.     There was a total of 2mg IV Midazolam and 50mcg Fentanyl titrated for the procedure    Total sedation time was >10minutes and <20minutes      MEDICATIONS INJECTED: Preservative-free Decadron 10 mg with 1mL of sterile 0.25%Bupivicaine and 3ml of preservative free normal saline.     LOCAL ANESTHETIC INJECTED: Xylocaine 1% 3mL    ESTIMATED BLOOD LOSS: none.     COMPLICATIONS: none.     TECHNIQUE: With the patient laying in a prone position, the area was prepped and draped in the usual sterile fashion using ChloraPrep and a fenestrated drape. Local anesthetic was given using a 27-gauge needle by raising a wheal and going down to the hub of the needle over the C6-7 interlaminar space.  The interlaminar space was then approached with a 3.5 inch 18-gauge Touhy needle was introduced under fluoroscopic guidance in the AP and Lateral view. Once the Ligamentum flavum was encountered loss of resistance to saline was used to enter the epidural space. With positive loss of resistance and negative CSF or Blood, 2mL contrast dye Omnipaque  (300mg/ml) was injected to confirm placement and there was no vascular runoff. The medication was then injected slowly. The patient tolerated the procedure well.     The patient was monitored after the procedure.   They were given post-procedure and discharge instructions to follow at home.  The patient was discharged in a stable condition.

## 2023-01-17 NOTE — DISCHARGE SUMMARY
Discharge Note  Short Stay      SUMMARY     Admit Date: 1/17/2023    Attending Physician: Tavares Monet MD        Discharge Physician: Tavares Monet MD        Discharge Date: 1/17/2023 8:38 AM    Procedure(s) (LRB):  C6/7 [place] MADISON (N/A)    Final Diagnosis: Cervical radiculopathy [M54.12]    Disposition: Home or self care    Patient Instructions:   Current Discharge Medication List        CONTINUE these medications which have NOT CHANGED    Details   acetaminophen (TYLENOL) 500 MG tablet Take 1 tablet (500 mg total) by mouth every 8 (eight) hours as needed for Pain.  Qty: 90 tablet, Refills: 11    Associated Diagnoses: Traumatic incomplete tear of left rotator cuff, sequela; DDD (degenerative disc disease), cervical; Myalgia of auxiliary muscles, head and neck; Cervical spondylosis with radiculopathy; Cervical radiculopathy      fluticasone propionate (FLONASE) 50 mcg/actuation nasal spray 2 PUFFS EACH NOSTRIL ONCE DAILY  Qty: 16 g, Refills: 1      gabapentin (NEURONTIN) 300 MG capsule Take 1 capsule (300 mg total) by mouth every morning AND 1 capsule (300 mg total) with lunch AND 2 capsules (600 mg total) every evening.  Qty: 120 capsule, Refills: 1    Comments: Dr. Tavares Monet - LIVIER# [id number]  Associated Diagnoses: Cervical radicular pain      nabumetone (RELAFEN) 500 MG tablet Take 1 tablet (500 mg total) by mouth 2 (two) times daily as needed for Pain. take with food.  Qty: 60 tablet, Refills: 1    Associated Diagnoses: Cervical spondylosis      !! oxyCODONE-acetaminophen (PERCOCET) 5-325 mg per tablet Take 1 tablet Q8-12H PRN pain.  *30 DAY SUPPLY*  Qty: 70 tablet, Refills: 0    Comments: *30 DAY SUPPLY* Greater than 7 day supply medically necessary.  Associated Diagnoses: DDD (degenerative disc disease), cervical      !! oxyCODONE-acetaminophen (PERCOCET) 5-325 mg per tablet Take 1 tablet Q8-12H PRN pain. *30 DAY SUPPLY*  Qty: 70 tablet, Refills: 0    Comments: *30 DAY SUPPLY* Greater than 7 day supply  medically necessary.  Associated Diagnoses: DDD (degenerative disc disease), cervical      tiZANidine (ZANAFLEX) 4 MG tablet Take 0.5-1 tablets (2-4 mg total) by mouth 2 (two) times daily as needed (muscle spasms). May cause drowsiness.  Qty: 60 tablet, Refills: 1    Associated Diagnoses: Muscle pain       !! - Potential duplicate medications found. Please discuss with provider.              Discharge Diagnosis: Cervical radiculopathy [M54.12]  Condition on Discharge: Stable with no complications to procedure   Diet on Discharge: Same as before.  Activity: as per instruction sheet.  Discharge to: Home with a responsible adult.  Follow up: 2-4 weeks       Please call the office at [phone] if you experience any weakness or loss of sensation, fever > 101.5, pain uncontrolled with oral medications, persistent nausea/vomiting/or diarrhea, redness or drainage from the incisions, or any other worrisome concerns. If physician on call was not reached or could not communicate with our office for any reason please go to the nearest emergency department

## 2023-01-17 NOTE — H&P
HPI  Patient presenting for Procedure(s) (LRB):  C6/7 [place] MADISON (N/A)     Patient on Anti-coagulation No    No health changes since previous encounter    History reviewed. No pertinent past medical history.  Past Surgical History:   Procedure Laterality Date    INJECTION OF ANESTHETIC AGENT AROUND MEDIAL BRANCH NERVES INNERVATING CERVICAL FACET JOINT Bilateral 12/8/2022    Procedure: diagnostic Bilateral C5-7 MBB with RN IV sedation;  Surgeon: Tavares Monet MD;  [facility];  Service: Pain Management;  Laterality: Bilateral;     Review of patient's allergies indicates:  No Known Allergies     No current facility-administered medications on file prior to encounter.     Current Outpatient Medications on File Prior to Encounter   Medication Sig Dispense Refill    acetaminophen (TYLENOL) 500 MG tablet Take 1 tablet (500 mg total) by mouth every 8 (eight) hours as needed for Pain. 90 tablet 11    fluticasone propionate (FLONASE) 50 mcg/actuation nasal spray 2 PUFFS EACH NOSTRIL ONCE DAILY 16 g 1    gabapentin (NEURONTIN) 300 MG capsule Take 1 capsule (300 mg total) by mouth every morning AND 1 capsule (300 mg total) with lunch AND 2 capsules (600 mg total) every evening. 120 capsule 1    nabumetone (RELAFEN) 500 MG tablet Take 1 tablet (500 mg total) by mouth 2 (two) times daily as needed for Pain. take with food. 60 tablet 1    oxyCODONE-acetaminophen (PERCOCET) 5-325 mg per tablet Take 1 tablet Q8-12H PRN pain.  *30 DAY SUPPLY* 70 tablet 0    oxyCODONE-acetaminophen (PERCOCET) 5-325 mg per tablet Take 1 tablet Q8-12H PRN pain. *30 DAY SUPPLY* 70 tablet 0    tiZANidine (ZANAFLEX) 4 MG tablet Take 0.5-1 tablets (2-4 mg total) by mouth 2 (two) times daily as needed (muscle spasms). May cause drowsiness. 60 tablet 1        PMHx, PSHx, Allergies, Medications reviewed in epic    ROS negative except pain complaints in HPI    OBJECTIVE:    BP (!) 185/77 (BP Location: Right arm, Patient Position: Sitting)   Pulse  "71   Temp 97.4 °F (36.3 °C) (Temporal)   Resp 18   Ht 5' 8" (1.727 m)   Wt 54.6 kg (120 lb 6.6 oz)   SpO2 99%   BMI 18.31 kg/m²     PHYSICAL EXAMINATION:    GENERAL: Well appearing, in no acute distress, alert and oriented x3.  PSYCH:  Mood and affect appropriate.  SKIN: Skin color, texture, turgor normal, no rashes or lesions which will impact the procedure.  CV: RRR with palpation of the radial artery.  PULM: No evidence of respiratory difficulty, symmetric chest rise. Clear to auscultation.  NEURO: Cranial nerves grossly intact.    Plan:    Proceed with procedure as planned Procedure(s) (LRB):  C6/7 IL MADISON (N/A)    Tavares Monet MD  01/17/2023            "

## 2023-01-17 NOTE — DISCHARGE INSTRUCTIONS

## 2023-01-24 NOTE — PROGRESS NOTES
Established Patient Chronic Pain Note (Follow up visit)    Chief Complaint:   Chief Complaint   Patient presents with    Shoulder Pain           SUBJECTIVE:    Interval History 1/25/2023: presents today for follow-up visit.  he underwent C6/7 IL MADISON on 1/17/23.  The patient reports that he is/was better following the procedure.  he reports 70% pain relief.  The changes lasted 1 week so far.  The changes have continued through this visit.       he also presents today for follow-up for medication refill. he feels the pain medication is providing adequate pain relief and reduces the negative effects of chronic pain that affects quality of life. No major SE from medications. No major changes since previous visit; patient is stable overall. Patient reports pain as 7/10 today.     Interval History (11/29/2022): Leo Roblero was last seen on 8/30/2022. he presents today for follow-up for medication refill.  Medication is providing adequate pain relief. he feels the pain medication reduces the negative effects of chronic pain that affects quality of life. Patient reports pain as 9/10 today.  He c/o neck pain that radiates but mostly axial. Also c/o low back pain as well.     Interval History (8/30/2022):  Leo Roblero presents today for follow-up visit. Patient was last seen on 6/28/2022. he presents today for follow-up for medication refill.  Medication is providing adequate pain relief. he feels the pain medication reduces the negative effects of chronic pain that affects day-to-day function and quality of life.Patient reports pain as 8/10 today. Pain has Increased over last month; patient admits to being out of medication today.    Interval History (6/28/2022): Patient was last seen on 3/29/2022. he presents today for follow-up for medication refill.  Medication is providing adequate pain relief. he feels the pain medication reduces the negative effects of chronic pain that affects day-to-day function and quality of  life. No major changes since previous visit; patient is stable overall. Patient reports pain as 9/10 today.     Interval History (3/29/2022): Patient was last seen on 2/1/2022. At last visit, we switched to norco for opioid vacation, which seemed to help his pain medication work better. he presents today for medication refill.  No major changes; patient is stable overall. Medication is providing adequate pain relief. Patient reports pain as 8/10 today.     Interval History (2/1/2022):  Leo Roblero presents today for follow-up visit.  Patient was last seen on 12/7/2021.  He reports some pain relief with medication, but he does not feel that the pain is completely controlled.  He also feels that he is getting used to the Percocet.  Patient reports pain as 9/10 today.    Interval History (12/7/2021):  Leo Roblero presents today for follow-up visit for medication refill.  Patient was last seen on 10/7/2021.  He reports that he has been having low back pain for the past 3 weeks or so.  He has had low back pain in the past, but it has not been a real issue lately.  He does not feel that the pain is currently controlled.  Patient reports pain as 9/10 today.    Interval History (10/7/2021): Patient was last seen on 7/30/2021. he presents today for medication refill. Patient reports pain as 9/10 today.   No major changes; patient is stable overall. Medication is providing adequate pain relief.     Interval History (7/30/2021): Patient was last seen on 5/28/2021. No major changes; patient is stable overall. Medication is providing adequate pain relief. Patient reports pain as 8/10 today. he presents today for medication refill.      Interval History (5/28/2021): Patient was last seen on 3/26/2021, and he presents today for medication refill. Patient reports pain as 9/10 today.   Medication is providing adequate pain relief.     Interval History (3/26/2021): Patient was last seen on 1/22/2021. he presents today for  medication refill. At last visit, medication was changed from Norco to Percocet, which he reports better pain relief with.  Although he is asking for increased dose today.  Medication is providing some pain relief. Patient reports pain as 9/10 today.  He continues to take gabapentin 600 mg at night.  He also takes Tylenol 500 mg during the day, but he usually only takes 1 tablet.    Interval History (1/22/2021): Patient was last seen on 11/19/2020. He is here today for medication Refill but reports medication is not providing adequate pain relief. Patient reports pain as 9/10 today.    Interval History (9/23/2020): Patient was last seen on 7/29/2020. At that visit, the plan was to Refill Norco 5/325mg BID.  Patient reports pain as 10/10 today.  He is requesting a higher dose of the Norco today.  He continues to take naproxen and Tylenol as adjuvant medications.  He reports that the pain seems to be worse at night.    Interval HPI (7/29/2020):  Leo Roblero is a 60 y.o. male who presents to the clinic for a follow-up appointment for neck and bilateral shoulder pain.  At the last clinic visit, I expressed to the patient that he could continue on NSAIDs and Tylenol for his chronic pain control and to start with physical therapy to help stabilize his shoulder musculature.  He had failed subacromial bursa injections without much relief.  Since the last visit, Leo Roblero states the pain has been persistant. Current pain intensity is 10/10.    Initial HPI 02/19/2020:  Leo Roblero is a 59 y.o. male who presents to the clinic for the evaluation of neck and bilateral shoulder pain. He was referred by the Orthopedics Department for further evaluation and management of neck pain. Of note, patient has a past medical history of hyperlipidemia, and tobacco abuse, traumatic rotator cuff tear, tendinitis of the bilateral biceps tendon, and other medical comorbidities as listed below.  The pain started several years ago without  any significant injury or trauma and symptoms have been worsening.  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  The pain is described as shooting and throbbing and is rated as 6/10. The pain is rated with a score of  4/10 on the BEST day and a score of 10/10 on the WORST day.  Symptoms interfere with daily activity, sleeping and work. The pain is exacerbated by work.  The pain is mitigated by rest. The patient reports spending less than 2 hours per day reclining. The patient reports 6-8 hours of uninterrupted sleep per night.  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  Patient denies night fever/night sweats, urinary incontinence, bowel incontinence, significant weight loss, significant motor weakness and loss of sensations.    Pain Disability Index Review:  Last 3 PDI Scores 2/1/2022 12/7/2021 10/7/2021   Pain Disability Index (PDI) 63 15 64       Non-Pharmacologic Treatments:  Physical Therapy/Home Exercise:  No, pending start  Ice/Heat:yes  TENS: no  Acupuncture: no  Massage: no  Chiropractic: no    Other: no     Pain Medications:  - Opioids: Percocet (Oxycodone/Acetaminophen)  - Adjuvant Medications: Mobic  - Anti-Coagulants: Aspirin      report:  Reviewed and consistent with medication use as prescribed.     Pain Procedures:   -12/05/2019:  Bilateral subacromial bursa injections, limited relief  -02/14/2020:  IM Toradol to the right deltoid  - C6/7 IL MADISON on 1/17/23 with 70% pain relief          Imaging & EMG/NCS (Reviewed on 1/25/2023):    X-Ray Shoulder Complete Bilateral  Narrative: EXAM:  XR SHOULDER COMPLETE 2 OR MORE VIEWS BILATERAL    CLINICAL HISTORY:  Shoulder pain.    FINDINGS: Clinic joint spaces are well-maintained.  Mild narrowing with spurring of the lateral  acromioclavicular joint spaces.  Lung apices are clear.  No acute fracture or dislocation.  Impression:  Bilateral minimal osteoarthritis of the acromioclavicular joints.    Finalized on: 1/25/2023 11:28 AM By:  Bam Astorga MD  [facility]# [id number]      2023-01-25 11:30:28.092    [facility]     EMG/NCS bilateral upper extremities 03/06/2020:  1. ABNORMAL study  2. 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  There is additional evidence of a CHRONIC radiculopathy of the C7,C8, T1 nerve roots    X-ray cervical spine 02/19/2020:  Reversal of normal cervical lordosis.  Grade 1 anterolisthesis of C3 on C4 and C4 on C5.  These appear to mostly reduce with extension maneuver.  Grade 1 anterolisthesis of C7 on T1 which does not change with provocative maneuvers.  No acute fracture.  Prevertebral soft tissues are maintained.  Multilevel discogenic degenerative changes are seen with findings most severe at C5-C6 and C6-C7 with disc space narrowing marginal spurring.  Intact odontoid.  Multilevel bilateral areas of neural foraminal encroachment, right greater than left.  Clear lung apices.  Calcification of the bilateral carotids is seen.    MRI cervical spine 06/01/2017 (via [facility]):  There is a mild cervical levoscoliosis with slight straightening of normal cervical lordosis in the superior aspect of the cervical spine. Cervical vertebral body stature is preserved. No prevertebral edema is noted.  The visualized anatomy at the skull base is normal.  C2-C3: Mild disc space narrowing. Mild bilateral posterior facet and ligamentum flavum hypertrophy which is effacing the posterior theca and causing subtle posterior impression on the cervical spinal cord, although the thecal sac still measures 1.2 cm in AP dimension. There is minimal posterior spondylosis.  C3-C4: Disc desiccation with right  greater than left posterior facet arthropathy and with right uncinate hypertrophy, with mild disc space narrowing. There is mild ligamentum flavum hypertrophy. There is mild AP narrowing of the thecal sac, consistent with a spinal stenosis, but there is no significant deformity on the cervical spinal cord. There is right lateral recess/foraminal origin stenosis.  C4-C5: Mild to moderate disc space narrowing with disc desiccation and with right greater than left posterior facet hypertrophy. There is right uncinate hypertrophy at this level. There is bilateral ligamentum flavum hypertrophy. This combination of findings is causing a spinal stenosis, thecal sac measuring only 7.7 mm in AP dimension, and with a trefoil deformity of the thecal sac. There is also some mass effect and deformity on the cervical spinal cord. In addition to a spinal stenosis, there is right greater than left lateral recess/foraminal origin stenosis.  C5-C6: Prominent disc space narrowing with disc desiccation, bridging anterior bony spurring, right greater than left posterior facet arthropathy, and mild right uncinate hypertrophy. There is a broad-based posterior osteophyte disc complex which effaces the anterior theca and narrows the AP dimension of the cervical thecal sac, but which does not cause any significant mass effect on the cervical spinal cord. There is mild bilateral lateral recess/foraminal origin stenosis at this level, right side greater than left.  C6-C7: Prominent disc space narrowing with disc desiccation, anterior and posterior spondylosis, and right uncinate hypertrophy. There is a broad-based, posterior osteophyte disc complex which partially effaces the anterior theca but which does not deform the cervical spinal cord. There is a mild spinal stenosis with mild, bilateral foraminal origin stenosis, slightly greater on the left.  C7-T1: Mild to moderate, bilateral posterior facet arthropathy. There is mild disc desiccation  and disc space narrowing with approximately 2 mm of anterolisthesis C7 on T1. There is no obvious stenosis.  Signal intensity in the cervical thecal sac is normal. There is no convincing evidence of any significant edema or gliosis in the cervical spinal cord.     X-ray left shoulder 02/14/2020:  There is no radiographic evidence of acute osseous, articular, or soft tissue abnormality.  Joint spaces are preserved.     MRI left shoulder 2/10/20:  There is moderate rotator cuff tendinosis, supraspinatus and infraspinatus, with superimposed moderate grade partial thickness tear with fluid-filled defect posterior supraspinatus footprint with bursal and interstitial fiber tearing.  Supraspinatus tendon articular surface fraying is also noted.  There is also low-grade interstitial partial-thickness tear at the blending of the supraspinatus and infraspinatus.  There are few clustered small subcortical cysts and associated minimal edema like signal greater tuberosity.  The subscapularis tendon is unremarkable.  Long head biceps tendon is intact with mild intra-articular tendinosis.  Superior labral degenerative signal with a sublabral foramen versus SLAP tear.  Labrum is otherwise unremarkable.  Glenohumeral chondral thinning.  No joint effusion.  Normal IGHL.  Type 2 acromion without tilting minimal AC joint spurring.  There is no subacromial subdeltoid bursal fluid collection.  The bone marrow signal intensity is otherwise unremarkable.  The signal intensity of the muscle groups is normal.  No atrophy.          REVIEW OF SYSTEMS:    GENERAL:  No weight loss, malaise or fevers.  HEENT:   No recent changes in vision or hearing  NECK:  Negative for lumps, no difficulty with swallowing.  RESPIRATORY:  Negative for cough, wheezing or shortness of breath, patient denies any recent URI.  CARDIOVASCULAR:  Negative for chest pain, leg swelling or palpitations.  GI:  Negative for abdominal discomfort, blood in stools or black  "stools or change in bowel habits.  MUSCULOSKELETAL:  See HPI.  SKIN:  Negative for lesions, rash, and itching.  PSYCH:  No mood disorder or recent psychosocial stressors.  Patients sleep is not disturbed secondary to pain.  HEMATOLOGY/LYMPHOLOGY:  Negative for prolonged bleeding, bruising easily or swollen nodes.  Patient is not currently taking any anti-coagulants  NEURO:   No history of headaches, syncope, paralysis, seizures or tremors.  All other reviewed and negative other than HPI.        OBJECTIVE:  Vitals:    01/25/23 1029   BP: 138/84   Pulse: 69   Weight: 54.9 kg (121 lb 2.3 oz)   Height: 5' 8" (1.727 m)   PainSc:   7   PainLoc: Shoulder    Body mass index is 18.42 kg/m².   (reviewed on 1/25/2023)    General: alert and oriented, in no apparent distress.  Gait: normal gait.  Skin: no rashes, no discoloration, no obvious lesions  HEENT: normocephalic, atraumatic.    Respiratory: without use of accessory muscles of respiration.    Musculoskeletal - Cervical Spine:  - Pain on flexion of cervical spine: Present   - Pain on extension of cervical spine: Present   - Cervical facet loading: Present bilaterally   - TTP over the cervical facet joints: Present   - TTP over the cervical paraspinals: Present   - Spurling's:  Equivocal    Shoulder:  - Pain on abduction: Present bilaterally   - Flexion: decreased to 100° on left, decreased to 120° on the right  - Abduction:  Decreased to 110° on the left, decreased to 100° on the right  - lateral winging of the scapula on the left  - TTP:  Present over bilateral biceps tendons  - Neer's: Positive  - Hawkin's: Positive    Lumbar:  - pain with extension  - pain over facet joints & SIJ      Neuro - Upper Extremities:  - BUE Strength:R/L: D: 5/5; B: 5/5; T: 5/5; WF: 5/5; WE: 5/5; IO: 5/5  - Extremity Reflexes: Brisk and symmetric throughout  - Sensory: Sensation to light touch intact bilaterally    Neuro - Lower Extremities:  - RLE Strength:     >> 5/5 strength with right hip " flexion/ extension    >> 5/5 strength with right knee flexion/ extension    >> 5/5 strength in right ankle with plantar and dorsiflexion  - LLE Strength:     >> 5/5 strength with left hip flexion/ extension    >> 5/5 strength with knee flexion extension on the left     >> 5/5 strength in left ankle with plantar and dorsiflexion  - Extremity Reflexes: Brisk and symmetric throughout  - Sensory: Sensation to light touch intact bilaterally      Psych:  Mood and affect is appropriate            ASSESSMENT: 62 y.o. year old male with neck and bilateral shoulder pain, consistent with     1. DDD (degenerative disc disease), cervical        2. Cervical radicular pain  gabapentin (NEURONTIN) 300 MG capsule      3. Opioid use agreement exists        4. Chronic pain of both shoulders  Ambulatory referral/consult to Orthopedics    X-Ray Shoulder Complete Bilateral      5. Tendonitis of upper biceps tendon of right shoulder  Ambulatory referral/consult to Orthopedics      6. Tendonitis of upper biceps tendon of left shoulder  Ambulatory referral/consult to Orthopedics            PLAN:   1. Interventional:   - S/p C6/7 IL MADISON on 1/17/23 with 70% pain relief.   - Also consider lumbar treatment.    2. Pharmacologic:   - Refill Percocet 5/325mg Q8-Q12H PRN pain with additional 10 tablets to take TID on those 10 bad days per month (70 tablets).  Will e-prescribe to fill on 2/6/23 and 3/8/23.  Patient understands this is the highest dose that we prescribe.  Patient tolerating opioids with no side effects, obtaining some pain control with functional improvement.  We have discussed the risks of chronic opioid medication management.    - Refill gabapentin 300mg QAM/ 300mg at lunch/ 600mg QHS, nabumetone (Relafen) 500mg BID PRN pain, and Zanaflex 4mg BID PRN (60 tablets). Patient understands this may cause drowsiness.     - Can take additional 4 tablets of Tylenol 500mg during the day in addition to Percocet 5/325mg (3x) per day. Total of  acetaminophen daily - cannot exceed 3000mg; patient verbalized understanding.  - Anticoagulation use: None.   - Opioid contract signed on 7/29/2020.   Opioid risk tool completed on 7/29/2020: low risk.  - [place]  reviewed and appropriate.     - Last UDS was compliant for medications prescribed.  Patient given warning 8/30/2022 for taking more medication than prescribed.     3. Rehabilitative: Encouraged regular exercise.    4. Diagnostic: Order shoulder x-ray. Addendum: Radiology results reviewed & added to the chart.      5. Consults/Referrals:  Refer to Orthopedics for shoulder pain.    6. Follow up: 8-9 weeks for med refill.      - This condition does not require this patient to take time off of work, and the primary goal of our Pain Management services is to improve the patient's functional capacity.   - I discussed the risks, benefits, and alternatives to potential treatment options. All questions and concerns were fully addressed today in clinic.           >>UDS:  9/23/2020 :: appropriate   1/22/2021 :: appropriate   5/28/2021 :: appropriate  12/7/2021 :: appropriate  6/28/2022 :: appropriate  11/29/2022 :: appropriate     Warnings:  8/30/2022 - patient given warning for taking more than prescribed

## 2023-01-25 ENCOUNTER — OFFICE VISIT (OUTPATIENT)
Dept: PAIN MEDICINE | Facility: CLINIC | Age: 63
End: 2023-01-25
Payer: MEDICARE

## 2023-01-25 ENCOUNTER — HOSPITAL ENCOUNTER (OUTPATIENT)
Dept: RADIOLOGY | Facility: HOSPITAL | Age: 63
Discharge: HOME | End: 2023-01-25
Attending: PHYSICIAN ASSISTANT
Payer: MEDICARE

## 2023-01-25 VITALS
SYSTOLIC BLOOD PRESSURE: 138 MMHG | BODY MASS INDEX: 18.36 KG/M2 | WEIGHT: 121.13 LBS | DIASTOLIC BLOOD PRESSURE: 84 MMHG | HEIGHT: 68 IN | HEART RATE: 69 BPM

## 2023-01-25 DIAGNOSIS — M75.22 TENDONITIS OF UPPER BICEPS TENDON OF LEFT SHOULDER: ICD-10-CM

## 2023-01-25 DIAGNOSIS — G89.29 CHRONIC PAIN OF BOTH SHOULDERS: ICD-10-CM

## 2023-01-25 DIAGNOSIS — Z79.891 OPIOID USE AGREEMENT EXISTS: ICD-10-CM

## 2023-01-25 DIAGNOSIS — M54.12 CERVICAL RADICULAR PAIN: ICD-10-CM

## 2023-01-25 DIAGNOSIS — M50.30 DDD (DEGENERATIVE DISC DISEASE), CERVICAL: Primary | ICD-10-CM

## 2023-01-25 DIAGNOSIS — M75.21 TENDONITIS OF UPPER BICEPS TENDON OF RIGHT SHOULDER: ICD-10-CM

## 2023-01-25 DIAGNOSIS — M25.512 CHRONIC PAIN OF BOTH SHOULDERS: ICD-10-CM

## 2023-01-25 DIAGNOSIS — M50.30 DDD (DEGENERATIVE DISC DISEASE), CERVICAL: ICD-10-CM

## 2023-01-25 DIAGNOSIS — M25.511 CHRONIC PAIN OF BOTH SHOULDERS: ICD-10-CM

## 2023-01-25 PROCEDURE — 73030 X-RAY EXAM OF SHOULDER: CPT | Mod: TC,50,HCNC

## 2023-01-25 PROCEDURE — 3079F DIAST BP 80-89 MM HG: CPT | Mod: HCNC,CPTII,S$GLB, | Performed by: PHYSICIAN ASSISTANT

## 2023-01-25 PROCEDURE — 99214 PR OFFICE/OUTPT VISIT, EST, LEVL IV, 30-39 MIN: ICD-10-PCS | Mod: HCNC,S$GLB,, | Performed by: PHYSICIAN ASSISTANT

## 2023-01-25 PROCEDURE — 99999 PR PBB SHADOW E&M-EST. PATIENT-LVL IV: ICD-10-PCS | Mod: PBBFAC,HCNC,, | Performed by: PHYSICIAN ASSISTANT

## 2023-01-25 PROCEDURE — 1159F MED LIST DOCD IN RCRD: CPT | Mod: HCNC,CPTII,S$GLB, | Performed by: PHYSICIAN ASSISTANT

## 2023-01-25 PROCEDURE — 3075F SYST BP GE 130 - 139MM HG: CPT | Mod: HCNC,CPTII,S$GLB, | Performed by: PHYSICIAN ASSISTANT

## 2023-01-25 PROCEDURE — 3008F BODY MASS INDEX DOCD: CPT | Mod: HCNC,CPTII,S$GLB, | Performed by: PHYSICIAN ASSISTANT

## 2023-01-25 PROCEDURE — 1160F PR REVIEW ALL MEDS BY PRESCRIBER/CLIN PHARMACIST DOCUMENTED: ICD-10-PCS | Mod: HCNC,CPTII,S$GLB, | Performed by: PHYSICIAN ASSISTANT

## 2023-01-25 PROCEDURE — 99999 PR PBB SHADOW E&M-EST. PATIENT-LVL IV: CPT | Mod: PBBFAC,HCNC,, | Performed by: PHYSICIAN ASSISTANT

## 2023-01-25 PROCEDURE — 99214 OFFICE O/P EST MOD 30 MIN: CPT | Mod: HCNC,S$GLB,, | Performed by: PHYSICIAN ASSISTANT

## 2023-01-25 PROCEDURE — 73030 X-RAY EXAM OF SHOULDER: CPT | Mod: 26,50,HCNC, | Performed by: RADIOLOGY

## 2023-01-25 PROCEDURE — 1160F RVW MEDS BY RX/DR IN RCRD: CPT | Mod: HCNC,CPTII,S$GLB, | Performed by: PHYSICIAN ASSISTANT

## 2023-01-25 PROCEDURE — 1159F PR MEDICATION LIST DOCUMENTED IN MEDICAL RECORD: ICD-10-PCS | Mod: HCNC,CPTII,S$GLB, | Performed by: PHYSICIAN ASSISTANT

## 2023-01-25 PROCEDURE — 73030 XR SHOULDER COMPLETE 2 OR MORE VIEWS BILATERAL: ICD-10-PCS | Mod: 26,50,HCNC, | Performed by: RADIOLOGY

## 2023-01-25 PROCEDURE — 3075F PR MOST RECENT SYSTOLIC BLOOD PRESS GE 130-139MM HG: ICD-10-PCS | Mod: HCNC,CPTII,S$GLB, | Performed by: PHYSICIAN ASSISTANT

## 2023-01-25 PROCEDURE — 3008F PR BODY MASS INDEX (BMI) DOCUMENTED: ICD-10-PCS | Mod: HCNC,CPTII,S$GLB, | Performed by: PHYSICIAN ASSISTANT

## 2023-01-25 PROCEDURE — 3079F PR MOST RECENT DIASTOLIC BLOOD PRESSURE 80-89 MM HG: ICD-10-PCS | Mod: HCNC,CPTII,S$GLB, | Performed by: PHYSICIAN ASSISTANT

## 2023-01-25 RX ORDER — OXYCODONE AND ACETAMINOPHEN 5; 325 MG/1; MG/1
TABLET ORAL
Qty: 70 TABLET | Refills: 0 | Status: SHIPPED | OUTPATIENT
Start: 2023-03-08 | End: 2023-03-08 | Stop reason: ALTCHOICE

## 2023-01-25 RX ORDER — OXYCODONE AND ACETAMINOPHEN 5; 325 MG/1; MG/1
TABLET ORAL
Qty: 70 TABLET | Refills: 0 | Status: SHIPPED | OUTPATIENT
Start: 2023-02-06 | End: 2023-03-08 | Stop reason: ALTCHOICE

## 2023-01-25 RX ORDER — GABAPENTIN 300 MG/1
CAPSULE ORAL
Qty: 120 CAPSULE | Refills: 1 | Status: SHIPPED | OUTPATIENT
Start: 2023-01-25 | End: 2023-05-26 | Stop reason: SDUPTHER

## 2023-03-06 ENCOUNTER — TELEPHONE (OUTPATIENT)
Dept: PAIN MEDICINE | Facility: CLINIC | Age: 63
End: 2023-03-06
Payer: MEDICARE

## 2023-03-06 NOTE — TELEPHONE ENCOUNTER
Returned pt call. Informed pt that I sent over a message to Dr. Monet regarding medication being out of stock. Pt verbalizes understanding. All questions answered.

## 2023-03-06 NOTE — TELEPHONE ENCOUNTER
----- Message from Arelis Dunaway sent at 3/6/2023  9:44 AM CST -----  Contact: Amanda/ wife  Amanda is calling in regards to the patients oxyCODONE-acetaminophen (PERCOCET) 5-325 mg per tablet. States the pharmacy is unable to fill the prescription because the medication is currently on back order. Wants to now if the patient can get a prescription for a different medication in the mean time because the patient is out. Please call her at [phone]      [facility] - [place], [place] - [address] [facility]  [address] [address] [address]  Phone: [phone] Fax: [phone]

## 2023-03-06 NOTE — TELEPHONE ENCOUNTER
----- Message from Maria A Rush sent at 3/6/2023  2:34 PM CST -----  Contact: Amanda/ Spouse  Type:  Patient Returning Call    Who Called:Amanda  Who Left Message for Patient:Danieljarrett  Does the patient know what this is regarding?:medication  Would the patient rather a call back or a response via [employer]? Call back  Best Call Back Number:[phone]  Additional Information: Amanda is calling back to speak to the nurse regarding a medication for the patient, please give her a call back    Thanks  LJ

## 2023-03-08 ENCOUNTER — TELEPHONE (OUTPATIENT)
Dept: PAIN MEDICINE | Facility: CLINIC | Age: 63
End: 2023-03-08
Payer: MEDICARE

## 2023-03-08 DIAGNOSIS — M50.30 DDD (DEGENERATIVE DISC DISEASE), CERVICAL: ICD-10-CM

## 2023-03-08 RX ORDER — OXYCODONE AND ACETAMINOPHEN 5; 325 MG/1; MG/1
TABLET ORAL
Qty: 70 TABLET | Refills: 0 | Status: SHIPPED | OUTPATIENT
Start: 2023-03-08 | End: 2023-05-26 | Stop reason: ALTCHOICE

## 2023-03-08 NOTE — TELEPHONE ENCOUNTER
----- Message from Betzaida Casey sent at 3/8/2023  4:12 PM CST -----  Contact: stefan/ wife  Patients wife is calling to speak with the nurse regarding medication. Reports following up on to see if anything was sent to the pharmacy, stats the patient is in pain. Please give the patients wife a call back at [phone]  Thanks mariann

## 2023-03-08 NOTE — TELEPHONE ENCOUNTER
Spoke to pt wife to inform Percocet prescription was sent to pharmacy by Dr Monet.   .[facility]ad[facility]

## 2023-03-08 NOTE — TELEPHONE ENCOUNTER
Returned pt call. Informed her that I send a message to Dr. Monet on yesterday to change medication to [facility] in [place], also send a reminder message today. Informed them to check with the pharmacy at the end of the day.

## 2023-03-21 NOTE — PROGRESS NOTES
Established Patient Chronic Pain Note (Follow up visit)    Chief Complaint:   Chief Complaint   Patient presents with    Neck Pain    Shoulder Pain    Low-back Pain   Low back pain into right buttock    SUBJECTIVE:    Interval History (3/22/2023): Leo Roblero  was last seen on 1/25/2023. he presents today for follow-up for medication refill. he feels the pain medication is providing adequate pain relief and reduces the negative effects of chronic pain that affects quality of life. No major SE from medications.  Patient reports pain as 8/10 today.   He recently did some weed eating, which caused right lower back pain into the buttock.  Pain started about 3 weeks ago during this activity and has not subsided.  He feels the pain is slowly improving now several weeks later.  He is using the back brace as needed in his wearing today.    Interval History 1/25/2023: Patient presents today for follow-up visit.  he underwent C6/7 IL MADISON on 1/17/23.  The patient reports that he is/was better following the procedure.  he reports 70% pain relief.  The changes lasted 1 week so far.  The changes have continued through this visit.       he also presents today for follow-up for medication refill. he feels the pain medication is providing adequate pain relief and reduces the negative effects of chronic pain that affects quality of life. No major SE from medications. No major changes since previous visit; patient is stable overall. Patient reports pain as 7/10 today.     Interval History (11/29/2022): Leo Roblero was last seen on 8/30/2022. he presents today for follow-up for medication refill.  Medication is providing adequate pain relief. he feels the pain medication reduces the negative effects of chronic pain that affects quality of life. Patient reports pain as 9/10 today.  He c/o neck pain that radiates but mostly axial. Also c/o low back pain as well.     Interval History (8/30/2022):  Leo Roblero presents today for  follow-up visit. Patient was last seen on 6/28/2022. he presents today for follow-up for medication refill.  Medication is providing adequate pain relief. he feels the pain medication reduces the negative effects of chronic pain that affects day-to-day function and quality of life.Patient reports pain as 8/10 today. Pain has Increased over last month; patient admits to being out of medication today.    Interval History (6/28/2022): Patient was last seen on 3/29/2022. he presents today for follow-up for medication refill.  Medication is providing adequate pain relief. he feels the pain medication reduces the negative effects of chronic pain that affects day-to-day function and quality of life. No major changes since previous visit; patient is stable overall. Patient reports pain as 9/10 today.     Interval History (3/29/2022): Patient was last seen on 2/1/2022. At last visit, we switched to norco for opioid vacation, which seemed to help his pain medication work better. he presents today for medication refill.  No major changes; patient is stable overall. Medication is providing adequate pain relief. Patient reports pain as 8/10 today.     Interval History (2/1/2022):  Leo Rbolero presents today for follow-up visit.  Patient was last seen on 12/7/2021.  He reports some pain relief with medication, but he does not feel that the pain is completely controlled.  He also feels that he is getting used to the Percocet.  Patient reports pain as 9/10 today.    Interval History (12/7/2021):  Leo Roblero presents today for follow-up visit for medication refill.  Patient was last seen on 10/7/2021.  He reports that he has been having low back pain for the past 3 weeks or so.  He has had low back pain in the past, but it has not been a real issue lately.  He does not feel that the pain is currently controlled.  Patient reports pain as 9/10 today.    Interval History (10/7/2021): Patient was last seen on 7/30/2021. he presents  today for medication refill. Patient reports pain as 9/10 today.   No major changes; patient is stable overall. Medication is providing adequate pain relief.     Interval History (7/30/2021): Patient was last seen on 5/28/2021. No major changes; patient is stable overall. Medication is providing adequate pain relief. Patient reports pain as 8/10 today. he presents today for medication refill.      Interval History (5/28/2021): Patient was last seen on 3/26/2021, and he presents today for medication refill. Patient reports pain as 9/10 today.   Medication is providing adequate pain relief.     Interval History (3/26/2021): Patient was last seen on 1/22/2021. he presents today for medication refill. At last visit, medication was changed from Norco to Percocet, which he reports better pain relief with.  Although he is asking for increased dose today.  Medication is providing some pain relief. Patient reports pain as 9/10 today.  He continues to take gabapentin 600 mg at night.  He also takes Tylenol 500 mg during the day, but he usually only takes 1 tablet.    Interval History (1/22/2021): Patient was last seen on 11/19/2020. He is here today for medication Refill but reports medication is not providing adequate pain relief. Patient reports pain as 9/10 today.    Interval History (9/23/2020): Patient was last seen on 7/29/2020. At that visit, the plan was to Refill Norco 5/325mg BID.  Patient reports pain as 10/10 today.  He is requesting a higher dose of the Norco today.  He continues to take naproxen and Tylenol as adjuvant medications.  He reports that the pain seems to be worse at night.    Interval HPI (7/29/2020):  Leo Roblero is a 60 y.o. male who presents to the clinic for a follow-up appointment for neck and bilateral shoulder pain.  At the last clinic visit, I expressed to the patient that he could continue on NSAIDs and Tylenol for his chronic pain control and to start with physical therapy to help  stabilize his shoulder musculature.  He had failed subacromial bursa injections without much relief.  Since the last visit, Leo Roblero states the pain has been persistant. Current pain intensity is 10/10.    Initial HPI 02/19/2020:  Leo Roblero is a 59 y.o. male who presents to the clinic for the evaluation of neck and bilateral shoulder pain. He was referred by the Orthopedics Department for further evaluation and management of neck pain. Of note, patient has a past medical history of hyperlipidemia, and tobacco abuse, traumatic rotator cuff tear, tendinitis of the bilateral biceps tendon, and other medical comorbidities as listed below.  The pain started several years ago without any significant injury or trauma and symptoms have been worsening.  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  The pain is described as shooting and throbbing and is rated as 6/10. The pain is rated with a score of  4/10 on the BEST day and a score of 10/10 on the WORST day.  Symptoms interfere with daily activity, sleeping and work. The pain is exacerbated by work.  The pain is mitigated by rest. The patient reports spending less than 2 hours per day reclining. The patient reports 6-8 hours of uninterrupted sleep per night.  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  Patient denies night fever/night sweats, urinary incontinence, bowel incontinence, significant weight loss, significant motor weakness and loss of sensations.    Pain Disability Index Review:  Last 3 PDI Scores 2/1/2022 12/7/2021 10/7/2021   Pain Disability Index (PDI) 63 15 64       Non-Pharmacologic Treatments:  Physical Therapy/Home Exercise:  No, pending start  Ice/Heat:yes  TENS: no  Acupuncture: no  Massage: no  Chiropractic: no     Other: no     Pain Medications:  - Opioids: Percocet (Oxycodone/Acetaminophen)  - Adjuvant Medications: Mobic  - Anti-Coagulants: Aspirin      report:  Reviewed and consistent with medication use as prescribed.     Pain Procedures:   -12/05/2019:  Bilateral subacromial bursa shoulder injections, limited relief  -02/14/2020:  IM Toradol to the right deltoid  - C6/7 IL MADISON on 1/17/23 with 70% pain relief          Imaging & EMG/NCS (Reviewed on 3/22/2023):    1/23/23 X-Ray Shoulder Complete Bilateral  FINDINGS: Clinic joint spaces are well-maintained.  Mild narrowing with spurring of the lateral acromioclavicular joint spaces.  Lung apices are clear.  No acute fracture or dislocation.  Impression:  Bilateral minimal osteoarthritis of the acromioclavicular joints.     EMG/NCS bilateral upper extremities 03/06/2020:  1. ABNORMAL study  2. 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  There is additional evidence of a CHRONIC radiculopathy of the C7,C8, T1 nerve roots    X-ray cervical spine 02/19/2020:  Reversal of normal cervical lordosis.  Grade 1 anterolisthesis of C3 on C4 and C4 on C5.  These appear to mostly reduce with extension maneuver.  Grade 1 anterolisthesis of C7 on T1 which does not change with provocative maneuvers.  No acute fracture.  Prevertebral soft tissues are maintained.  Multilevel discogenic degenerative changes are seen with findings most severe at C5-C6 and C6-C7 with disc space narrowing marginal spurring.  Intact odontoid.  Multilevel bilateral areas of neural foraminal encroachment, right greater than left.  Clear lung apices.  Calcification of the bilateral carotids is seen.    MRI cervical spine 06/01/2017 ([facility]):  There is a mild cervical levoscoliosis with slight straightening of normal cervical lordosis in the superior aspect of the  cervical spine. Cervical vertebral body stature is preserved. No prevertebral edema is noted.  The visualized anatomy at the skull base is normal.  C2-C3: Mild disc space narrowing. Mild bilateral posterior facet and ligamentum flavum hypertrophy which is effacing the posterior theca and causing subtle posterior impression on the cervical spinal cord, although the thecal sac still measures 1.2 cm in AP dimension. There is minimal posterior spondylosis.  C3-C4: Disc desiccation with right greater than left posterior facet arthropathy and with right uncinate hypertrophy, with mild disc space narrowing. There is mild ligamentum flavum hypertrophy. There is mild AP narrowing of the thecal sac, consistent with a spinal stenosis, but there is no significant deformity on the cervical spinal cord. There is right lateral recess/foraminal origin stenosis.  C4-C5: Mild to moderate disc space narrowing with disc desiccation and with right greater than left posterior facet hypertrophy. There is right uncinate hypertrophy at this level. There is bilateral ligamentum flavum hypertrophy. This combination of findings is causing a spinal stenosis, thecal sac measuring only 7.7 mm in AP dimension, and with a trefoil deformity of the thecal sac. There is also some mass effect and deformity on the cervical spinal cord. In addition to a spinal stenosis, there is right greater than left lateral recess/foraminal origin stenosis.  C5-C6: Prominent disc space narrowing with disc desiccation, bridging anterior bony spurring, right greater than left posterior facet arthropathy, and mild right uncinate hypertrophy. There is a broad-based posterior osteophyte disc complex which effaces the anterior theca and narrows the AP dimension of the cervical thecal sac, but which does not cause any significant mass effect on the cervical spinal cord. There is mild bilateral lateral recess/foraminal origin stenosis at this level, right side greater than  left.  C6-C7: Prominent disc space narrowing with disc desiccation, anterior and posterior spondylosis, and right uncinate hypertrophy. There is a broad-based, posterior osteophyte disc complex which partially effaces the anterior theca but which does not deform the cervical spinal cord. There is a mild spinal stenosis with mild, bilateral foraminal origin stenosis, slightly greater on the left.  C7-T1: Mild to moderate, bilateral posterior facet arthropathy. There is mild disc desiccation and disc space narrowing with approximately 2 mm of anterolisthesis C7 on T1. There is no obvious stenosis.  Signal intensity in the cervical thecal sac is normal. There is no convincing evidence of any significant edema or gliosis in the cervical spinal cord.     X-ray left shoulder 02/14/2020:  There is no radiographic evidence of acute osseous, articular, or soft tissue abnormality.  Joint spaces are preserved.     MRI left shoulder 2/10/20:  There is moderate rotator cuff tendinosis, supraspinatus and infraspinatus, with superimposed moderate grade partial thickness tear with fluid-filled defect posterior supraspinatus footprint with bursal and interstitial fiber tearing.  Supraspinatus tendon articular surface fraying is also noted.  There is also low-grade interstitial partial-thickness tear at the blending of the supraspinatus and infraspinatus.  There are few clustered small subcortical cysts and associated minimal edema like signal greater tuberosity.  The subscapularis tendon is unremarkable.  Long head biceps tendon is intact with mild intra-articular tendinosis.  Superior labral degenerative signal with a sublabral foramen versus SLAP tear.  Labrum is otherwise unremarkable.  Glenohumeral chondral thinning.  No joint effusion.  Normal IGHL.  Type 2 acromion without tilting minimal AC joint spurring.  There is no subacromial subdeltoid bursal fluid collection.  The bone marrow signal intensity is otherwise unremarkable.  " The signal intensity of the muscle groups is normal.  No atrophy.          REVIEW OF SYSTEMS:    GENERAL:  No weight loss, malaise or fevers.  HEENT:   No recent changes in vision or hearing  NECK:  Negative for lumps, no difficulty with swallowing.  RESPIRATORY:  Negative for cough, wheezing or shortness of breath, patient denies any recent URI.  CARDIOVASCULAR:  Negative for chest pain, leg swelling or palpitations.  GI:  Negative for abdominal discomfort, blood in stools or black stools or change in bowel habits.  MUSCULOSKELETAL:  See HPI.  SKIN:  Negative for lesions, rash, and itching.  PSYCH:  No mood disorder or recent psychosocial stressors.  Patients sleep is not disturbed secondary to pain.  HEMATOLOGY/LYMPHOLOGY:  Negative for prolonged bleeding, bruising easily or swollen nodes.  Patient is not currently taking any anti-coagulants  NEURO:   No history of headaches, syncope, paralysis, seizures or tremors.  All other reviewed and negative other than HPI.        OBJECTIVE:  Vitals:    03/22/23 1036   BP: (!) 151/80   Pulse: 76   Weight: 53.1 kg (117 lb 2.8 oz)   Height: 5' 8" (1.727 m)   PainSc:   8   PainLoc: Back    Body mass index is 17.82 kg/m².   (reviewed on 3/22/2023)    General: alert and oriented, in no apparent distress.  Gait: normal gait.  Skin: no rashes, no discoloration, no obvious lesions  HEENT: normocephalic, atraumatic.    Respiratory: without use of accessory muscles of respiration.    Musculoskeletal - Cervical Spine:  - ROM fairly preserved   - Pain on flexion of cervical spine: Present   - Pain on extension of cervical spine: Present   - Cervical facet loading: Present bilaterally   - TTP over the cervical facet joints: Present   - TTP over the cervical paraspinals: Present   - Spurling's:  Equivocal    Shoulder:  - Pain on abduction: Present bilaterally   - Flexion: decreased to 100° on left, decreased to 120° on the right  - Abduction:  Decreased to 110° on the left, decreased to " 100° on the right  - lateral winging of the scapula on the left  - TTP:  Present over bilateral biceps tendons  - Neer's: Positive  - Hawkin's: Positive    Lumbar:  - pain with extension  - pain over facet joints - less so than SIJ  - TTP over right piriformis     SIJ testing:  - TTP over SI joint: Present on right   - Su's/ Wang's: Positive  on right   - Sacroiliac Distraction Test (anterior pressure): Positive  - Sacroiliac Compression Test (lateral pressure): Positive     Neuro - Upper Extremities:  - BUE Strength:R/L: D: 5/5; B: 5/5; T: 5/5; WF: 5/5; WE: 5/5; IO: 5/5  - Extremity Reflexes: Brisk and symmetric throughout  - Sensory: Sensation to light touch intact bilaterally    Neuro - Lower Extremities:  - RLE Strength:     >> 5/5 strength with right hip flexion/ extension    >> 5/5 strength with right knee flexion/ extension    >> 5/5 strength in right ankle with plantar and dorsiflexion  - LLE Strength:     >> 5/5 strength with left hip flexion/ extension    >> 5/5 strength with knee flexion extension on the left     >> 5/5 strength in left ankle with plantar and dorsiflexion  - Extremity Reflexes: Brisk and symmetric throughout  - Sensory: Sensation to light touch intact bilaterally      Psych:  Mood and affect is appropriate            ASSESSMENT: 62 y.o. year old male with neck and bilateral shoulder pain, consistent with     1. Arthropathy of right sacroiliac joint  IR SI Joint Injection w/Imaging    Case Request-RAD/Other Procedure Area: Right SIJ Injection - PAT: ask about sedation      2. DDD (degenerative disc disease), cervical        3. Cervical radicular pain        4. Chronic pain of both shoulders        5. Opioid use agreement exists        6. Muscle pain        7. Cervical spondylosis                PLAN:   1. Interventional:   - Order in for right SIJ injection. Patient can call to schedule.  - S/p C6/7 IL MADISON on 1/17/23 with 70% pain relief.   - Also consider lumbar treatment.    2.  Pharmacologic:   - Start Norco 7.5/325mg Q8-Q12H PRN pain (70 tablets) x 1 month.  Will e-prescribe to fill on 4/5/23; next Rx due on 5/6/23. D/c Percocet 5/325mg Q8-Q12H PRN pain with additional 10 tablets to take TID on those 10 bad days per month (70 tablets)-due to limited and relief provided at this time.    Patient understands this is the highest dose that we prescribe.  Patient tolerating opioids with no side effects, obtaining some pain control with functional improvement.  We have discussed the risks of chronic opioid medication management.    - Refill gabapentin 300mg QAM/ 300mg at lunch/ 600mg QHS, nabumetone (Relafen) 500mg BID PRN pain, and Zanaflex 4mg BID PRN (60 tablets). Patient understands this may cause drowsiness.     - Can take additional 4 tablets of Tylenol 500mg during the day in addition to Percocet 5/325mg (3x) per day. Total of acetaminophen daily - cannot exceed 3000mg; patient verbalized understanding.  - Anticoagulation use: None.   - Opioid contract signed on 7/29/2020.   Opioid risk tool completed on 7/29/2020: low risk.  - LA  reviewed and appropriate.     - Will order drug screen (UDS or oral swab) today to ensure med compliance.   Patient given warning 8/30/2022 for taking more medication than prescribed.     3. Rehabilitative: Encouraged regular exercise.    4. Diagnostic: None.     5. Consults/Referrals:  Referral previously placed to Orthopedics for shoulder pain. Patient had appointment in February, which was ultimately canceled.    6. Follow up:   One month audio visit for medication evaluation   8-9 weeks for med refill.      - This condition does not require this patient to take time off of work, and the primary goal of our Pain Management services is to improve the patient's functional capacity.   - I discussed the risks, benefits, and alternatives to potential treatment options. All questions and concerns were fully addressed today in clinic.           >>UDS:  9/23/2020  :: appropriate   1/22/2021 :: appropriate   5/28/2021 :: appropriate  12/7/2021 :: appropriate  6/28/2022 :: appropriate  11/29/2022 :: appropriate   3/22/2023 :: pending     Warnings:  8/30/2022 - patient given warning for taking more than prescribed

## 2023-03-22 ENCOUNTER — OFFICE VISIT (OUTPATIENT)
Dept: PAIN MEDICINE | Facility: CLINIC | Age: 63
End: 2023-03-22
Payer: MEDICARE

## 2023-03-22 VITALS
BODY MASS INDEX: 17.76 KG/M2 | HEART RATE: 76 BPM | SYSTOLIC BLOOD PRESSURE: 151 MMHG | HEIGHT: 68 IN | WEIGHT: 117.19 LBS | DIASTOLIC BLOOD PRESSURE: 80 MMHG

## 2023-03-22 DIAGNOSIS — M50.30 DDD (DEGENERATIVE DISC DISEASE), CERVICAL: ICD-10-CM

## 2023-03-22 DIAGNOSIS — M47.812 CERVICAL SPONDYLOSIS: ICD-10-CM

## 2023-03-22 DIAGNOSIS — Z79.891 OPIOID USE AGREEMENT EXISTS: ICD-10-CM

## 2023-03-22 DIAGNOSIS — M79.10 MUSCLE PAIN: ICD-10-CM

## 2023-03-22 DIAGNOSIS — M25.512 CHRONIC PAIN OF BOTH SHOULDERS: ICD-10-CM

## 2023-03-22 DIAGNOSIS — G89.29 CHRONIC PAIN OF BOTH SHOULDERS: ICD-10-CM

## 2023-03-22 DIAGNOSIS — M25.511 CHRONIC PAIN OF BOTH SHOULDERS: ICD-10-CM

## 2023-03-22 DIAGNOSIS — M47.818 ARTHROPATHY OF RIGHT SACROILIAC JOINT: Primary | ICD-10-CM

## 2023-03-22 DIAGNOSIS — M54.12 CERVICAL RADICULAR PAIN: ICD-10-CM

## 2023-03-22 PROCEDURE — 99999 PR PBB SHADOW E&M-EST. PATIENT-LVL III: ICD-10-PCS | Mod: PBBFAC,HCNC,, | Performed by: PHYSICIAN ASSISTANT

## 2023-03-22 PROCEDURE — 3077F PR MOST RECENT SYSTOLIC BLOOD PRESSURE >= 140 MM HG: ICD-10-PCS | Mod: HCNC,CPTII,S$GLB, | Performed by: PHYSICIAN ASSISTANT

## 2023-03-22 PROCEDURE — 1160F RVW MEDS BY RX/DR IN RCRD: CPT | Mod: HCNC,CPTII,S$GLB, | Performed by: PHYSICIAN ASSISTANT

## 2023-03-22 PROCEDURE — 3079F PR MOST RECENT DIASTOLIC BLOOD PRESSURE 80-89 MM HG: ICD-10-PCS | Mod: HCNC,CPTII,S$GLB, | Performed by: PHYSICIAN ASSISTANT

## 2023-03-22 PROCEDURE — 99214 OFFICE O/P EST MOD 30 MIN: CPT | Mod: HCNC,S$GLB,, | Performed by: PHYSICIAN ASSISTANT

## 2023-03-22 PROCEDURE — 1159F MED LIST DOCD IN RCRD: CPT | Mod: HCNC,CPTII,S$GLB, | Performed by: PHYSICIAN ASSISTANT

## 2023-03-22 PROCEDURE — 3008F PR BODY MASS INDEX (BMI) DOCUMENTED: ICD-10-PCS | Mod: HCNC,CPTII,S$GLB, | Performed by: PHYSICIAN ASSISTANT

## 2023-03-22 PROCEDURE — 3008F BODY MASS INDEX DOCD: CPT | Mod: HCNC,CPTII,S$GLB, | Performed by: PHYSICIAN ASSISTANT

## 2023-03-22 PROCEDURE — 99999 PR PBB SHADOW E&M-EST. PATIENT-LVL III: CPT | Mod: PBBFAC,HCNC,, | Performed by: PHYSICIAN ASSISTANT

## 2023-03-22 PROCEDURE — 3079F DIAST BP 80-89 MM HG: CPT | Mod: HCNC,CPTII,S$GLB, | Performed by: PHYSICIAN ASSISTANT

## 2023-03-22 PROCEDURE — 1160F PR REVIEW ALL MEDS BY PRESCRIBER/CLIN PHARMACIST DOCUMENTED: ICD-10-PCS | Mod: HCNC,CPTII,S$GLB, | Performed by: PHYSICIAN ASSISTANT

## 2023-03-22 PROCEDURE — 3077F SYST BP >= 140 MM HG: CPT | Mod: HCNC,CPTII,S$GLB, | Performed by: PHYSICIAN ASSISTANT

## 2023-03-22 PROCEDURE — 1159F PR MEDICATION LIST DOCUMENTED IN MEDICAL RECORD: ICD-10-PCS | Mod: HCNC,CPTII,S$GLB, | Performed by: PHYSICIAN ASSISTANT

## 2023-03-22 PROCEDURE — 99214 PR OFFICE/OUTPT VISIT, EST, LEVL IV, 30-39 MIN: ICD-10-PCS | Mod: HCNC,S$GLB,, | Performed by: PHYSICIAN ASSISTANT

## 2023-03-22 RX ORDER — NABUMETONE 500 MG/1
500 TABLET, FILM COATED ORAL 2 TIMES DAILY PRN
Qty: 60 TABLET | Refills: 1 | Status: CANCELLED | OUTPATIENT
Start: 2023-03-22

## 2023-03-22 RX ORDER — TIZANIDINE 4 MG/1
2-4 TABLET ORAL 2 TIMES DAILY PRN
Qty: 60 TABLET | Refills: 1 | Status: CANCELLED | OUTPATIENT
Start: 2023-03-22

## 2023-03-22 RX ORDER — GABAPENTIN 300 MG/1
CAPSULE ORAL
Qty: 120 CAPSULE | Refills: 1 | Status: CANCELLED | OUTPATIENT
Start: 2023-03-22

## 2023-03-22 RX ORDER — HYDROCODONE BITARTRATE AND ACETAMINOPHEN 7.5; 325 MG/1; MG/1
TABLET ORAL
Qty: 70 TABLET | Refills: 0 | Status: SHIPPED | OUTPATIENT
Start: 2023-04-05 | End: 2023-05-04 | Stop reason: SDUPTHER

## 2023-05-04 NOTE — PROGRESS NOTES
Established Patient - TeleHealth Visit    The patient location is: [place]  The chief complaint leading to consultation is: chronic pain     Visit type: audio only     time with patient: 10-15 minutes  20 minutes of total time spent on the encounter, which includes face to face time and non-face to face time preparing to see the patient (eg, review of tests), Obtaining and/or reviewing separately obtained history, Documenting clinical information in the electronic or other health record, Independently interpreting results (not separately reported) and communicating results to the patient/family/caregiver, or Care coordination (not separately reported).     Each patient to whom he or she provides medical services by telemedicine is:  (1) informed of the relationship between the physician and patient and the respective role of any other health care provider with respect to management of the patient; and (2) notified that he or she may decline to receive medical services by telemedicine and may withdraw from such care at any time.      Established Patient Chronic Pain Note (Follow up visit)    Chief Complaint:   Chief Complaint   Patient presents with    Pain   Low back pain into right buttock    SUBJECTIVE:    Interval History (5/8/2023):  Leo Roblero presents today for follow-up visit. At that visit, the plan was to switch oxycodone to Eclectic. Patient feels this is helping.  Initially, he felt that it was making him hyper, but since then, the side effects have resolved.  He feels the pain relief is provided with this medication.  He is tolerating well with minimal side effects.    Interval History (3/22/2023): Leo Roblero  was last seen on 1/25/2023. he presents today for follow-up for medication refill. he feels the pain medication is providing adequate pain relief and reduces the negative effects of chronic pain that affects quality of life. No major SE from medications.  Patient reports pain as 8/10 today.   He  recently did some weed eating, which caused right lower back pain into the buttock.  Pain started about 3 weeks ago during this activity and has not subsided.  He feels the pain is slowly improving now several weeks later.  He is using the back brace as needed in his wearing today.    Interval History 1/25/2023: Patient presents today for follow-up visit.  he underwent C6/7 IL MADISON on 1/17/23.  The patient reports that he is/was better following the procedure.  he reports 70% pain relief.  The changes lasted 1 week so far.  The changes have continued through this visit.       he also presents today for follow-up for medication refill. he feels the pain medication is providing adequate pain relief and reduces the negative effects of chronic pain that affects quality of life. No major SE from medications. No major changes since previous visit; patient is stable overall. Patient reports pain as 7/10 today.     Interval History (11/29/2022): Leo Roblero was last seen on 8/30/2022. he presents today for follow-up for medication refill.  Medication is providing adequate pain relief. he feels the pain medication reduces the negative effects of chronic pain that affects quality of life. Patient reports pain as 9/10 today.  He c/o neck pain that radiates but mostly axial. Also c/o low back pain as well.     Interval History (8/30/2022):  Leo Roblero presents today for follow-up visit. Patient was last seen on 6/28/2022. he presents today for follow-up for medication refill.  Medication is providing adequate pain relief. he feels the pain medication reduces the negative effects of chronic pain that affects day-to-day function and quality of life.Patient reports pain as 8/10 today. Pain has Increased over last month; patient admits to being out of medication today.    Interval History (6/28/2022): Patient was last seen on 3/29/2022. he presents today for follow-up for medication refill.  Medication is providing adequate pain  relief. he feels the pain medication reduces the negative effects of chronic pain that affects day-to-day function and quality of life. No major changes since previous visit; patient is stable overall. Patient reports pain as 9/10 today.     Interval History (3/29/2022): Patient was last seen on 2/1/2022. At last visit, we switched to norco for opioid vacation, which seemed to help his pain medication work better. he presents today for medication refill.  No major changes; patient is stable overall. Medication is providing adequate pain relief. Patient reports pain as 8/10 today.     Interval History (2/1/2022):  Leo Roblero presents today for follow-up visit.  Patient was last seen on 12/7/2021.  He reports some pain relief with medication, but he does not feel that the pain is completely controlled.  He also feels that he is getting used to the Percocet.  Patient reports pain as 9/10 today.    Interval History (12/7/2021):  Leo Roblero presents today for follow-up visit for medication refill.  Patient was last seen on 10/7/2021.  He reports that he has been having low back pain for the past 3 weeks or so.  He has had low back pain in the past, but it has not been a real issue lately.  He does not feel that the pain is currently controlled.  Patient reports pain as 9/10 today.    Interval History (10/7/2021): Patient was last seen on 7/30/2021. he presents today for medication refill. Patient reports pain as 9/10 today.   No major changes; patient is stable overall. Medication is providing adequate pain relief.     Interval History (7/30/2021): Patient was last seen on 5/28/2021. No major changes; patient is stable overall. Medication is providing adequate pain relief. Patient reports pain as 8/10 today. he presents today for medication refill.      Interval History (5/28/2021): Patient was last seen on 3/26/2021, and he presents today for medication refill. Patient reports pain as 9/10 today.   Medication is  providing adequate pain relief.     Interval History (3/26/2021): Patient was last seen on 1/22/2021. he presents today for medication refill. At last visit, medication was changed from Norco to Percocet, which he reports better pain relief with.  Although he is asking for increased dose today.  Medication is providing some pain relief. Patient reports pain as 9/10 today.  He continues to take gabapentin 600 mg at night.  He also takes Tylenol 500 mg during the day, but he usually only takes 1 tablet.    Interval History (1/22/2021): Patient was last seen on 11/19/2020. He is here today for medication Refill but reports medication is not providing adequate pain relief. Patient reports pain as 9/10 today.    Interval History (9/23/2020): Patient was last seen on 7/29/2020. At that visit, the plan was to Refill Norco 5/325mg BID.  Patient reports pain as 10/10 today.  He is requesting a higher dose of the Norco today.  He continues to take naproxen and Tylenol as adjuvant medications.  He reports that the pain seems to be worse at night.    Interval HPI (7/29/2020):  Leo Roblero is a 60 y.o. male who presents to the clinic for a follow-up appointment for neck and bilateral shoulder pain.  At the last clinic visit, I expressed to the patient that he could continue on NSAIDs and Tylenol for his chronic pain control and to start with physical therapy to help stabilize his shoulder musculature.  He had failed subacromial bursa injections without much relief.  Since the last visit, Leo Roblero states the pain has been persistant. Current pain intensity is 10/10.    Initial HPI 02/19/2020:  Leo Roblero is a 59 y.o. male who presents to the clinic for the evaluation of neck and bilateral shoulder pain. He was referred by the Orthopedics Department for further evaluation and management of neck pain. Of note, patient has a past medical history of hyperlipidemia, and tobacco abuse, traumatic rotator cuff tear, tendinitis of  the bilateral biceps tendon, and other medical comorbidities as listed below.  The pain started several years ago without any significant injury or trauma and symptoms have been worsening.  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  The pain is described as shooting and throbbing and is rated as 6/10. The pain is rated with a score of  4/10 on the BEST day and a score of 10/10 on the WORST day.  Symptoms interfere with daily activity, sleeping and work. The pain is exacerbated by work.  The pain is mitigated by rest. The patient reports spending less than 2 hours per day reclining. The patient reports 6-8 hours of uninterrupted sleep per night.  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  Patient denies night fever/night sweats, urinary incontinence, bowel incontinence, significant weight loss, significant motor weakness and loss of sensations.    Pain Disability Index Review:  Last 3 PDI Scores 2/1/2022 12/7/2021 10/7/2021   Pain Disability Index (PDI) 63 15 64       Non-Pharmacologic Treatments:  Physical Therapy/Home Exercise:  No, pending start  Ice/Heat:yes  TENS: no  Acupuncture: no  Massage: no  Chiropractic: no    Other: no     Pain Medications:  - Opioids: Percocet (Oxycodone/Acetaminophen)  - Adjuvant Medications: Mobic  - Anti-Coagulants: Aspirin      report:  Reviewed and consistent with medication use as prescribed.     Pain Procedures:   -12/05/2019:  Bilateral subacromial bursa shoulder injections, limited relief  -02/14/2020:  IM Toradol to the right deltoid  - C6/7 IL MADISON on 1/17/23 with 70% pain relief          Imaging & EMG/NCS (Reviewed on 5/8/2023):    1/23/23 X-Ray Shoulder Complete Bilateral  FINDINGS: Clinic joint spaces are well-maintained.  Mild  narrowing with spurring of the lateral acromioclavicular joint spaces.  Lung apices are clear.  No acute fracture or dislocation.  Impression:  Bilateral minimal osteoarthritis of the acromioclavicular joints.     EMG/NCS bilateral upper extremities 03/06/2020:  1. ABNORMAL study  2. 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  There is additional evidence of a CHRONIC radiculopathy of the C7,C8, T1 nerve roots    X-ray cervical spine 02/19/2020:  Reversal of normal cervical lordosis.  Grade 1 anterolisthesis of C3 on C4 and C4 on C5.  These appear to mostly reduce with extension maneuver.  Grade 1 anterolisthesis of C7 on T1 which does not change with provocative maneuvers.  No acute fracture.  Prevertebral soft tissues are maintained.  Multilevel discogenic degenerative changes are seen with findings most severe at C5-C6 and C6-C7 with disc space narrowing marginal spurring.  Intact odontoid.  Multilevel bilateral areas of neural foraminal encroachment, right greater than left.  Clear lung apices.  Calcification of the bilateral carotids is seen.    MRI cervical spine 06/01/2017 (via [facility]):  There is a mild cervical levoscoliosis with slight straightening of normal cervical lordosis in the superior aspect of the cervical spine. Cervical vertebral body stature is preserved. No prevertebral edema is noted.  The visualized anatomy at the skull base is normal.  C2-C3: Mild disc space narrowing. Mild bilateral posterior facet and ligamentum flavum hypertrophy which is effacing the posterior theca and causing subtle posterior impression on the cervical spinal cord, although the thecal sac still measures 1.2 cm in AP dimension. There is minimal posterior spondylosis.  C3-C4: Disc desiccation with right greater than left posterior facet arthropathy and with right uncinate  hypertrophy, with mild disc space narrowing. There is mild ligamentum flavum hypertrophy. There is mild AP narrowing of the thecal sac, consistent with a spinal stenosis, but there is no significant deformity on the cervical spinal cord. There is right lateral recess/foraminal origin stenosis.  C4-C5: Mild to moderate disc space narrowing with disc desiccation and with right greater than left posterior facet hypertrophy. There is right uncinate hypertrophy at this level. There is bilateral ligamentum flavum hypertrophy. This combination of findings is causing a spinal stenosis, thecal sac measuring only 7.7 mm in AP dimension, and with a trefoil deformity of the thecal sac. There is also some mass effect and deformity on the cervical spinal cord. In addition to a spinal stenosis, there is right greater than left lateral recess/foraminal origin stenosis.  C5-C6: Prominent disc space narrowing with disc desiccation, bridging anterior bony spurring, right greater than left posterior facet arthropathy, and mild right uncinate hypertrophy. There is a broad-based posterior osteophyte disc complex which effaces the anterior theca and narrows the AP dimension of the cervical thecal sac, but which does not cause any significant mass effect on the cervical spinal cord. There is mild bilateral lateral recess/foraminal origin stenosis at this level, right side greater than left.  C6-C7: Prominent disc space narrowing with disc desiccation, anterior and posterior spondylosis, and right uncinate hypertrophy. There is a broad-based, posterior osteophyte disc complex which partially effaces the anterior theca but which does not deform the cervical spinal cord. There is a mild spinal stenosis with mild, bilateral foraminal origin stenosis, slightly greater on the left.  C7-T1: Mild to moderate, bilateral posterior facet arthropathy. There is mild disc desiccation and disc space narrowing with approximately 2 mm of anterolisthesis C7  on T1. There is no obvious stenosis.  Signal intensity in the cervical thecal sac is normal. There is no convincing evidence of any significant edema or gliosis in the cervical spinal cord.     X-ray left shoulder 02/14/2020:  There is no radiographic evidence of acute osseous, articular, or soft tissue abnormality.  Joint spaces are preserved.     MRI left shoulder 2/10/20:  There is moderate rotator cuff tendinosis, supraspinatus and infraspinatus, with superimposed moderate grade partial thickness tear with fluid-filled defect posterior supraspinatus footprint with bursal and interstitial fiber tearing.  Supraspinatus tendon articular surface fraying is also noted.  There is also low-grade interstitial partial-thickness tear at the blending of the supraspinatus and infraspinatus.  There are few clustered small subcortical cysts and associated minimal edema like signal greater tuberosity.  The subscapularis tendon is unremarkable.  Long head biceps tendon is intact with mild intra-articular tendinosis.  Superior labral degenerative signal with a sublabral foramen versus SLAP tear.  Labrum is otherwise unremarkable.  Glenohumeral chondral thinning.  No joint effusion.  Normal IGHL.  Type 2 acromion without tilting minimal AC joint spurring.  There is no subacromial subdeltoid bursal fluid collection.  The bone marrow signal intensity is otherwise unremarkable.  The signal intensity of the muscle groups is normal.  No atrophy.          REVIEW OF SYSTEMS:    GENERAL:  No weight loss, malaise or fevers.  HEENT:   No recent changes in vision or hearing  NECK:  Negative for lumps, no difficulty with swallowing.  RESPIRATORY:  Negative for cough, wheezing or shortness of breath, patient denies any recent URI.  CARDIOVASCULAR:  Negative for chest pain, leg swelling or palpitations.  GI:  Negative for abdominal discomfort, blood in stools or black stools or change in bowel habits.  MUSCULOSKELETAL:  See HPI.  SKIN:   "Negative for lesions, rash, and itching.  PSYCH:  No mood disorder or recent psychosocial stressors.  Patients sleep is not disturbed secondary to pain.  HEMATOLOGY/LYMPHOLOGY:  Negative for prolonged bleeding, bruising easily or swollen nodes.  Patient is not currently taking any anti-coagulants  NEURO:   No history of headaches, syncope, paralysis, seizures or tremors.  All other reviewed and negative other than HPI.        OBJECTIVE:  Vitals:    05/08/23 0909   Height: 5' 8" (1.727 m)  Comment: pt reported       Body mass index is 17.82 kg/m².   (reviewed on 5/8/2023)     *No Physical Exam performed today - audio visit only*    Physical Exam from last in clinic visit:     General: alert and oriented, in no apparent distress.  Gait: normal gait.  Skin: no rashes, no discoloration, no obvious lesions  HEENT: normocephalic, atraumatic.    Respiratory: without use of accessory muscles of respiration.    Musculoskeletal - Cervical Spine:  - ROM fairly preserved   - Pain on flexion of cervical spine: Present   - Pain on extension of cervical spine: Present   - Cervical facet loading: Present bilaterally   - TTP over the cervical facet joints: Present   - TTP over the cervical paraspinals: Present   - Spurling's:  Equivocal    Shoulder:  - Pain on abduction: Present bilaterally   - Flexion: decreased to 100° on left, decreased to 120° on the right  - Abduction:  Decreased to 110° on the left, decreased to 100° on the right  - lateral winging of the scapula on the left  - TTP:  Present over bilateral biceps tendons  - Neer's: Positive  - Hawkin's: Positive    Lumbar:  - pain with extension  - pain over facet joints - less so than SIJ  - TTP over right piriformis     SIJ testing:  - TTP over SI joint: Present on right   - Su's/ Wang's: Positive  on right   - Sacroiliac Distraction Test (anterior pressure): Positive  - Sacroiliac Compression Test (lateral pressure): Positive     Neuro - Upper Extremities:  - BUE " Strength:R/L: D: 5/5; B: 5/5; T: 5/5; WF: 5/5; WE: 5/5; IO: 5/5  - Extremity Reflexes: Brisk and symmetric throughout  - Sensory: Sensation to light touch intact bilaterally    Neuro - Lower Extremities:  - RLE Strength:     >> 5/5 strength with right hip flexion/ extension    >> 5/5 strength with right knee flexion/ extension    >> 5/5 strength in right ankle with plantar and dorsiflexion  - LLE Strength:     >> 5/5 strength with left hip flexion/ extension    >> 5/5 strength with knee flexion extension on the left     >> 5/5 strength in left ankle with plantar and dorsiflexion  - Extremity Reflexes: Brisk and symmetric throughout  - Sensory: Sensation to light touch intact bilaterally      Psych:  Mood and affect is appropriate            ASSESSMENT: 62 y.o. year old male with neck and bilateral shoulder pain, consistent with     1. Arthropathy of right sacroiliac joint        2. Cervical radicular pain        3. DDD (degenerative disc disease), cervical        4. Chronic pain of both shoulders        5. Opioid use agreement exists                  PLAN:   1. Interventional:   - Order in for right SIJ injection. Patient can call to schedule.  - S/p C6/7 IL MADISON on 1/17/23 with 70% pain relief.   - Also consider lumbar treatment.    2. Pharmacologic:   - Refill Norco 7.5/325mg Q8-Q12H PRN pain (70 tablets) x 1 month.  Rx due on 5/6/23.-due to limited and relief provided at this time.    Patient understands this is the highest dose that we prescribe.  Patient tolerating opioids with no side effects, obtaining some pain control with functional improvement.  We have discussed the risks of chronic opioid medication management.    - Refill gabapentin 300mg QAM/ 300mg at lunch/ 600mg QHS, nabumetone (Relafen) 500mg BID PRN pain, and Zanaflex 4mg BID PRN (60 tablets). Patient understands this may cause drowsiness.     - Can take additional 4 tablets of Tylenol 500mg during the day in addition to Percocet 5/325mg (3x) per  day. Total of acetaminophen daily - cannot exceed 3000mg; patient verbalized understanding.  - Anticoagulation use: None.   - Opioid contract signed on 7/29/2020.   Opioid risk tool completed on 7/29/2020: low risk.  - [place]  reviewed and appropriate.     - drug screen (UDS or oral swab) ordered 3/22/23 to ensure med compliance.   Patient given warning 8/30/2022 for taking more medication than prescribed.     3. Rehabilitative: Encouraged regular exercise.    4. Diagnostic: None.     5. Consults/Referrals:  Referral previously placed to Orthopedics for shoulder pain. Patient had appointment in February, which was ultimately canceled.    6. Follow up: medication Refill     - This condition does not require this patient to take time off of work, and the primary goal of our Pain Management services is to improve the patient's functional capacity.   - I discussed the risks, benefits, and alternatives to potential treatment options. All questions and concerns were fully addressed today in clinic.           >>UDS:  9/23/2020 :: appropriate   1/22/2021 :: appropriate   5/28/2021 :: appropriate  12/7/2021 :: appropriate  6/28/2022 :: appropriate  11/29/2022 :: appropriate   3/22/2023 :: pending     Warnings:  8/30/2022 - patient given warning for taking more than prescribed

## 2023-05-05 RX ORDER — HYDROCODONE BITARTRATE AND ACETAMINOPHEN 7.5; 325 MG/1; MG/1
TABLET ORAL
Qty: 70 TABLET | Refills: 0 | Status: SHIPPED | OUTPATIENT
Start: 2023-05-05 | End: 2023-05-26 | Stop reason: SDUPTHER

## 2023-05-08 ENCOUNTER — OFFICE VISIT (OUTPATIENT)
Dept: PAIN MEDICINE | Facility: CLINIC | Age: 63
End: 2023-05-08
Payer: MEDICARE

## 2023-05-08 VITALS — BODY MASS INDEX: 17.82 KG/M2 | HEIGHT: 68 IN

## 2023-05-08 DIAGNOSIS — M50.30 DDD (DEGENERATIVE DISC DISEASE), CERVICAL: ICD-10-CM

## 2023-05-08 DIAGNOSIS — G89.29 CHRONIC PAIN OF BOTH SHOULDERS: ICD-10-CM

## 2023-05-08 DIAGNOSIS — M47.818 ARTHROPATHY OF RIGHT SACROILIAC JOINT: Primary | ICD-10-CM

## 2023-05-08 DIAGNOSIS — M25.511 CHRONIC PAIN OF BOTH SHOULDERS: ICD-10-CM

## 2023-05-08 DIAGNOSIS — M54.12 CERVICAL RADICULAR PAIN: ICD-10-CM

## 2023-05-08 DIAGNOSIS — M25.512 CHRONIC PAIN OF BOTH SHOULDERS: ICD-10-CM

## 2023-05-08 DIAGNOSIS — Z79.891 OPIOID USE AGREEMENT EXISTS: ICD-10-CM

## 2023-05-08 PROCEDURE — 99442 PR PHYSICIAN TELEPHONE EVALUATION 11-20 MIN: ICD-10-PCS | Mod: 95,,, | Performed by: PHYSICIAN ASSISTANT

## 2023-05-08 PROCEDURE — 1159F MED LIST DOCD IN RCRD: CPT | Mod: CPTII,95,, | Performed by: PHYSICIAN ASSISTANT

## 2023-05-08 PROCEDURE — 3008F PR BODY MASS INDEX (BMI) DOCUMENTED: ICD-10-PCS | Mod: CPTII,95,, | Performed by: PHYSICIAN ASSISTANT

## 2023-05-08 PROCEDURE — 1160F RVW MEDS BY RX/DR IN RCRD: CPT | Mod: CPTII,95,, | Performed by: PHYSICIAN ASSISTANT

## 2023-05-08 PROCEDURE — 3008F BODY MASS INDEX DOCD: CPT | Mod: CPTII,95,, | Performed by: PHYSICIAN ASSISTANT

## 2023-05-08 PROCEDURE — 1159F PR MEDICATION LIST DOCUMENTED IN MEDICAL RECORD: ICD-10-PCS | Mod: CPTII,95,, | Performed by: PHYSICIAN ASSISTANT

## 2023-05-08 PROCEDURE — 99442 PR PHYSICIAN TELEPHONE EVALUATION 11-20 MIN: CPT | Mod: 95,,, | Performed by: PHYSICIAN ASSISTANT

## 2023-05-08 PROCEDURE — 1160F PR REVIEW ALL MEDS BY PRESCRIBER/CLIN PHARMACIST DOCUMENTED: ICD-10-PCS | Mod: CPTII,95,, | Performed by: PHYSICIAN ASSISTANT

## 2023-05-23 ENCOUNTER — TELEPHONE (OUTPATIENT)
Dept: FAMILY MEDICINE | Facility: CLINIC | Age: 63
End: 2023-05-23
Payer: MEDICARE

## 2023-05-23 NOTE — PROGRESS NOTES
Established Patient Chronic Pain Note (Follow up visit)    Chief Complaint:   Chief Complaint   Patient presents with    Neck Pain     Right side    Low-back Pain   Low back pain into right buttock    SUBJECTIVE:    Interval History (5/26/2023): Patient was last seen on 3/22/2023. he presents today for follow-up for medication refill. he feels the pain medication is providing adequate pain relief and reduces the negative effects of chronic pain that affects quality of life. No major SE from medications. No major changes since previous visit; patient is stable overall. Patient reports pain as 7/10 today.     Interval History (5/8/2023):  Leo Roblero presents today for follow-up visit. At that visit, the plan was to switch oxycodone to [place]. Patient feels this is helping.  Initially, he felt that it was making him hyper, but since then, the side effects have resolved.  He feels the pain relief is provided with this medication.  He is tolerating well with minimal side effects.    Interval History (3/22/2023): Leo Roblero  was last seen on 1/25/2023. he presents today for follow-up for medication refill. he feels the pain medication is providing adequate pain relief and reduces the negative effects of chronic pain that affects quality of life. No major SE from medications.  Patient reports pain as 8/10 today.   He recently did some weed eating, which caused right lower back pain into the buttock.  Pain started about 3 weeks ago during this activity and has not subsided.  He feels the pain is slowly improving now several weeks later.  He is using the back brace as needed in his wearing today.    Interval History 1/25/2023: Patient presents today for follow-up visit.  he underwent C6/7 IL MADISON on 1/17/23.  The patient reports that he is/was better following the procedure.  he reports 70% pain relief.  The changes lasted 1 week so far.  The changes have continued through this visit.       he also presents today for  follow-up for medication refill. he feels the pain medication is providing adequate pain relief and reduces the negative effects of chronic pain that affects quality of life. No major SE from medications. No major changes since previous visit; patient is stable overall. Patient reports pain as 7/10 today.     Interval History (11/29/2022): eLo Roblero was last seen on 8/30/2022. he presents today for follow-up for medication refill.  Medication is providing adequate pain relief. he feels the pain medication reduces the negative effects of chronic pain that affects quality of life. Patient reports pain as 9/10 today.  He c/o neck pain that radiates but mostly axial. Also c/o low back pain as well.     Interval History (8/30/2022):  Leo Roblero presents today for follow-up visit. Patient was last seen on 6/28/2022. he presents today for follow-up for medication refill.  Medication is providing adequate pain relief. he feels the pain medication reduces the negative effects of chronic pain that affects day-to-day function and quality of life.Patient reports pain as 8/10 today. Pain has Increased over last month; patient admits to being out of medication today.    Interval History (6/28/2022): Patient was last seen on 3/29/2022. he presents today for follow-up for medication refill.  Medication is providing adequate pain relief. he feels the pain medication reduces the negative effects of chronic pain that affects day-to-day function and quality of life. No major changes since previous visit; patient is stable overall. Patient reports pain as 9/10 today.     Interval History (3/29/2022): Patient was last seen on 2/1/2022. At last visit, we switched to norco for opioid vacation, which seemed to help his pain medication work better. he presents today for medication refill.  No major changes; patient is stable overall. Medication is providing adequate pain relief. Patient reports pain as 8/10 today.     Interval History  (2/1/2022):  Leo Roblero presents today for follow-up visit.  Patient was last seen on 12/7/2021.  He reports some pain relief with medication, but he does not feel that the pain is completely controlled.  He also feels that he is getting used to the Percocet.  Patient reports pain as 9/10 today.    Interval History (12/7/2021):  Leo Roblero presents today for follow-up visit for medication refill.  Patient was last seen on 10/7/2021.  He reports that he has been having low back pain for the past 3 weeks or so.  He has had low back pain in the past, but it has not been a real issue lately.  He does not feel that the pain is currently controlled.  Patient reports pain as 9/10 today.    Interval History (10/7/2021): Patient was last seen on 7/30/2021. he presents today for medication refill. Patient reports pain as 9/10 today.   No major changes; patient is stable overall. Medication is providing adequate pain relief.     Interval History (7/30/2021): Patient was last seen on 5/28/2021. No major changes; patient is stable overall. Medication is providing adequate pain relief. Patient reports pain as 8/10 today. he presents today for medication refill.      Interval History (5/28/2021): Patient was last seen on 3/26/2021, and he presents today for medication refill. Patient reports pain as 9/10 today.   Medication is providing adequate pain relief.     Interval History (3/26/2021): Patient was last seen on 1/22/2021. he presents today for medication refill. At last visit, medication was changed from Norco to Percocet, which he reports better pain relief with.  Although he is asking for increased dose today.  Medication is providing some pain relief. Patient reports pain as 9/10 today.  He continues to take gabapentin 600 mg at night.  He also takes Tylenol 500 mg during the day, but he usually only takes 1 tablet.    Interval History (1/22/2021): Patient was last seen on 11/19/2020. He is here today for medication  Refill but reports medication is not providing adequate pain relief. Patient reports pain as 9/10 today.    Interval History (9/23/2020): Patient was last seen on 7/29/2020. At that visit, the plan was to Refill Norco 5/325mg BID.  Patient reports pain as 10/10 today.  He is requesting a higher dose of the Norco today.  He continues to take naproxen and Tylenol as adjuvant medications.  He reports that the pain seems to be worse at night.    Interval HPI (7/29/2020):  Leo Roblero is a 60 y.o. male who presents to the clinic for a follow-up appointment for neck and bilateral shoulder pain.  At the last clinic visit, I expressed to the patient that he could continue on NSAIDs and Tylenol for his chronic pain control and to start with physical therapy to help stabilize his shoulder musculature.  He had failed subacromial bursa injections without much relief.  Since the last visit, Leo Roblero states the pain has been persistant. Current pain intensity is 10/10.    Initial HPI 02/19/2020:  Leo Roblero is a 59 y.o. male who presents to the clinic for the evaluation of neck and bilateral shoulder pain. He was referred by the Orthopedics Department for further evaluation and management of neck pain. Of note, patient has a past medical history of hyperlipidemia, and tobacco abuse, traumatic rotator cuff tear, tendinitis of the bilateral biceps tendon, and other medical comorbidities as listed below.  The pain started several years ago without any significant injury or trauma and symptoms have been worsening.  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  The pain is described as shooting and throbbing and is rated as 6/10. The pain is rated with a score of  4/10 on the BEST day and a score of  10/10 on the WORST day.  Symptoms interfere with daily activity, sleeping and work. The pain is exacerbated by work.  The pain is mitigated by rest. The patient reports spending less than 2 hours per day reclining. The patient reports 6-8 hours of uninterrupted sleep per night.  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  Patient denies night fever/night sweats, urinary incontinence, bowel incontinence, significant weight loss, significant motor weakness and loss of sensations.    Pain Disability Index Review:  Last 3 PDI Scores 2/1/2022 12/7/2021 10/7/2021   Pain Disability Index (PDI) 63 15 64       Non-Pharmacologic Treatments:  Physical Therapy/Home Exercise:  No, pending start  Ice/Heat:yes  TENS: no  Acupuncture: no  Massage: no  Chiropractic: no    Other: no     Pain Medications:  - Opioids: Percocet (Oxycodone/Acetaminophen)  - Adjuvant Medications: Mobic  - Anti-Coagulants: Aspirin      report:  Reviewed and consistent with medication use as prescribed.     Pain Procedures:   -12/05/2019:  Bilateral subacromial bursa shoulder injections, limited relief  -02/14/2020:  IM Toradol to the right deltoid  - C6/7 IL MADISON on 1/17/23 with 70% pain relief          Imaging & EMG/NCS (Reviewed on 5/26/2023):    1/23/23 X-Ray Shoulder Complete Bilateral  FINDINGS: Clinic joint spaces are well-maintained.  Mild narrowing with spurring of the lateral acromioclavicular joint spaces.  Lung apices are clear.  No acute fracture or dislocation.  Impression:  Bilateral minimal osteoarthritis of the acromioclavicular joints.     EMG/NCS bilateral upper extremities 03/06/2020:  1. ABNORMAL study  2. 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  There is additional evidence of a CHRONIC radiculopathy of the C7,C8, T1 nerve roots    X-ray cervical spine 02/19/2020:  Reversal of normal  cervical lordosis.  Grade 1 anterolisthesis of C3 on C4 and C4 on C5.  These appear to mostly reduce with extension maneuver.  Grade 1 anterolisthesis of C7 on T1 which does not change with provocative maneuvers.  No acute fracture.  Prevertebral soft tissues are maintained.  Multilevel discogenic degenerative changes are seen with findings most severe at C5-C6 and C6-C7 with disc space narrowing marginal spurring.  Intact odontoid.  Multilevel bilateral areas of neural foraminal encroachment, right greater than left.  Clear lung apices.  Calcification of the bilateral carotids is seen.    MRI cervical spine 06/01/2017 (via [facility]):  There is a mild cervical levoscoliosis with slight straightening of normal cervical lordosis in the superior aspect of the cervical spine. Cervical vertebral body stature is preserved. No prevertebral edema is noted.  The visualized anatomy at the skull base is normal.  C2-C3: Mild disc space narrowing. Mild bilateral posterior facet and ligamentum flavum hypertrophy which is effacing the posterior theca and causing subtle posterior impression on the cervical spinal cord, although the thecal sac still measures 1.2 cm in AP dimension. There is minimal posterior spondylosis.  C3-C4: Disc desiccation with right greater than left posterior facet arthropathy and with right uncinate hypertrophy, with mild disc space narrowing. There is mild ligamentum flavum hypertrophy. There is mild AP narrowing of the thecal sac, consistent with a spinal stenosis, but there is no significant deformity on the cervical spinal cord. There is right lateral recess/foraminal origin stenosis.  C4-C5: Mild to moderate disc space narrowing with disc desiccation and with right greater than left posterior facet hypertrophy. There is right uncinate hypertrophy at this level. There is bilateral ligamentum flavum hypertrophy. This combination of findings is causing a spinal stenosis, thecal sac measuring only  7.7 mm in AP dimension, and with a trefoil deformity of the thecal sac. There is also some mass effect and deformity on the cervical spinal cord. In addition to a spinal stenosis, there is right greater than left lateral recess/foraminal origin stenosis.  C5-C6: Prominent disc space narrowing with disc desiccation, bridging anterior bony spurring, right greater than left posterior facet arthropathy, and mild right uncinate hypertrophy. There is a broad-based posterior osteophyte disc complex which effaces the anterior theca and narrows the AP dimension of the cervical thecal sac, but which does not cause any significant mass effect on the cervical spinal cord. There is mild bilateral lateral recess/foraminal origin stenosis at this level, right side greater than left.  C6-C7: Prominent disc space narrowing with disc desiccation, anterior and posterior spondylosis, and right uncinate hypertrophy. There is a broad-based, posterior osteophyte disc complex which partially effaces the anterior theca but which does not deform the cervical spinal cord. There is a mild spinal stenosis with mild, bilateral foraminal origin stenosis, slightly greater on the left.  C7-T1: Mild to moderate, bilateral posterior facet arthropathy. There is mild disc desiccation and disc space narrowing with approximately 2 mm of anterolisthesis C7 on T1. There is no obvious stenosis.  Signal intensity in the cervical thecal sac is normal. There is no convincing evidence of any significant edema or gliosis in the cervical spinal cord.     X-ray left shoulder 02/14/2020:  There is no radiographic evidence of acute osseous, articular, or soft tissue abnormality.  Joint spaces are preserved.     MRI left shoulder 2/10/20:  There is moderate rotator cuff tendinosis, supraspinatus and infraspinatus, with superimposed moderate grade partial thickness tear with fluid-filled defect posterior supraspinatus footprint with bursal and interstitial fiber  "tearing.  Supraspinatus tendon articular surface fraying is also noted.  There is also low-grade interstitial partial-thickness tear at the blending of the supraspinatus and infraspinatus.  There are few clustered small subcortical cysts and associated minimal edema like signal greater tuberosity.  The subscapularis tendon is unremarkable.  Long head biceps tendon is intact with mild intra-articular tendinosis.  Superior labral degenerative signal with a sublabral foramen versus SLAP tear.  Labrum is otherwise unremarkable.  Glenohumeral chondral thinning.  No joint effusion.  Normal IGHL.  Type 2 acromion without tilting minimal AC joint spurring.  There is no subacromial subdeltoid bursal fluid collection.  The bone marrow signal intensity is otherwise unremarkable.  The signal intensity of the muscle groups is normal.  No atrophy.          REVIEW OF SYSTEMS:    GENERAL:  No weight loss, malaise or fevers.  HEENT:   No recent changes in vision or hearing  NECK:  Negative for lumps, no difficulty with swallowing.  RESPIRATORY:  Negative for cough, wheezing or shortness of breath, patient denies any recent URI.  CARDIOVASCULAR:  Negative for chest pain, leg swelling or palpitations.  GI:  Negative for abdominal discomfort, blood in stools or black stools or change in bowel habits.  MUSCULOSKELETAL:  See HPI.  SKIN:  Negative for lesions, rash, and itching.  PSYCH:  No mood disorder or recent psychosocial stressors.  Patients sleep is not disturbed secondary to pain.  HEMATOLOGY/LYMPHOLOGY:  Negative for prolonged bleeding, bruising easily or swollen nodes.  Patient is not currently taking any anti-coagulants  NEURO:   No history of headaches, syncope, paralysis, seizures or tremors.  All other reviewed and negative other than HPI.        OBJECTIVE:  Vitals:    05/26/23 0919   BP: (!) 160/81   Weight: 83.5 kg (184 lb)   Height: 5' 8" (1.727 m)   PainSc:   7   PainLoc: Back    Body mass index is 27.98 kg/m². "   (reviewed on 5/26/2023)    General: alert and oriented, in no apparent distress.  Gait: normal gait.  Skin: no rashes, no discoloration, no obvious lesions  HEENT: normocephalic, atraumatic.    Respiratory: without use of accessory muscles of respiration.  GI: no obvious distention.     Musculoskeletal - Cervical Spine:  - Pain on flexion of cervical spine: Present   - Pain on extension of cervical spine: Present   - Cervical facet loading: Present bilaterally   - TTP over the cervical facet joints: Present   - TTP over the cervical paraspinals: Present   - Spurling's:  Equivocal    Shoulder:  - Pain on abduction: Present bilaterally   - Flexion: decreased to 100° on left, decreased to 120° on the right  - Abduction:  Decreased to 110° on the left, decreased to 100° on the right  - lateral winging of the scapula on the left  - TTP:  Present over bilateral biceps tendons  - Neer's: Positive  - Hawkin's: Positive    Lumbar:  - pain with extension  - pain over facet joints - less so than SIJ  - TTP over right piriformis - improved     SIJ testing:  - TTP over SI joint: Present on right   - Su's/ Wang's: Positive  on right   - Sacroiliac Distraction Test (anterior pressure): Positive  - Sacroiliac Compression Test (lateral pressure): Positive     Neuro - Upper Extremities:  - BUE Strength:R/L: D: 5/5; B: 5/5; T: 5/5; WF: 5/5; WE: 5/5; IO: 5/5  - Extremity Reflexes: Brisk and symmetric throughout  - Sensory: Sensation to light touch intact bilaterally    Neuro - Lower Extremities:  - RLE Strength:     >> 5/5 strength with right hip flexion/ extension    >> 5/5 strength with right knee flexion/ extension    >> 5/5 strength in right ankle with plantar and dorsiflexion  - LLE Strength:     >> 5/5 strength with left hip flexion/ extension    >> 5/5 strength with knee flexion extension on the left     >> 5/5 strength in left ankle with plantar and dorsiflexion  - Extremity Reflexes: Brisk and symmetric throughout  -  Sensory: Sensation to light touch intact bilaterally      Psych:  Mood and affect is appropriate            ASSESSMENT: 62 y.o. year old male with neck and bilateral shoulder pain, consistent with     1. Arthropathy of right sacroiliac joint        2. Cervical radicular pain  gabapentin (NEURONTIN) 300 MG capsule      3. DDD (degenerative disc disease), cervical        4. Chronic pain of both shoulders        5. Opioid use agreement exists        6. Muscle pain  tiZANidine (ZANAFLEX) 4 MG tablet      7. Cervical spondylosis  nabumetone (RELAFEN) 500 MG tablet                  PLAN:   1. Interventional:   - Order in for right SIJ injection. Patient can call to schedule.  - S/p C6/7 IL MADISON on 1/17/23 with 70% pain relief.   - Also consider lumbar treatment.    2. Pharmacologic:   - Refill Norco 7.5/325mg Q8-Q12H PRN pain (70 tablets) x 2 months. Will e-prescribe to fill on   (will allow to fill on 6/3/23 since pharmacy closed on weekend date) and   (will allow to fill on 7/3/23 since pharmacy closed on holiday date).    Patient understands this is the highest dose that we prescribe.  Patient tolerating opioids with no side effects, obtaining some pain control with functional improvement.  We have discussed the risks of chronic opioid medication management.    - Refill gabapentin 300mg QAM/ 300mg at lunch/ 600mg QHS, nabumetone (Relafen) 500mg BID PRN pain, and Zanaflex 4mg BID PRN (60 tablets). Patient understands this may cause drowsiness.     - Can take additional 4 tablets of Tylenol 500mg during the day in addition to Percocet 5/325mg (3x) per day. Total of acetaminophen daily - cannot exceed 3000mg; patient verbalized understanding.  - Anticoagulation use: None.   - Opioid contract signed on 7/29/2020.   Opioid risk tool completed on 7/29/2020: low risk.  - LA  reviewed and appropriate. MME=17.5     - drug screen (UDS or oral swab) ordered 3/22/23 to ensure med compliance was also +THC; patient given warning  5/26/2023.   Patient given previous warning 8/30/2022 for taking more medication than prescribed.     3. Rehabilitative: Encouraged regular exercise.    4. Diagnostic: None.     5. Consults/Referrals:  Referral previously placed to Orthopedics for shoulder pain. Patient had appointment in February, which was ultimately canceled.    6. Follow up: 8 week Medication Refill with UDS     - This condition does not require this patient to take time off of work, and the primary goal of our Pain Management services is to improve the patient's functional capacity.   - I discussed the risks, benefits, and alternatives to potential treatment options. All questions and concerns were fully addressed today in clinic.         Elif Dolan PA-C  Interventional Pain Management - [facility]    Disclaimer:  This note was prepared using voice recognition system and is likely to have sound alike errors that may have been overlooked even after proof reading.  Please call me with any questions.         >>UDS:  9/23/2020 :: appropriate   1/22/2021 :: appropriate   5/28/2021 :: appropriate  12/7/2021 :: appropriate  6/28/2022 :: appropriate  11/29/2022 :: appropriate   3/22/2023 :: appropriate for oxycodone with metabolites but also +THC - patient given warning today for THC      Warnings:  8/30/2022 - patient given warning for taking more than prescribed

## 2023-05-26 ENCOUNTER — OFFICE VISIT (OUTPATIENT)
Dept: PAIN MEDICINE | Facility: CLINIC | Age: 63
End: 2023-05-26
Payer: MEDICARE

## 2023-05-26 VITALS
WEIGHT: 184 LBS | DIASTOLIC BLOOD PRESSURE: 81 MMHG | BODY MASS INDEX: 27.89 KG/M2 | HEIGHT: 68 IN | SYSTOLIC BLOOD PRESSURE: 160 MMHG

## 2023-05-26 DIAGNOSIS — M47.818 ARTHROPATHY OF RIGHT SACROILIAC JOINT: Primary | ICD-10-CM

## 2023-05-26 DIAGNOSIS — M79.10 MUSCLE PAIN: ICD-10-CM

## 2023-05-26 DIAGNOSIS — Z79.891 OPIOID USE AGREEMENT EXISTS: ICD-10-CM

## 2023-05-26 DIAGNOSIS — M25.512 CHRONIC PAIN OF BOTH SHOULDERS: ICD-10-CM

## 2023-05-26 DIAGNOSIS — M47.812 CERVICAL SPONDYLOSIS: ICD-10-CM

## 2023-05-26 DIAGNOSIS — M50.30 DDD (DEGENERATIVE DISC DISEASE), CERVICAL: ICD-10-CM

## 2023-05-26 DIAGNOSIS — M54.12 CERVICAL RADICULAR PAIN: ICD-10-CM

## 2023-05-26 DIAGNOSIS — M25.511 CHRONIC PAIN OF BOTH SHOULDERS: ICD-10-CM

## 2023-05-26 DIAGNOSIS — G89.29 CHRONIC PAIN OF BOTH SHOULDERS: ICD-10-CM

## 2023-05-26 PROCEDURE — 99999 PR PBB SHADOW E&M-EST. PATIENT-LVL III: ICD-10-PCS | Mod: PBBFAC,,, | Performed by: PHYSICIAN ASSISTANT

## 2023-05-26 PROCEDURE — 3079F DIAST BP 80-89 MM HG: CPT | Mod: CPTII,S$GLB,, | Performed by: PHYSICIAN ASSISTANT

## 2023-05-26 PROCEDURE — 99214 PR OFFICE/OUTPT VISIT, EST, LEVL IV, 30-39 MIN: ICD-10-PCS | Mod: S$GLB,,, | Performed by: PHYSICIAN ASSISTANT

## 2023-05-26 PROCEDURE — 1159F MED LIST DOCD IN RCRD: CPT | Mod: CPTII,S$GLB,, | Performed by: PHYSICIAN ASSISTANT

## 2023-05-26 PROCEDURE — 99999 PR PBB SHADOW E&M-EST. PATIENT-LVL III: CPT | Mod: PBBFAC,,, | Performed by: PHYSICIAN ASSISTANT

## 2023-05-26 PROCEDURE — 99214 OFFICE O/P EST MOD 30 MIN: CPT | Mod: S$GLB,,, | Performed by: PHYSICIAN ASSISTANT

## 2023-05-26 PROCEDURE — 1159F PR MEDICATION LIST DOCUMENTED IN MEDICAL RECORD: ICD-10-PCS | Mod: CPTII,S$GLB,, | Performed by: PHYSICIAN ASSISTANT

## 2023-05-26 PROCEDURE — 3079F PR MOST RECENT DIASTOLIC BLOOD PRESSURE 80-89 MM HG: ICD-10-PCS | Mod: CPTII,S$GLB,, | Performed by: PHYSICIAN ASSISTANT

## 2023-05-26 PROCEDURE — 1160F RVW MEDS BY RX/DR IN RCRD: CPT | Mod: CPTII,S$GLB,, | Performed by: PHYSICIAN ASSISTANT

## 2023-05-26 PROCEDURE — 1160F PR REVIEW ALL MEDS BY PRESCRIBER/CLIN PHARMACIST DOCUMENTED: ICD-10-PCS | Mod: CPTII,S$GLB,, | Performed by: PHYSICIAN ASSISTANT

## 2023-05-26 PROCEDURE — 3077F PR MOST RECENT SYSTOLIC BLOOD PRESSURE >= 140 MM HG: ICD-10-PCS | Mod: CPTII,S$GLB,, | Performed by: PHYSICIAN ASSISTANT

## 2023-05-26 PROCEDURE — 3008F PR BODY MASS INDEX (BMI) DOCUMENTED: ICD-10-PCS | Mod: CPTII,S$GLB,, | Performed by: PHYSICIAN ASSISTANT

## 2023-05-26 PROCEDURE — 3008F BODY MASS INDEX DOCD: CPT | Mod: CPTII,S$GLB,, | Performed by: PHYSICIAN ASSISTANT

## 2023-05-26 PROCEDURE — 3077F SYST BP >= 140 MM HG: CPT | Mod: CPTII,S$GLB,, | Performed by: PHYSICIAN ASSISTANT

## 2023-05-26 RX ORDER — NABUMETONE 500 MG/1
500 TABLET, FILM COATED ORAL 2 TIMES DAILY PRN
Qty: 60 TABLET | Refills: 1 | Status: SHIPPED | OUTPATIENT
Start: 2023-05-26 | End: 2024-02-26 | Stop reason: SDUPTHER

## 2023-05-26 RX ORDER — TIZANIDINE 4 MG/1
2-4 TABLET ORAL 2 TIMES DAILY PRN
Qty: 60 TABLET | Refills: 1 | Status: SHIPPED | OUTPATIENT
Start: 2023-05-26 | End: 2024-02-26 | Stop reason: SDUPTHER

## 2023-05-26 RX ORDER — GABAPENTIN 300 MG/1
CAPSULE ORAL
Qty: 120 CAPSULE | Refills: 1 | Status: SHIPPED | OUTPATIENT
Start: 2023-05-26 | End: 2023-10-03 | Stop reason: SDUPTHER

## 2023-05-26 NOTE — PATIENT INSTRUCTIONS
You can  your pain medications on  (will allow to fill on 6/3/23 since pharmacy closed on weekend date) and   (will allow to fill on 7/3/23 since pharmacy closed on holiday date).

## 2023-05-29 RX ORDER — HYDROCODONE BITARTRATE AND ACETAMINOPHEN 7.5; 325 MG/1; MG/1
TABLET ORAL
Qty: 70 TABLET | Refills: 0 | Status: SHIPPED | OUTPATIENT
Start: 2023-06-03 | End: 2023-07-07 | Stop reason: SDUPTHER

## 2023-05-29 RX ORDER — HYDROCODONE BITARTRATE AND ACETAMINOPHEN 7.5; 325 MG/1; MG/1
TABLET ORAL
Qty: 70 TABLET | Refills: 0 | Status: SHIPPED | OUTPATIENT
Start: 2023-07-03 | End: 2023-08-23 | Stop reason: SDUPTHER

## 2023-05-31 ENCOUNTER — OFFICE VISIT (OUTPATIENT)
Dept: FAMILY MEDICINE | Facility: CLINIC | Age: 63
End: 2023-05-31
Payer: MEDICARE

## 2023-05-31 VITALS
BODY MASS INDEX: 27.23 KG/M2 | HEART RATE: 79 BPM | DIASTOLIC BLOOD PRESSURE: 76 MMHG | RESPIRATION RATE: 20 BRPM | TEMPERATURE: 98 F | OXYGEN SATURATION: 98 % | WEIGHT: 179.69 LBS | SYSTOLIC BLOOD PRESSURE: 120 MMHG | HEIGHT: 68 IN

## 2023-05-31 DIAGNOSIS — Z72.0 NOT CONSIDERING QUITTING USE OF TOBACCO: ICD-10-CM

## 2023-05-31 DIAGNOSIS — M54.12 CERVICAL RADICULOPATHY: ICD-10-CM

## 2023-05-31 DIAGNOSIS — Z12.11 COLON CANCER SCREENING: ICD-10-CM

## 2023-05-31 DIAGNOSIS — Z12.2 ENCOUNTER FOR SCREENING FOR LUNG CANCER: ICD-10-CM

## 2023-05-31 DIAGNOSIS — Z00.00 WELL ADULT EXAM: Primary | ICD-10-CM

## 2023-05-31 DIAGNOSIS — Z12.5 PROSTATE CANCER SCREENING ENCOUNTER, OPTIONS AND RISKS DISCUSSED: ICD-10-CM

## 2023-05-31 PROCEDURE — 3008F BODY MASS INDEX DOCD: CPT | Mod: CPTII,S$GLB,, | Performed by: FAMILY MEDICINE

## 2023-05-31 PROCEDURE — 3074F SYST BP LT 130 MM HG: CPT | Mod: CPTII,S$GLB,, | Performed by: FAMILY MEDICINE

## 2023-05-31 PROCEDURE — 3008F PR BODY MASS INDEX (BMI) DOCUMENTED: ICD-10-PCS | Mod: CPTII,S$GLB,, | Performed by: FAMILY MEDICINE

## 2023-05-31 PROCEDURE — 3078F PR MOST RECENT DIASTOLIC BLOOD PRESSURE < 80 MM HG: ICD-10-PCS | Mod: CPTII,S$GLB,, | Performed by: FAMILY MEDICINE

## 2023-05-31 PROCEDURE — 3074F PR MOST RECENT SYSTOLIC BLOOD PRESSURE < 130 MM HG: ICD-10-PCS | Mod: CPTII,S$GLB,, | Performed by: FAMILY MEDICINE

## 2023-05-31 PROCEDURE — 99396 PREV VISIT EST AGE 40-64: CPT | Mod: S$GLB,,, | Performed by: FAMILY MEDICINE

## 2023-05-31 PROCEDURE — 1159F PR MEDICATION LIST DOCUMENTED IN MEDICAL RECORD: ICD-10-PCS | Mod: CPTII,S$GLB,, | Performed by: FAMILY MEDICINE

## 2023-05-31 PROCEDURE — 1159F MED LIST DOCD IN RCRD: CPT | Mod: CPTII,S$GLB,, | Performed by: FAMILY MEDICINE

## 2023-05-31 PROCEDURE — 99999 PR PBB SHADOW E&M-EST. PATIENT-LVL IV: CPT | Mod: PBBFAC,,, | Performed by: FAMILY MEDICINE

## 2023-05-31 PROCEDURE — 99999 PR PBB SHADOW E&M-EST. PATIENT-LVL IV: ICD-10-PCS | Mod: PBBFAC,,, | Performed by: FAMILY MEDICINE

## 2023-05-31 PROCEDURE — 3078F DIAST BP <80 MM HG: CPT | Mod: CPTII,S$GLB,, | Performed by: FAMILY MEDICINE

## 2023-05-31 PROCEDURE — 99396 PR PREVENTIVE VISIT,EST,40-64: ICD-10-PCS | Mod: S$GLB,,, | Performed by: FAMILY MEDICINE

## 2023-05-31 NOTE — PROGRESS NOTES
Chief Complaint:  No chief complaint on file.      History of Present Illness:    Patient presents today for physical   Denies any specific complaints   He has chronic neck pain follows with pain management on narcotic pain medicines.  Doing okay    Continues smoke has no desire to quit    ROS:  Review of Systems   Constitutional:  Negative for activity change, chills, fatigue, fever and unexpected weight change.   HENT:  Negative for congestion, ear discharge, ear pain, hearing loss, postnasal drip and rhinorrhea.    Eyes:  Negative for pain and visual disturbance.   Respiratory:  Negative for cough, chest tightness and shortness of breath.    Cardiovascular:  Negative for chest pain and palpitations.   Gastrointestinal:  Negative for abdominal pain, diarrhea and vomiting.   Endocrine: Negative for heat intolerance.   Genitourinary:  Negative for dysuria, flank pain, frequency and hematuria.   Musculoskeletal:  Negative for back pain, gait problem and neck pain.   Skin:  Negative for color change and rash.   Neurological:  Negative for dizziness, tremors, seizures, numbness and headaches.   Psychiatric/Behavioral:  Negative for agitation, hallucinations, self-injury, sleep disturbance and suicidal ideas. The patient is not nervous/anxious.      History reviewed. No pertinent past medical history.    Social History:  Social History     Socioeconomic History    Marital status:    Tobacco Use    Smoking status: Every Day     Packs/day: 1.00     Years: 40.00     Pack years: 40.00     Types: Cigarettes    Smokeless tobacco: Never   Substance and Sexual Activity    Alcohol use: Yes     Alcohol/week: 6.0 standard drinks     Types: 6 Cans of beer per week    Drug use: No    Sexual activity: Yes     Partners: Female       Family History:   family history includes Cancer in his father; Heart disease in his mother.    Health Maintenance   Topic Date Due    LDCT Lung Screen  11/22/2020    Lipid Panel  12/23/2024     "TETANUS VACCINE  01/25/2028    Hepatitis C Screening  Completed       Exam:Physical     Vital Signs  Temp: 98.4 °F (36.9 °C)  Pulse: 79  Resp: 20  SpO2: 98 %  BP: 120/76  Pain Score: 0-No pain  Height and Weight  Height: 5' 8" (172.7 cm)  Weight: 81.5 kg (179 lb 10.8 oz)  BSA (Calculated - sq m): 1.98 sq meters  BMI (Calculated): 27.3  Weight in (lb) to have BMI = 25: 164.1]    Body mass index is 27.32 kg/m².    Physical Exam  Constitutional:       Appearance: He is well-developed.   Eyes:      Conjunctiva/sclera: Conjunctivae normal.      Pupils: Pupils are equal, round, and reactive to light.   Cardiovascular:      Rate and Rhythm: Normal rate and regular rhythm.      Heart sounds: Normal heart sounds. No murmur heard.  Pulmonary:      Effort: Pulmonary effort is normal. No respiratory distress.      Breath sounds: Normal breath sounds. No wheezing or rales.   Chest:      Chest wall: No tenderness.   Abdominal:      General: There is no distension.      Palpations: Abdomen is soft. There is no mass.      Tenderness: There is no abdominal tenderness. There is no guarding.   Musculoskeletal:         General: No tenderness.      Cervical back: Normal range of motion and neck supple.   Lymphadenopathy:      Cervical: No cervical adenopathy.   Skin:     General: Skin is warm and dry.   Neurological:      Mental Status: He is alert and oriented to person, place, and time.      Deep Tendon Reflexes: Reflexes are normal and symmetric.   Psychiatric:         Behavior: Behavior normal.         Thought Content: Thought content normal.         Judgment: Judgment normal.       Assessment:      ICD-10-CM ICD-9-CM   1. Well adult exam  Z00.00 V70.0   2. Not considering quitting use of tobacco  Z72.0 305.1   3. Encounter for screening for lung cancer  Z12.2 V76.0   4. Colon cancer screening  Z12.11 V76.51   5. Cervical radiculopathy  M54.12 723.4   6. Prostate cancer screening encounter, options and risks discussed  Z12.5 V76.44 "         Plan:    Smoking cessation strongly advised patient not interested   Check labs as below  Do recommend Cologuard and lung cancer screening           Follow up in about 1 year (around 5/31/2024).      Reuben Banuelos MD

## 2023-06-01 ENCOUNTER — LAB VISIT (OUTPATIENT)
Dept: LAB | Facility: HOSPITAL | Age: 63
End: 2023-06-01
Attending: FAMILY MEDICINE
Payer: MEDICARE

## 2023-06-01 DIAGNOSIS — Z12.5 PROSTATE CANCER SCREENING ENCOUNTER, OPTIONS AND RISKS DISCUSSED: ICD-10-CM

## 2023-06-01 DIAGNOSIS — Z00.00 WELL ADULT EXAM: ICD-10-CM

## 2023-06-01 LAB
ALBUMIN SERPL BCP-MCNC: 4.1 G/DL (ref 3.5–5.2)
ALP SERPL-CCNC: 98 U/L (ref 55–135)
ALT SERPL W/O P-5'-P-CCNC: 53 U/L (ref 10–44)
ANION GAP SERPL CALC-SCNC: 11 MMOL/L (ref 8–16)
AST SERPL-CCNC: 56 U/L (ref 10–40)
BASOPHILS # BLD AUTO: 0.04 K/UL (ref 0–0.2)
BASOPHILS NFR BLD: 0.5 % (ref 0–1.9)
BILIRUB SERPL-MCNC: 0.6 MG/DL (ref 0.1–1)
BUN SERPL-MCNC: 7 MG/DL (ref 8–23)
CALCIUM SERPL-MCNC: 9.7 MG/DL (ref 8.7–10.5)
CHLORIDE SERPL-SCNC: 103 MMOL/L (ref 95–110)
CHOLEST SERPL-MCNC: 199 MG/DL (ref 120–199)
CHOLEST/HDLC SERPL: 2.4 {RATIO} (ref 2–5)
CO2 SERPL-SCNC: 24 MMOL/L (ref 23–29)
COMPLEXED PSA SERPL-MCNC: 0.3 NG/ML (ref 0–4)
CREAT SERPL-MCNC: 0.8 MG/DL (ref 0.5–1.4)
DIFFERENTIAL METHOD: ABNORMAL
EOSINOPHIL # BLD AUTO: 0.1 K/UL (ref 0–0.5)
EOSINOPHIL NFR BLD: 1.2 % (ref 0–8)
ERYTHROCYTE [DISTWIDTH] IN BLOOD BY AUTOMATED COUNT: 13.3 % (ref 11.5–14.5)
EST. GFR  (NO RACE VARIABLE): >60 ML/MIN/1.73 M^2
ESTIMATED AVG GLUCOSE: 97 MG/DL (ref 68–131)
GLUCOSE SERPL-MCNC: 94 MG/DL (ref 70–110)
HBA1C MFR BLD: 5 % (ref 4–5.6)
HCT VFR BLD AUTO: 44.4 % (ref 40–54)
HDLC SERPL-MCNC: 84 MG/DL (ref 40–75)
HDLC SERPL: 42.2 % (ref 20–50)
HGB BLD-MCNC: 14.9 G/DL (ref 14–18)
IMM GRANULOCYTES # BLD AUTO: 0.02 K/UL (ref 0–0.04)
IMM GRANULOCYTES NFR BLD AUTO: 0.2 % (ref 0–0.5)
LDLC SERPL CALC-MCNC: 90.4 MG/DL (ref 63–159)
LYMPHOCYTES # BLD AUTO: 2.7 K/UL (ref 1–4.8)
LYMPHOCYTES NFR BLD: 33.1 % (ref 18–48)
MCH RBC QN AUTO: 32.6 PG (ref 27–31)
MCHC RBC AUTO-ENTMCNC: 33.6 G/DL (ref 32–36)
MCV RBC AUTO: 97 FL (ref 82–98)
MONOCYTES # BLD AUTO: 0.8 K/UL (ref 0.3–1)
MONOCYTES NFR BLD: 9.6 % (ref 4–15)
NEUTROPHILS # BLD AUTO: 4.5 K/UL (ref 1.8–7.7)
NEUTROPHILS NFR BLD: 55.4 % (ref 38–73)
NONHDLC SERPL-MCNC: 115 MG/DL
NRBC BLD-RTO: 0 /100 WBC
PLATELET # BLD AUTO: 314 K/UL (ref 150–450)
PMV BLD AUTO: 11.4 FL (ref 9.2–12.9)
POTASSIUM SERPL-SCNC: 4.8 MMOL/L (ref 3.5–5.1)
PROT SERPL-MCNC: 7.3 G/DL (ref 6–8.4)
RBC # BLD AUTO: 4.57 M/UL (ref 4.6–6.2)
SODIUM SERPL-SCNC: 138 MMOL/L (ref 136–145)
TRIGL SERPL-MCNC: 123 MG/DL (ref 30–150)
WBC # BLD AUTO: 8.06 K/UL (ref 3.9–12.7)

## 2023-06-01 PROCEDURE — 84153 ASSAY OF PSA TOTAL: CPT | Performed by: FAMILY MEDICINE

## 2023-06-01 PROCEDURE — 80053 COMPREHEN METABOLIC PANEL: CPT | Performed by: FAMILY MEDICINE

## 2023-06-01 PROCEDURE — 83036 HEMOGLOBIN GLYCOSYLATED A1C: CPT | Mod: DBM | Performed by: FAMILY MEDICINE

## 2023-06-01 PROCEDURE — 85025 COMPLETE CBC W/AUTO DIFF WBC: CPT | Performed by: FAMILY MEDICINE

## 2023-06-01 PROCEDURE — 80061 LIPID PANEL: CPT | Mod: DBM | Performed by: FAMILY MEDICINE

## 2023-06-01 PROCEDURE — 36415 COLL VENOUS BLD VENIPUNCTURE: CPT | Mod: PO | Performed by: FAMILY MEDICINE

## 2023-06-05 ENCOUNTER — TELEPHONE (OUTPATIENT)
Dept: FAMILY MEDICINE | Facility: CLINIC | Age: 63
End: 2023-06-05
Payer: MEDICARE

## 2023-06-05 DIAGNOSIS — Z00.00 WELL ADULT EXAM: Primary | ICD-10-CM

## 2023-06-05 DIAGNOSIS — R74.8 ELEVATED LIVER ENZYMES: ICD-10-CM

## 2023-06-13 ENCOUNTER — HOSPITAL ENCOUNTER (OUTPATIENT)
Dept: RADIOLOGY | Facility: HOSPITAL | Age: 63
Discharge: HOME | End: 2023-06-13
Attending: FAMILY MEDICINE
Payer: MEDICARE

## 2023-06-13 DIAGNOSIS — Z12.2 ENCOUNTER FOR SCREENING FOR LUNG CANCER: ICD-10-CM

## 2023-06-13 PROCEDURE — 71271 CT THORAX LUNG CANCER SCR C-: CPT | Mod: DBM,TC

## 2023-06-13 PROCEDURE — 71271 CT CHEST LUNG SCREENING LOW DOSE: ICD-10-PCS | Mod: 26,DBM,, | Performed by: RADIOLOGY

## 2023-06-13 PROCEDURE — 71271 CT THORAX LUNG CANCER SCR C-: CPT | Mod: 26,DBM,, | Performed by: RADIOLOGY

## 2023-06-15 ENCOUNTER — TELEPHONE (OUTPATIENT)
Dept: FAMILY MEDICINE | Facility: CLINIC | Age: 63
End: 2023-06-15
Payer: MEDICARE

## 2023-06-15 NOTE — TELEPHONE ENCOUNTER
Result notes were given to pt.wife. Ms. Amanda Roblero she was so satisfied with his results. She will relate the message to her .

## 2023-06-22 LAB — NONINV COLON CA DNA+OCC BLD SCRN STL QL: NEGATIVE

## 2023-06-27 ENCOUNTER — TELEPHONE (OUTPATIENT)
Dept: PAIN MEDICINE | Facility: CLINIC | Age: 63
End: 2023-06-27
Payer: MEDICARE

## 2023-06-27 NOTE — TELEPHONE ENCOUNTER
----- Message from Elif Dolan PA-C sent at 6/27/2023  9:33 AM CDT -----  Orders in for injection. Please look at last note.  ----- Message -----  From: Markie Aguilar MA  Sent: 6/26/2023   2:58 PM CDT  To: Elif Dolan PA-C      ----- Message -----  From: Lia Alexandra  Sent: 6/26/2023   2:31 PM CDT  To: Eyad Rodriguez Staff    Pts wife is requesting a call back to schedule an appt for an injection. Call back at [phone]

## 2023-06-28 ENCOUNTER — TELEPHONE (OUTPATIENT)
Dept: PAIN MEDICINE | Facility: CLINIC | Age: 63
End: 2023-06-28
Payer: MEDICARE

## 2023-06-28 NOTE — TELEPHONE ENCOUNTER
----- Message from Asad Esposito sent at 6/27/2023  5:34 PM CDT -----  Type:  Patient Returning Call    Who Called:pt  Who Left Message for Patient:  Does the patient know what this is regarding?:missed call  Would the patient rather a call back or a response via [facility]? Call  Best Call Back Number:[phone]  Additional Information: pt states would like a call back in regards to scheduling procedure.

## 2023-06-28 NOTE — TELEPHONE ENCOUNTER
Called pt to schedule procedure. Procedure schedule for 07/13. Procedure instructions reviewed. Pt verbalized understanding. All questions answered.

## 2023-07-05 ENCOUNTER — TELEPHONE (OUTPATIENT)
Dept: FAMILY MEDICINE | Facility: CLINIC | Age: 63
End: 2023-07-05
Payer: MEDICARE

## 2023-07-06 NOTE — PRE-PROCEDURE INSTRUCTIONS
Spoke with patients wife regarding procedure scheduled on 7.13     Arrival time 0815     Has patient been sick with fever or on antibiotics within the last 7 days? No     Does the patient have any open wounds, sores or rashes? No     Does the patient have any recent fractures? no     Has patient received a vaccination within the last 7 days? No     Received the COVID vaccination? yes     Has the patient stopped all medications as directed? na     Does patient have a pacemaker and or defibrillator? no     Does the patient have a ride to and from procedure and someone reliable to remain with patient? wife     Is the patient diabetic? no     Does the patient have sleep apnea? Or use O2 at home? no     Is the patient receiving sedation? yes     Is the patient instructed to remain NPO beginning at midnight the night before their procedure? yes     Procedure location confirmed with patient? Yes     Covid- Denies signs/symptoms. Instructed to notify PAT/MD if any changes.

## 2023-07-07 ENCOUNTER — OFFICE VISIT (OUTPATIENT)
Dept: PAIN MEDICINE | Facility: CLINIC | Age: 63
End: 2023-07-07
Payer: MEDICARE

## 2023-07-07 VITALS
HEIGHT: 68 IN | DIASTOLIC BLOOD PRESSURE: 88 MMHG | WEIGHT: 113 LBS | SYSTOLIC BLOOD PRESSURE: 158 MMHG | HEART RATE: 81 BPM | BODY MASS INDEX: 17.13 KG/M2

## 2023-07-07 DIAGNOSIS — M25.512 CHRONIC PAIN OF BOTH SHOULDERS: ICD-10-CM

## 2023-07-07 DIAGNOSIS — G89.29 CHRONIC PAIN OF BOTH SHOULDERS: ICD-10-CM

## 2023-07-07 DIAGNOSIS — M54.12 CERVICAL RADICULAR PAIN: ICD-10-CM

## 2023-07-07 DIAGNOSIS — Z79.891 OPIOID USE AGREEMENT EXISTS: ICD-10-CM

## 2023-07-07 DIAGNOSIS — M50.30 DDD (DEGENERATIVE DISC DISEASE), CERVICAL: ICD-10-CM

## 2023-07-07 DIAGNOSIS — M47.818 ARTHROPATHY OF RIGHT SACROILIAC JOINT: Primary | ICD-10-CM

## 2023-07-07 DIAGNOSIS — M25.511 CHRONIC PAIN OF BOTH SHOULDERS: ICD-10-CM

## 2023-07-07 PROCEDURE — 3008F BODY MASS INDEX DOCD: CPT | Mod: HCNC,CPTII,S$GLB, | Performed by: PHYSICIAN ASSISTANT

## 2023-07-07 PROCEDURE — 3079F PR MOST RECENT DIASTOLIC BLOOD PRESSURE 80-89 MM HG: ICD-10-PCS | Mod: HCNC,CPTII,S$GLB, | Performed by: PHYSICIAN ASSISTANT

## 2023-07-07 PROCEDURE — 1160F RVW MEDS BY RX/DR IN RCRD: CPT | Mod: HCNC,CPTII,S$GLB, | Performed by: PHYSICIAN ASSISTANT

## 2023-07-07 PROCEDURE — 3079F DIAST BP 80-89 MM HG: CPT | Mod: HCNC,CPTII,S$GLB, | Performed by: PHYSICIAN ASSISTANT

## 2023-07-07 PROCEDURE — 3077F PR MOST RECENT SYSTOLIC BLOOD PRESSURE >= 140 MM HG: ICD-10-PCS | Mod: HCNC,CPTII,S$GLB, | Performed by: PHYSICIAN ASSISTANT

## 2023-07-07 PROCEDURE — 1159F PR MEDICATION LIST DOCUMENTED IN MEDICAL RECORD: ICD-10-PCS | Mod: HCNC,CPTII,S$GLB, | Performed by: PHYSICIAN ASSISTANT

## 2023-07-07 PROCEDURE — 3008F PR BODY MASS INDEX (BMI) DOCUMENTED: ICD-10-PCS | Mod: HCNC,CPTII,S$GLB, | Performed by: PHYSICIAN ASSISTANT

## 2023-07-07 PROCEDURE — 1160F PR REVIEW ALL MEDS BY PRESCRIBER/CLIN PHARMACIST DOCUMENTED: ICD-10-PCS | Mod: HCNC,CPTII,S$GLB, | Performed by: PHYSICIAN ASSISTANT

## 2023-07-07 PROCEDURE — 99214 PR OFFICE/OUTPT VISIT, EST, LEVL IV, 30-39 MIN: ICD-10-PCS | Mod: HCNC,S$GLB,, | Performed by: PHYSICIAN ASSISTANT

## 2023-07-07 PROCEDURE — 3044F HG A1C LEVEL LT 7.0%: CPT | Mod: HCNC,CPTII,S$GLB, | Performed by: PHYSICIAN ASSISTANT

## 2023-07-07 PROCEDURE — 1159F MED LIST DOCD IN RCRD: CPT | Mod: HCNC,CPTII,S$GLB, | Performed by: PHYSICIAN ASSISTANT

## 2023-07-07 PROCEDURE — 99214 OFFICE O/P EST MOD 30 MIN: CPT | Mod: HCNC,S$GLB,, | Performed by: PHYSICIAN ASSISTANT

## 2023-07-07 PROCEDURE — 99999 PR PBB SHADOW E&M-EST. PATIENT-LVL III: ICD-10-PCS | Mod: PBBFAC,HCNC,, | Performed by: PHYSICIAN ASSISTANT

## 2023-07-07 PROCEDURE — 3044F PR MOST RECENT HEMOGLOBIN A1C LEVEL <7.0%: ICD-10-PCS | Mod: HCNC,CPTII,S$GLB, | Performed by: PHYSICIAN ASSISTANT

## 2023-07-07 PROCEDURE — 99999 PR PBB SHADOW E&M-EST. PATIENT-LVL III: CPT | Mod: PBBFAC,HCNC,, | Performed by: PHYSICIAN ASSISTANT

## 2023-07-07 PROCEDURE — 3077F SYST BP >= 140 MM HG: CPT | Mod: HCNC,CPTII,S$GLB, | Performed by: PHYSICIAN ASSISTANT

## 2023-07-07 RX ORDER — HYDROCODONE BITARTRATE AND ACETAMINOPHEN 7.5; 325 MG/1; MG/1
TABLET ORAL
Qty: 70 TABLET | Refills: 0 | Status: SHIPPED | OUTPATIENT
Start: 2023-09-01 | End: 2023-10-03 | Stop reason: SDUPTHER

## 2023-07-07 RX ORDER — HYDROCODONE BITARTRATE AND ACETAMINOPHEN 7.5; 325 MG/1; MG/1
TABLET ORAL
Qty: 70 TABLET | Refills: 0 | Status: SHIPPED | OUTPATIENT
Start: 2023-08-02 | End: 2023-08-23 | Stop reason: ALTCHOICE

## 2023-07-07 NOTE — PROGRESS NOTES
Established Patient Chronic Pain Note (Follow up visit)    Chief Complaint:   Chief Complaint   Patient presents with    Neck Pain     Left side    Low-back Pain     Right side   Low back pain into right buttock    SUBJECTIVE:    Interval History (7/7/2023): Leo Roblero was last seen on 5/26/2023. he presents today for follow-up for medication refill. he feels the pain medication is providing adequate pain relief and reduces the negative effects of chronic pain that affects quality of life. No major SE from medications. No major changes since previous visit; patient is stable overall. Patient reports pain as 7/10 today.     Interval History (5/26/2023): Patient was last seen on 3/22/2023. he presents today for follow-up for medication refill. he feels the pain medication is providing adequate pain relief and reduces the negative effects of chronic pain that affects quality of life. No major SE from medications. No major changes since previous visit; patient is stable overall. Patient reports pain as 7/10 today.     Interval History (5/8/2023):  Leo Roblero presents today for follow-up visit. At that visit, the plan was to switch oxycodone to [place]. Patient feels this is helping.  Initially, he felt that it was making him hyper, but since then, the side effects have resolved.  He feels the pain relief is provided with this medication.  He is tolerating well with minimal side effects.    Interval History (3/22/2023): Leo Roblero  was last seen on 1/25/2023. he presents today for follow-up for medication refill. he feels the pain medication is providing adequate pain relief and reduces the negative effects of chronic pain that affects quality of life. No major SE from medications.  Patient reports pain as 8/10 today.   He recently did some weed eating, which caused right lower back pain into the buttock.  Pain started about 3 weeks ago during this activity and has not subsided.  He feels the pain is slowly  improving now several weeks later.  He is using the back brace as needed in his wearing today.    Interval History 1/25/2023: Patient presents today for follow-up visit.  he underwent C6/7 IL MADISON on 1/17/23.  The patient reports that he is/was better following the procedure.  he reports 70% pain relief.  The changes lasted 1 week so far.  The changes have continued through this visit.       he also presents today for follow-up for medication refill. he feels the pain medication is providing adequate pain relief and reduces the negative effects of chronic pain that affects quality of life. No major SE from medications. No major changes since previous visit; patient is stable overall. Patient reports pain as 7/10 today.     Interval History (11/29/2022): Leo Roblero was last seen on 8/30/2022. he presents today for follow-up for medication refill.  Medication is providing adequate pain relief. he feels the pain medication reduces the negative effects of chronic pain that affects quality of life. Patient reports pain as 9/10 today.  He c/o neck pain that radiates but mostly axial. Also c/o low back pain as well.     Interval History (8/30/2022):  Leo Roblero presents today for follow-up visit. Patient was last seen on 6/28/2022. he presents today for follow-up for medication refill.  Medication is providing adequate pain relief. he feels the pain medication reduces the negative effects of chronic pain that affects day-to-day function and quality of life.Patient reports pain as 8/10 today. Pain has Increased over last month; patient admits to being out of medication today.    Interval History (6/28/2022): Patient was last seen on 3/29/2022. he presents today for follow-up for medication refill.  Medication is providing adequate pain relief. he feels the pain medication reduces the negative effects of chronic pain that affects day-to-day function and quality of life. No major changes since previous visit; patient is  stable overall. Patient reports pain as 9/10 today.     Interval History (3/29/2022): Patient was last seen on 2/1/2022. At last visit, we switched to norco for opioid vacation, which seemed to help his pain medication work better. he presents today for medication refill.  No major changes; patient is stable overall. Medication is providing adequate pain relief. Patient reports pain as 8/10 today.     Interval History (2/1/2022):  Leo Roblero presents today for follow-up visit.  Patient was last seen on 12/7/2021.  He reports some pain relief with medication, but he does not feel that the pain is completely controlled.  He also feels that he is getting used to the Percocet.  Patient reports pain as 9/10 today.    Interval History (12/7/2021):  Leo Roblero presents today for follow-up visit for medication refill.  Patient was last seen on 10/7/2021.  He reports that he has been having low back pain for the past 3 weeks or so.  He has had low back pain in the past, but it has not been a real issue lately.  He does not feel that the pain is currently controlled.  Patient reports pain as 9/10 today.    Interval History (10/7/2021): Patient was last seen on 7/30/2021. he presents today for medication refill. Patient reports pain as 9/10 today.   No major changes; patient is stable overall. Medication is providing adequate pain relief.     Interval History (7/30/2021): Patient was last seen on 5/28/2021. No major changes; patient is stable overall. Medication is providing adequate pain relief. Patient reports pain as 8/10 today. he presents today for medication refill.      Interval History (5/28/2021): Patient was last seen on 3/26/2021, and he presents today for medication refill. Patient reports pain as 9/10 today.   Medication is providing adequate pain relief.     Interval History (3/26/2021): Patient was last seen on 1/22/2021. he presents today for medication refill. At last visit, medication was changed from  Norco to Percocet, which he reports better pain relief with.  Although he is asking for increased dose today.  Medication is providing some pain relief. Patient reports pain as 9/10 today.  He continues to take gabapentin 600 mg at night.  He also takes Tylenol 500 mg during the day, but he usually only takes 1 tablet.    Interval History (1/22/2021): Patient was last seen on 11/19/2020. He is here today for medication Refill but reports medication is not providing adequate pain relief. Patient reports pain as 9/10 today.    Interval History (9/23/2020): Patient was last seen on 7/29/2020. At that visit, the plan was to Refill Norco 5/325mg BID.  Patient reports pain as 10/10 today.  He is requesting a higher dose of the Norco today.  He continues to take naproxen and Tylenol as adjuvant medications.  He reports that the pain seems to be worse at night.    Interval HPI (7/29/2020):  Leo Roblero is a 60 y.o. male who presents to the clinic for a follow-up appointment for neck and bilateral shoulder pain.  At the last clinic visit, I expressed to the patient that he could continue on NSAIDs and Tylenol for his chronic pain control and to start with physical therapy to help stabilize his shoulder musculature.  He had failed subacromial bursa injections without much relief.  Since the last visit, Leo Rolbero states the pain has been persistant. Current pain intensity is 10/10.    Initial HPI 02/19/2020:  Leo Roblero is a 59 y.o. male who presents to the clinic for the evaluation of neck and bilateral shoulder pain. He was referred by the Orthopedics Department for further evaluation and management of neck pain. Of note, patient has a past medical history of hyperlipidemia, and tobacco abuse, traumatic rotator cuff tear, tendinitis of the bilateral biceps tendon, and other medical comorbidities as listed below.  The pain started several years ago without any significant injury or trauma and symptoms have been  worsening.  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  The pain is described as shooting and throbbing and is rated as 6/10. The pain is rated with a score of  4/10 on the BEST day and a score of 10/10 on the WORST day.  Symptoms interfere with daily activity, sleeping and work. The pain is exacerbated by work.  The pain is mitigated by rest. The patient reports spending less than 2 hours per day reclining. The patient reports 6-8 hours of uninterrupted sleep per night.  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  Patient denies night fever/night sweats, urinary incontinence, bowel incontinence, significant weight loss, significant motor weakness and loss of sensations.    Pain Disability Index Review:  Last 3 PDI Scores 2/1/2022 12/7/2021 10/7/2021   Pain Disability Index (PDI) 63 15 64       Non-Pharmacologic Treatments:  Physical Therapy/Home Exercise:  No, pending start  Ice/Heat:yes  TENS: no  Acupuncture: no  Massage: no  Chiropractic: no    Other: no     Pain Medications:  - Opioids: Percocet (Oxycodone/Acetaminophen)  - Adjuvant Medications: Mobic  - Anti-Coagulants: Aspirin      report:  Reviewed and consistent with medication use as prescribed.     Pain Procedures:   -12/05/2019:  Bilateral subacromial bursa shoulder injections, limited relief  -02/14/2020:  IM Toradol to the right deltoid  - C6/7 IL MADISON on 1/17/23 with 70% pain relief          Imaging & EMG/NCS (Reviewed on 7/7/2023):    1/23/23 X-Ray Shoulder Complete Bilateral  FINDINGS: Clinic joint spaces are well-maintained.  Mild narrowing with spurring of the lateral acromioclavicular joint spaces.  Lung apices are clear.  No acute fracture or dislocation.  Impression:  Bilateral minimal osteoarthritis of the  acromioclavicular joints.     EMG/NCS bilateral upper extremities 03/06/2020:  1. ABNORMAL study  2. 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  There is additional evidence of a CHRONIC radiculopathy of the C7,C8, T1 nerve roots    X-ray cervical spine 02/19/2020:  Reversal of normal cervical lordosis.  Grade 1 anterolisthesis of C3 on C4 and C4 on C5.  These appear to mostly reduce with extension maneuver.  Grade 1 anterolisthesis of C7 on T1 which does not change with provocative maneuvers.  No acute fracture.  Prevertebral soft tissues are maintained.  Multilevel discogenic degenerative changes are seen with findings most severe at C5-C6 and C6-C7 with disc space narrowing marginal spurring.  Intact odontoid.  Multilevel bilateral areas of neural foraminal encroachment, right greater than left.  Clear lung apices.  Calcification of the bilateral carotids is seen.    MRI cervical spine 06/01/2017 (via [facility]):  There is a mild cervical levoscoliosis with slight straightening of normal cervical lordosis in the superior aspect of the cervical spine. Cervical vertebral body stature is preserved. No prevertebral edema is noted.  The visualized anatomy at the skull base is normal.  C2-C3: Mild disc space narrowing. Mild bilateral posterior facet and ligamentum flavum hypertrophy which is effacing the posterior theca and causing subtle posterior impression on the cervical spinal cord, although the thecal sac still measures 1.2 cm in AP dimension. There is minimal posterior spondylosis.  C3-C4: Disc desiccation with right greater than left posterior facet arthropathy and with right uncinate hypertrophy, with mild disc space narrowing. There is mild ligamentum flavum hypertrophy. There is mild AP narrowing of the thecal sac, consistent with a spinal stenosis, but there is no  significant deformity on the cervical spinal cord. There is right lateral recess/foraminal origin stenosis.  C4-C5: Mild to moderate disc space narrowing with disc desiccation and with right greater than left posterior facet hypertrophy. There is right uncinate hypertrophy at this level. There is bilateral ligamentum flavum hypertrophy. This combination of findings is causing a spinal stenosis, thecal sac measuring only 7.7 mm in AP dimension, and with a trefoil deformity of the thecal sac. There is also some mass effect and deformity on the cervical spinal cord. In addition to a spinal stenosis, there is right greater than left lateral recess/foraminal origin stenosis.  C5-C6: Prominent disc space narrowing with disc desiccation, bridging anterior bony spurring, right greater than left posterior facet arthropathy, and mild right uncinate hypertrophy. There is a broad-based posterior osteophyte disc complex which effaces the anterior theca and narrows the AP dimension of the cervical thecal sac, but which does not cause any significant mass effect on the cervical spinal cord. There is mild bilateral lateral recess/foraminal origin stenosis at this level, right side greater than left.  C6-C7: Prominent disc space narrowing with disc desiccation, anterior and posterior spondylosis, and right uncinate hypertrophy. There is a broad-based, posterior osteophyte disc complex which partially effaces the anterior theca but which does not deform the cervical spinal cord. There is a mild spinal stenosis with mild, bilateral foraminal origin stenosis, slightly greater on the left.  C7-T1: Mild to moderate, bilateral posterior facet arthropathy. There is mild disc desiccation and disc space narrowing with approximately 2 mm of anterolisthesis C7 on T1. There is no obvious stenosis.  Signal intensity in the cervical thecal sac is normal. There is no convincing evidence of any significant edema or gliosis in the cervical spinal  cord.     X-ray left shoulder 02/14/2020:  There is no radiographic evidence of acute osseous, articular, or soft tissue abnormality.  Joint spaces are preserved.     MRI left shoulder 2/10/20:  There is moderate rotator cuff tendinosis, supraspinatus and infraspinatus, with superimposed moderate grade partial thickness tear with fluid-filled defect posterior supraspinatus footprint with bursal and interstitial fiber tearing.  Supraspinatus tendon articular surface fraying is also noted.  There is also low-grade interstitial partial-thickness tear at the blending of the supraspinatus and infraspinatus.  There are few clustered small subcortical cysts and associated minimal edema like signal greater tuberosity.  The subscapularis tendon is unremarkable.  Long head biceps tendon is intact with mild intra-articular tendinosis.  Superior labral degenerative signal with a sublabral foramen versus SLAP tear.  Labrum is otherwise unremarkable.  Glenohumeral chondral thinning.  No joint effusion.  Normal IGHL.  Type 2 acromion without tilting minimal AC joint spurring.  There is no subacromial subdeltoid bursal fluid collection.  The bone marrow signal intensity is otherwise unremarkable.  The signal intensity of the muscle groups is normal.  No atrophy.          REVIEW OF SYSTEMS:    GENERAL:  No weight loss, malaise or fevers.  HEENT:   No recent changes in vision or hearing  NECK:  Negative for lumps, no difficulty with swallowing.  RESPIRATORY:  Negative for cough, wheezing or shortness of breath, patient denies any recent URI.  CARDIOVASCULAR:  Negative for chest pain, leg swelling or palpitations.  GI:  Negative for abdominal discomfort, blood in stools or black stools or change in bowel habits.  MUSCULOSKELETAL:  See HPI.  SKIN:  Negative for lesions, rash, and itching.  PSYCH:  No mood disorder or recent psychosocial stressors.  Patients sleep is not disturbed secondary to pain.  HEMATOLOGY/LYMPHOLOGY:  Negative for  "prolonged bleeding, bruising easily or swollen nodes.  Patient is not currently taking any anti-coagulants  NEURO:   No history of headaches, syncope, paralysis, seizures or tremors.  All other reviewed and negative other than HPI.        Physical Exam:   Vitals:    07/07/23 0950   BP: (!) 158/88   Pulse: 81   Weight: 51.2 kg (112 lb 15.8 oz)   Height: 5' 8" (1.727 m)   PainSc:   7   PainLoc: Neck    Body mass index is 17.18 kg/m².   (reviewed on 7/7/2023)    General: alert and oriented, in no apparent distress.  Gait: normal gait.  Skin: no rashes, no discoloration, no obvious lesions  HEENT: normocephalic, atraumatic.    Respiratory: without use of accessory muscles of respiration.  GI: no obvious distention.     Musculoskeletal - Cervical Spine:  - ROM fairly preserved   - Pain on flexion of cervical spine: Present   - Pain on extension of cervical spine: Present   - Cervical facet loading: Present bilaterally   - TTP over the cervical facet joints: Present   - TTP over the cervical paraspinals: Present   - Spurling's:  Equivocal    Shoulder:  - Pain on abduction: Present bilaterally   - Flexion: decreased to 100° on left, decreased to 120° on the right  - Abduction:  Decreased to 110° on the left, decreased to 100° on the right  - lateral winging of the scapula on the left  - TTP:  Present over bilateral biceps tendons  - Neer's: Positive  - Hawkin's: Positive    Lumbar:  - pain with extension  - pain over facet joints - less so than SIJ  - TTP over right piriformis - improved     SIJ testing:  - TTP over SI joint: Present on right   - Su's/ Wang's: Positive  on right   - Sacroiliac Distraction Test (anterior pressure): Positive  - Sacroiliac Compression Test (lateral pressure): Positive     Neuro - Upper Extremities:  - BUE Strength:R/L: D: 5/5; B: 5/5; T: 5/5; WF: 5/5; WE: 5/5; IO: 5/5  - Extremity Reflexes: Brisk and symmetric throughout  - Sensory: Sensation to light touch intact bilaterally    Neuro - " Lower Extremities:  - RLE Strength:     >> 5/5 strength with right hip flexion/ extension    >> 5/5 strength with right knee flexion/ extension    >> 5/5 strength in right ankle with plantar and dorsiflexion  - LLE Strength:     >> 5/5 strength with left hip flexion/ extension    >> 5/5 strength with knee flexion extension on the left     >> 5/5 strength in left ankle with plantar and dorsiflexion  - Extremity Reflexes: Brisk and symmetric throughout  - Sensory: Sensation to light touch intact bilaterally      Psych:  Mood and affect is appropriate            ASSESSMENT: 62 y.o. year old male with neck and bilateral shoulder pain, consistent with     1. Arthropathy of right sacroiliac joint        2. Cervical radicular pain        3. DDD (degenerative disc disease), cervical        4. Chronic pain of both shoulders        5. Opioid use agreement exists            PLAN:   1. Interventional:   - Order in for right SIJ injection. Patient can call to schedule.  - S/p C6/7 IL MADISON on 1/17/23 with 70% pain relief.   - Also consider lumbar treatment.    2. Pharmacologic:   - Refill Norco 7.5/325mg Q8-Q12H PRN pain (70 tablets) x 2 months. Will e-prescribe to fill on 8/2/23 since pharmacy closed on weekend date) and 9/1/23.   Patient understands this is the highest dose that we prescribe.  Patient tolerating opioids with no side effects, obtaining some pain control with functional improvement.  We have discussed the risks of chronic opioid medication management.    - Refill gabapentin 300mg QAM/ 300mg at lunch/ 600mg QHS, nabumetone (Relafen) 500mg BID PRN pain, and Zanaflex 4mg BID PRN (60 tablets). Patient understands this may cause drowsiness.     - Can take additional 4 tablets of Tylenol 500mg during the day in addition to Percocet 5/325mg (3x) per day. Total of acetaminophen daily - cannot exceed 3000mg; patient verbalized understanding.  - Anticoagulation use: None.   - Opioid contract signed on 7/29/2020.   Opioid  risk tool completed on 7/29/2020: low risk.  - LA  reviewed and appropriate. MME=17.5     - Will order drug screen (UDS or oral swab) today to ensure med compliance.     3. Rehabilitative: Encouraged regular exercise.    4. Diagnostic: None.     5. Consults/Referrals:  Referral previously placed to Orthopedics for shoulder pain. Patient had appointment in February, which was ultimately canceled.    6. Follow up: 8 week Medication Refill      - This condition does not require this patient to take time off of work, and the primary goal of our Pain Management services is to improve the patient's functional capacity.   - I discussed the risks, benefits, and alternatives to potential treatment options. All questions and concerns were fully addressed today in clinic.         Elif Dolan PA-C  Interventional Pain Management - [facility]    Disclaimer:  This note was prepared using voice recognition system and is likely to have sound alike errors that may have been overlooked even after proof reading.  Please call me with any questions.         >>UDS:  9/23/2020 :: appropriate   1/22/2021 :: appropriate   5/28/2021 :: appropriate  12/7/2021 :: appropriate  6/28/2022 :: appropriate  11/29/2022 :: appropriate   3/22/2023 :: appropriate for oxycodone with metabolites but also +THC - patient given warning today for THC   7/7/2023 :: pending      Warnings:  8/30/2022 - patient given warning for taking more than prescribed

## 2023-07-11 ENCOUNTER — LAB VISIT (OUTPATIENT)
Dept: LAB | Facility: HOSPITAL | Age: 63
End: 2023-07-11
Attending: FAMILY MEDICINE
Payer: MEDICARE

## 2023-07-11 DIAGNOSIS — R74.8 ELEVATED LIVER ENZYMES: ICD-10-CM

## 2023-07-11 LAB
ALBUMIN SERPL BCP-MCNC: 4.1 G/DL (ref 3.5–5.2)
ALP SERPL-CCNC: 90 U/L (ref 55–135)
ALT SERPL W/O P-5'-P-CCNC: 38 U/L (ref 10–44)
ANION GAP SERPL CALC-SCNC: 11 MMOL/L (ref 8–16)
AST SERPL-CCNC: 47 U/L (ref 10–40)
BILIRUB SERPL-MCNC: 0.9 MG/DL (ref 0.1–1)
BUN SERPL-MCNC: 10 MG/DL (ref 8–23)
CALCIUM SERPL-MCNC: 9.5 MG/DL (ref 8.7–10.5)
CHLORIDE SERPL-SCNC: 103 MMOL/L (ref 95–110)
CO2 SERPL-SCNC: 25 MMOL/L (ref 23–29)
CREAT SERPL-MCNC: 0.7 MG/DL (ref 0.5–1.4)
EST. GFR  (NO RACE VARIABLE): >60 ML/MIN/1.73 M^2
GLUCOSE SERPL-MCNC: 105 MG/DL (ref 70–110)
POTASSIUM SERPL-SCNC: 4.4 MMOL/L (ref 3.5–5.1)
PROT SERPL-MCNC: 7.1 G/DL (ref 6–8.4)
SODIUM SERPL-SCNC: 139 MMOL/L (ref 136–145)

## 2023-07-11 PROCEDURE — 80053 COMPREHEN METABOLIC PANEL: CPT | Mod: HCNC | Performed by: FAMILY MEDICINE

## 2023-07-11 PROCEDURE — 36415 COLL VENOUS BLD VENIPUNCTURE: CPT | Mod: HCNC,PO | Performed by: FAMILY MEDICINE

## 2023-07-12 ENCOUNTER — TELEPHONE (OUTPATIENT)
Dept: FAMILY MEDICINE | Facility: CLINIC | Age: 63
End: 2023-07-12
Payer: MEDICARE

## 2023-07-12 DIAGNOSIS — R74.8 ELEVATED LIVER ENZYMES: Primary | ICD-10-CM

## 2023-07-12 NOTE — TELEPHONE ENCOUNTER
----- Message from Arua Washington sent at 7/12/2023 10:55 AM CDT -----  Contact: Amanda/Wife  .Type:  Patient Returning Call    Who Called: Amanda/ pts wife   Who Left Message for Patient: Sonia   Does the patient know what this is regarding?: unknown   Would the patient rather a call back or a response via [facility]?  Call   Best Call Back Number:  [phone]  Additional Information:

## 2023-07-13 ENCOUNTER — HOSPITAL ENCOUNTER (OUTPATIENT)
Facility: HOSPITAL | Age: 63
Discharge: HOME | End: 2023-07-13
Attending: PHYSICAL MEDICINE & REHABILITATION | Admitting: PHYSICAL MEDICINE & REHABILITATION
Payer: MEDICARE

## 2023-07-13 VITALS
HEART RATE: 64 BPM | RESPIRATION RATE: 15 BRPM | OXYGEN SATURATION: 99 % | DIASTOLIC BLOOD PRESSURE: 70 MMHG | WEIGHT: 111.25 LBS | BODY MASS INDEX: 16.86 KG/M2 | TEMPERATURE: 97 F | SYSTOLIC BLOOD PRESSURE: 124 MMHG | HEIGHT: 68 IN

## 2023-07-13 DIAGNOSIS — M46.1 SACROILIITIS: Primary | ICD-10-CM

## 2023-07-13 PROCEDURE — 63600175 PHARM REV CODE 636 W HCPCS: Mod: HCNC | Performed by: PHYSICAL MEDICINE & REHABILITATION

## 2023-07-13 PROCEDURE — 27096 INJECT SACROILIAC JOINT: CPT | Mod: HCNC | Performed by: PHYSICAL MEDICINE & REHABILITATION

## 2023-07-13 PROCEDURE — 25500020 PHARM REV CODE 255: Mod: HCNC | Performed by: PHYSICAL MEDICINE & REHABILITATION

## 2023-07-13 PROCEDURE — 27096 INJECT SACROILIAC JOINT: CPT | Mod: HCNC,RT,, | Performed by: PHYSICAL MEDICINE & REHABILITATION

## 2023-07-13 PROCEDURE — 27096 PR INJECTION,SACROILIAC JOINT: ICD-10-PCS | Mod: HCNC,RT,, | Performed by: PHYSICAL MEDICINE & REHABILITATION

## 2023-07-13 RX ORDER — BUPIVACAINE HYDROCHLORIDE 2.5 MG/ML
INJECTION, SOLUTION EPIDURAL; INFILTRATION; INTRACAUDAL
Status: DISCONTINUED | OUTPATIENT
Start: 2023-07-13 | End: 2023-07-13 | Stop reason: HOSPADM

## 2023-07-13 RX ORDER — ONDANSETRON 2 MG/ML
4 INJECTION INTRAMUSCULAR; INTRAVENOUS ONCE AS NEEDED
Status: DISCONTINUED | OUTPATIENT
Start: 2023-07-13 | End: 2023-07-13 | Stop reason: HOSPADM

## 2023-07-13 RX ORDER — METHYLPREDNISOLONE ACETATE 40 MG/ML
INJECTION, SUSPENSION INTRA-ARTICULAR; INTRALESIONAL; INTRAMUSCULAR; SOFT TISSUE
Status: DISCONTINUED | OUTPATIENT
Start: 2023-07-13 | End: 2023-07-13 | Stop reason: HOSPADM

## 2023-07-13 RX ORDER — FENTANYL CITRATE 50 UG/ML
INJECTION, SOLUTION INTRAMUSCULAR; INTRAVENOUS
Status: DISCONTINUED | OUTPATIENT
Start: 2023-07-13 | End: 2023-07-13 | Stop reason: HOSPADM

## 2023-07-13 RX ORDER — MIDAZOLAM HYDROCHLORIDE 1 MG/ML
INJECTION, SOLUTION INTRAMUSCULAR; INTRAVENOUS
Status: DISCONTINUED | OUTPATIENT
Start: 2023-07-13 | End: 2023-07-13 | Stop reason: HOSPADM

## 2023-07-13 NOTE — DISCHARGE INSTRUCTIONS

## 2023-07-13 NOTE — H&P
"HPI  Patient presenting for Procedure(s) (LRB):  Right SIJ Injection - PAT: ask about sedation (Right)       No health changes since previous encounter    History reviewed. No pertinent past medical history.  Past Surgical History:   Procedure Laterality Date    EPIDURAL STEROID INJECTION INTO CERVICAL SPINE N/A 1/17/2023    Procedure: C6/7 IL MADISON;  Surgeon: Tavares Monet MD;  Location: [facility];  Service: Pain Management;  Laterality: N/A;    INJECTION OF ANESTHETIC AGENT AROUND MEDIAL BRANCH NERVES INNERVATING CERVICAL FACET JOINT Bilateral 12/8/2022    Procedure: diagnostic Bilateral C5-7 MBB with RN IV sedation;  Surgeon: Tavares Monet MD;  Location: [facility];  Service: Pain Management;  Laterality: Bilateral;     Review of patient's allergies indicates:  No Known Allergies     No current facility-administered medications on file prior to encounter.     Current Outpatient Medications on File Prior to Encounter   Medication Sig Dispense Refill    acetaminophen (TYLENOL) 500 MG tablet Take 1 tablet (500 mg total) by mouth every 8 (eight) hours as needed for Pain. 90 tablet 11    fluticasone propionate (FLONASE) 50 mcg/actuation nasal spray 2 PUFFS EACH NOSTRIL ONCE DAILY 16 g 1        PMHx, PSHx, Allergies, Medications reviewed in epic    ROS negative except pain complaints in HPI    OBJECTIVE:    BP (!) 156/73   Pulse 72   Temp 97.2 °F (36.2 °C)   Resp 16   Ht 5' 8" (1.727 m)   Wt 50.4 kg (111 lb 3.6 oz)   SpO2 99%   BMI 16.91 kg/m²     PHYSICAL EXAMINATION:    GENERAL: Well appearing, in no acute distress, alert and oriented x3.  PSYCH:  Mood and affect appropriate.  SKIN: Skin color, texture, turgor normal, no rashes or lesions which will impact the procedure.  CV: RRR with palpation of the radial artery.  PULM: No evidence of respiratory difficulty, symmetric chest rise. Clear to auscultation.  NEURO: Cranial nerves grossly intact.    Plan:    Proceed with procedure as planned " Procedure(s) (LRB):  Right SIJ Injection - PAT: ask about sedation (Right)    Tavares Monet MD  07/13/2023

## 2023-07-13 NOTE — DISCHARGE SUMMARY
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 HYDROcodone-acetaminophen (NORCO) 7.5-325 mg per tablet Take 1 tablet Q8-12H PRN pain. *30 DAY SUPPLY*  Qty: 70 tablet, Refills: 0    Comments: Greater than 7 day supply medically necessary. *30 DAY SUPPLY*      acetaminophen (TYLENOL) 500 MG tablet Take 1 tablet (500 mg total) by mouth every 8 (eight) hours as needed for Pain.  Qty: 90 tablet, Refills: 11    Associated Diagnoses: Traumatic incomplete tear of left rotator cuff, sequela; DDD (degenerative disc disease), cervical; Myalgia of auxiliary muscles, head and neck; Cervical spondylosis with radiculopathy; Cervical radiculopathy      fluticasone propionate (FLONASE) 50 mcg/actuation nasal spray 2 PUFFS EACH NOSTRIL ONCE DAILY  Qty: 16 g, Refills: 1      gabapentin (NEURONTIN) 300 MG capsule Take 1 capsule (300 mg total) by mouth every morning AND 1 capsule (300 mg total) with lunch AND 2 capsules (600 mg total) every evening.  Qty: 120 capsule, Refills: 1    Comments: Dr. Tavares Monet - [facility]# [id number]  Associated Diagnoses: Cervical radicular pain      !! HYDROcodone-acetaminophen (NORCO) 7.5-325 mg per tablet Take 1 tablet Q8-12H PRN pain. *30 DAY SUPPLY*  Qty: 70 tablet, Refills: 0    Comments: Greater than 7 day supply medically necessary. *30 DAY SUPPLY*      !! HYDROcodone-acetaminophen (NORCO) 7.5-325 mg per tablet Take 1 tablet Q8-12H PRN pain. *30 DAY SUPPLY*  Qty: 70 tablet, Refills: 0    Comments: Greater than 7 day supply medically necessary.  *30 DAY SUPPLY*      nabumetone (RELAFEN) 500 MG tablet Take 1 tablet (500 mg total) by mouth 2 (two) times daily as needed for Pain. take with food.  Qty: 60 tablet, Refills: 1    Associated Diagnoses: Cervical spondylosis      tiZANidine (ZANAFLEX) 4 MG tablet Take 0.5-1 tablets (2-4 mg total) by mouth 2 (two) times daily as needed (muscle spasms). May cause drowsiness.  Qty: 60 tablet, Refills: 1    Associated Diagnoses: Muscle pain       !! - Potential duplicate medications found. Please discuss with provider.              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  Activity: as per instruction sheet.  Discharge to: Home with a responsible adult.  Follow up: 2-4 weeks       Please call the office at [phone] if you experience any weakness or loss of sensation, fever > 101.5, pain uncontrolled with oral medications, persistent nausea/vomiting/or diarrhea, redness or drainage from the incisions, or any other worrisome concerns. If physician on call was not reached or could not communicate with our office for any reason please go to the nearest emergency department

## 2023-07-13 NOTE — OP NOTE
Sacroiliac Joint Injection under Fluoroscopy    Date of procedure 07/13/2023    Time-out taken to identify patient and procedure side prior to starting the procedure.                                                                 PROCEDURE:  rightsacroiliac joint injection under fluoroscopy.    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  The patient was hemodynamically stable throughout the entire process was responsive to voice, and breathing spontaneously.  Supplemental O2 was provided at 2L/min via nasal cannula.  Patient was comfortable for the duration of the procedure.     There was a total of 2mg IV Midazolam and 50mcg Fentanyl titrated for the procedure    Total sedation time was <10 minutes      ESTIMATED BLOOD LOSS:  None.    COMPLICATIONS:  None.    TECHNIQUE:   Laying in the prone position, the patient was prepped and draped in the usual sterile fashion using ChloraPrep and fenestrated drape.  The area was determined under fluoroscopy.  Local Xylocaine was injected by raising a wheel and going down to the periosteum using a 27-gauge hypodermic needle.  The 3.5 inch 22-gauge spinal needle was introduce into the right sacroiliac joint.  Negative pressure applied to confirm no intravascular placement.  Omnipaque was injected to confirm placement and to confirm that there was no vascular runoff.  The medication was then injected slowly.  The patient tolerated the procedure well.      The patient was monitored for approximately 30 minutes after the procedure.  Patient was given post procedure and  discharge instructions to follow at home.  We will see the patient back in two weeks or the patient may call to inform of status. The patient was discharged in a stable condition

## 2023-07-14 ENCOUNTER — PES CALL (OUTPATIENT)
Dept: ADMINISTRATIVE | Facility: CLINIC | Age: 63
End: 2023-07-14
Payer: MEDICARE

## 2023-08-14 ENCOUNTER — LAB VISIT (OUTPATIENT)
Dept: LAB | Facility: HOSPITAL | Age: 63
End: 2023-08-14
Attending: FAMILY MEDICINE
Payer: MEDICARE

## 2023-08-14 DIAGNOSIS — R74.8 ELEVATED LIVER ENZYMES: ICD-10-CM

## 2023-08-14 LAB
ALBUMIN SERPL BCP-MCNC: 4.1 G/DL (ref 3.5–5.2)
ALP SERPL-CCNC: 90 U/L (ref 55–135)
ALT SERPL W/O P-5'-P-CCNC: 47 U/L (ref 10–44)
ANION GAP SERPL CALC-SCNC: 9 MMOL/L (ref 8–16)
AST SERPL-CCNC: 39 U/L (ref 10–40)
BILIRUB SERPL-MCNC: 0.5 MG/DL (ref 0.1–1)
BUN SERPL-MCNC: 11 MG/DL (ref 8–23)
CALCIUM SERPL-MCNC: 9.2 MG/DL (ref 8.7–10.5)
CHLORIDE SERPL-SCNC: 101 MMOL/L (ref 95–110)
CO2 SERPL-SCNC: 24 MMOL/L (ref 23–29)
CREAT SERPL-MCNC: 0.8 MG/DL (ref 0.5–1.4)
EST. GFR  (NO RACE VARIABLE): >60 ML/MIN/1.73 M^2
GLUCOSE SERPL-MCNC: 85 MG/DL (ref 70–110)
POTASSIUM SERPL-SCNC: 4.2 MMOL/L (ref 3.5–5.1)
PROT SERPL-MCNC: 7.2 G/DL (ref 6–8.4)
SODIUM SERPL-SCNC: 134 MMOL/L (ref 136–145)

## 2023-08-14 PROCEDURE — 36415 COLL VENOUS BLD VENIPUNCTURE: CPT | Mod: HCNC,PO | Performed by: FAMILY MEDICINE

## 2023-08-14 PROCEDURE — 80053 COMPREHEN METABOLIC PANEL: CPT | Mod: HCNC | Performed by: FAMILY MEDICINE

## 2023-08-22 ENCOUNTER — TELEPHONE (OUTPATIENT)
Dept: FAMILY MEDICINE | Facility: CLINIC | Age: 63
End: 2023-08-22
Payer: MEDICARE

## 2023-08-22 DIAGNOSIS — R74.8 ELEVATED LIVER ENZYMES: Primary | ICD-10-CM

## 2023-08-22 NOTE — PROGRESS NOTES
Established Patient Chronic Pain Note (Follow up visit)    Chief Complaint:   Chief Complaint   Patient presents with    Neck Pain   Low back pain into right buttock    SUBJECTIVE:  Interval History (8/23/2023): Leo Roblero presents today for follow-up visit.  he underwent right SIJ injection on 7/13/23 (6 weeks ago).  The patient reports that he is/was better following the procedure.  he reports 80% pain relief.  The changes lasted 4 weeks so far.  The changes have continued through this visit.    he also presents today for follow-up for medication refill. he feels the pain medication is providing adequate pain relief and reduces the negative effects of chronic pain that affects quality of life. No major SE from medications. No major changes since previous visit; patient is stable overall.   Patient reports pain as 8/10 today-due to neck pain.    Interval History (7/7/2023): Leo Roblero was last seen on 5/26/2023. he presents today for follow-up for medication refill. he feels the pain medication is providing adequate pain relief and reduces the negative effects of chronic pain that affects quality of life. No major SE from medications. No major changes since previous visit; patient is stable overall. Patient reports pain as 7/10 today.     Interval History (5/26/2023): Patient was last seen on 3/22/2023. he presents today for follow-up for medication refill. he feels the pain medication is providing adequate pain relief and reduces the negative effects of chronic pain that affects quality of life. No major SE from medications. No major changes since previous visit; patient is stable overall. Patient reports pain as 7/10 today.     Interval History (5/8/2023):  Leo Roblero presents today for follow-up visit. At that visit, the plan was to switch oxycodone to [place]. Patient feels this is helping.  Initially, he felt that it was making him hyper, but since then, the side effects have resolved.  He feels  the pain relief is provided with this medication.  He is tolerating well with minimal side effects.    Interval History (3/22/2023): Leo Roblero  was last seen on 1/25/2023. he presents today for follow-up for medication refill. he feels the pain medication is providing adequate pain relief and reduces the negative effects of chronic pain that affects quality of life. No major SE from medications.  Patient reports pain as 8/10 today.   He recently did some weed eating, which caused right lower back pain into the buttock.  Pain started about 3 weeks ago during this activity and has not subsided.  He feels the pain is slowly improving now several weeks later.  He is using the back brace as needed in his wearing today.    Interval History 1/25/2023: Patient presents today for follow-up visit.  he underwent C6/7 IL MADISON on 1/17/23.  The patient reports that he is/was better following the procedure.  he reports 70% pain relief.  The changes lasted 1 week so far.  The changes have continued through this visit.       he also presents today for follow-up for medication refill. he feels the pain medication is providing adequate pain relief and reduces the negative effects of chronic pain that affects quality of life. No major SE from medications. No major changes since previous visit; patient is stable overall. Patient reports pain as 7/10 today.     Interval History (11/29/2022): Leo Roblero was last seen on 8/30/2022. he presents today for follow-up for medication refill.  Medication is providing adequate pain relief. he feels the pain medication reduces the negative effects of chronic pain that affects quality of life. Patient reports pain as 9/10 today.  He c/o neck pain that radiates but mostly axial. Also c/o low back pain as well.     Interval History (8/30/2022):  Leo Roblero presents today for follow-up visit. Patient was last seen on 6/28/2022. he presents today for follow-up for medication refill.  Medication  is providing adequate pain relief. he feels the pain medication reduces the negative effects of chronic pain that affects day-to-day function and quality of life.Patient reports pain as 8/10 today. Pain has Increased over last month; patient admits to being out of medication today.    Interval History (6/28/2022): Patient was last seen on 3/29/2022. he presents today for follow-up for medication refill.  Medication is providing adequate pain relief. he feels the pain medication reduces the negative effects of chronic pain that affects day-to-day function and quality of life. No major changes since previous visit; patient is stable overall. Patient reports pain as 9/10 today.     Interval History (3/29/2022): Patient was last seen on 2/1/2022. At last visit, we switched to norco for opioid vacation, which seemed to help his pain medication work better. he presents today for medication refill.  No major changes; patient is stable overall. Medication is providing adequate pain relief. Patient reports pain as 8/10 today.     Interval History (2/1/2022):  Leo Roblero presents today for follow-up visit.  Patient was last seen on 12/7/2021.  He reports some pain relief with medication, but he does not feel that the pain is completely controlled.  He also feels that he is getting used to the Percocet.  Patient reports pain as 9/10 today.    Interval History (12/7/2021):  Leo Roblero presents today for follow-up visit for medication refill.  Patient was last seen on 10/7/2021.  He reports that he has been having low back pain for the past 3 weeks or so.  He has had low back pain in the past, but it has not been a real issue lately.  He does not feel that the pain is currently controlled.  Patient reports pain as 9/10 today.    Interval History (10/7/2021): Patient was last seen on 7/30/2021. he presents today for medication refill. Patient reports pain as 9/10 today.   No major changes; patient is stable overall.  Medication is providing adequate pain relief.     Interval History (7/30/2021): Patient was last seen on 5/28/2021. No major changes; patient is stable overall. Medication is providing adequate pain relief. Patient reports pain as 8/10 today. he presents today for medication refill.      Interval History (5/28/2021): Patient was last seen on 3/26/2021, and he presents today for medication refill. Patient reports pain as 9/10 today.   Medication is providing adequate pain relief.     Interval History (3/26/2021): Patient was last seen on 1/22/2021. he presents today for medication refill. At last visit, medication was changed from Norco to Percocet, which he reports better pain relief with.  Although he is asking for increased dose today.  Medication is providing some pain relief. Patient reports pain as 9/10 today.  He continues to take gabapentin 600 mg at night.  He also takes Tylenol 500 mg during the day, but he usually only takes 1 tablet.    Interval History (1/22/2021): Patient was last seen on 11/19/2020. He is here today for medication Refill but reports medication is not providing adequate pain relief. Patient reports pain as 9/10 today.    Interval History (9/23/2020): Patient was last seen on 7/29/2020. At that visit, the plan was to Refill Norco 5/325mg BID.  Patient reports pain as 10/10 today.  He is requesting a higher dose of the Norco today.  He continues to take naproxen and Tylenol as adjuvant medications.  He reports that the pain seems to be worse at night.    Interval HPI (7/29/2020):  Leo Roblero is a 60 y.o. male who presents to the clinic for a follow-up appointment for neck and bilateral shoulder pain.  At the last clinic visit, I expressed to the patient that he could continue on NSAIDs and Tylenol for his chronic pain control and to start with physical therapy to help stabilize his shoulder musculature.  He had failed subacromial bursa injections without much relief.  Since the last  visit, Leo Roblero states the pain has been persistant. Current pain intensity is 10/10.    Initial HPI 02/19/2020:  Leo Roblero is a 59 y.o. male who presents to the clinic for the evaluation of neck and bilateral shoulder pain. He was referred by the Orthopedics Department for further evaluation and management of neck pain. Of note, patient has a past medical history of hyperlipidemia, and tobacco abuse, traumatic rotator cuff tear, tendinitis of the bilateral biceps tendon, and other medical comorbidities as listed below.  The pain started several years ago without any significant injury or trauma and symptoms have been worsening.  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  The pain is described as shooting and throbbing and is rated as 6/10. The pain is rated with a score of  4/10 on the BEST day and a score of 10/10 on the WORST day.  Symptoms interfere with daily activity, sleeping and work. The pain is exacerbated by work.  The pain is mitigated by rest. The patient reports spending less than 2 hours per day reclining. The patient reports 6-8 hours of uninterrupted sleep per night.  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  Patient denies night fever/night sweats, urinary incontinence, bowel incontinence, significant weight loss, significant motor weakness and loss of sensations.    Pain Disability Index Review:      8/23/2023    10:22 AM 2/1/2022     9:58 AM 12/7/2021     8:27 AM   Last 3 PDI Scores   Pain Disability Index (PDI) 40 [phone]       Non-Pharmacologic Treatments:  Physical Therapy/Home Exercise:  No, pending start  Ice/Heat:yes  TENS: no  Acupuncture: no  Massage: no  Chiropractic: no    Other: no     Pain Medications:  - Opioids: Percocet  (Oxycodone/Acetaminophen)  - Adjuvant Medications: Mobic  - Anti-Coagulants: Aspirin        Pain Procedures:   -12/05/2019:  Bilateral subacromial bursa shoulder injections, limited relief  -02/14/2020:  IM Toradol to the right deltoid  - C6/7 [place] MADISON on 1/17/23 with 70% pain relief   - right SIJ injection on 7/13/23 with 80% pain relief          Imaging & EMG/NCS (Reviewed on 8/23/2023):    1/23/23 X-Ray Shoulder Complete Bilateral  FINDINGS: Clinic joint spaces are well-maintained.  Mild narrowing with spurring of the lateral acromioclavicular joint spaces.  Lung apices are clear.  No acute fracture or dislocation.  Impression:  Bilateral minimal osteoarthritis of the acromioclavicular joints.     EMG/NCS bilateral upper extremities 03/06/2020:  1. ABNORMAL study  2. 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  There is additional evidence of a CHRONIC radiculopathy of the C7,C8, T1 nerve roots    X-ray cervical spine 02/19/2020:  Reversal of normal cervical lordosis.  Grade 1 anterolisthesis of C3 on C4 and C4 on C5.  These appear to mostly reduce with extension maneuver.  Grade 1 anterolisthesis of C7 on T1 which does not change with provocative maneuvers.  No acute fracture.  Prevertebral soft tissues are maintained.  Multilevel discogenic degenerative changes are seen with findings most severe at C5-C6 and C6-C7 with disc space narrowing marginal spurring.  Intact odontoid.  Multilevel bilateral areas of neural foraminal encroachment, right greater than left.  Clear lung apices.  Calcification of the bilateral carotids is seen.    MRI cervical spine 06/01/2017 (via [facility]):  There is a mild cervical levoscoliosis with slight straightening of normal cervical lordosis in the superior aspect of the cervical spine. Cervical vertebral body stature is preserved. No  prevertebral edema is noted.  The visualized anatomy at the skull base is normal.  C2-C3: Mild disc space narrowing. Mild bilateral posterior facet and ligamentum flavum hypertrophy which is effacing the posterior theca and causing subtle posterior impression on the cervical spinal cord, although the thecal sac still measures 1.2 cm in AP dimension. There is minimal posterior spondylosis.  C3-C4: Disc desiccation with right greater than left posterior facet arthropathy and with right uncinate hypertrophy, with mild disc space narrowing. There is mild ligamentum flavum hypertrophy. There is mild AP narrowing of the thecal sac, consistent with a spinal stenosis, but there is no significant deformity on the cervical spinal cord. There is right lateral recess/foraminal origin stenosis.  C4-C5: Mild to moderate disc space narrowing with disc desiccation and with right greater than left posterior facet hypertrophy. There is right uncinate hypertrophy at this level. There is bilateral ligamentum flavum hypertrophy. This combination of findings is causing a spinal stenosis, thecal sac measuring only 7.7 mm in AP dimension, and with a trefoil deformity of the thecal sac. There is also some mass effect and deformity on the cervical spinal cord. In addition to a spinal stenosis, there is right greater than left lateral recess/foraminal origin stenosis.  C5-C6: Prominent disc space narrowing with disc desiccation, bridging anterior bony spurring, right greater than left posterior facet arthropathy, and mild right uncinate hypertrophy. There is a broad-based posterior osteophyte disc complex which effaces the anterior theca and narrows the AP dimension of the cervical thecal sac, but which does not cause any significant mass effect on the cervical spinal cord. There is mild bilateral lateral recess/foraminal origin stenosis at this level, right side greater than left.  C6-C7: Prominent disc space narrowing with disc desiccation,  anterior and posterior spondylosis, and right uncinate hypertrophy. There is a broad-based, posterior osteophyte disc complex which partially effaces the anterior theca but which does not deform the cervical spinal cord. There is a mild spinal stenosis with mild, bilateral foraminal origin stenosis, slightly greater on the left.  C7-T1: Mild to moderate, bilateral posterior facet arthropathy. There is mild disc desiccation and disc space narrowing with approximately 2 mm of anterolisthesis C7 on T1. There is no obvious stenosis.  Signal intensity in the cervical thecal sac is normal. There is no convincing evidence of any significant edema or gliosis in the cervical spinal cord.     X-ray left shoulder 02/14/2020:  There is no radiographic evidence of acute osseous, articular, or soft tissue abnormality.  Joint spaces are preserved.     MRI left shoulder 2/10/20:  There is moderate rotator cuff tendinosis, supraspinatus and infraspinatus, with superimposed moderate grade partial thickness tear with fluid-filled defect posterior supraspinatus footprint with bursal and interstitial fiber tearing.  Supraspinatus tendon articular surface fraying is also noted.  There is also low-grade interstitial partial-thickness tear at the blending of the supraspinatus and infraspinatus.  There are few clustered small subcortical cysts and associated minimal edema like signal greater tuberosity.  The subscapularis tendon is unremarkable.  Long head biceps tendon is intact with mild intra-articular tendinosis.  Superior labral degenerative signal with a sublabral foramen versus SLAP tear.  Labrum is otherwise unremarkable.  Glenohumeral chondral thinning.  No joint effusion.  Normal IGHL.  Type 2 acromion without tilting minimal AC joint spurring.  There is no subacromial subdeltoid bursal fluid collection.  The bone marrow signal intensity is otherwise unremarkable.  The signal intensity of the muscle groups is normal.  No  "atrophy.          REVIEW OF SYSTEMS:    GENERAL:  No weight loss, malaise or fevers.  HEENT:   No recent changes in vision or hearing  NECK:  Negative for lumps, no difficulty with swallowing.  RESPIRATORY:  Negative for cough, wheezing or shortness of breath, patient denies any recent URI.  CARDIOVASCULAR:  Negative for chest pain, leg swelling or palpitations.  GI:  Negative for abdominal discomfort, blood in stools or black stools or change in bowel habits.  MUSCULOSKELETAL:  See HPI.  SKIN:  Negative for lesions, rash, and itching.  PSYCH:  No mood disorder or recent psychosocial stressors.  Patients sleep is not disturbed secondary to pain.  HEMATOLOGY/LYMPHOLOGY:  Negative for prolonged bleeding, bruising easily or swollen nodes.  Patient is not currently taking any anti-coagulants  NEURO:   No history of headaches, syncope, paralysis, seizures or tremors.  All other reviewed and negative other than HPI.        Physical Exam:   Vitals:    08/23/23 1021   BP: 128/84   Pulse: 97   Weight: 51.6 kg (113 lb 12.1 oz)   Height: 5' 8" (1.727 m)   PainSc:   8   PainLoc: Neck   Body mass index is 17.3 kg/m².   (reviewed on 8/23/2023)    General: alert and oriented, in no apparent distress.  Gait: normal gait.  Skin: no rashes, no discoloration, no obvious lesions  HEENT: normocephalic, atraumatic.    Respiratory: without use of accessory muscles of respiration.  GI: no obvious distention.     Musculoskeletal - Cervical Spine:  - Pain on flexion of cervical spine: Present   - Pain on extension of cervical spine: Present   - Cervical facet loading: Present bilaterally   - TTP over the cervical facet joints: Present   - TTP over the cervical paraspinals: Present   - Spurling's:  Equivocal, causing more pain on the left    Shoulder:  - Pain on abduction: Present bilaterally   - Flexion: decreased to 100° on left, decreased to 120° on the right  - Abduction:  Decreased to 110° on the left, decreased to 100° on the right  - " lateral winging of the scapula on the left  - TTP:  Present over bilateral biceps tendons  - Neer's: Positive  - Hawkin's: Positive    Lumbar:  - pain with extension  - pain over facet joints - less so than SIJ  - TTP over right piriformis - improved     SIJ testing:  - TTP over SI joint: Present on right -- Present, mild, improved since procedure   - Su's/ Wang's: Positive  on right   - Sacroiliac Distraction Test (anterior pressure): Positive  - Sacroiliac Compression Test (lateral pressure): Positive     Neuro - Upper Extremities:  - BUE Strength:R/L: D: 5/5; B: 5/5; T: 5/5; WF: 5/5; WE: 5/5; IO: 5/5  - Extremity Reflexes: Brisk and symmetric throughout  - Sensory: Sensation to light touch intact bilaterally    Neuro - Lower Extremities:  - RLE Strength:     >> 5/5 strength with right hip flexion/ extension    >> 5/5 strength with right knee flexion/ extension    >> 5/5 strength in right ankle with plantar and dorsiflexion  - LLE Strength:     >> 5/5 strength with left hip flexion/ extension    >> 5/5 strength with knee flexion extension on the left     >> 5/5 strength in left ankle with plantar and dorsiflexion  - Extremity Reflexes: Brisk and symmetric throughout  - Sensory: Sensation to light touch intact bilaterally      Psych:  Mood and affect is appropriate            ASSESSMENT: 62 y.o. year old male with neck and bilateral shoulder pain, consistent with     1. Arthropathy of right sacroiliac joint        2. Cervical radicular pain        3. DDD (degenerative disc disease), cervical        4. Chronic pain of both shoulders        5. Opioid use agreement exists              PLAN:   1. Interventional:   - S/p right SIJ injection on 7/13/23 with 80% pain relief.   - Patient can call to schedule repeat C6/7 [place] MADISON (with left paramedian approach). Patient is not taking prescription blood thinners or ASA.    - S/p C6/7 [place] MADISON on 1/17/23 with 70% pain relief.   - Also consider lumbar treatment.    2.  Pharmacologic:   - Refill Norco 7.5/325mg Q8-Q12H PRN pain (70 tablets) x 2 months. Will e-prescribe to fill on 9/1/23 and  (will allow to fill on 09/29/2023.  since pharmacy closed on weekend date).  Following prescription already sent for 10/31/2023.  Patient understands this is the highest dose that we prescribe.  Patient tolerating opioids with no side effects, obtaining some pain control with functional improvement.  We have discussed the risks of chronic opioid medication management.    - Refill gabapentin 300mg QAM/ 300mg at lunch/ 600mg QHS, nabumetone (Relafen) 500mg BID PRN pain, and Zanaflex 4mg BID PRN (60 tablets). Patient understands this may cause drowsiness.     - Can take additional 4 tablets of Tylenol 500mg during the day in addition to Percocet 5/325mg (3x) per day. Total of acetaminophen daily - cannot exceed 3000mg; patient verbalized understanding.  - Anticoagulation use: None.   - Opioid contract signed on 7/29/2020.   Opioid risk tool completed on 7/29/2020: low risk.  - LA  reviewed and appropriate. MME=17.5     - Last UDS was compliant for medications prescribed.     3. Rehabilitative: Encouraged regular exercise.    4. Diagnostic: None.     5. Consults/Referrals:  Referral previously placed to Orthopedics for shoulder pain. Patient had appointment in February, which was ultimately canceled.    6. Follow up: 8 week Medication Refill      - This condition does not require this patient to take time off of work, and the primary goal of our Pain Management services is to improve the patient's functional capacity.   - I discussed the risks, benefits, and alternatives to potential treatment options. All questions and concerns were fully addressed today in clinic.         Elif Dolan PA-C  Interventional Pain Management - [facility]    Disclaimer:  This note was prepared using voice recognition system and is likely to have sound alike errors that may have been overlooked even  after proof reading.  Please call me with any questions.         >>UDS:  9/23/2020 :: appropriate   1/22/2021 :: appropriate   5/28/2021 :: appropriate  12/7/2021 :: appropriate  6/28/2022 :: appropriate  11/29/2022 :: appropriate   3/22/2023 :: appropriate for oxycodone with metabolites but also +THC - patient given warning today for THC   7/7/2023 :: appropriate      Warnings:  8/30/2022 - patient given warning for taking more than prescribed

## 2023-08-22 NOTE — TELEPHONE ENCOUNTER
----- Message from Letitia Lane sent at 8/22/2023  9:26 AM CDT -----  Contact: Amanda/wife  .Type:  Patient Returning Call    Who Called:Amanda/wife   Who Left Message for Patient:unknown   Does the patient know what this is regarding?:results from 08/14/023 visit   Would the patient rather a call back or a response via [facility]? call  Best Call Back Number:.[phone]  Additional Information:   Thanks  LR

## 2023-08-23 ENCOUNTER — OFFICE VISIT (OUTPATIENT)
Dept: PAIN MEDICINE | Facility: CLINIC | Age: 63
End: 2023-08-23
Payer: MEDICARE

## 2023-08-23 VITALS
BODY MASS INDEX: 17.24 KG/M2 | HEIGHT: 68 IN | WEIGHT: 113.75 LBS | HEART RATE: 97 BPM | SYSTOLIC BLOOD PRESSURE: 128 MMHG | DIASTOLIC BLOOD PRESSURE: 84 MMHG

## 2023-08-23 DIAGNOSIS — M25.512 CHRONIC PAIN OF BOTH SHOULDERS: ICD-10-CM

## 2023-08-23 DIAGNOSIS — Z79.891 OPIOID USE AGREEMENT EXISTS: ICD-10-CM

## 2023-08-23 DIAGNOSIS — M54.12 CERVICAL RADICULAR PAIN: ICD-10-CM

## 2023-08-23 DIAGNOSIS — M25.511 CHRONIC PAIN OF BOTH SHOULDERS: ICD-10-CM

## 2023-08-23 DIAGNOSIS — M47.818 ARTHROPATHY OF RIGHT SACROILIAC JOINT: Primary | ICD-10-CM

## 2023-08-23 DIAGNOSIS — G89.29 CHRONIC PAIN OF BOTH SHOULDERS: ICD-10-CM

## 2023-08-23 DIAGNOSIS — M50.30 DDD (DEGENERATIVE DISC DISEASE), CERVICAL: ICD-10-CM

## 2023-08-23 PROCEDURE — 99999 PR PBB SHADOW E&M-EST. PATIENT-LVL III: CPT | Mod: PBBFAC,HCNC,, | Performed by: PHYSICIAN ASSISTANT

## 2023-08-23 PROCEDURE — 3074F PR MOST RECENT SYSTOLIC BLOOD PRESSURE < 130 MM HG: ICD-10-PCS | Mod: HCNC,CPTII,S$GLB, | Performed by: PHYSICIAN ASSISTANT

## 2023-08-23 PROCEDURE — 99214 PR OFFICE/OUTPT VISIT, EST, LEVL IV, 30-39 MIN: ICD-10-PCS | Mod: HCNC,S$GLB,, | Performed by: PHYSICIAN ASSISTANT

## 2023-08-23 PROCEDURE — 3079F PR MOST RECENT DIASTOLIC BLOOD PRESSURE 80-89 MM HG: ICD-10-PCS | Mod: HCNC,CPTII,S$GLB, | Performed by: PHYSICIAN ASSISTANT

## 2023-08-23 PROCEDURE — 1160F PR REVIEW ALL MEDS BY PRESCRIBER/CLIN PHARMACIST DOCUMENTED: ICD-10-PCS | Mod: HCNC,CPTII,S$GLB, | Performed by: PHYSICIAN ASSISTANT

## 2023-08-23 PROCEDURE — 3079F DIAST BP 80-89 MM HG: CPT | Mod: HCNC,CPTII,S$GLB, | Performed by: PHYSICIAN ASSISTANT

## 2023-08-23 PROCEDURE — 3008F PR BODY MASS INDEX (BMI) DOCUMENTED: ICD-10-PCS | Mod: HCNC,CPTII,S$GLB, | Performed by: PHYSICIAN ASSISTANT

## 2023-08-23 PROCEDURE — 1159F PR MEDICATION LIST DOCUMENTED IN MEDICAL RECORD: ICD-10-PCS | Mod: HCNC,CPTII,S$GLB, | Performed by: PHYSICIAN ASSISTANT

## 2023-08-23 PROCEDURE — 3044F HG A1C LEVEL LT 7.0%: CPT | Mod: HCNC,CPTII,S$GLB, | Performed by: PHYSICIAN ASSISTANT

## 2023-08-23 PROCEDURE — 99214 OFFICE O/P EST MOD 30 MIN: CPT | Mod: HCNC,S$GLB,, | Performed by: PHYSICIAN ASSISTANT

## 2023-08-23 PROCEDURE — 3074F SYST BP LT 130 MM HG: CPT | Mod: HCNC,CPTII,S$GLB, | Performed by: PHYSICIAN ASSISTANT

## 2023-08-23 PROCEDURE — 1159F MED LIST DOCD IN RCRD: CPT | Mod: HCNC,CPTII,S$GLB, | Performed by: PHYSICIAN ASSISTANT

## 2023-08-23 PROCEDURE — 1160F RVW MEDS BY RX/DR IN RCRD: CPT | Mod: HCNC,CPTII,S$GLB, | Performed by: PHYSICIAN ASSISTANT

## 2023-08-23 PROCEDURE — 99999 PR PBB SHADOW E&M-EST. PATIENT-LVL III: ICD-10-PCS | Mod: PBBFAC,HCNC,, | Performed by: PHYSICIAN ASSISTANT

## 2023-08-23 PROCEDURE — 3044F PR MOST RECENT HEMOGLOBIN A1C LEVEL <7.0%: ICD-10-PCS | Mod: HCNC,CPTII,S$GLB, | Performed by: PHYSICIAN ASSISTANT

## 2023-08-23 PROCEDURE — 3008F BODY MASS INDEX DOCD: CPT | Mod: HCNC,CPTII,S$GLB, | Performed by: PHYSICIAN ASSISTANT

## 2023-08-23 RX ORDER — HYDROCODONE BITARTRATE AND ACETAMINOPHEN 7.5; 325 MG/1; MG/1
TABLET ORAL
Qty: 70 TABLET | Refills: 0 | Status: SHIPPED | OUTPATIENT
Start: 2023-09-29 | End: 2023-11-29 | Stop reason: SDUPTHER

## 2023-08-23 RX ORDER — HYDROCODONE BITARTRATE AND ACETAMINOPHEN 7.5; 325 MG/1; MG/1
TABLET ORAL
Qty: 70 TABLET | Refills: 0 | Status: SHIPPED | OUTPATIENT
Start: 2023-10-31 | End: 2023-11-29 | Stop reason: SDUPTHER

## 2023-10-02 NOTE — PROGRESS NOTES
Established Patient Chronic Pain Note (Follow up visit)    Chief Complaint:   Chief Complaint   Patient presents with    Low-back Pain    Shoulder Pain   Low back pain into right buttock    SUBJECTIVE:  Interval History (10/3/2023): Patient was last seen on 8/23/2023. he presents today for follow-up for medication refill. he feels the pain medication is providing adequate pain relief and reduces the negative effects of chronic pain that affects quality of life. No major SE from medications. No major changes since previous visit; patient is stable overall. Patient reports pain as 7/10 today.     Interval History (8/23/2023): Leo Roblero presents today for follow-up visit.  he underwent right SIJ injection on 7/13/23 (6 weeks ago).  The patient reports that he is/was better following the procedure.  he reports 80% pain relief.  The changes lasted 4 weeks so far.  The changes have continued through this visit.    he also presents today for follow-up for medication refill. he feels the pain medication is providing adequate pain relief and reduces the negative effects of chronic pain that affects quality of life. No major SE from medications. No major changes since previous visit; patient is stable overall.   Patient reports pain as 8/10 today-due to neck pain.    Interval History (7/7/2023): Leo Roblero was last seen on 5/26/2023. he presents today for follow-up for medication refill. he feels the pain medication is providing adequate pain relief and reduces the negative effects of chronic pain that affects quality of life. No major SE from medications. No major changes since previous visit; patient is stable overall. Patient reports pain as 7/10 today.     Interval History (5/26/2023): Patient was last seen on 3/22/2023. he presents today for follow-up for medication refill. he feels the pain medication is providing adequate pain relief and reduces the negative effects of chronic pain that affects quality of  life. No major SE from medications. No major changes since previous visit; patient is stable overall. Patient reports pain as 7/10 today.     Interval History (5/8/2023):  Leo Roblero presents today for follow-up visit. At that visit, the plan was to switch oxycodone to [place]. Patient feels this is helping.  Initially, he felt that it was making him hyper, but since then, the side effects have resolved.  He feels the pain relief is provided with this medication.  He is tolerating well with minimal side effects.    Interval History (3/22/2023): Leo Roblero  was last seen on 1/25/2023. he presents today for follow-up for medication refill. he feels the pain medication is providing adequate pain relief and reduces the negative effects of chronic pain that affects quality of life. No major SE from medications.  Patient reports pain as 8/10 today.   He recently did some weed eating, which caused right lower back pain into the buttock.  Pain started about 3 weeks ago during this activity and has not subsided.  He feels the pain is slowly improving now several weeks later.  He is using the back brace as needed in his wearing today.    Interval History 1/25/2023: Patient presents today for follow-up visit.  he underwent C6/7 IL MADISON on 1/17/23.  The patient reports that he is/was better following the procedure.  he reports 70% pain relief.  The changes lasted 1 week so far.  The changes have continued through this visit.     he also presents today for follow-up for medication refill. he feels the pain medication is providing adequate pain relief and reduces the negative effects of chronic pain that affects quality of life. No major SE from medications. No major changes since previous visit; patient is stable overall. Patient reports pain as 7/10 today.     Interval History (11/29/2022): Leo Roblero was last seen on 8/30/2022. he presents today for follow-up for medication refill.  Medication is providing adequate  pain relief. he feels the pain medication reduces the negative effects of chronic pain that affects quality of life. Patient reports pain as 9/10 today.  He c/o neck pain that radiates but mostly axial. Also c/o low back pain as well.     Interval History (8/30/2022):  Leo Roblero presents today for follow-up visit. Patient was last seen on 6/28/2022. he presents today for follow-up for medication refill.  Medication is providing adequate pain relief. he feels the pain medication reduces the negative effects of chronic pain that affects day-to-day function and quality of life.Patient reports pain as 8/10 today. Pain has Increased over last month; patient admits to being out of medication today.    Interval History (12/7/2021):  Leo Roblero presents today for follow-up visit for medication refill.  Patient was last seen on 10/7/2021.  He reports that he has been having low back pain for the past 3 weeks or so.  He has had low back pain in the past, but it has not been a real issue lately.  He does not feel that the pain is currently controlled.  Patient reports pain as 9/10 today.    Interval History (9/23/2020): Patient was last seen on 7/29/2020. At that visit, the plan was to Refill Norco 5/325mg BID.  Patient reports pain as 10/10 today.  He is requesting a higher dose of the Norco today.  He continues to take naproxen and Tylenol as adjuvant medications.  He reports that the pain seems to be worse at night.    Interval HPI (7/29/2020):  Leo Roblero is a 60 y.o. male who presents to the clinic for a follow-up appointment for neck and bilateral shoulder pain.  At the last clinic visit, I expressed to the patient that he could continue on NSAIDs and Tylenol for his chronic pain control and to start with physical therapy to help stabilize his shoulder musculature.  He had failed subacromial bursa injections without much relief.  Since the last visit, Leo Roblero states the pain has been persistant. Current pain  intensity is 10/10.    Initial HPI 02/19/2020:  Leo Roblero is a 59 y.o. male who presents to the clinic for the evaluation of neck and bilateral shoulder pain. He was referred by the Orthopedics Department for further evaluation and management of neck pain. Of note, patient has a past medical history of hyperlipidemia, and tobacco abuse, traumatic rotator cuff tear, tendinitis of the bilateral biceps tendon, and other medical comorbidities as listed below.  The pain started several years ago without any significant injury or trauma and symptoms have been worsening.  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  The pain is described as shooting and throbbing and is rated as 6/10. The pain is rated with a score of  4/10 on the BEST day and a score of 10/10 on the WORST day.  Symptoms interfere with daily activity, sleeping and work. The pain is exacerbated by work.  The pain is mitigated by rest. The patient reports spending less than 2 hours per day reclining. The patient reports 6-8 hours of uninterrupted sleep per night.  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  Patient denies night fever/night sweats, urinary incontinence, bowel incontinence, significant weight loss, significant motor weakness and loss of sensations.    Pain Disability Index Review:      10/3/2023     9:46 AM 8/23/2023    10:22 AM 2/1/2022     9:58 AM   Last 3 PDI Scores   Pain Disability Index (PDI) 38 40 63       Non-Pharmacologic Treatments:  Physical Therapy/Home Exercise:  No, pending start  Ice/Heat:yes  TENS: no  Acupuncture: no  Massage: no  Chiropractic: no    Other: no     Pain Medications:  - Opioids: Percocet (Oxycodone/Acetaminophen)  - Adjuvant Medications: Mobic  - Anti-Coagulants: Aspirin         Pain Procedures:   -12/05/2019:  Bilateral subacromial bursa shoulder injections, limited relief  -02/14/2020:  IM Toradol to the right deltoid  - C6/7 IL MADISON on 1/17/23 with 70% pain relief   - right SIJ injection on 7/13/23 with 80% pain relief          Imaging & EMG/NCS (Reviewed on 10/3/2023):    1/23/23 X-Ray Shoulder Complete Bilateral  FINDINGS: Clinic joint spaces are well-maintained.  Mild narrowing with spurring of the lateral acromioclavicular joint spaces.  Lung apices are clear.  No acute fracture or dislocation.  Impression:  Bilateral minimal osteoarthritis of the acromioclavicular joints.     EMG/NCS bilateral upper extremities 03/06/2020:  1. ABNORMAL study  2. 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  There is additional evidence of a CHRONIC radiculopathy of the C7,C8, T1 nerve roots    X-ray cervical spine 02/19/2020:  Reversal of normal cervical lordosis.  Grade 1 anterolisthesis of C3 on C4 and C4 on C5.  These appear to mostly reduce with extension maneuver.  Grade 1 anterolisthesis of C7 on T1 which does not change with provocative maneuvers.  No acute fracture.  Prevertebral soft tissues are maintained.  Multilevel discogenic degenerative changes are seen with findings most severe at C5-C6 and C6-C7 with disc space narrowing marginal spurring.  Intact odontoid.  Multilevel bilateral areas of neural foraminal encroachment, right greater than left.  Clear lung apices.  Calcification of the bilateral carotids is seen.    MRI cervical spine 06/01/2017 ([facility]):  There is a mild cervical levoscoliosis with slight straightening of normal cervical lordosis in the superior aspect of the cervical spine. Cervical vertebral body stature is preserved. No prevertebral edema is noted.  The visualized anatomy at the skull base is normal.  C2-C3: Mild  disc space narrowing. Mild bilateral posterior facet and ligamentum flavum hypertrophy which is effacing the posterior theca and causing subtle posterior impression on the cervical spinal cord, although the thecal sac still measures 1.2 cm in AP dimension. There is minimal posterior spondylosis.  C3-C4: Disc desiccation with right greater than left posterior facet arthropathy and with right uncinate hypertrophy, with mild disc space narrowing. There is mild ligamentum flavum hypertrophy. There is mild AP narrowing of the thecal sac, consistent with a spinal stenosis, but there is no significant deformity on the cervical spinal cord. There is right lateral recess/foraminal origin stenosis.  C4-C5: Mild to moderate disc space narrowing with disc desiccation and with right greater than left posterior facet hypertrophy. There is right uncinate hypertrophy at this level. There is bilateral ligamentum flavum hypertrophy. This combination of findings is causing a spinal stenosis, thecal sac measuring only 7.7 mm in AP dimension, and with a trefoil deformity of the thecal sac. There is also some mass effect and deformity on the cervical spinal cord. In addition to a spinal stenosis, there is right greater than left lateral recess/foraminal origin stenosis.  C5-C6: Prominent disc space narrowing with disc desiccation, bridging anterior bony spurring, right greater than left posterior facet arthropathy, and mild right uncinate hypertrophy. There is a broad-based posterior osteophyte disc complex which effaces the anterior theca and narrows the AP dimension of the cervical thecal sac, but which does not cause any significant mass effect on the cervical spinal cord. There is mild bilateral lateral recess/foraminal origin stenosis at this level, right side greater than left.  C6-C7: Prominent disc space narrowing with disc desiccation, anterior and posterior spondylosis, and right uncinate hypertrophy. There is a broad-based,  posterior osteophyte disc complex which partially effaces the anterior theca but which does not deform the cervical spinal cord. There is a mild spinal stenosis with mild, bilateral foraminal origin stenosis, slightly greater on the left.  C7-T1: Mild to moderate, bilateral posterior facet arthropathy. There is mild disc desiccation and disc space narrowing with approximately 2 mm of anterolisthesis C7 on T1. There is no obvious stenosis.  Signal intensity in the cervical thecal sac is normal. There is no convincing evidence of any significant edema or gliosis in the cervical spinal cord.     X-ray left shoulder 02/14/2020:  There is no radiographic evidence of acute osseous, articular, or soft tissue abnormality.  Joint spaces are preserved.     MRI left shoulder 2/10/20:  There is moderate rotator cuff tendinosis, supraspinatus and infraspinatus, with superimposed moderate grade partial thickness tear with fluid-filled defect posterior supraspinatus footprint with bursal and interstitial fiber tearing.  Supraspinatus tendon articular surface fraying is also noted.  There is also low-grade interstitial partial-thickness tear at the blending of the supraspinatus and infraspinatus.  There are few clustered small subcortical cysts and associated minimal edema like signal greater tuberosity.  The subscapularis tendon is unremarkable.  Long head biceps tendon is intact with mild intra-articular tendinosis.  Superior labral degenerative signal with a sublabral foramen versus SLAP tear.  Labrum is otherwise unremarkable.  Glenohumeral chondral thinning.  No joint effusion.  Normal IGHL.  Type 2 acromion without tilting minimal AC joint spurring.  There is no subacromial subdeltoid bursal fluid collection.  The bone marrow signal intensity is otherwise unremarkable.  The signal intensity of the muscle groups is normal.  No atrophy.          REVIEW OF SYSTEMS:    GENERAL:  No weight loss, malaise or fevers.  HEENT:   No  "recent changes in vision or hearing  NECK:  Negative for lumps, no difficulty with swallowing.  RESPIRATORY:  Negative for cough, wheezing or shortness of breath, patient denies any recent URI.  CARDIOVASCULAR:  Negative for chest pain, leg swelling or palpitations.  GI:  Negative for abdominal discomfort, blood in stools or black stools or change in bowel habits.  MUSCULOSKELETAL:  See HPI.  SKIN:  Negative for lesions, rash, and itching.  PSYCH:  No mood disorder or recent psychosocial stressors.  Patients sleep is not disturbed secondary to pain.  HEMATOLOGY/LYMPHOLOGY:  Negative for prolonged bleeding, bruising easily or swollen nodes.  Patient is not currently taking any anti-coagulants  NEURO:   No history of headaches, syncope, paralysis, seizures or tremors.  All other reviewed and negative other than HPI.        Physical Exam:   Vitals:    10/03/23 0944 10/03/23 0945   BP:  (!) 164/89   Pulse:  86   Weight: 51.4 kg (113 lb 5.1 oz) 51.4 kg (113 lb 5.1 oz)   Height: 5' 8" (1.727 m) 5' 8" (1.727 m)   PainSc:   7   7   PainLoc: Back Back   Body mass index is 17.23 kg/m².   (reviewed on 10/3/2023)    General: alert and oriented, in no apparent distress.  Gait: normal gait.  Skin: no rashes, no discoloration, no obvious lesions  HEENT: normocephalic, atraumatic.    Respiratory: without use of accessory muscles of respiration.  GI: no obvious distention.     Musculoskeletal - Cervical Spine:  - Pain on flexion of cervical spine: Present   - Pain on extension of cervical spine: Present   - Cervical facet loading: Present bilaterally   - TTP over the cervical facet joints: Present   - TTP over the cervical paraspinals: Present   - Spurling's:  Equivocal, causing more pain on the left    Shoulder:  - Pain on abduction: Present bilaterally   - Flexion: decreased to 100° on left, decreased to 120° on the right  - Abduction:  Decreased to 110° on the left, decreased to 100° on the right  - lateral winging of the scapula " on the left  - TTP:  Present over bilateral biceps tendons  - Neer's: Positive  - Hawkin's: Positive    Lumbar:  - ROM fairly preserved   - pain with extension  - pain over facet joints - less so than SIJ  - TTP over right piriformis - improved     SIJ testing:  - TTP over SI joint: Present on right -- Present, mild, improved since procedure   - Su's/ Wang's: Positive  on right   - Sacroiliac Distraction Test (anterior pressure): Positive  - Sacroiliac Compression Test (lateral pressure): Positive     Neuro - Upper Extremities:  - BUE Strength:R/L: D: 5/5; B: 5/5; T: 5/5; WF: 5/5; WE: 5/5; IO: 5/5  - Extremity Reflexes: Brisk and symmetric throughout  - Sensory: Sensation to light touch intact bilaterally    Neuro - Lower Extremities:  - RLE Strength:     >> 5/5 strength with right hip flexion/ extension    >> 5/5 strength with right knee flexion/ extension    >> 5/5 strength in right ankle with plantar and dorsiflexion  - LLE Strength:     >> 5/5 strength with left hip flexion/ extension    >> 5/5 strength with knee flexion extension on the left     >> 5/5 strength in left ankle with plantar and dorsiflexion  - Extremity Reflexes: Brisk and symmetric throughout  - Sensory: Sensation to light touch intact bilaterally      Psych:  Mood and affect is appropriate            ASSESSMENT: 63 y.o. year old male with neck and bilateral shoulder pain, consistent with     1. Arthropathy of right sacroiliac joint        2. Cervical radicular pain  gabapentin (NEURONTIN) 600 MG tablet      3. DDD (degenerative disc disease), cervical        4. Chronic pain of both shoulders        5. Opioid use agreement exists              PLAN:   1. Interventional:   - Patient can call to schedule repeat C6/7 IL MADISON (with left paramedian approach). Patient is not taking prescription blood thinners or ASA.    - S/p right SIJ injection on 7/13/23 with 80% pain relief.   - S/p C6/7 IL MADISON on 1/17/23 with 70% pain relief.   - Also consider  lumbar treatment.    2. Pharmacologic:   - Refill Norco 7.5/325mg Q8-Q12H PRN pain (70 tablets) x 2 months. Will e-prescribe to fill on 10/31/2023 and 11/30/23.  Patient understands this is the highest dose that we prescribe.  Patient tolerating opioids with no side effects, obtaining some pain control with functional improvement.  We have discussed the risks of chronic opioid medication management.    - Increase gabapentin 300mg QAM/ 300mg at lunch/ 600mg QHS to gabapentin (Neurontin) 600mg TID.  - Refill nabumetone (Relafen) 500mg BID PRN pain and Zanaflex 4mg BID PRN (60 tablets). Patient understands this may cause drowsiness.     - Can take additional 4 tablets of Tylenol 500mg during the day in addition to Percocet 5/325mg (3x) per day. Total of acetaminophen daily - cannot exceed 3000mg; patient verbalized understanding.  - Anticoagulation use: None.   - Opioid contract signed on 7/29/2020.   Opioid risk tool completed on 7/29/2020: low risk.  - LA  reviewed and appropriate. MME=17.5.  Last filled on 10/2/23 (per pharmacy).    - Last UDS 7/7/2023 was compliant for medications prescribed. Order drug screen (UDS/ oral swab) next visit to ensure med compliance.     3. Rehabilitative: Encouraged regular exercise.    4. Diagnostic/ Imaging: No new imaging ordered. Previous imaging reviewed.     5. Consults/Referrals:   - Given several recommendations for medical marijuana providers.  - Referral previously placed to Orthopedics for shoulder pain.      6. Follow up: 8 week Medication Refill      - This condition does not require this patient to take time off of work, and the primary goal of our Pain Management services is to improve the patient's functional capacity.   - I discussed the risks, benefits, and alternatives to potential treatment options. All questions and concerns were fully addressed today in clinic.         Elif Dolan PA-C  Interventional Pain Management - [facility]  [place]    Disclaimer:  This note was prepared using voice recognition system and is likely to have sound alike errors that may have been overlooked even after proof reading.  Please call me with any questions.         >>UDS:  9/23/2020 :: appropriate   1/22/2021 :: appropriate   5/28/2021 :: appropriate  12/7/2021 :: appropriate  6/28/2022 :: appropriate  11/29/2022 :: appropriate   3/22/2023 :: appropriate for oxycodone with metabolites but also +THC - patient given warning today for THC   7/7/2023 :: appropriate      Warnings:  8/30/2022 - patient given warning for taking more than prescribed

## 2023-10-03 ENCOUNTER — OFFICE VISIT (OUTPATIENT)
Dept: PAIN MEDICINE | Facility: CLINIC | Age: 63
End: 2023-10-03
Attending: PHYSICAL MEDICINE & REHABILITATION
Payer: MEDICARE

## 2023-10-03 VITALS
DIASTOLIC BLOOD PRESSURE: 89 MMHG | HEIGHT: 68 IN | BODY MASS INDEX: 17.17 KG/M2 | SYSTOLIC BLOOD PRESSURE: 164 MMHG | HEART RATE: 86 BPM | WEIGHT: 113.31 LBS

## 2023-10-03 DIAGNOSIS — G89.29 CHRONIC PAIN OF BOTH SHOULDERS: ICD-10-CM

## 2023-10-03 DIAGNOSIS — M25.512 CHRONIC PAIN OF BOTH SHOULDERS: ICD-10-CM

## 2023-10-03 DIAGNOSIS — M25.511 CHRONIC PAIN OF BOTH SHOULDERS: ICD-10-CM

## 2023-10-03 DIAGNOSIS — Z79.891 OPIOID USE AGREEMENT EXISTS: ICD-10-CM

## 2023-10-03 DIAGNOSIS — M47.818 ARTHROPATHY OF RIGHT SACROILIAC JOINT: Primary | ICD-10-CM

## 2023-10-03 DIAGNOSIS — M50.30 DDD (DEGENERATIVE DISC DISEASE), CERVICAL: ICD-10-CM

## 2023-10-03 DIAGNOSIS — M54.12 CERVICAL RADICULAR PAIN: ICD-10-CM

## 2023-10-03 PROCEDURE — 99999 PR PBB SHADOW E&M-EST. PATIENT-LVL IV: CPT | Mod: PBBFAC,HCNC,, | Performed by: PHYSICIAN ASSISTANT

## 2023-10-03 PROCEDURE — 99214 PR OFFICE/OUTPT VISIT, EST, LEVL IV, 30-39 MIN: ICD-10-PCS | Mod: HCNC,S$GLB,, | Performed by: PHYSICIAN ASSISTANT

## 2023-10-03 PROCEDURE — 1160F PR REVIEW ALL MEDS BY PRESCRIBER/CLIN PHARMACIST DOCUMENTED: ICD-10-PCS | Mod: HCNC,CPTII,S$GLB, | Performed by: PHYSICIAN ASSISTANT

## 2023-10-03 PROCEDURE — 3077F PR MOST RECENT SYSTOLIC BLOOD PRESSURE >= 140 MM HG: ICD-10-PCS | Mod: HCNC,CPTII,S$GLB, | Performed by: PHYSICIAN ASSISTANT

## 2023-10-03 PROCEDURE — 3008F PR BODY MASS INDEX (BMI) DOCUMENTED: ICD-10-PCS | Mod: HCNC,CPTII,S$GLB, | Performed by: PHYSICIAN ASSISTANT

## 2023-10-03 PROCEDURE — 3044F PR MOST RECENT HEMOGLOBIN A1C LEVEL <7.0%: ICD-10-PCS | Mod: HCNC,CPTII,S$GLB, | Performed by: PHYSICIAN ASSISTANT

## 2023-10-03 PROCEDURE — 99214 OFFICE O/P EST MOD 30 MIN: CPT | Mod: HCNC,S$GLB,, | Performed by: PHYSICIAN ASSISTANT

## 2023-10-03 PROCEDURE — 1159F MED LIST DOCD IN RCRD: CPT | Mod: HCNC,CPTII,S$GLB, | Performed by: PHYSICIAN ASSISTANT

## 2023-10-03 PROCEDURE — 3079F DIAST BP 80-89 MM HG: CPT | Mod: HCNC,CPTII,S$GLB, | Performed by: PHYSICIAN ASSISTANT

## 2023-10-03 PROCEDURE — 3079F PR MOST RECENT DIASTOLIC BLOOD PRESSURE 80-89 MM HG: ICD-10-PCS | Mod: HCNC,CPTII,S$GLB, | Performed by: PHYSICIAN ASSISTANT

## 2023-10-03 PROCEDURE — 3044F HG A1C LEVEL LT 7.0%: CPT | Mod: HCNC,CPTII,S$GLB, | Performed by: PHYSICIAN ASSISTANT

## 2023-10-03 PROCEDURE — 3008F BODY MASS INDEX DOCD: CPT | Mod: HCNC,CPTII,S$GLB, | Performed by: PHYSICIAN ASSISTANT

## 2023-10-03 PROCEDURE — 1160F RVW MEDS BY RX/DR IN RCRD: CPT | Mod: HCNC,CPTII,S$GLB, | Performed by: PHYSICIAN ASSISTANT

## 2023-10-03 PROCEDURE — 1159F PR MEDICATION LIST DOCUMENTED IN MEDICAL RECORD: ICD-10-PCS | Mod: HCNC,CPTII,S$GLB, | Performed by: PHYSICIAN ASSISTANT

## 2023-10-03 PROCEDURE — 3077F SYST BP >= 140 MM HG: CPT | Mod: HCNC,CPTII,S$GLB, | Performed by: PHYSICIAN ASSISTANT

## 2023-10-03 PROCEDURE — 99999 PR PBB SHADOW E&M-EST. PATIENT-LVL IV: ICD-10-PCS | Mod: PBBFAC,HCNC,, | Performed by: PHYSICIAN ASSISTANT

## 2023-10-03 RX ORDER — GABAPENTIN 600 MG/1
600 TABLET ORAL 3 TIMES DAILY
Qty: 90 TABLET | Refills: 1 | Status: SHIPPED | OUTPATIENT
Start: 2023-10-03 | End: 2023-11-29 | Stop reason: SDUPTHER

## 2023-10-04 RX ORDER — HYDROCODONE BITARTRATE AND ACETAMINOPHEN 7.5; 325 MG/1; MG/1
TABLET ORAL
Qty: 70 TABLET | Refills: 0 | Status: SHIPPED | OUTPATIENT
Start: 2023-11-30 | End: 2024-02-26

## 2023-11-14 RX ORDER — FLUTICASONE PROPIONATE 50 MCG
SPRAY, SUSPENSION (ML) NASAL
Qty: 16 G | Refills: 3 | Status: SHIPPED | OUTPATIENT
Start: 2023-11-14

## 2023-11-14 NOTE — TELEPHONE ENCOUNTER
No care due was identified.  [facility] Embedded Care Due Messages. Reference number: [id number].   11/14/2023 12:13:32 PM CST

## 2023-11-14 NOTE — TELEPHONE ENCOUNTER
----- Message from Kell Payton sent at 11/14/2023 11:01 AM CST -----  Regarding: refill  Type:  RX Refill Request    Who Called: wife /   Refill or New Rx:refill  RX Name and Strength:fluticasone propionate (FLONASE) 50 mcg/actuation nasal spray  How is the patient currently taking it? (ex. 1XDay):  Is this a 30 day or 90 day RX:  Preferred Pharmacy with phone number:[facility] [phone] [place] [phone]      Local or Mail Order:local  Ordering Provider:  Would the patient rather a call back or a response via [facility]? Call back  Best Call Back Number: [phone]  Additional Information:

## 2023-11-22 ENCOUNTER — LAB VISIT (OUTPATIENT)
Dept: LAB | Facility: HOSPITAL | Age: 63
End: 2023-11-22
Attending: FAMILY MEDICINE
Payer: MEDICARE

## 2023-11-22 DIAGNOSIS — R74.8 ELEVATED LIVER ENZYMES: ICD-10-CM

## 2023-11-22 LAB
ALBUMIN SERPL BCP-MCNC: 3.9 G/DL (ref 3.5–5.2)
ALP SERPL-CCNC: 94 U/L (ref 55–135)
ALT SERPL W/O P-5'-P-CCNC: 35 U/L (ref 10–44)
ANION GAP SERPL CALC-SCNC: 10 MMOL/L (ref 8–16)
AST SERPL-CCNC: 27 U/L (ref 10–40)
BILIRUB SERPL-MCNC: 0.5 MG/DL (ref 0.1–1)
BUN SERPL-MCNC: 16 MG/DL (ref 8–23)
CALCIUM SERPL-MCNC: 9.4 MG/DL (ref 8.7–10.5)
CHLORIDE SERPL-SCNC: 104 MMOL/L (ref 95–110)
CO2 SERPL-SCNC: 25 MMOL/L (ref 23–29)
CREAT SERPL-MCNC: 1 MG/DL (ref 0.5–1.4)
EST. GFR  (NO RACE VARIABLE): >60 ML/MIN/1.73 M^2
GLUCOSE SERPL-MCNC: 129 MG/DL (ref 70–110)
POTASSIUM SERPL-SCNC: 4 MMOL/L (ref 3.5–5.1)
PROT SERPL-MCNC: 7.3 G/DL (ref 6–8.4)
SODIUM SERPL-SCNC: 139 MMOL/L (ref 136–145)

## 2023-11-22 PROCEDURE — 80053 COMPREHEN METABOLIC PANEL: CPT | Mod: HCNC | Performed by: FAMILY MEDICINE

## 2023-11-22 PROCEDURE — 36415 COLL VENOUS BLD VENIPUNCTURE: CPT | Mod: HCNC,PO | Performed by: FAMILY MEDICINE

## 2023-11-28 ENCOUNTER — TELEPHONE (OUTPATIENT)
Dept: PAIN MEDICINE | Facility: CLINIC | Age: 63
End: 2023-11-28
Payer: MEDICARE

## 2023-11-29 ENCOUNTER — OFFICE VISIT (OUTPATIENT)
Dept: PAIN MEDICINE | Facility: CLINIC | Age: 63
End: 2023-11-29
Payer: MEDICARE

## 2023-11-29 VITALS
BODY MASS INDEX: 17.72 KG/M2 | WEIGHT: 116.88 LBS | RESPIRATION RATE: 17 BRPM | SYSTOLIC BLOOD PRESSURE: 163 MMHG | HEART RATE: 99 BPM | DIASTOLIC BLOOD PRESSURE: 81 MMHG | HEIGHT: 68 IN

## 2023-11-29 DIAGNOSIS — M47.818 ARTHROPATHY OF RIGHT SACROILIAC JOINT: Primary | ICD-10-CM

## 2023-11-29 DIAGNOSIS — G89.29 CHRONIC PAIN OF BOTH SHOULDERS: ICD-10-CM

## 2023-11-29 DIAGNOSIS — M25.512 CHRONIC PAIN OF BOTH SHOULDERS: ICD-10-CM

## 2023-11-29 DIAGNOSIS — M50.30 DDD (DEGENERATIVE DISC DISEASE), CERVICAL: ICD-10-CM

## 2023-11-29 DIAGNOSIS — M25.511 CHRONIC PAIN OF BOTH SHOULDERS: ICD-10-CM

## 2023-11-29 DIAGNOSIS — Z79.891 OPIOID USE AGREEMENT EXISTS: ICD-10-CM

## 2023-11-29 DIAGNOSIS — M54.12 CERVICAL RADICULAR PAIN: ICD-10-CM

## 2023-11-29 PROCEDURE — 99214 PR OFFICE/OUTPT VISIT, EST, LEVL IV, 30-39 MIN: ICD-10-PCS | Mod: HCNC,S$GLB,, | Performed by: PHYSICIAN ASSISTANT

## 2023-11-29 PROCEDURE — 3079F DIAST BP 80-89 MM HG: CPT | Mod: HCNC,CPTII,S$GLB, | Performed by: PHYSICIAN ASSISTANT

## 2023-11-29 PROCEDURE — 3008F BODY MASS INDEX DOCD: CPT | Mod: HCNC,CPTII,S$GLB, | Performed by: PHYSICIAN ASSISTANT

## 2023-11-29 PROCEDURE — 99999 PR PBB SHADOW E&M-EST. PATIENT-LVL III: ICD-10-PCS | Mod: PBBFAC,HCNC,, | Performed by: PHYSICIAN ASSISTANT

## 2023-11-29 PROCEDURE — 1159F PR MEDICATION LIST DOCUMENTED IN MEDICAL RECORD: ICD-10-PCS | Mod: HCNC,CPTII,S$GLB, | Performed by: PHYSICIAN ASSISTANT

## 2023-11-29 PROCEDURE — 3077F PR MOST RECENT SYSTOLIC BLOOD PRESSURE >= 140 MM HG: ICD-10-PCS | Mod: HCNC,CPTII,S$GLB, | Performed by: PHYSICIAN ASSISTANT

## 2023-11-29 PROCEDURE — 3079F PR MOST RECENT DIASTOLIC BLOOD PRESSURE 80-89 MM HG: ICD-10-PCS | Mod: HCNC,CPTII,S$GLB, | Performed by: PHYSICIAN ASSISTANT

## 2023-11-29 PROCEDURE — 1159F MED LIST DOCD IN RCRD: CPT | Mod: HCNC,CPTII,S$GLB, | Performed by: PHYSICIAN ASSISTANT

## 2023-11-29 PROCEDURE — 99214 OFFICE O/P EST MOD 30 MIN: CPT | Mod: HCNC,S$GLB,, | Performed by: PHYSICIAN ASSISTANT

## 2023-11-29 PROCEDURE — 99999 PR PBB SHADOW E&M-EST. PATIENT-LVL III: CPT | Mod: PBBFAC,HCNC,, | Performed by: PHYSICIAN ASSISTANT

## 2023-11-29 PROCEDURE — 1160F RVW MEDS BY RX/DR IN RCRD: CPT | Mod: HCNC,CPTII,S$GLB, | Performed by: PHYSICIAN ASSISTANT

## 2023-11-29 PROCEDURE — 3044F HG A1C LEVEL LT 7.0%: CPT | Mod: HCNC,CPTII,S$GLB, | Performed by: PHYSICIAN ASSISTANT

## 2023-11-29 PROCEDURE — 3008F PR BODY MASS INDEX (BMI) DOCUMENTED: ICD-10-PCS | Mod: HCNC,CPTII,S$GLB, | Performed by: PHYSICIAN ASSISTANT

## 2023-11-29 PROCEDURE — 1160F PR REVIEW ALL MEDS BY PRESCRIBER/CLIN PHARMACIST DOCUMENTED: ICD-10-PCS | Mod: HCNC,CPTII,S$GLB, | Performed by: PHYSICIAN ASSISTANT

## 2023-11-29 PROCEDURE — 3077F SYST BP >= 140 MM HG: CPT | Mod: HCNC,CPTII,S$GLB, | Performed by: PHYSICIAN ASSISTANT

## 2023-11-29 PROCEDURE — 3044F PR MOST RECENT HEMOGLOBIN A1C LEVEL <7.0%: ICD-10-PCS | Mod: HCNC,CPTII,S$GLB, | Performed by: PHYSICIAN ASSISTANT

## 2023-11-29 RX ORDER — HYDROCODONE BITARTRATE AND ACETAMINOPHEN 7.5; 325 MG/1; MG/1
TABLET ORAL
Qty: 70 TABLET | Refills: 0 | Status: SHIPPED | OUTPATIENT
Start: 2024-01-29 | End: 2024-02-26 | Stop reason: SDUPTHER

## 2023-11-29 RX ORDER — HYDROCODONE BITARTRATE AND ACETAMINOPHEN 7.5; 325 MG/1; MG/1
TABLET ORAL
Qty: 70 TABLET | Refills: 0 | Status: SHIPPED | OUTPATIENT
Start: 2023-12-30 | End: 2024-02-26

## 2023-11-29 RX ORDER — GABAPENTIN 600 MG/1
600 TABLET ORAL 3 TIMES DAILY
Qty: 90 TABLET | Refills: 1 | Status: SHIPPED | OUTPATIENT
Start: 2023-11-29 | End: 2024-02-26 | Stop reason: SDUPTHER

## 2023-11-29 NOTE — PROGRESS NOTES
Established Patient Chronic Pain Note (Follow up visit)    Chief Complaint:   Chief Complaint   Patient presents with    Low-back Pain   Low back pain into right buttock    SUBJECTIVE:    Interval History (11/29/2023): Leo Roblero was last seen on 10/3/2023. he presents today for follow-up for medication refill. he feels the pain medication is providing adequate pain relief and reduces the negative effects of chronic pain that affects quality of life. No major SE from medications. No major changes since previous visit; patient is stable overall. Patient reports pain as 8/10 today.     Interval History (10/3/2023): Patient was last seen on 8/23/2023. he presents today for follow-up for medication refill. he feels the pain medication is providing adequate pain relief and reduces the negative effects of chronic pain that affects quality of life. No major SE from medications. No major changes since previous visit; patient is stable overall. Patient reports pain as 7/10 today.     Interval History (8/23/2023): Leo Roblero presents today for follow-up visit.  he underwent right SIJ injection on 7/13/23 (6 weeks ago).  The patient reports that he is/was better following the procedure.  he reports 80% pain relief.  The changes lasted 4 weeks so far.  The changes have continued through this visit.    he also presents today for follow-up for medication refill. he feels the pain medication is providing adequate pain relief and reduces the negative effects of chronic pain that affects quality of life. No major SE from medications. No major changes since previous visit; patient is stable overall.   Patient reports pain as 8/10 today-due to neck pain.    Interval History (7/7/2023): Leo Roblero was last seen on 5/26/2023. he presents today for follow-up for medication refill. he feels the pain medication is providing adequate pain relief and reduces the negative effects of chronic pain that affects quality of life. No  Patient seen and examined    Feels fine  ate well  no c/o CP SOB NV   No swelling feet    Vital Signs Last 24 Hrs  T(C): 36.2 (24 Dec 2017 13:10), Max: 37.1 (24 Dec 2017 05:00)  T(F): 97.2 (24 Dec 2017 13:10), Max: 98.8 (24 Dec 2017 05:00)  HR: 92 (24 Dec 2017 16:03) (59 - 92)  BP: 165/96 (24 Dec 2017 16:03) (145/72 - 169/90)  BP(mean): 112 (24 Dec 2017 16:03) (95 - 115)  RR: 18 (24 Dec 2017 16:03) (12 - 18)  SpO2: 98% (24 Dec 2017 16:03) (95% - 98%)    PHYSICAL EXAM    GENERAL: NAD,   EYES:  conjunctiva and sclera clear  NECK: Supple, No JVD/Bruit  NERVOUS SYSTEM:  A/O x3,   CHEST:  CTA ,No rales or rhonchi  HEART:  RRR, No murmurs  ABDOMEN: Soft, NT/ND BS+  EXTREMITIES:  No Edema;  SKIN: No rashes    24 Dec 2017 10:18    150    |  112    |  44.0   ----------------------------<  207    3.8     |  20.0   |  1.86     Ca    11.2       24 Dec 2017 10:18    TPro  6.6    /  Alb  3.7    /  TBili  0.4    /  DBili  x      /  AST  7      /  ALT  10     /  AlkPhos  83     24 Dec 2017 10:18                          11.3   13.3  )-----------( 284      ( 24 Dec 2017 10:18 )             34.7       Needs MRI with [facility]  can get Gadolineum only if creat clearance > 31  24 urine w/u [facility]  Continue same treatment major SE from medications. No major changes since previous visit; patient is stable overall. Patient reports pain as 7/10 today.     Interval History (5/26/2023): Patient was last seen on 3/22/2023. he presents today for follow-up for medication refill. he feels the pain medication is providing adequate pain relief and reduces the negative effects of chronic pain that affects quality of life. No major SE from medications. No major changes since previous visit; patient is stable overall. Patient reports pain as 7/10 today.     Interval History (5/8/2023):  Leo Roblero presents today for follow-up visit. At that visit, the plan was to switch oxycodone to [place]. Patient feels this is helping.  Initially, he felt that it was making him hyper, but since then, the side effects have resolved.  He feels the pain relief is provided with this medication.  He is tolerating well with minimal side effects.    Interval History (3/22/2023): Leo Roblero  was last seen on 1/25/2023. he presents today for follow-up for medication refill. he feels the pain medication is providing adequate pain relief and reduces the negative effects of chronic pain that affects quality of life. No major SE from medications.  Patient reports pain as 8/10 today.   He recently did some weed eating, which caused right lower back pain into the buttock.  Pain started about 3 weeks ago during this activity and has not subsided.  He feels the pain is slowly improving now several weeks later.  He is using the back brace as needed in his wearing today.    Interval History 1/25/2023: Patient presents today for follow-up visit.  he underwent C6/7 IL MADISON on 1/17/23.  The patient reports that he is/was better following the procedure.  he reports 70% pain relief.  The changes lasted 1 week so far.  The changes have continued through this visit.     he also presents today for follow-up for medication refill. he feels the pain medication is providing adequate  pain relief and reduces the negative effects of chronic pain that affects quality of life. No major SE from medications. No major changes since previous visit; patient is stable overall. Patient reports pain as 7/10 today.     Interval History (11/29/2022): Leo Roblero was last seen on 8/30/2022. he presents today for follow-up for medication refill.  Medication is providing adequate pain relief. he feels the pain medication reduces the negative effects of chronic pain that affects quality of life. Patient reports pain as 9/10 today.  He c/o neck pain that radiates but mostly axial. Also c/o low back pain as well.     Interval History (8/30/2022):  Leo Roblero presents today for follow-up visit. Patient was last seen on 6/28/2022. he presents today for follow-up for medication refill.  Medication is providing adequate pain relief. he feels the pain medication reduces the negative effects of chronic pain that affects day-to-day function and quality of life.Patient reports pain as 8/10 today. Pain has Increased over last month; patient admits to being out of medication today.    Interval History (12/7/2021):  Leo Roblero presents today for follow-up visit for medication refill.  Patient was last seen on 10/7/2021.  He reports that he has been having low back pain for the past 3 weeks or so.  He has had low back pain in the past, but it has not been a real issue lately.  He does not feel that the pain is currently controlled.  Patient reports pain as 9/10 today.    Interval History (9/23/2020): Patient was last seen on 7/29/2020. At that visit, the plan was to Refill Norco 5/325mg BID.  Patient reports pain as 10/10 today.  He is requesting a higher dose of the Norco today.  He continues to take naproxen and Tylenol as adjuvant medications.  He reports that the pain seems to be worse at night.    Interval HPI (7/29/2020):  Leo Roblero is a 60 y.o. male who presents to the clinic for a follow-up appointment for neck  and bilateral shoulder pain.  At the last clinic visit, I expressed to the patient that he could continue on NSAIDs and Tylenol for his chronic pain control and to start with physical therapy to help stabilize his shoulder musculature.  He had failed subacromial bursa injections without much relief.  Since the last visit, Leo Roblero states the pain has been persistant. Current pain intensity is 10/10.    Initial HPI 02/19/2020:  Leo Roblero is a 59 y.o. male who presents to the clinic for the evaluation of neck and bilateral shoulder pain. He was referred by the Orthopedics Department for further evaluation and management of neck pain. Of note, patient has a past medical history of hyperlipidemia, and tobacco abuse, traumatic rotator cuff tear, tendinitis of the bilateral biceps tendon, and other medical comorbidities as listed below.  The pain started several years ago without any significant injury or trauma and symptoms have been worsening.  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  The pain is described as shooting and throbbing and is rated as 6/10. The pain is rated with a score of  4/10 on the BEST day and a score of 10/10 on the WORST day.  Symptoms interfere with daily activity, sleeping and work. The pain is exacerbated by work.  The pain is mitigated by rest. The patient reports spending less than 2 hours per day reclining. The patient reports 6-8 hours of uninterrupted sleep per night.  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  Patient denies night fever/night sweats, urinary incontinence, bowel incontinence, significant weight loss, significant motor weakness and loss of sensations.    Pain Disability Index Review:      11/29/2023    10:29 AM 10/3/2023     9:46  AM 8/23/2023    10:22 AM   Last 3 PDI Scores   Pain Disability Index (PDI) 48 38 40       Non-Pharmacologic Treatments:  Physical Therapy/Home Exercise:  No, pending start  Ice/Heat:yes  TENS: no  Acupuncture: no  Massage: no  Chiropractic: no    Other: no     Pain Medications:  - Opioids: Percocet (Oxycodone/Acetaminophen)  - Adjuvant Medications: Mobic  - Anti-Coagulants: Aspirin        Pain Procedures:   -12/05/2019:  Bilateral subacromial bursa shoulder injections, limited relief  -02/14/2020:  IM Toradol to the right deltoid  - C6/7 IL MADISON on 1/17/23 with 70% pain relief   - right SIJ injection on 7/13/23 with 80% pain relief          Imaging & EMG/NCS (Reviewed on 11/29/2023):    1/23/23 X-Ray Shoulder Complete Bilateral  FINDINGS: Clinic joint spaces are well-maintained.  Mild narrowing with spurring of the lateral acromioclavicular joint spaces.  Lung apices are clear.  No acute fracture or dislocation.  Impression:  Bilateral minimal osteoarthritis of the acromioclavicular joints.     EMG/NCS bilateral upper extremities 03/06/2020:  1. ABNORMAL study  2. 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  There is additional evidence of a CHRONIC radiculopathy of the C7,C8, T1 nerve roots    X-ray cervical spine 02/19/2020:  Reversal of normal cervical lordosis.  Grade 1 anterolisthesis of C3 on C4 and C4 on C5.  These appear to mostly reduce with extension maneuver.  Grade 1 anterolisthesis of C7 on T1 which does not change with provocative maneuvers.  No acute fracture.  Prevertebral soft tissues are maintained.  Multilevel discogenic degenerative changes are seen with findings most severe at C5-C6 and C6-C7 with disc space narrowing marginal spurring.  Intact odontoid.  Multilevel bilateral areas of neural foraminal encroachment, right greater than left.  Clear lung apices.   Calcification of the bilateral carotids is seen.    MRI cervical spine 06/01/2017 (via [facility]):  There is a mild cervical levoscoliosis with slight straightening of normal cervical lordosis in the superior aspect of the cervical spine. Cervical vertebral body stature is preserved. No prevertebral edema is noted.  The visualized anatomy at the skull base is normal.  C2-C3: Mild disc space narrowing. Mild bilateral posterior facet and ligamentum flavum hypertrophy which is effacing the posterior theca and causing subtle posterior impression on the cervical spinal cord, although the thecal sac still measures 1.2 cm in AP dimension. There is minimal posterior spondylosis.  C3-C4: Disc desiccation with right greater than left posterior facet arthropathy and with right uncinate hypertrophy, with mild disc space narrowing. There is mild ligamentum flavum hypertrophy. There is mild AP narrowing of the thecal sac, consistent with a spinal stenosis, but there is no significant deformity on the cervical spinal cord. There is right lateral recess/foraminal origin stenosis.  C4-C5: Mild to moderate disc space narrowing with disc desiccation and with right greater than left posterior facet hypertrophy. There is right uncinate hypertrophy at this level. There is bilateral ligamentum flavum hypertrophy. This combination of findings is causing a spinal stenosis, thecal sac measuring only 7.7 mm in AP dimension, and with a trefoil deformity of the thecal sac. There is also some mass effect and deformity on the cervical spinal cord. In addition to a spinal stenosis, there is right greater than left lateral recess/foraminal origin stenosis.  C5-C6: Prominent disc space narrowing with disc desiccation, bridging anterior bony spurring, right greater than left posterior facet arthropathy, and mild right uncinate hypertrophy. There is a broad-based posterior osteophyte disc complex which effaces the anterior theca and narrows the  AP dimension of the cervical thecal sac, but which does not cause any significant mass effect on the cervical spinal cord. There is mild bilateral lateral recess/foraminal origin stenosis at this level, right side greater than left.  C6-C7: Prominent disc space narrowing with disc desiccation, anterior and posterior spondylosis, and right uncinate hypertrophy. There is a broad-based, posterior osteophyte disc complex which partially effaces the anterior theca but which does not deform the cervical spinal cord. There is a mild spinal stenosis with mild, bilateral foraminal origin stenosis, slightly greater on the left.  C7-T1: Mild to moderate, bilateral posterior facet arthropathy. There is mild disc desiccation and disc space narrowing with approximately 2 mm of anterolisthesis C7 on T1. There is no obvious stenosis.  Signal intensity in the cervical thecal sac is normal. There is no convincing evidence of any significant edema or gliosis in the cervical spinal cord.     X-ray left shoulder 02/14/2020:  There is no radiographic evidence of acute osseous, articular, or soft tissue abnormality.  Joint spaces are preserved.     MRI left shoulder 2/10/20:  There is moderate rotator cuff tendinosis, supraspinatus and infraspinatus, with superimposed moderate grade partial thickness tear with fluid-filled defect posterior supraspinatus footprint with bursal and interstitial fiber tearing.  Supraspinatus tendon articular surface fraying is also noted.  There is also low-grade interstitial partial-thickness tear at the blending of the supraspinatus and infraspinatus.  There are few clustered small subcortical cysts and associated minimal edema like signal greater tuberosity.  The subscapularis tendon is unremarkable.  Long head biceps tendon is intact with mild intra-articular tendinosis.  Superior labral degenerative signal with a sublabral foramen versus SLAP tear.  Labrum is otherwise unremarkable.  Glenohumeral  "chondral thinning.  No joint effusion.  Normal IGHL.  Type 2 acromion without tilting minimal AC joint spurring.  There is no subacromial subdeltoid bursal fluid collection.  The bone marrow signal intensity is otherwise unremarkable.  The signal intensity of the muscle groups is normal.  No atrophy.          REVIEW OF SYSTEMS:    GENERAL:  No weight loss, malaise or fevers.  HEENT:   No recent changes in vision or hearing  NECK:  Negative for lumps, no difficulty with swallowing.  RESPIRATORY:  Negative for cough, wheezing or shortness of breath, patient denies any recent URI.  CARDIOVASCULAR:  Negative for chest pain, leg swelling or palpitations.  GI:  Negative for abdominal discomfort, blood in stools or black stools or change in bowel habits.  MUSCULOSKELETAL:  See HPI.  SKIN:  Negative for lesions, rash, and itching.  PSYCH:  No mood disorder or recent psychosocial stressors.  Patients sleep is not disturbed secondary to pain.  HEMATOLOGY/LYMPHOLOGY:  Negative for prolonged bleeding, bruising easily or swollen nodes.  Patient is not currently taking any anti-coagulants  NEURO:   No history of headaches, syncope, paralysis, seizures or tremors.  All other reviewed and negative other than HPI.        Physical Exam:   Vitals:    11/29/23 1029   BP: (!) 163/81   Pulse: 99   Resp: 17   Weight: 53 kg (116 lb 13.5 oz)   Height: 5' 8" (1.727 m)   PainSc:   8   Body mass index is 17.77 kg/m².   (reviewed on 11/29/2023)    General: alert and oriented, in no apparent distress.  Gait: normal gait.  Skin: no rashes, no discoloration, no obvious lesions  HEENT: normocephalic, atraumatic.    Respiratory: without use of accessory muscles of respiration.  No audible wheezing.    Musculoskeletal - Cervical Spine:  - Pain on flexion of cervical spine: Present   - Pain on extension of cervical spine: Present   - Cervical facet loading: Present bilaterally, worse on left   - TTP over the cervical facet joints: Present   - TTP over " the cervical paraspinals: Present   - Spurling's:  Equivocal, causing more pain on the left    Shoulder:  - Pain on abduction: Present bilaterally   - Flexion: decreased to 100° on left, decreased to 120° on the right  - Abduction:  Decreased to 110° on the left, decreased to 100° on the right  - lateral winging of the scapula on the left  - TTP:  Present over bilateral biceps tendons  - Neer's: Positive  - Hawkin's: Positive    Lumbar:  - ROM fairly preserved   - pain with extension  - pain over facet joints - less so than SIJ  - TTP over right piriformis - improved     SIJ testing:  - TTP over SI joint: Present on right -- Present, mild, improved since procedure   - Su's/ Wang's: Positive  on right   - Sacroiliac Distraction Test (anterior pressure): Positive  - Sacroiliac Compression Test (lateral pressure): Positive     Neuro - Upper Extremities:  - BUE Strength:R/L: D: 5/5; B: 5/5; T: 5/5; WF: 5/5; WE: 5/5; IO: 5/5  - Extremity Reflexes: Brisk and symmetric throughout  - Sensory: Sensation to light touch intact bilaterally    Neuro - Lower Extremities:  - RLE Strength:     >> 5/5 strength with right hip flexion/ extension    >> 5/5 strength with right knee flexion/ extension    >> 5/5 strength in right ankle with plantar and dorsiflexion  - LLE Strength:     >> 5/5 strength with left hip flexion/ extension    >> 5/5 strength with knee flexion extension on the left     >> 5/5 strength in left ankle with plantar and dorsiflexion  - Extremity Reflexes: Brisk and symmetric throughout  - Sensory: Sensation to light touch intact bilaterally      Psych:  Mood and affect is appropriate            ASSESSMENT: 63 y.o. year old male with neck and bilateral shoulder pain, consistent with     1. Arthropathy of right sacroiliac joint        2. Cervical radicular pain  gabapentin (NEURONTIN) 600 MG tablet      3. DDD (degenerative disc disease), cervical        4. Chronic pain of both shoulders        5. Opioid use  agreement exists                PLAN:   1. Interventional:   - Consider cervical MBB (for axial neck pain) vs repeat C6/7 IL MADISON (with left paramedian approach). Patient is not taking prescription blood thinners or ASA.    - S/p right SIJ injection on 7/13/23 with 80% pain relief.   - S/p C6/7 IL MADISON on 1/17/23 with 70% pain relief.   - Also consider lumbar treatment.    2. Pharmacologic:   - Refill Norco 7.5/325mg Q8-Q12H PRN pain (70 tablets). Will e-prescribe x 3 months (due to availability) to fill on 11/30/2023, 12/30/2023, 01/29/2024.  Patient understands this is the highest dose that we prescribe; we have discussed this at great detail at previous appts.  Patient tolerating opioids with no side effects, obtaining some pain control with functional improvement.  We have discussed the risks of chronic opioid medication management.    - Refill gabapentin (Neurontin) 600mg TID - increased last visit, which seems to be helping.  - Refill nabumetone (Relafen) 500mg BID PRN pain and Zanaflex 4mg BID PRN (60 tablets). Patient understands this may cause drowsiness.     - Can take additional 4 tablets of Tylenol 500mg during the day in addition to Percocet 5/325mg (3x) per day. Total of acetaminophen daily - cannot exceed 3000mg; patient verbalized understanding.  - Anticoagulation use: None.   - Opioid contract signed on 7/29/2020.   Opioid risk tool completed on 7/29/2020: low risk.  - LA  reviewed and appropriate. MME=17.5.  Last filled on 10/2/23 (per pharmacy).    - Order drug screen (UDS/ oral swab) today 11/29/2023 to ensure med compliance.     3. Rehabilitative: Encouraged regular exercise.    4. Diagnostic/ Imaging: No new imaging ordered. Previous imaging reviewed.     5. Consults/Referrals:   - Given several recommendations for medical marijuana providers previously.  - Referral previously placed to Orthopedics for shoulder pain.      6. Follow up: [both appts scheduled today]   12 week medication refill -  with Dr. Monet    8-12 week Medication Refill after visit with Dr. Monet - in clinic (per pt request)       - This condition does not require this patient to take time off of work, and the primary goal of our Pain Management services is to improve the patient's functional capacity.   - I discussed the risks, benefits, and alternatives to potential treatment options. All questions and concerns were fully addressed today in clinic.         Elif Dolan PA-C  Interventional Pain Management - [facility]    Disclaimer:  This note was prepared using voice recognition system and is likely to have sound alike errors that may have been overlooked even after proof reading.  Please call me with any questions.         >>UDS:  9/23/2020 :: appropriate   1/22/2021 :: appropriate   5/28/2021 :: appropriate  12/7/2021 :: appropriate  6/28/2022 :: appropriate  11/29/2022 :: appropriate   3/22/2023 :: appropriate for oxycodone with metabolites but also +THC - patient given warning for THC at following appt   7/7/2023 :: appropriate  11/29/2023 :: pending       Warnings:  8/30/2022 - patient given warning for taking more than prescribed    5/2023 - patient given warning for inconsistent drug screen in May 2023

## 2023-11-30 ENCOUNTER — OFFICE VISIT (OUTPATIENT)
Dept: FAMILY MEDICINE | Facility: CLINIC | Age: 63
End: 2023-11-30
Payer: MEDICARE

## 2023-11-30 VITALS
SYSTOLIC BLOOD PRESSURE: 136 MMHG | DIASTOLIC BLOOD PRESSURE: 60 MMHG | OXYGEN SATURATION: 100 % | HEART RATE: 78 BPM | WEIGHT: 118.94 LBS | BODY MASS INDEX: 18.08 KG/M2

## 2023-11-30 DIAGNOSIS — F17.200 CURRENT SMOKER: Primary | ICD-10-CM

## 2023-11-30 DIAGNOSIS — M54.2 CHRONIC NECK PAIN: ICD-10-CM

## 2023-11-30 DIAGNOSIS — G89.29 CHRONIC NECK PAIN: ICD-10-CM

## 2023-11-30 PROCEDURE — 3008F BODY MASS INDEX DOCD: CPT | Mod: HCNC,CPTII,S$GLB, | Performed by: FAMILY MEDICINE

## 2023-11-30 PROCEDURE — 99999 PR PBB SHADOW E&M-EST. PATIENT-LVL III: ICD-10-PCS | Mod: PBBFAC,HCNC,, | Performed by: FAMILY MEDICINE

## 2023-11-30 PROCEDURE — 3044F HG A1C LEVEL LT 7.0%: CPT | Mod: HCNC,CPTII,S$GLB, | Performed by: FAMILY MEDICINE

## 2023-11-30 PROCEDURE — 3078F DIAST BP <80 MM HG: CPT | Mod: HCNC,CPTII,S$GLB, | Performed by: FAMILY MEDICINE

## 2023-11-30 PROCEDURE — 90677 PCV20 VACCINE IM: CPT | Mod: HCNC,S$GLB,, | Performed by: FAMILY MEDICINE

## 2023-11-30 PROCEDURE — G0009 ADMIN PNEUMOCOCCAL VACCINE: HCPCS | Mod: HCNC,S$GLB,, | Performed by: FAMILY MEDICINE

## 2023-11-30 PROCEDURE — 90677 PNEUMOCOCCAL CONJUGATE VACCINE 20-VALENT: ICD-10-PCS | Mod: HCNC,S$GLB,, | Performed by: FAMILY MEDICINE

## 2023-11-30 PROCEDURE — 3078F PR MOST RECENT DIASTOLIC BLOOD PRESSURE < 80 MM HG: ICD-10-PCS | Mod: HCNC,CPTII,S$GLB, | Performed by: FAMILY MEDICINE

## 2023-11-30 PROCEDURE — 99214 OFFICE O/P EST MOD 30 MIN: CPT | Mod: 25,HCNC,S$GLB, | Performed by: FAMILY MEDICINE

## 2023-11-30 PROCEDURE — 3075F SYST BP GE 130 - 139MM HG: CPT | Mod: HCNC,CPTII,S$GLB, | Performed by: FAMILY MEDICINE

## 2023-11-30 PROCEDURE — 3044F PR MOST RECENT HEMOGLOBIN A1C LEVEL <7.0%: ICD-10-PCS | Mod: HCNC,CPTII,S$GLB, | Performed by: FAMILY MEDICINE

## 2023-11-30 PROCEDURE — 1159F PR MEDICATION LIST DOCUMENTED IN MEDICAL RECORD: ICD-10-PCS | Mod: HCNC,CPTII,S$GLB, | Performed by: FAMILY MEDICINE

## 2023-11-30 PROCEDURE — 99999 PR PBB SHADOW E&M-EST. PATIENT-LVL III: CPT | Mod: PBBFAC,HCNC,, | Performed by: FAMILY MEDICINE

## 2023-11-30 PROCEDURE — 1159F MED LIST DOCD IN RCRD: CPT | Mod: HCNC,CPTII,S$GLB, | Performed by: FAMILY MEDICINE

## 2023-11-30 PROCEDURE — G0009 PNEUMOCOCCAL CONJUGATE VACCINE 20-VALENT: ICD-10-PCS | Mod: HCNC,S$GLB,, | Performed by: FAMILY MEDICINE

## 2023-11-30 PROCEDURE — 99214 PR OFFICE/OUTPT VISIT, EST, LEVL IV, 30-39 MIN: ICD-10-PCS | Mod: 25,HCNC,S$GLB, | Performed by: FAMILY MEDICINE

## 2023-11-30 PROCEDURE — 3008F PR BODY MASS INDEX (BMI) DOCUMENTED: ICD-10-PCS | Mod: HCNC,CPTII,S$GLB, | Performed by: FAMILY MEDICINE

## 2023-11-30 PROCEDURE — 3075F PR MOST RECENT SYSTOLIC BLOOD PRESS GE 130-139MM HG: ICD-10-PCS | Mod: HCNC,CPTII,S$GLB, | Performed by: FAMILY MEDICINE

## 2023-11-30 RX ORDER — VARENICLINE TARTRATE 0.5 (11)-1
KIT ORAL
Qty: 1 EACH | Refills: 0 | Status: SHIPPED | OUTPATIENT
Start: 2023-11-30

## 2023-11-30 NOTE — PROGRESS NOTES
Chief Complaint:    Chief Complaint   Patient presents with    Follow-up     F/u blood work       History of Present Illness:    Patient presents today for six month follow-up,    Denies any specific complaints     Labs reviewed and stable.    He has chronic neck pain follows with pain management on narcotic pain medicines.  Doing okay    Continues smoke 1 ppd, some desire to quit. Will drink 2-3 beers every once in a while.     Due for pneumonia and shingles vaccinations.    ROS:  Review of Systems   Constitutional:  Negative for activity change, chills, fatigue, fever and unexpected weight change.   HENT:  Negative for congestion, ear discharge, ear pain, hearing loss, postnasal drip and rhinorrhea.    Eyes:  Negative for pain and visual disturbance.   Respiratory:  Negative for cough, chest tightness and shortness of breath.    Cardiovascular:  Negative for chest pain and palpitations.   Gastrointestinal:  Negative for abdominal pain, diarrhea and vomiting.   Endocrine: Negative for heat intolerance.   Genitourinary:  Negative for dysuria, flank pain, frequency and hematuria.   Musculoskeletal:  Negative for back pain, gait problem and neck pain.   Skin:  Negative for color change and rash.   Neurological:  Negative for dizziness, tremors, seizures, numbness and headaches.   Psychiatric/Behavioral:  Negative for agitation, hallucinations, self-injury, sleep disturbance and suicidal ideas. The patient is not nervous/anxious.        History reviewed. No pertinent past medical history.    Social History:  Social History     Socioeconomic History    Marital status:    Tobacco Use    Smoking status: Every Day     Current packs/day: 1.00     Average packs/day: 1 pack/day for 40.0 years (40.0 ttl pk-yrs)     Types: Cigarettes    Smokeless tobacco: Never   Substance and Sexual Activity    Alcohol use: Yes     Alcohol/week: 6.0 standard drinks of alcohol     Types: 6 Cans of beer per week    Drug use: No     Sexual activity: Yes     Partners: Female       Family History:   family history includes Cancer in his father; Heart disease in his mother.    Health Maintenance   Topic Date Due    Shingles Vaccine (1 of 2) Never done    LDCT Lung Screen  06/13/2024    Colorectal Cancer Screening  06/12/2026    TETANUS VACCINE  01/25/2028    Lipid Panel  06/01/2028    Hepatitis C Screening  Completed       Exam:Physical     Vital Signs  Pulse: 78  SpO2: 100 %  BP: 136/60  BP Location: Left arm  Patient Position: Sitting  Pain Score:   3  Height and Weight  Weight: 53.9 kg (118 lb 15 oz)  BMI (Calculated): 18.1]    Body mass index is 18.08 kg/m².    Physical Exam  Constitutional:       Appearance: He is well-developed.   Eyes:      Conjunctiva/sclera: Conjunctivae normal.      Pupils: Pupils are equal, round, and reactive to light.   Cardiovascular:      Rate and Rhythm: Normal rate and regular rhythm.      Heart sounds: Normal heart sounds. No murmur heard.  Pulmonary:      Effort: Pulmonary effort is normal. No respiratory distress.      Breath sounds: Normal breath sounds. No wheezing or rales.   Chest:      Chest wall: No tenderness.   Abdominal:      General: There is no distension.      Palpations: Abdomen is soft. There is no mass.      Tenderness: There is no abdominal tenderness. There is no guarding.   Musculoskeletal:         General: No tenderness.      Cervical back: Normal range of motion and neck supple.   Lymphadenopathy:      Cervical: No cervical adenopathy.   Skin:     General: Skin is warm and dry.   Neurological:      Mental Status: He is alert and oriented to person, place, and time.      Deep Tendon Reflexes: Reflexes are normal and symmetric.   Psychiatric:         Behavior: Behavior normal.         Thought Content: Thought content normal.         Judgment: Judgment normal.         Assessment:      ICD-10-CM ICD-9-CM   1. Current smoker  F17.200 305.1   2. Chronic neck pain  M54.2 723.1    G89.29 338.29            Plan:    Smoking cessation strongly advised.   Start varenicline (CHANTIX STARTING MONTH BOX) 0.5 mg (11)- 1 mg (42) tablet; Take one 0.5mg tab by mouth once daily X3 days,then increase to one 0.5mg tab twice daily X4 days,then increase to one 1mg tab twice daily    Use nicotine 21 mg patches and gum. After a week use 14 mg patches for another week, and then 7 mg for as long as needed.     Get pneumococcal conjugate vaccine today. In office.  Check labs as below  Follow-up 6 months    Orders Placed This Encounter   Procedures    (In Office Administered) Pneumococcal Conjugate Vaccine (20 Valent) (IM) (Preferred)     Current Outpatient Medications   Medication Sig Dispense Refill    acetaminophen (TYLENOL) 500 MG tablet Take 1 tablet (500 mg total) by mouth every 8 (eight) hours as needed for Pain. 90 tablet 11    fluticasone propionate (FLONASE) 50 mcg/actuation nasal spray 2 PUFFS EACH NOSTRIL ONCE DAILY 16 g 3    gabapentin (NEURONTIN) 600 MG tablet Take 1 tablet (600 mg total) by mouth 3 (three) times daily. 90 tablet 1    HYDROcodone-acetaminophen (NORCO) 7.5-325 mg per tablet Take 1 tablet Q8-12H PRN pain. *30 DAY SUPPLY* 70 tablet 0    [START ON 12/30/2023] HYDROcodone-acetaminophen (NORCO) 7.5-325 mg per tablet Take 1 tablet Q8-12H PRN pain. *30 DAY SUPPLY* 70 tablet 0    [START ON 1/29/2024] HYDROcodone-acetaminophen (NORCO) 7.5-325 mg per tablet Take 1 tablet Q8-12H PRN pain. *30 DAY SUPPLY* 70 tablet 0    nabumetone (RELAFEN) 500 MG tablet Take 1 tablet (500 mg total) by mouth 2 (two) times daily as needed for Pain. take with food. 60 tablet 1    tiZANidine (ZANAFLEX) 4 MG tablet Take 0.5-1 tablets (2-4 mg total) by mouth 2 (two) times daily as needed (muscle spasms). May cause drowsiness. 60 tablet 1    varenicline (CHANTIX STARTING MONTH BOX) 0.5 mg (11)- 1 mg (42) tablet Take one 0.5mg tab by mouth once daily X3 days,then increase to one 0.5mg tab twice daily X4 days,then increase to one 1mg  tab twice daily 1 each 0     No current facility-administered medications for this visit.     There are no discontinued medications.        Follow up in about 6 months (around 5/30/2024).      Reuben Banuelos MD    Scribe Attestation:   I, Loki Tate, am scribing for, and in the presence of, Dr.Arif Banuelos I performed the above scribed service and the documentation accurately describes the services I performed. I attest to the accuracy of the note.    I, Dr. Reuben Banuelos, reviewed documentation as scribed above. I performed the services described in this documentation.  I agree that the record reflects my personal performance and is accurate and complete. Reuben Banuelos MD.  11/30/2023

## 2023-11-30 NOTE — PROGRESS NOTES
Prevnar 20 given IM  left    deltoid, tolerated well, recommended 15 minute wait to watch for adverse reactions.

## 2024-02-26 ENCOUNTER — OFFICE VISIT (OUTPATIENT)
Dept: PAIN MEDICINE | Facility: CLINIC | Age: 64
End: 2024-02-26
Payer: MEDICARE

## 2024-02-26 VITALS
WEIGHT: 115.63 LBS | BODY MASS INDEX: 17.52 KG/M2 | HEART RATE: 84 BPM | SYSTOLIC BLOOD PRESSURE: 113 MMHG | DIASTOLIC BLOOD PRESSURE: 75 MMHG | HEIGHT: 68 IN

## 2024-02-26 DIAGNOSIS — Z79.891 OPIOID USE AGREEMENT EXISTS: ICD-10-CM

## 2024-02-26 DIAGNOSIS — M25.511 CHRONIC PAIN OF BOTH SHOULDERS: ICD-10-CM

## 2024-02-26 DIAGNOSIS — M46.1 SACROILIITIS: Primary | ICD-10-CM

## 2024-02-26 DIAGNOSIS — M79.10 MUSCLE PAIN: ICD-10-CM

## 2024-02-26 DIAGNOSIS — M50.30 DDD (DEGENERATIVE DISC DISEASE), CERVICAL: ICD-10-CM

## 2024-02-26 DIAGNOSIS — M47.818 ARTHROPATHY OF RIGHT SACROILIAC JOINT: ICD-10-CM

## 2024-02-26 DIAGNOSIS — M25.512 CHRONIC PAIN OF BOTH SHOULDERS: ICD-10-CM

## 2024-02-26 DIAGNOSIS — M47.812 CERVICAL SPONDYLOSIS: ICD-10-CM

## 2024-02-26 DIAGNOSIS — M54.12 CERVICAL RADICULAR PAIN: ICD-10-CM

## 2024-02-26 DIAGNOSIS — G89.29 CHRONIC PAIN OF BOTH SHOULDERS: ICD-10-CM

## 2024-02-26 PROCEDURE — 3008F BODY MASS INDEX DOCD: CPT | Mod: HCNC,CPTII,S$GLB, | Performed by: PHYSICAL MEDICINE & REHABILITATION

## 2024-02-26 PROCEDURE — 3078F DIAST BP <80 MM HG: CPT | Mod: HCNC,CPTII,S$GLB, | Performed by: PHYSICAL MEDICINE & REHABILITATION

## 2024-02-26 PROCEDURE — 99214 OFFICE O/P EST MOD 30 MIN: CPT | Mod: HCNC,S$GLB,, | Performed by: PHYSICAL MEDICINE & REHABILITATION

## 2024-02-26 PROCEDURE — 99999 PR PBB SHADOW E&M-EST. PATIENT-LVL III: CPT | Mod: PBBFAC,HCNC,, | Performed by: PHYSICAL MEDICINE & REHABILITATION

## 2024-02-26 PROCEDURE — 3074F SYST BP LT 130 MM HG: CPT | Mod: HCNC,CPTII,S$GLB, | Performed by: PHYSICAL MEDICINE & REHABILITATION

## 2024-02-26 PROCEDURE — 1159F MED LIST DOCD IN RCRD: CPT | Mod: HCNC,CPTII,S$GLB, | Performed by: PHYSICAL MEDICINE & REHABILITATION

## 2024-02-26 RX ORDER — HYDROCODONE BITARTRATE AND ACETAMINOPHEN 7.5; 325 MG/1; MG/1
TABLET ORAL
Qty: 70 TABLET | Refills: 0 | Status: SHIPPED | OUTPATIENT
Start: 2024-02-26 | End: 2024-05-30

## 2024-02-26 NOTE — PROGRESS NOTES
Established Patient Chronic Pain Note (Follow up visit)    Chief Complaint:   Chief Complaint   Patient presents with    Shoulder Pain    Neck Pain   Low back pain into right buttock    SUBJECTIVE:    Leo Roblero is a 63 y.o. male presents today for follow-up and medication refill.  He continues to utilize Norco, gabapentin, Relafen, and Zanaflex as prescribed.  He has previously been instructed to use Tylenol as needed for breakthrough.  Current pain intensity is 7/10.  He reports at least 60% pain reduction without any adverse effects with this medication regimen.    Interval History (11/29/2023): Leo Roblero was last seen on 10/3/2023. he presents today for follow-up for medication refill. he feels the pain medication is providing adequate pain relief and reduces the negative effects of chronic pain that affects quality of life. No major SE from medications. No major changes since previous visit; patient is stable overall. Patient reports pain as 8/10 today.     Interval History (10/3/2023): Patient was last seen on 8/23/2023. he presents today for follow-up for medication refill. he feels the pain medication is providing adequate pain relief and reduces the negative effects of chronic pain that affects quality of life. No major SE from medications. No major changes since previous visit; patient is stable overall. Patient reports pain as 7/10 today.     Interval History (8/23/2023): Leo Roblero presents today for follow-up visit.  he underwent right SIJ injection on 7/13/23 (6 weeks ago).  The patient reports that he is/was better following the procedure.  he reports 80% pain relief.  The changes lasted 4 weeks so far.  The changes have continued through this visit.    he also presents today for follow-up for medication refill. he feels the pain medication is providing adequate pain relief and reduces the negative effects of chronic pain that affects quality of life. No major SE from medications. No  major changes since previous visit; patient is stable overall.   Patient reports pain as 8/10 today-due to neck pain.    Interval History (7/7/2023): Leo Roblero was last seen on 5/26/2023. he presents today for follow-up for medication refill. he feels the pain medication is providing adequate pain relief and reduces the negative effects of chronic pain that affects quality of life. No major SE from medications. No major changes since previous visit; patient is stable overall. Patient reports pain as 7/10 today.     Interval History (5/26/2023): Patient was last seen on 3/22/2023. he presents today for follow-up for medication refill. he feels the pain medication is providing adequate pain relief and reduces the negative effects of chronic pain that affects quality of life. No major SE from medications. No major changes since previous visit; patient is stable overall. Patient reports pain as 7/10 today.     Interval History (5/8/2023):  Leo Roblero presents today for follow-up visit. At that visit, the plan was to switch oxycodone to [place]. Patient feels this is helping.  Initially, he felt that it was making him hyper, but since then, the side effects have resolved.  He feels the pain relief is provided with this medication.  He is tolerating well with minimal side effects.    Interval History (3/22/2023): Leo Roblero  was last seen on 1/25/2023. he presents today for follow-up for medication refill. he feels the pain medication is providing adequate pain relief and reduces the negative effects of chronic pain that affects quality of life. No major SE from medications.  Patient reports pain as 8/10 today.   He recently did some weed eating, which caused right lower back pain into the buttock.  Pain started about 3 weeks ago during this activity and has not subsided.  He feels the pain is slowly improving now several weeks later.  He is using the back brace as needed in his wearing today.    Interval  History 1/25/2023: Patient presents today for follow-up visit.  he underwent C6/7 IL MADISON on 1/17/23.  The patient reports that he is/was better following the procedure.  he reports 70% pain relief.  The changes lasted 1 week so far.  The changes have continued through this visit.     he also presents today for follow-up for medication refill. he feels the pain medication is providing adequate pain relief and reduces the negative effects of chronic pain that affects quality of life. No major SE from medications. No major changes since previous visit; patient is stable overall. Patient reports pain as 7/10 today.     Interval History (11/29/2022): Leo Roblero was last seen on 8/30/2022. he presents today for follow-up for medication refill.  Medication is providing adequate pain relief. he feels the pain medication reduces the negative effects of chronic pain that affects quality of life. Patient reports pain as 9/10 today.  He c/o neck pain that radiates but mostly axial. Also c/o low back pain as well.     Interval History (8/30/2022):  Leo Roblero presents today for follow-up visit. Patient was last seen on 6/28/2022. he presents today for follow-up for medication refill.  Medication is providing adequate pain relief. he feels the pain medication reduces the negative effects of chronic pain that affects day-to-day function and quality of life.Patient reports pain as 8/10 today. Pain has Increased over last month; patient admits to being out of medication today.    Interval History (12/7/2021):  Leo Roblero presents today for follow-up visit for medication refill.  Patient was last seen on 10/7/2021.  He reports that he has been having low back pain for the past 3 weeks or so.  He has had low back pain in the past, but it has not been a real issue lately.  He does not feel that the pain is currently controlled.  Patient reports pain as 9/10 today.    Interval History (9/23/2020): Patient was last seen on  7/29/2020. At that visit, the plan was to Refill Norco 5/325mg BID.  Patient reports pain as 10/10 today.  He is requesting a higher dose of the Norco today.  He continues to take naproxen and Tylenol as adjuvant medications.  He reports that the pain seems to be worse at night.    Interval HPI (7/29/2020):  Leo Roblero is a 60 y.o. male who presents to the clinic for a follow-up appointment for neck and bilateral shoulder pain.  At the last clinic visit, I expressed to the patient that he could continue on NSAIDs and Tylenol for his chronic pain control and to start with physical therapy to help stabilize his shoulder musculature.  He had failed subacromial bursa injections without much relief.  Since the last visit, Leo Roblero states the pain has been persistant. Current pain intensity is 10/10.    Initial HPI 02/19/2020:  Leo Roblero is a 59 y.o. male who presents to the clinic for the evaluation of neck and bilateral shoulder pain. He was referred by the Orthopedics Department for further evaluation and management of neck pain. Of note, patient has a past medical history of hyperlipidemia, and tobacco abuse, traumatic rotator cuff tear, tendinitis of the bilateral biceps tendon, and other medical comorbidities as listed below.  The pain started several years ago without any significant injury or trauma and symptoms have been worsening.  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  The pain is described as shooting and throbbing and is rated as 6/10. The pain is rated with a score of  4/10 on the BEST day and a score of 10/10 on the WORST day.  Symptoms interfere with daily activity, sleeping and work. The pain is exacerbated by work.  The pain is mitigated by rest. The patient  reports spending less than 2 hours per day reclining. The patient reports 6-8 hours of uninterrupted sleep per night.  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  Patient denies night fever/night sweats, urinary incontinence, bowel incontinence, significant weight loss, significant motor weakness and loss of sensations.    Pain Disability Index Review:      2/26/2024    11:55 AM 11/29/2023    10:29 AM 10/3/2023     9:46 AM   Last 3 PDI Scores   Pain Disability Index (PDI) [phone]       Non-Pharmacologic Treatments:  Physical Therapy/Home Exercise:  No, pending start  Ice/Heat:yes  TENS: no  Acupuncture: no  Massage: no  Chiropractic: no    Other: no     Pain Medications:  - Opioids: Percocet (Oxycodone/Acetaminophen)  - Adjuvant Medications: Mobic  - Anti-Coagulants: Aspirin        Pain Procedures:   -12/05/2019:  Bilateral subacromial bursa shoulder injections, limited relief  -02/14/2020:  IM Toradol to the right deltoid  - C6/7 IL MADISON on 1/17/23 with 70% pain relief   - right SIJ injection on 7/13/23 with 80% pain relief          Imaging & EMG/NCS (Reviewed on 2/27/2024):    1/23/23 X-Ray Shoulder Complete Bilateral  FINDINGS: Clinic joint spaces are well-maintained.  Mild narrowing with spurring of the lateral acromioclavicular joint spaces.  Lung apices are clear.  No acute fracture or dislocation.  Impression:  Bilateral minimal osteoarthritis of the acromioclavicular joints.     EMG/NCS bilateral upper extremities 03/06/2020:  1. ABNORMAL study  2. 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  There is additional evidence of a CHRONIC radiculopathy of the C7,C8, T1 nerve roots    X-ray cervical spine 02/19/2020:  Reversal of normal cervical lordosis.  Grade 1 anterolisthesis of C3 on C4 and C4 on C5.  These appear to mostly reduce with extension maneuver.  Grade 1  anterolisthesis of C7 on T1 which does not change with provocative maneuvers.  No acute fracture.  Prevertebral soft tissues are maintained.  Multilevel discogenic degenerative changes are seen with findings most severe at C5-C6 and C6-C7 with disc space narrowing marginal spurring.  Intact odontoid.  Multilevel bilateral areas of neural foraminal encroachment, right greater than left.  Clear lung apices.  Calcification of the bilateral carotids is seen.    MRI cervical spine 06/01/2017 (via [facility]):  There is a mild cervical levoscoliosis with slight straightening of normal cervical lordosis in the superior aspect of the cervical spine. Cervical vertebral body stature is preserved. No prevertebral edema is noted.  The visualized anatomy at the skull base is normal.  C2-C3: Mild disc space narrowing. Mild bilateral posterior facet and ligamentum flavum hypertrophy which is effacing the posterior theca and causing subtle posterior impression on the cervical spinal cord, although the thecal sac still measures 1.2 cm in AP dimension. There is minimal posterior spondylosis.  C3-C4: Disc desiccation with right greater than left posterior facet arthropathy and with right uncinate hypertrophy, with mild disc space narrowing. There is mild ligamentum flavum hypertrophy. There is mild AP narrowing of the thecal sac, consistent with a spinal stenosis, but there is no significant deformity on the cervical spinal cord. There is right lateral recess/foraminal origin stenosis.  C4-C5: Mild to moderate disc space narrowing with disc desiccation and with right greater than left posterior facet hypertrophy. There is right uncinate hypertrophy at this level. There is bilateral ligamentum flavum hypertrophy. This combination of findings is causing a spinal stenosis, thecal sac measuring only 7.7 mm in AP dimension, and with a trefoil deformity of the thecal sac. There is also some mass effect and deformity on the cervical  spinal cord. In addition to a spinal stenosis, there is right greater than left lateral recess/foraminal origin stenosis.  C5-C6: Prominent disc space narrowing with disc desiccation, bridging anterior bony spurring, right greater than left posterior facet arthropathy, and mild right uncinate hypertrophy. There is a broad-based posterior osteophyte disc complex which effaces the anterior theca and narrows the AP dimension of the cervical thecal sac, but which does not cause any significant mass effect on the cervical spinal cord. There is mild bilateral lateral recess/foraminal origin stenosis at this level, right side greater than left.  C6-C7: Prominent disc space narrowing with disc desiccation, anterior and posterior spondylosis, and right uncinate hypertrophy. There is a broad-based, posterior osteophyte disc complex which partially effaces the anterior theca but which does not deform the cervical spinal cord. There is a mild spinal stenosis with mild, bilateral foraminal origin stenosis, slightly greater on the left.  C7-T1: Mild to moderate, bilateral posterior facet arthropathy. There is mild disc desiccation and disc space narrowing with approximately 2 mm of anterolisthesis C7 on T1. There is no obvious stenosis.  Signal intensity in the cervical thecal sac is normal. There is no convincing evidence of any significant edema or gliosis in the cervical spinal cord.     X-ray left shoulder 02/14/2020:  There is no radiographic evidence of acute osseous, articular, or soft tissue abnormality.  Joint spaces are preserved.     MRI left shoulder 2/10/20:  There is moderate rotator cuff tendinosis, supraspinatus and infraspinatus, with superimposed moderate grade partial thickness tear with fluid-filled defect posterior supraspinatus footprint with bursal and interstitial fiber tearing.  Supraspinatus tendon articular surface fraying is also noted.  There is also low-grade interstitial partial-thickness tear at  "the blending of the supraspinatus and infraspinatus.  There are few clustered small subcortical cysts and associated minimal edema like signal greater tuberosity.  The subscapularis tendon is unremarkable.  Long head biceps tendon is intact with mild intra-articular tendinosis.  Superior labral degenerative signal with a sublabral foramen versus SLAP tear.  Labrum is otherwise unremarkable.  Glenohumeral chondral thinning.  No joint effusion.  Normal IGHL.  Type 2 acromion without tilting minimal AC joint spurring.  There is no subacromial subdeltoid bursal fluid collection.  The bone marrow signal intensity is otherwise unremarkable.  The signal intensity of the muscle groups is normal.  No atrophy.          REVIEW OF SYSTEMS:    GENERAL:  No weight loss, malaise or fevers.  HEENT:   No recent changes in vision or hearing  NECK:  Negative for lumps, no difficulty with swallowing.  RESPIRATORY:  Negative for cough, wheezing or shortness of breath, patient denies any recent URI.  CARDIOVASCULAR:  Negative for chest pain, leg swelling or palpitations.  GI:  Negative for abdominal discomfort, blood in stools or black stools or change in bowel habits.  MUSCULOSKELETAL:  See HPI.  SKIN:  Negative for lesions, rash, and itching.  PSYCH:  No mood disorder or recent psychosocial stressors.  Patients sleep is not disturbed secondary to pain.  HEMATOLOGY/LYMPHOLOGY:  Negative for prolonged bleeding, bruising easily or swollen nodes.  Patient is not currently taking any anti-coagulants  NEURO:   No history of headaches, syncope, paralysis, seizures or tremors.  All other reviewed and negative other than HPI.        Physical Exam:   Vitals:    02/26/24 1154   BP: 113/75   Pulse: 84   Weight: 52.4 kg (115 lb 10.1 oz)   Height: 5' 8" (1.727 m)   PainSc:   7   PainLoc: Shoulder     Body mass index is 17.58 kg/m².   (reviewed on 2/27/2024)    General: alert and oriented, in no apparent distress.  Gait: normal gait.  Skin: no " rashes, no discoloration, no obvious lesions  HEENT: normocephalic, atraumatic.    Respiratory: without use of accessory muscles of respiration.  No audible wheezing.    Musculoskeletal - Cervical Spine:  - Pain on flexion of cervical spine: Present   - Pain on extension of cervical spine: Present   - Cervical facet loading: Present bilaterally, worse on left   - TTP over the cervical facet joints: Present   - TTP over the cervical paraspinals: Present   - Spurling's:  Equivocal, causing more pain on the left    Shoulder:  - Pain on abduction: Present bilaterally   - Flexion: decreased to 100° on left, decreased to 120° on the right  - Abduction:  Decreased to 110° on the left, decreased to 100° on the right  - lateral winging of the scapula on the left  - TTP:  Present over bilateral biceps tendons  - Neer's: Positive  - Hawkin's: Positive    Lumbar:  - ROM fairly preserved   - pain with extension  - pain over facet joints - less so than SIJ  - TTP over right piriformis - improved     SIJ testing:  - TTP over SI joint: Present on right -- Present, mild, improved since procedure   - Su's/ Wang's: Positive  on right   - Sacroiliac Distraction Test (anterior pressure): Positive  - Sacroiliac Compression Test (lateral pressure): Positive     Neuro - Upper Extremities:  - BUE Strength:R/L: D: 5/5; B: 5/5; T: 5/5; WF: 5/5; WE: 5/5; IO: 5/5  - Extremity Reflexes: Brisk and symmetric throughout  - Sensory: Sensation to light touch intact bilaterally    Neuro - Lower Extremities:  - RLE Strength:     >> 5/5 strength with right hip flexion/ extension    >> 5/5 strength with right knee flexion/ extension    >> 5/5 strength in right ankle with plantar and dorsiflexion  - LLE Strength:     >> 5/5 strength with left hip flexion/ extension    >> 5/5 strength with knee flexion extension on the left     >> 5/5 strength in left ankle with plantar and dorsiflexion  - Extremity Reflexes: Brisk and symmetric throughout  -  Sensory: Sensation to light touch intact bilaterally      Psych:  Mood and affect is appropriate            ASSESSMENT: 63 y.o. year old male with neck and bilateral shoulder pain, consistent with     1. Sacroiliitis        2. Cervical radicular pain  gabapentin (NEURONTIN) 600 MG tablet      3. Cervical spondylosis  nabumetone (RELAFEN) 500 MG tablet      4. Muscle pain  tiZANidine (ZANAFLEX) 4 MG tablet      5. Arthropathy of right sacroiliac joint        6. DDD (degenerative disc disease), cervical        7. Opioid use agreement exists        8. Chronic pain of both shoulders                  PLAN:   - Interventional:   - Consider cervical MBB (for axial neck pain) vs repeat C6/7 IL MADISON (with left paramedian approach). Patient is not taking prescription blood thinners or ASA.    - S/p right SIJ injection on 7/13/23 with 80% pain relief.   - S/p C6/7 IL MADISON on 1/17/23 with 70% pain relief.   - Also consider lumbar treatment.    - Pharmacologic:   - Refill Norco 7.5/325mg Q8-Q12H PRN pain (70 tablets). Will e-prescribe x 3 months to fill on 02/26/2024 (printed), 03/25/2024, and 04/22/2024. Patient understands this is the highest dose that we prescribe; we have discussed this at great detail at previous appts.  Patient tolerating opioids with no side effects, obtaining some pain control with functional improvement.  We have discussed the risks of chronic opioid medication management.    - Refill gabapentin (Neurontin) 600mg TID - increased last visit, which seems to be helping.  - Refill nabumetone (Relafen) 500mg BID PRN pain and Zanaflex 4mg BID PRN (60 tablets). Patient understands this may cause drowsiness.     - Can take additional 4 tablets of Tylenol 500mg during the day in addition to Percocet 5/325mg (3x) per day. Total of acetaminophen daily - cannot exceed 3000mg; patient verbalized understanding.  - Anticoagulation use: None.   - Opioid contract signed on 7/29/2020.   Opioid risk tool completed on  7/29/2020: low risk.  - LA  reviewed and appropriate. MME=17.5.  Last filled on 01/29/2024    - reviewed drug screen from 11/29/2023, appropriate    - Rehabilitative: Encouraged regular exercise.    - Diagnostic/ Imaging: No new imaging ordered. Previous imaging reviewed.     - Consults/Referrals:   - Given several recommendations for medical marijuana providers previously.  - Referral previously placed to Orthopedics for shoulder pain.      - Follow up: 12 week Medication Refill       - This condition does not require this patient to take time off of work, and the primary goal of our Pain Management services is to improve the patient's functional capacity.     - I discussed the risks, benefits, and alternatives to potential treatment options. All questions and concerns were fully addressed today in clinic.       Tavares Monet MD  Interventional Pain Medicine  [facility] [place]      Disclaimer:  This note was prepared using voice recognition system and is likely to have sound alike errors that may have been overlooked even after proof reading.  Please call me with any questions.         >>UDS:  9/23/2020 :: appropriate   1/22/2021 :: appropriate   5/28/2021 :: appropriate  12/7/2021 :: appropriate  6/28/2022 :: appropriate  11/29/2022 :: appropriate   3/22/2023 :: appropriate for oxycodone with metabolites but also +THC - patient given warning for THC at following appt   7/7/2023 :: appropriate  11/29/2023 ::  Appropriate with Norco use      Warnings:  8/30/2022 - patient given warning for taking more than prescribed    5/2023 - patient given warning for inconsistent drug screen in May 2023

## 2024-02-27 RX ORDER — TIZANIDINE 4 MG/1
2-4 TABLET ORAL 2 TIMES DAILY PRN
Qty: 60 TABLET | Refills: 1 | Status: SHIPPED | OUTPATIENT
Start: 2024-02-27 | End: 2024-06-12 | Stop reason: SDUPTHER

## 2024-02-27 RX ORDER — HYDROCODONE BITARTRATE AND ACETAMINOPHEN 7.5; 325 MG/1; MG/1
TABLET ORAL
Qty: 70 TABLET | Refills: 0 | Status: SHIPPED | OUTPATIENT
Start: 2024-03-25 | End: 2024-05-30

## 2024-02-27 RX ORDER — GABAPENTIN 600 MG/1
600 TABLET ORAL 3 TIMES DAILY
Qty: 90 TABLET | Refills: 1 | Status: SHIPPED | OUTPATIENT
Start: 2024-02-27 | End: 2024-06-12 | Stop reason: SDUPTHER

## 2024-02-27 RX ORDER — HYDROCODONE BITARTRATE AND ACETAMINOPHEN 7.5; 325 MG/1; MG/1
TABLET ORAL
Qty: 70 TABLET | Refills: 0 | Status: SHIPPED | OUTPATIENT
Start: 2024-04-22 | End: 2024-05-30

## 2024-02-27 RX ORDER — NABUMETONE 500 MG/1
500 TABLET, FILM COATED ORAL 2 TIMES DAILY PRN
Qty: 60 TABLET | Refills: 1 | Status: SHIPPED | OUTPATIENT
Start: 2024-02-27 | End: 2024-06-12 | Stop reason: SDUPTHER

## 2024-03-01 ENCOUNTER — TELEPHONE (OUTPATIENT)
Dept: PAIN MEDICINE | Facility: CLINIC | Age: 64
End: 2024-03-01
Payer: MEDICARE

## 2024-03-01 NOTE — TELEPHONE ENCOUNTER
----- Message from Tracey Malik sent at 3/1/2024  1:34 PM CST -----  Type: Patient Call Back    Who called:pt     What is the request in detail:pt calling to speak to nurse in regards to HYDROcodone-acetaminophen (NORCO) 7.5-325 mg per tablet. He is saying the pharmacy isn't filling it. Call pt       Can the clinic reply by [facility]?    Would the patient rather a call back or a response via [facility]? call    Best call back number:[phone] (work)      Additional Information:

## 2024-05-30 ENCOUNTER — TELEPHONE (OUTPATIENT)
Dept: PAIN MEDICINE | Facility: CLINIC | Age: 64
End: 2024-05-30
Payer: MEDICARE

## 2024-05-30 ENCOUNTER — OFFICE VISIT (OUTPATIENT)
Dept: FAMILY MEDICINE | Facility: CLINIC | Age: 64
End: 2024-05-30
Payer: MEDICARE

## 2024-05-30 ENCOUNTER — LAB VISIT (OUTPATIENT)
Dept: LAB | Facility: HOSPITAL | Age: 64
End: 2024-05-30
Attending: FAMILY MEDICINE
Payer: MEDICARE

## 2024-05-30 VITALS
BODY MASS INDEX: 17.7 KG/M2 | HEART RATE: 61 BPM | DIASTOLIC BLOOD PRESSURE: 70 MMHG | SYSTOLIC BLOOD PRESSURE: 120 MMHG | OXYGEN SATURATION: 100 % | WEIGHT: 116.38 LBS

## 2024-05-30 DIAGNOSIS — M19.042 PRIMARY OSTEOARTHRITIS OF BOTH HANDS: ICD-10-CM

## 2024-05-30 DIAGNOSIS — M19.041 PRIMARY OSTEOARTHRITIS OF BOTH HANDS: ICD-10-CM

## 2024-05-30 DIAGNOSIS — Z87.898 QUIT DRINKING ALCOHOL: ICD-10-CM

## 2024-05-30 DIAGNOSIS — Z00.00 WELL ADULT EXAM: ICD-10-CM

## 2024-05-30 DIAGNOSIS — F17.200 SMOKER: ICD-10-CM

## 2024-05-30 DIAGNOSIS — Z12.2 ENCOUNTER FOR SCREENING FOR LUNG CANCER: ICD-10-CM

## 2024-05-30 DIAGNOSIS — Z12.5 PROSTATE CANCER SCREENING ENCOUNTER, OPTIONS AND RISKS DISCUSSED: ICD-10-CM

## 2024-05-30 DIAGNOSIS — Z12.2 ENCOUNTER FOR SCREENING FOR LUNG CANCER: Primary | ICD-10-CM

## 2024-05-30 LAB
ALBUMIN SERPL BCP-MCNC: 3.8 G/DL (ref 3.5–5.2)
ALP SERPL-CCNC: 90 U/L (ref 55–135)
ALT SERPL W/O P-5'-P-CCNC: 33 U/L (ref 10–44)
ANION GAP SERPL CALC-SCNC: 7 MMOL/L (ref 8–16)
AST SERPL-CCNC: 23 U/L (ref 10–40)
BASOPHILS # BLD AUTO: 0.06 K/UL (ref 0–0.2)
BASOPHILS NFR BLD: 0.8 % (ref 0–1.9)
BILIRUB SERPL-MCNC: 0.3 MG/DL (ref 0.1–1)
BUN SERPL-MCNC: 6 MG/DL (ref 8–23)
CALCIUM SERPL-MCNC: 9.7 MG/DL (ref 8.7–10.5)
CHLORIDE SERPL-SCNC: 103 MMOL/L (ref 95–110)
CHOLEST SERPL-MCNC: 237 MG/DL (ref 120–199)
CHOLEST/HDLC SERPL: 4.6 {RATIO} (ref 2–5)
CO2 SERPL-SCNC: 26 MMOL/L (ref 23–29)
COMPLEXED PSA SERPL-MCNC: 0.28 NG/ML (ref 0–4)
CREAT SERPL-MCNC: 0.9 MG/DL (ref 0.5–1.4)
DIFFERENTIAL METHOD BLD: ABNORMAL
EOSINOPHIL # BLD AUTO: 0.2 K/UL (ref 0–0.5)
EOSINOPHIL NFR BLD: 2.6 % (ref 0–8)
ERYTHROCYTE [DISTWIDTH] IN BLOOD BY AUTOMATED COUNT: 12.9 % (ref 11.5–14.5)
EST. GFR  (NO RACE VARIABLE): >60 ML/MIN/1.73 M^2
GLUCOSE SERPL-MCNC: 87 MG/DL (ref 70–110)
HCT VFR BLD AUTO: 43.2 % (ref 40–54)
HDLC SERPL-MCNC: 52 MG/DL (ref 40–75)
HDLC SERPL: 21.9 % (ref 20–50)
HGB BLD-MCNC: 14.2 G/DL (ref 14–18)
IMM GRANULOCYTES # BLD AUTO: 0.04 K/UL (ref 0–0.04)
IMM GRANULOCYTES NFR BLD AUTO: 0.5 % (ref 0–0.5)
LDLC SERPL CALC-MCNC: 161.2 MG/DL (ref 63–159)
LYMPHOCYTES # BLD AUTO: 2.7 K/UL (ref 1–4.8)
LYMPHOCYTES NFR BLD: 35.3 % (ref 18–48)
MCH RBC QN AUTO: 33.2 PG (ref 27–31)
MCHC RBC AUTO-ENTMCNC: 32.9 G/DL (ref 32–36)
MCV RBC AUTO: 101 FL (ref 82–98)
MONOCYTES # BLD AUTO: 0.8 K/UL (ref 0.3–1)
MONOCYTES NFR BLD: 10.3 % (ref 4–15)
NEUTROPHILS # BLD AUTO: 3.8 K/UL (ref 1.8–7.7)
NEUTROPHILS NFR BLD: 50.5 % (ref 38–73)
NONHDLC SERPL-MCNC: 185 MG/DL
NRBC BLD-RTO: 0 /100 WBC
PLATELET # BLD AUTO: 367 K/UL (ref 150–450)
PMV BLD AUTO: 10.7 FL (ref 9.2–12.9)
POTASSIUM SERPL-SCNC: 5 MMOL/L (ref 3.5–5.1)
PROT SERPL-MCNC: 7 G/DL (ref 6–8.4)
RBC # BLD AUTO: 4.28 M/UL (ref 4.6–6.2)
SODIUM SERPL-SCNC: 136 MMOL/L (ref 136–145)
TRIGL SERPL-MCNC: 119 MG/DL (ref 30–150)
WBC # BLD AUTO: 7.6 K/UL (ref 3.9–12.7)

## 2024-05-30 PROCEDURE — 80053 COMPREHEN METABOLIC PANEL: CPT | Mod: HCNC | Performed by: FAMILY MEDICINE

## 2024-05-30 PROCEDURE — 84153 ASSAY OF PSA TOTAL: CPT | Mod: DBM,HCNC | Performed by: FAMILY MEDICINE

## 2024-05-30 PROCEDURE — 3078F DIAST BP <80 MM HG: CPT | Mod: HCNC,CPTII,S$GLB, | Performed by: FAMILY MEDICINE

## 2024-05-30 PROCEDURE — 80061 LIPID PANEL: CPT | Mod: DBM,HCNC | Performed by: FAMILY MEDICINE

## 2024-05-30 PROCEDURE — 84425 ASSAY OF VITAMIN B-1: CPT | Mod: HCNC | Performed by: FAMILY MEDICINE

## 2024-05-30 PROCEDURE — 3008F BODY MASS INDEX DOCD: CPT | Mod: HCNC,CPTII,S$GLB, | Performed by: FAMILY MEDICINE

## 2024-05-30 PROCEDURE — 1159F MED LIST DOCD IN RCRD: CPT | Mod: HCNC,CPTII,S$GLB, | Performed by: FAMILY MEDICINE

## 2024-05-30 PROCEDURE — 3074F SYST BP LT 130 MM HG: CPT | Mod: HCNC,CPTII,S$GLB, | Performed by: FAMILY MEDICINE

## 2024-05-30 PROCEDURE — 85025 COMPLETE CBC W/AUTO DIFF WBC: CPT | Mod: HCNC | Performed by: FAMILY MEDICINE

## 2024-05-30 PROCEDURE — 99396 PREV VISIT EST AGE 40-64: CPT | Mod: HCNC,S$GLB,, | Performed by: FAMILY MEDICINE

## 2024-05-30 PROCEDURE — 99999 PR PBB SHADOW E&M-EST. PATIENT-LVL III: CPT | Mod: PBBFAC,HCNC,, | Performed by: FAMILY MEDICINE

## 2024-05-30 RX ORDER — HYDROCODONE BITARTRATE AND ACETAMINOPHEN 7.5; 325 MG/1; MG/1
TABLET ORAL
Qty: 70 TABLET | Refills: 0 | Status: SHIPPED | OUTPATIENT
Start: 2024-05-30 | End: 2024-06-12 | Stop reason: SDUPTHER

## 2024-05-30 RX ORDER — DICLOFENAC SODIUM 10 MG/G
2 GEL TOPICAL 4 TIMES DAILY
Qty: 100 G | Refills: 3 | Status: SHIPPED | OUTPATIENT
Start: 2024-05-30

## 2024-05-30 RX ORDER — OXYCODONE AND ACETAMINOPHEN 7.5; 325 MG/1; MG/1
1 TABLET ORAL EVERY 4 HOURS PRN
COMMUNITY

## 2024-05-30 NOTE — PROGRESS NOTES
Chief Complaint:    No chief complaint on file.      History of Present Illness:    Patient presents today for six month follow-up,    Doing well.   Labs reviewed and stable.    Mild Arthritic pains in bilat hands.     He has chronic neck pain follows with pain management on narcotic pain medicines.  Doing okay    Continues smoke 1 ppd, some desire to quit. Was hesitant to start Chantix due to side effects.   Due for lung cancer screening.   Quit drinking 2 weeks ago.  Drinking 12 packs of 16 oz beer a day    Due for RSV and shingles vaccinations.    ROS:  Review of Systems   Constitutional:  Negative for activity change, chills, fatigue, fever and unexpected weight change.   HENT:  Negative for congestion, ear discharge, ear pain, hearing loss, postnasal drip and rhinorrhea.    Eyes:  Negative for pain and visual disturbance.   Respiratory:  Negative for cough, chest tightness and shortness of breath.    Cardiovascular:  Negative for chest pain and palpitations.   Gastrointestinal:  Negative for abdominal pain, diarrhea and vomiting.   Endocrine: Negative for heat intolerance.   Genitourinary:  Negative for dysuria, flank pain, frequency and hematuria.   Musculoskeletal:  Positive for arthralgias. Negative for back pain, gait problem and neck pain.   Skin:  Negative for color change and rash.   Neurological:  Negative for dizziness, tremors, seizures, numbness and headaches.   Psychiatric/Behavioral:  Negative for agitation, hallucinations, self-injury, sleep disturbance and suicidal ideas. The patient is not nervous/anxious.        No past medical history on file.    Social History:  Social History     Socioeconomic History    Marital status:    Tobacco Use    Smoking status: Every Day     Current packs/day: 1.00     Average packs/day: 1 pack/day for 40.0 years (40.0 ttl pk-yrs)     Types: Cigarettes    Smokeless tobacco: Never   Substance and Sexual Activity    Alcohol use: Not Currently      Alcohol/week: 6.0 standard drinks of alcohol     Types: 6 Cans of beer per week    Drug use: No    Sexual activity: Yes     Partners: Female       Family History:   family history includes Cancer in his father; Heart disease in his mother.    Health Maintenance   Topic Date Due    Shingles Vaccine (1 of 2) Never done    LDCT Lung Screen  06/13/2024    Colorectal Cancer Screening  06/12/2026    TETANUS VACCINE  01/25/2028    Lipid Panel  06/01/2028    Hepatitis C Screening  Completed       Exam:Physical     Vital Signs  Pulse: 61  SpO2: 100 %  BP: 120/70  BP Location: Left arm  Patient Position: Sitting  Height and Weight  Weight: 52.8 kg (116 lb 6.5 oz)]    Body mass index is 17.7 kg/m².    Physical Exam  Constitutional:       Appearance: He is well-developed.   Eyes:      Conjunctiva/sclera: Conjunctivae normal.      Pupils: Pupils are equal, round, and reactive to light.   Cardiovascular:      Rate and Rhythm: Normal rate and regular rhythm.      Heart sounds: Normal heart sounds. No murmur heard.  Pulmonary:      Effort: Pulmonary effort is normal. No respiratory distress.      Breath sounds: Normal breath sounds. No wheezing or rales.   Chest:      Chest wall: No tenderness.   Abdominal:      General: There is no distension.      Palpations: Abdomen is soft. There is no mass.      Tenderness: There is no abdominal tenderness. There is no guarding.   Musculoskeletal:         General: No tenderness.      Right hand: Bony tenderness present.      Left hand: Bony tenderness present.      Cervical back: Normal range of motion and neck supple.   Lymphadenopathy:      Cervical: No cervical adenopathy.   Skin:     General: Skin is warm and dry.   Neurological:      Mental Status: He is alert and oriented to person, place, and time.      Deep Tendon Reflexes: Reflexes are normal and symmetric.   Psychiatric:         Behavior: Behavior normal.         Thought Content: Thought content normal.         Judgment: Judgment  normal.         Assessment:      ICD-10-CM ICD-9-CM   1. Encounter for screening for lung cancer  Z12.2 V76.0   2. Well adult exam  Z00.00 V70.0   3. Primary osteoarthritis of both hands  M19.041 715.14    M19.042    4. Smoker  F17.200 305.1   5. Prostate cancer screening encounter, options and risks discussed  Z12.5 V76.44   6. Quit drinking alcohol  Z87.898 V11.3             Plan:    Smoking cessation strongly advised.   Message back with dosage of Chantix you have currently, If correct dosage then can take. Combine this with nicotine patches.   Continue alcohol cessation.     Start Voltaren 1% gel for primary osteoarthritis of both hands.   Order low dose CT lung scan for cancer screening.   Get RSV and shingles vaccination at your pharmacy.  See labs below  Follow-up 1 year.      Orders Placed This Encounter   Procedures    CT Chest Lung Screening Low Dose    CBC Auto Differential    Comprehensive Metabolic Panel    Lipid Panel    PSA, Screening    VITAMIN B1     Current Outpatient Medications   Medication Sig Dispense Refill    acetaminophen (TYLENOL) 500 MG tablet Take 1 tablet (500 mg total) by mouth every 8 (eight) hours as needed for Pain. 90 tablet 11    fluticasone propionate (FLONASE) 50 mcg/actuation nasal spray 2 PUFFS EACH NOSTRIL ONCE DAILY 16 g 3    gabapentin (NEURONTIN) 600 MG tablet Take 1 tablet (600 mg total) by mouth 3 (three) times daily. 90 tablet 1    oxyCODONE-acetaminophen (PERCOCET) 7.5-325 mg per tablet Take 1 tablet by mouth every 4 (four) hours as needed for Pain.      diclofenac sodium (VOLTAREN) 1 % Gel Apply 2 g topically 4 (four) times daily. 100 g 3    HYDROcodone-acetaminophen (NORCO) 7.5-325 mg per tablet Take 1 tablet Q8-12H PRN pain. *30 DAY SUPPLY* (Patient not taking: Reported on 5/30/2024) 70 tablet 0    HYDROcodone-acetaminophen (NORCO) 7.5-325 mg per tablet Take 1 tablet Q8-12H PRN pain. *30 DAY SUPPLY* (Patient not taking: Reported on 5/30/2024) 70 tablet 0     HYDROcodone-acetaminophen (NORCO) 7.5-325 mg per tablet Take 1 tablet Q8-12H PRN pain. *30 DAY SUPPLY* (Patient not taking: Reported on 5/30/2024) 70 tablet 0    nabumetone (RELAFEN) 500 MG tablet Take 1 tablet (500 mg total) by mouth 2 (two) times daily as needed for Pain. take with food. (Patient not taking: Reported on 5/30/2024) 60 tablet 1    tiZANidine (ZANAFLEX) 4 MG tablet Take 0.5-1 tablets (2-4 mg total) by mouth 2 (two) times daily as needed (muscle spasms). May cause drowsiness. (Patient not taking: Reported on 5/30/2024) 60 tablet 1    varenicline (CHANTIX STARTING MONTH BOX) 0.5 mg (11)- 1 mg (42) tablet Take one 0.5mg tab by mouth once daily X3 days,then increase to one 0.5mg tab twice daily X4 days,then increase to one 1mg tab twice daily (Patient not taking: Reported on 5/30/2024) 1 each 0     No current facility-administered medications for this visit.     There are no discontinued medications.        Follow up in about 1 year (around 5/30/2025).      Reuben Banuelos MD    Scribe Attestation:   I, Loki Tate, am scribing for, and in the presence of, Dr.Arif Banuelos I performed the above scribed service and the documentation accurately describes the services I performed. I attest to the accuracy of the note.    I, Dr. Reuben Banuelos, reviewed documentation as scribed above. I performed the services described in this documentation.  I agree that the record reflects my personal performance and is accurate and complete. Reuben Banuelos MD.  05/30/2024

## 2024-05-30 NOTE — TELEPHONE ENCOUNTER
----- Message from René Mae sent at 5/30/2024  9:14 AM CDT -----  Contact: wife   .Type:  RX Refill Request    Who Called: pts wife  Refill or New Rx:refill   RX Name and Strength:Pts wife called in for Oxycodone refill   How is the patient currently taking it? (ex. 1XDay): 2xday   Is this a 30 day or 90 day RX: 30  Preferred Pharmacy with phone number:.  [facility] - [place], [place] - [address] [address]  [address] [address] [address]  Phone: [phone] Fax: [phone]  Local or Mail Order:local   Ordering Provider:  Would the patient rather a call back or a response via [facility]? N/a  Best Call Back Number:.[phone]    Additional Information: n/a

## 2024-05-30 NOTE — TELEPHONE ENCOUNTER
Called patient in regards to medication refill request. Patient's wife stated that the patient would like to refill Norco 7.5/325MG . Stated to patient's wife I would reach out to the provider to see if he would like to put in refill request due to not having any refills after previous script. Patient verbalized understanding.    Annabel Esquivel MA

## 2024-06-06 DIAGNOSIS — E78.2 MIXED HYPERLIPIDEMIA: Primary | ICD-10-CM

## 2024-06-06 LAB — VIT B1 BLD-MCNC: 50 UG/L (ref 38–122)

## 2024-06-06 RX ORDER — PRAVASTATIN SODIUM 20 MG/1
20 TABLET ORAL DAILY
Qty: 90 TABLET | Refills: 3 | Status: SHIPPED | OUTPATIENT
Start: 2024-06-06 | End: 2025-06-06

## 2024-06-06 NOTE — TELEPHONE ENCOUNTER
No care due was identified.  [facility] Embedded Care Due Messages. Reference number: [id number].   6/06/2024 3:54:11 PM CDT

## 2024-06-12 ENCOUNTER — OFFICE VISIT (OUTPATIENT)
Dept: PAIN MEDICINE | Facility: CLINIC | Age: 64
End: 2024-06-12
Payer: MEDICARE

## 2024-06-12 DIAGNOSIS — M79.10 MUSCLE PAIN: ICD-10-CM

## 2024-06-12 DIAGNOSIS — Z79.891 OPIOID USE AGREEMENT EXISTS: ICD-10-CM

## 2024-06-12 DIAGNOSIS — M47.812 CERVICAL SPONDYLOSIS: ICD-10-CM

## 2024-06-12 DIAGNOSIS — M54.12 CERVICAL RADICULAR PAIN: Primary | ICD-10-CM

## 2024-06-12 PROCEDURE — 1160F RVW MEDS BY RX/DR IN RCRD: CPT | Mod: HCNC,CPTII,95, | Performed by: PHYSICIAN ASSISTANT

## 2024-06-12 PROCEDURE — 1159F MED LIST DOCD IN RCRD: CPT | Mod: HCNC,CPTII,95, | Performed by: PHYSICIAN ASSISTANT

## 2024-06-12 PROCEDURE — 99213 OFFICE O/P EST LOW 20 MIN: CPT | Mod: HCNC,95,, | Performed by: PHYSICIAN ASSISTANT

## 2024-06-12 RX ORDER — NABUMETONE 500 MG/1
500 TABLET, FILM COATED ORAL 2 TIMES DAILY PRN
Qty: 60 TABLET | Refills: 0 | Status: SHIPPED | OUTPATIENT
Start: 2024-06-12

## 2024-06-12 RX ORDER — HYDROCODONE BITARTRATE AND ACETAMINOPHEN 7.5; 325 MG/1; MG/1
TABLET ORAL
Qty: 70 TABLET | Refills: 0 | Status: SHIPPED | OUTPATIENT
Start: 2024-08-28

## 2024-06-12 RX ORDER — TIZANIDINE 4 MG/1
2-4 TABLET ORAL 2 TIMES DAILY PRN
Qty: 60 TABLET | Refills: 0 | Status: SHIPPED | OUTPATIENT
Start: 2024-06-12

## 2024-06-12 RX ORDER — GABAPENTIN 600 MG/1
600 TABLET ORAL 3 TIMES DAILY
Qty: 90 TABLET | Refills: 2 | Status: SHIPPED | OUTPATIENT
Start: 2024-06-12

## 2024-06-12 RX ORDER — HYDROCODONE BITARTRATE AND ACETAMINOPHEN 7.5; 325 MG/1; MG/1
TABLET ORAL
Qty: 70 TABLET | Refills: 0 | Status: SHIPPED | OUTPATIENT
Start: 2024-06-28

## 2024-06-12 RX ORDER — HYDROCODONE BITARTRATE AND ACETAMINOPHEN 7.5; 325 MG/1; MG/1
TABLET ORAL
Qty: 70 TABLET | Refills: 0 | Status: SHIPPED | OUTPATIENT
Start: 2024-07-29

## 2024-06-12 NOTE — PROGRESS NOTES
Established Patient - TeleHealth Visit    The patient location is: [place]  The chief complaint leading to consultation is: chronic pain     Visit type: telemedicine visit - connected on doximity    Face to Face time with patient: 10-15 minutes  20 minutes of total time spent on the encounter, which includes face to face time and non-face to face time preparing to see the patient (eg, review of tests), Obtaining and/or reviewing separately obtained history, Documenting clinical information in the electronic or other health record, Independently interpreting results (not separately reported) and communicating results to the patient/family/caregiver, or Care coordination (not separately reported).     Each patient to whom he or she provides medical services by telemedicine is:  (1) informed of the relationship between the physician and patient and the respective role of any other health care provider with respect to management of the patient; and (2) notified that he or she may decline to receive medical services by telemedicine and may withdraw from such care at any time.        Established Patient Chronic Pain Note (Follow up visit)    Chief Complaint:   Chief Complaint   Patient presents with    Follow-up   Low back pain into right buttock      SUBJECTIVE:    Interval History (6/12/2024): Patient was last seen over 3 months ago. he presents today for follow-up for medication refill. he feels the pain medication is providing adequate pain relief and reduces the negative effects of chronic pain that affects quality of life. No major SE from medications. No major changes since previous visit; patient is stable overall. Patient reports pain as 8/10 today.      Interval HPI (2/26/24):  Leo Roblero is a 63 y.o. male presents today for follow-up and medication refill.  He continues to utilize Norco, gabapentin, Relafen, and Zanaflex as prescribed.  He has previously been instructed to use Tylenol as needed for breakthrough.   Current pain intensity is 7/10.  He reports at least 60% pain reduction without any adverse effects with this medication regimen.    Interval History (11/29/2023): Leo Roblero was last seen on 10/3/2023. he presents today for follow-up for medication refill. he feels the pain medication is providing adequate pain relief and reduces the negative effects of chronic pain that affects quality of life. No major SE from medications. No major changes since previous visit; patient is stable overall. Patient reports pain as 8/10 today.     Interval History (10/3/2023): Patient was last seen on 8/23/2023. he presents today for follow-up for medication refill. he feels the pain medication is providing adequate pain relief and reduces the negative effects of chronic pain that affects quality of life. No major SE from medications. No major changes since previous visit; patient is stable overall. Patient reports pain as 7/10 today.     Interval History (8/23/2023): Leo Roblero presents today for follow-up visit.  he underwent right SIJ injection on 7/13/23 (6 weeks ago).  The patient reports that he is/was better following the procedure.  he reports 80% pain relief.  The changes lasted 4 weeks so far.  The changes have continued through this visit.    he also presents today for follow-up for medication refill. he feels the pain medication is providing adequate pain relief and reduces the negative effects of chronic pain that affects quality of life. No major SE from medications. No major changes since previous visit; patient is stable overall.   Patient reports pain as 8/10 today-due to neck pain.    Interval History (7/7/2023): Leo Roblero was last seen on 5/26/2023. he presents today for follow-up for medication refill. he feels the pain medication is providing adequate pain relief and reduces the negative effects of chronic pain that affects quality of life. No major SE from medications. No major changes since  previous visit; patient is stable overall. Patient reports pain as 7/10 today.     Interval History (5/26/2023): Patient was last seen on 3/22/2023. he presents today for follow-up for medication refill. he feels the pain medication is providing adequate pain relief and reduces the negative effects of chronic pain that affects quality of life. No major SE from medications. No major changes since previous visit; patient is stable overall. Patient reports pain as 7/10 today.     Interval History (5/8/2023):  Leo Roblero presents today for follow-up visit. At that visit, the plan was to switch oxycodone to [place]. Patient feels this is helping.  Initially, he felt that it was making him hyper, but since then, the side effects have resolved.  He feels the pain relief is provided with this medication.  He is tolerating well with minimal side effects.    Interval History (3/22/2023): Leo Roblero  was last seen on 1/25/2023. he presents today for follow-up for medication refill. he feels the pain medication is providing adequate pain relief and reduces the negative effects of chronic pain that affects quality of life. No major SE from medications.  Patient reports pain as 8/10 today.   He recently did some weed eating, which caused right lower back pain into the buttock.  Pain started about 3 weeks ago during this activity and has not subsided.  He feels the pain is slowly improving now several weeks later.  He is using the back brace as needed in his wearing today.    Interval History 1/25/2023: Patient presents today for follow-up visit.  he underwent C6/7 IL MADISON on 1/17/23.  The patient reports that he is/was better following the procedure.  he reports 70% pain relief.  The changes lasted 1 week so far.  The changes have continued through this visit.     he also presents today for follow-up for medication refill. he feels the pain medication is providing adequate pain relief and reduces the negative effects of  chronic pain that affects quality of life. No major SE from medications. No major changes since previous visit; patient is stable overall. Patient reports pain as 7/10 today.     Interval History (11/29/2022): Leo Roblero was last seen on 8/30/2022. he presents today for follow-up for medication refill.  Medication is providing adequate pain relief. he feels the pain medication reduces the negative effects of chronic pain that affects quality of life. Patient reports pain as 9/10 today.  He c/o neck pain that radiates but mostly axial. Also c/o low back pain as well.     Interval History (8/30/2022):  Leo Roblero presents today for follow-up visit. Patient was last seen on 6/28/2022. he presents today for follow-up for medication refill.  Medication is providing adequate pain relief. he feels the pain medication reduces the negative effects of chronic pain that affects day-to-day function and quality of life.Patient reports pain as 8/10 today. Pain has Increased over last month; patient admits to being out of medication today.    Interval History (12/7/2021):  Leo Roblero presents today for follow-up visit for medication refill.  Patient was last seen on 10/7/2021.  He reports that he has been having low back pain for the past 3 weeks or so.  He has had low back pain in the past, but it has not been a real issue lately.  He does not feel that the pain is currently controlled.  Patient reports pain as 9/10 today.    Interval History (9/23/2020): Patient was last seen on 7/29/2020. At that visit, the plan was to Refill Norco 5/325mg BID.  Patient reports pain as 10/10 today.  He is requesting a higher dose of the Norco today.  He continues to take naproxen and Tylenol as adjuvant medications.  He reports that the pain seems to be worse at night.    Interval HPI (7/29/2020):  Leo Roblero is a 60 y.o. male who presents to the clinic for a follow-up appointment for neck and bilateral shoulder pain.  At the last  clinic visit, I expressed to the patient that he could continue on NSAIDs and Tylenol for his chronic pain control and to start with physical therapy to help stabilize his shoulder musculature.  He had failed subacromial bursa injections without much relief.  Since the last visit, Leo Roblero states the pain has been persistant. Current pain intensity is 10/10.    Initial HPI 02/19/2020:  Leo Roblero is a 59 y.o. male who presents to the clinic for the evaluation of neck and bilateral shoulder pain. He was referred by the Orthopedics Department for further evaluation and management of neck pain. Of note, patient has a past medical history of hyperlipidemia, and tobacco abuse, traumatic rotator cuff tear, tendinitis of the bilateral biceps tendon, and other medical comorbidities as listed below.  The pain started several years ago without any significant injury or trauma and symptoms have been worsening.  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  The pain is described as shooting and throbbing and is rated as 6/10. The pain is rated with a score of  4/10 on the BEST day and a score of 10/10 on the WORST day.  Symptoms interfere with daily activity, sleeping and work. The pain is exacerbated by work.  The pain is mitigated by rest. The patient reports spending less than 2 hours per day reclining. The patient reports 6-8 hours of uninterrupted sleep per night.  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  Patient denies night fever/night sweats, urinary incontinence, bowel incontinence, significant weight loss, significant motor weakness and loss of sensations.    Pain Disability Index Review:      2/26/2024    11:55 AM 11/29/2023    10:29 AM 10/3/2023     9:46 AM   Last 3 PDI  Scores   Pain Disability Index (PDI) [phone]       Non-Pharmacologic Treatments:  Physical Therapy/Home Exercise:  No, pending start  Ice/Heat:yes  TENS: no  Acupuncture: no  Massage: no  Chiropractic: no    Other: no     Pain Medications:  - Opioids: Percocet (Oxycodone/Acetaminophen)  - Adjuvant Medications: Mobic  - Anti-Coagulants: Aspirin        Pain Procedures:   -12/05/2019:  Bilateral subacromial bursa shoulder injections, limited relief  -02/14/2020:  IM Toradol to the right deltoid  - C6/7 IL MADISON on 1/17/23 with 70% pain relief   - right SIJ injection on 7/13/23 with 80% pain relief          Imaging & EMG/NCS (Reviewed on 6/12/2024):    1/23/23 X-Ray Shoulder Complete Bilateral  FINDINGS: Clinic joint spaces are well-maintained.  Mild narrowing with spurring of the lateral acromioclavicular joint spaces.  Lung apices are clear.  No acute fracture or dislocation.  Impression:  Bilateral minimal osteoarthritis of the acromioclavicular joints.     EMG/NCS bilateral upper extremities 03/06/2020:  1. ABNORMAL study  2. 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  There is additional evidence of a CHRONIC radiculopathy of the C7,C8, T1 nerve roots    X-ray cervical spine 02/19/2020:  Reversal of normal cervical lordosis.  Grade 1 anterolisthesis of C3 on C4 and C4 on C5.  These appear to mostly reduce with extension maneuver.  Grade 1 anterolisthesis of C7 on T1 which does not change with provocative maneuvers.  No acute fracture.  Prevertebral soft tissues are maintained.  Multilevel discogenic degenerative changes are seen with findings most severe at C5-C6 and C6-C7 with disc space narrowing marginal spurring.  Intact odontoid.  Multilevel bilateral areas of neural foraminal encroachment, right greater than left.  Clear lung apices.  Calcification of the bilateral carotids  is seen.    MRI cervical spine 06/01/2017 ([facility]):  There is a mild cervical levoscoliosis with slight straightening of normal cervical lordosis in the superior aspect of the cervical spine. Cervical vertebral body stature is preserved. No prevertebral edema is noted.  The visualized anatomy at the skull base is normal.  C2-C3: Mild disc space narrowing. Mild bilateral posterior facet and ligamentum flavum hypertrophy which is effacing the posterior theca and causing subtle posterior impression on the cervical spinal cord, although the thecal sac still measures 1.2 cm in AP dimension. There is minimal posterior spondylosis.  C3-C4: Disc desiccation with right greater than left posterior facet arthropathy and with right uncinate hypertrophy, with mild disc space narrowing. There is mild ligamentum flavum hypertrophy. There is mild AP narrowing of the thecal sac, consistent with a spinal stenosis, but there is no significant deformity on the cervical spinal cord. There is right lateral recess/foraminal origin stenosis.  C4-C5: Mild to moderate disc space narrowing with disc desiccation and with right greater than left posterior facet hypertrophy. There is right uncinate hypertrophy at this level. There is bilateral ligamentum flavum hypertrophy. This combination of findings is causing a spinal stenosis, thecal sac measuring only 7.7 mm in AP dimension, and with a trefoil deformity of the thecal sac. There is also some mass effect and deformity on the cervical spinal cord. In addition to a spinal stenosis, there is right greater than left lateral recess/foraminal origin stenosis.  C5-C6: Prominent disc space narrowing with disc desiccation, bridging anterior bony spurring, right greater than left posterior facet arthropathy, and mild right uncinate hypertrophy. There is a broad-based posterior osteophyte disc complex which effaces the anterior theca and narrows the AP dimension of the cervical thecal sac,  but which does not cause any significant mass effect on the cervical spinal cord. There is mild bilateral lateral recess/foraminal origin stenosis at this level, right side greater than left.  C6-C7: Prominent disc space narrowing with disc desiccation, anterior and posterior spondylosis, and right uncinate hypertrophy. There is a broad-based, posterior osteophyte disc complex which partially effaces the anterior theca but which does not deform the cervical spinal cord. There is a mild spinal stenosis with mild, bilateral foraminal origin stenosis, slightly greater on the left.  C7-T1: Mild to moderate, bilateral posterior facet arthropathy. There is mild disc desiccation and disc space narrowing with approximately 2 mm of anterolisthesis C7 on T1. There is no obvious stenosis.  Signal intensity in the cervical thecal sac is normal. There is no convincing evidence of any significant edema or gliosis in the cervical spinal cord.     X-ray left shoulder 02/14/2020:  There is no radiographic evidence of acute osseous, articular, or soft tissue abnormality.  Joint spaces are preserved.     MRI left shoulder 2/10/20:  There is moderate rotator cuff tendinosis, supraspinatus and infraspinatus, with superimposed moderate grade partial thickness tear with fluid-filled defect posterior supraspinatus footprint with bursal and interstitial fiber tearing.  Supraspinatus tendon articular surface fraying is also noted.  There is also low-grade interstitial partial-thickness tear at the blending of the supraspinatus and infraspinatus.  There are few clustered small subcortical cysts and associated minimal edema like signal greater tuberosity.  The subscapularis tendon is unremarkable.  Long head biceps tendon is intact with mild intra-articular tendinosis.  Superior labral degenerative signal with a sublabral foramen versus SLAP tear.  Labrum is otherwise unremarkable.  Glenohumeral chondral thinning.  No joint effusion.  Normal  IGHL.  Type 2 acromion without tilting minimal AC joint spurring.  There is no subacromial subdeltoid bursal fluid collection.  The bone marrow signal intensity is otherwise unremarkable.  The signal intensity of the muscle groups is normal.  No atrophy.          REVIEW OF SYSTEMS:    GENERAL:  No weight loss, malaise or fevers.  HEENT:   No recent changes in vision or hearing  NECK:  Negative for lumps, no difficulty with swallowing.  RESPIRATORY:  Negative for cough, wheezing or shortness of breath, patient denies any recent URI.  CARDIOVASCULAR:  Negative for chest pain, leg swelling or palpitations.  GI:  Negative for abdominal discomfort, blood in stools or black stools or change in bowel habits.  MUSCULOSKELETAL:  See HPI.  SKIN:  Negative for lesions, rash, and itching.  PSYCH:  No mood disorder or recent psychosocial stressors.  Patients sleep is not disturbed secondary to pain.  HEMATOLOGY/LYMPHOLOGY:  Negative for prolonged bleeding, bruising easily or swollen nodes.  Patient is not currently taking any anti-coagulants  NEURO:   No history of headaches, syncope, paralysis, seizures or tremors.  All other reviewed and negative other than HPI.          Telemedicine Exam  There were no vitals filed for this visit.  There is no height or weight on file to calculate BMI.   (reviewed on 6/12/2024)     GENERAL: Well appearing, in no acute distress, alert and oriented x3.  Cooperative.  PSYCH:  Mood and affect appropriate.  SKIN: Skin color & texture with no obvious abnormalities.    HEAD/FACE:  Normocephalic, atraumatic.    PULM:  No difficulty breathing. No nasal flaring. No obvious wheezing.  EXTREMITIES: No obvious deformities. Moving all extremities well, appears to have symmetric strength throughout.  MUSCULOSKELETAL: No obvious atrophy abnormalities are noted.   NEURO: No obvious neurologic deficit.   GAIT: sitting.     Physical Exam: last in clinic visit:  General: alert and oriented, in no apparent  distress.  Gait: normal gait.  Skin: no rashes, no discoloration, no obvious lesions  HEENT: normocephalic, atraumatic.    Respiratory: without use of accessory muscles of respiration.  No audible wheezing.    Musculoskeletal - Cervical Spine:  - Pain on flexion of cervical spine: Present   - Pain on extension of cervical spine: Present   - Cervical facet loading: Present bilaterally, worse on left   - TTP over the cervical facet joints: Present   - TTP over the cervical paraspinals: Present   - Spurling's:  Equivocal, causing more pain on the left    Shoulder:  - Pain on abduction: Present bilaterally   - Flexion: decreased to 100° on left, decreased to 120° on the right  - Abduction:  Decreased to 110° on the left, decreased to 100° on the right  - lateral winging of the scapula on the left  - TTP:  Present over bilateral biceps tendons  - Neer's: Positive  - Hawkin's: Positive    Lumbar:  - ROM fairly preserved   - pain with extension  - pain over facet joints - less so than SIJ  - TTP over right piriformis - improved     SIJ testing:  - TTP over SI joint: Present on right -- Present, mild, improved since procedure   - Su's/ Wang's: Positive  on right   - Sacroiliac Distraction Test (anterior pressure): Positive  - Sacroiliac Compression Test (lateral pressure): Positive     Neuro - Upper Extremities:  - BUE Strength:R/L: D: 5/5; B: 5/5; T: 5/5; WF: 5/5; WE: 5/5; IO: 5/5  - Extremity Reflexes: Brisk and symmetric throughout  - Sensory: Sensation to light touch intact bilaterally    Neuro - Lower Extremities:  - RLE Strength:     >> 5/5 strength with right hip flexion/ extension    >> 5/5 strength with right knee flexion/ extension    >> 5/5 strength in right ankle with plantar and dorsiflexion  - LLE Strength:     >> 5/5 strength with left hip flexion/ extension    >> 5/5 strength with knee flexion extension on the left     >> 5/5 strength in left ankle with plantar and dorsiflexion  - Extremity Reflexes:  Brisk and symmetric throughout  - Sensory: Sensation to light touch intact bilaterally      Psych:  Mood and affect is appropriate            ASSESSMENT: 63 y.o. year old male with neck and bilateral shoulder pain, consistent with     1. Cervical radicular pain  gabapentin (NEURONTIN) 600 MG tablet      2. Cervical spondylosis  nabumetone (RELAFEN) 500 MG tablet      3. Muscle pain  tiZANidine (ZANAFLEX) 4 MG tablet      4. Opioid use agreement exists              PLAN:   - Interventional:   - Consider cervical MBB (for axial neck pain) vs repeat C6/7 IL MADISON (with left paramedian approach).  Patient is not taking prescription blood thinners or ASA.    - S/p right SIJ injection on 7/13/23 with 80% pain relief.   - S/p C6/7 IL MADISON on 1/17/23 with 70% pain relief.   - Also consider lumbar treatment.    - Pharmacologic:   - Refill Norco 7.5/325mg Q8-Q12H PRN pain (70 tablets). Will e-prescribe x 3 months to fill on  (will allow to fill on 06/28/2024 since pharmacy closed on weekend date), 07/29/2024, 08/28/2024. Patient understands this is the highest dose that we prescribe; we have discussed this at great detail at previous appts.  Patient tolerating opioids with no side effects, obtaining some pain control with functional improvement.  We have discussed the risks of chronic opioid medication management.    - Refill gabapentin (Neurontin) 600mg TID - increased last visit, which seemed to be helping.  - Refill nabumetone (Relafen) 500mg BID PRN pain and Zanaflex 4mg BID PRN (60 tablets). Patient understands this may cause drowsiness.     - Can take additional 4 tablets of Tylenol 500mg during the day in addition to Percocet 5/325mg (3x) per day. Total of acetaminophen daily - cannot exceed 3000mg; patient verbalized understanding.  - Anticoagulation use: None.   - Opioid contract signed on 7/29/2020.   Opioid risk tool completed on 7/29/2020: low risk.  *last seen Dr. Monet on 2/26/24.  - MICHELLE KRAUSE reviewed and  appropriate. MME=17.5.      - reviewed drug screen from 11/29/2023, appropriate. Order drug screen (UDS/ oral swab) next visit to ensure med compliance.     - Rehabilitative: Encouraged regular exercise.    - Diagnostic/ Imaging: No new imaging ordered. Previous imaging reviewed.     - Consults/Referrals:   - Given several recommendations for medical marijuana providers previously.  - Referral previously placed to Orthopedics for shoulder pain.      - Follow up: 12 week Medication Refill     - Patient Questions: Answered all of the patient's questions regarding diagnosis, therapy, and treatment.    - This condition does not require this patient to take time off of work, and the primary goal of our Pain Management services is to improve the patient's functional capacity.   - I discussed the risks, benefits, and alternatives to potential treatment options. All questions and concerns were fully addressed today in clinic.         Elif Dolan PA-C  Interventional Pain Management - [facility]    Disclaimer:  This note was prepared using voice recognition system and is likely to have sound alike errors that may have been overlooked even after proof reading.  Please call me with any questions.       >>UDS:  9/23/2020 :: appropriate   1/22/2021 :: appropriate   5/28/2021 :: appropriate  12/7/2021 :: appropriate  6/28/2022 :: appropriate  11/29/2022 :: appropriate   3/22/2023 :: appropriate for oxycodone with metabolites but also +THC -   7/7/2023 :: appropriate  11/29/2023 ::  Appropriate with Norco use      Warnings:  8/30/2022 - patient given warning for taking more than prescribed    5/2023 - patient given warning for inconsistent drug screen in May 2023  patient given warning for +THC on 3/22/24 UDS

## 2024-06-26 ENCOUNTER — HOSPITAL ENCOUNTER (OUTPATIENT)
Dept: RADIOLOGY | Facility: HOSPITAL | Age: 64
Discharge: HOME | End: 2024-06-26
Attending: FAMILY MEDICINE
Payer: MEDICARE

## 2024-06-26 DIAGNOSIS — Z12.2 ENCOUNTER FOR SCREENING FOR LUNG CANCER: ICD-10-CM

## 2024-06-26 PROCEDURE — 71271 CT THORAX LUNG CANCER SCR C-: CPT | Mod: 26,DBM,HCNC, | Performed by: RADIOLOGY

## 2024-06-26 PROCEDURE — 71271 CT THORAX LUNG CANCER SCR C-: CPT | Mod: DBM,TC,HCNC

## 2024-07-01 ENCOUNTER — TELEPHONE (OUTPATIENT)
Dept: PAIN MEDICINE | Facility: CLINIC | Age: 64
End: 2024-07-01
Payer: MEDICARE

## 2024-07-01 NOTE — TELEPHONE ENCOUNTER
----- Message from Vin Gutierres sent at 7/1/2024 12:31 PM CDT -----  .Type:  RX Refill Request    Who Called: pt wife Amanda  Refill or New Rx:refill  RX Name and Strength:oxyCODONE-acetaminophen (PERCOCET) 7.5-325 mg per table  How is the patient currently taking it? (ex. 1XDay):Take 1 tablet by mouth every 4 (four) hours as needed for Pain. - Oral  Is this a 30 day or 90 day RX:  Preferred Pharmacy with phone number:[facility] on [address] in [place]  Local or Mail Order:local  Ordering Provider:Tierney  Would the patient rather a call back or a response via [facility]? Call back  Best Call Back Number:[phone]  Additional Information:

## 2024-07-03 ENCOUNTER — TELEPHONE (OUTPATIENT)
Dept: PAIN MEDICINE | Facility: CLINIC | Age: 64
End: 2024-07-03
Payer: MEDICARE

## 2024-07-03 RX ORDER — HYDROCODONE BITARTRATE AND ACETAMINOPHEN 5; 325 MG/1; MG/1
TABLET ORAL
Qty: 100 TABLET | Refills: 0 | Status: SHIPPED | OUTPATIENT
Start: 2024-07-03

## 2024-07-03 NOTE — TELEPHONE ENCOUNTER
Called patient and informed him that medication has been called and he can  this afternoon from pharmacy.

## 2024-07-03 NOTE — TELEPHONE ENCOUNTER
----- Message from Joyce Frank sent at 7/2/2024 11:26 AM CDT -----  Regarding: Pharmacy Calling  Contact: Vy  .Type:  RX Refill Request    Who Called: Arline   Refill or New Rx: refill   RX Name and Strength: Norco 5 and 10mg   How is the patient currently taking it? (ex. 1XDay):  Is this a 30 day or 90 day RX:  Preferred Pharmacy with phone number:   [facility] - [place], [place] - [address] [address]  [address] [address] [address]  Phone: [phone] Fax: [phone]     Local or Mail Order: local  Ordering Provider: EZRA Monet   Would the patient rather a call back or a response via [employer]? call  Best Call Back Number: [phone]   Additional Information:  Vy from  [facility] is calling in regard to a prescription that has not been e-scribed. The patient has also been calling. The one ordered is on back order the 5 and 10s are available.

## 2024-07-03 NOTE — TELEPHONE ENCOUNTER
----- Message from Elif Dolan PA-C sent at 7/3/2024 10:53 AM CDT -----  Vania just talked to this patient.   Rx will be there by end of day.    Opioid pain medications are controlled substances, which means the government monitors when these medications are dispensed. Opioid pain medication refills within our department are processed differently than non-controlled pain medications. Prescriptions (that are not postdated to a future date) will be processed and completed no later than 4pm today.  ----- Message -----  From: Tequila Padilla  Sent: 7/3/2024  10:49 AM CDT  To: [facility] Staff    The pt wife is calling in regards to the medication HYDROcodone-acetaminophen (NORCO) 7.5-325 mg per tablet. She was told that the pharmacy doesn't have that dosage in stock and haven't for the past 10 days. She was looking to get a new RX with a new dosage he is bale to take. Please give a call back at [phone]     [facility] - [facility] [address] [address] [address]  [address] [address] [address]  Phone: [phone] Fax: [phone]

## 2024-07-15 ENCOUNTER — LAB VISIT (OUTPATIENT)
Dept: LAB | Facility: HOSPITAL | Age: 64
End: 2024-07-15
Attending: FAMILY MEDICINE
Payer: MEDICARE

## 2024-07-15 DIAGNOSIS — E78.2 MIXED HYPERLIPIDEMIA: ICD-10-CM

## 2024-07-15 LAB
ALBUMIN SERPL BCP-MCNC: 4 G/DL (ref 3.5–5.2)
ALP SERPL-CCNC: 81 U/L (ref 55–135)
ALT SERPL W/O P-5'-P-CCNC: 25 U/L (ref 10–44)
ANION GAP SERPL CALC-SCNC: 7 MMOL/L (ref 8–16)
AST SERPL-CCNC: 26 U/L (ref 10–40)
BILIRUB SERPL-MCNC: 0.5 MG/DL (ref 0.1–1)
BUN SERPL-MCNC: 13 MG/DL (ref 8–23)
CALCIUM SERPL-MCNC: 9.7 MG/DL (ref 8.7–10.5)
CHLORIDE SERPL-SCNC: 105 MMOL/L (ref 95–110)
CHOLEST SERPL-MCNC: 192 MG/DL (ref 120–199)
CHOLEST/HDLC SERPL: 2.7 {RATIO} (ref 2–5)
CO2 SERPL-SCNC: 25 MMOL/L (ref 23–29)
CREAT SERPL-MCNC: 0.9 MG/DL (ref 0.5–1.4)
EST. GFR  (NO RACE VARIABLE): >60 ML/MIN/1.73 M^2
GLUCOSE SERPL-MCNC: 115 MG/DL (ref 70–110)
HDLC SERPL-MCNC: 71 MG/DL (ref 40–75)
HDLC SERPL: 37 % (ref 20–50)
LDLC SERPL CALC-MCNC: 95.6 MG/DL (ref 63–159)
NONHDLC SERPL-MCNC: 121 MG/DL
POTASSIUM SERPL-SCNC: 4.7 MMOL/L (ref 3.5–5.1)
PROT SERPL-MCNC: 7 G/DL (ref 6–8.4)
SODIUM SERPL-SCNC: 137 MMOL/L (ref 136–145)
TRIGL SERPL-MCNC: 127 MG/DL (ref 30–150)

## 2024-07-15 PROCEDURE — 80053 COMPREHEN METABOLIC PANEL: CPT | Mod: HCNC | Performed by: FAMILY MEDICINE

## 2024-07-15 PROCEDURE — 36415 COLL VENOUS BLD VENIPUNCTURE: CPT | Mod: HCNC,PO | Performed by: FAMILY MEDICINE

## 2024-07-15 PROCEDURE — 80061 LIPID PANEL: CPT | Mod: HCNC | Performed by: FAMILY MEDICINE

## 2024-07-16 ENCOUNTER — TELEPHONE (OUTPATIENT)
Dept: FAMILY MEDICINE | Facility: CLINIC | Age: 64
End: 2024-07-16
Payer: MEDICARE

## 2024-07-16 NOTE — TELEPHONE ENCOUNTER
----- Message from Reuben Banuelos MD sent at 7/15/2024 10:12 PM CDT -----  Cholesterol is improving, continue current meds liver enzymes are normal

## 2024-09-05 ENCOUNTER — OFFICE VISIT (OUTPATIENT)
Dept: PAIN MEDICINE | Facility: CLINIC | Age: 64
End: 2024-09-05
Payer: MEDICARE

## 2024-09-05 VITALS
HEIGHT: 68 IN | DIASTOLIC BLOOD PRESSURE: 84 MMHG | BODY MASS INDEX: 16.47 KG/M2 | HEART RATE: 67 BPM | SYSTOLIC BLOOD PRESSURE: 151 MMHG | WEIGHT: 108.69 LBS

## 2024-09-05 DIAGNOSIS — M25.511 CHRONIC PAIN OF BOTH SHOULDERS: ICD-10-CM

## 2024-09-05 DIAGNOSIS — Z79.891 OPIOID USE AGREEMENT EXISTS: Primary | ICD-10-CM

## 2024-09-05 DIAGNOSIS — M46.1 SACROILIITIS: ICD-10-CM

## 2024-09-05 DIAGNOSIS — M47.812 CERVICAL SPONDYLOSIS: ICD-10-CM

## 2024-09-05 DIAGNOSIS — M12.812 ROTATOR CUFF ARTHROPATHY OF BOTH SHOULDERS: ICD-10-CM

## 2024-09-05 DIAGNOSIS — G89.29 CHRONIC PAIN OF BOTH SHOULDERS: ICD-10-CM

## 2024-09-05 DIAGNOSIS — M25.512 CHRONIC PAIN OF BOTH SHOULDERS: ICD-10-CM

## 2024-09-05 DIAGNOSIS — M12.811 ROTATOR CUFF ARTHROPATHY OF BOTH SHOULDERS: ICD-10-CM

## 2024-09-05 PROCEDURE — 80326 AMPHETAMINES 5 OR MORE: CPT | Mod: HCNC | Performed by: PHYSICIAN ASSISTANT

## 2024-09-05 PROCEDURE — 80364 OPIOID &OPIATE ANALOG 5/MORE: CPT | Mod: HCNC | Performed by: PHYSICIAN ASSISTANT

## 2024-09-05 PROCEDURE — 99999 PR PBB SHADOW E&M-EST. PATIENT-LVL III: CPT | Mod: PBBFAC,HCNC,, | Performed by: PHYSICIAN ASSISTANT

## 2024-09-05 RX ORDER — METHYLPREDNISOLONE ACETATE 40 MG/ML
40 INJECTION, SUSPENSION INTRA-ARTICULAR; INTRALESIONAL; INTRAMUSCULAR; SOFT TISSUE
Status: COMPLETED | OUTPATIENT
Start: 2024-09-05 | End: 2024-09-05

## 2024-09-05 RX ORDER — HYDROCODONE BITARTRATE AND ACETAMINOPHEN 7.5; 325 MG/1; MG/1
TABLET ORAL
Qty: 70 TABLET | Refills: 0 | Status: SHIPPED | OUTPATIENT
Start: 2024-09-27

## 2024-09-05 RX ORDER — HYDROCODONE BITARTRATE AND ACETAMINOPHEN 7.5; 325 MG/1; MG/1
TABLET ORAL
Qty: 70 TABLET | Refills: 0 | Status: SHIPPED | OUTPATIENT
Start: 2024-11-27

## 2024-09-05 RX ORDER — HYDROCODONE BITARTRATE AND ACETAMINOPHEN 7.5; 325 MG/1; MG/1
TABLET ORAL
Qty: 70 TABLET | Refills: 0 | Status: SHIPPED | OUTPATIENT
Start: 2024-10-28

## 2024-09-05 RX ADMIN — METHYLPREDNISOLONE ACETATE 40 MG: 40 INJECTION, SUSPENSION INTRA-ARTICULAR; INTRALESIONAL; INTRAMUSCULAR; SOFT TISSUE at 02:09

## 2024-09-05 NOTE — PROGRESS NOTES
Established Patient Chronic Pain Note (Follow up visit)    Chief Complaint:   Chief Complaint   Patient presents with    Shoulder Pain    Low-back Pain    Neck Pain         SUBJECTIVE:    Interval History (9/5/2024):  Leo Roblero presents today for follow-up visit.  He presents today for medication refills. He feels that the medications have his pain under decent control. He has shoulder , lower back and neck pain. Today both shoulders are bothering him. Patient reports pain as 6/10 today.    Interval History (6/12/2024): Patient was last seen over 3 months ago. he presents today for follow-up for medication refill. he feels the pain medication is providing adequate pain relief and reduces the negative effects of chronic pain that affects quality of life. No major SE from medications. No major changes since previous visit; patient is stable overall. Patient reports pain as 8/10 today.      Interval HPI (2/26/24):  Leo Roblero is a 63 y.o. male presents today for follow-up and medication refill.  He continues to utilize Norco, gabapentin, Relafen, and Zanaflex as prescribed.  He has previously been instructed to use Tylenol as needed for breakthrough.  Current pain intensity is 7/10.  He reports at least 60% pain reduction without any adverse effects with this medication regimen.    Interval History (11/29/2023): Leo Roblero was last seen on 10/3/2023. he presents today for follow-up for medication refill. he feels the pain medication is providing adequate pain relief and reduces the negative effects of chronic pain that affects quality of life. No major SE from medications. No major changes since previous visit; patient is stable overall. Patient reports pain as 8/10 today.     Interval History (10/3/2023): Patient was last seen on 8/23/2023. he presents today for follow-up for medication refill. he feels the pain medication is providing adequate pain relief and reduces the negative effects of chronic pain  that affects quality of life. No major SE from medications. No major changes since previous visit; patient is stable overall. Patient reports pain as 7/10 today.     Interval History (8/23/2023): Leo Roblero presents today for follow-up visit.  he underwent right SIJ injection on 7/13/23 (6 weeks ago).  The patient reports that he is/was better following the procedure.  he reports 80% pain relief.  The changes lasted 4 weeks so far.  The changes have continued through this visit.    he also presents today for follow-up for medication refill. he feels the pain medication is providing adequate pain relief and reduces the negative effects of chronic pain that affects quality of life. No major SE from medications. No major changes since previous visit; patient is stable overall.   Patient reports pain as 8/10 today-due to neck pain.    Interval History (7/7/2023): Leo Roblero was last seen on 5/26/2023. he presents today for follow-up for medication refill. he feels the pain medication is providing adequate pain relief and reduces the negative effects of chronic pain that affects quality of life. No major SE from medications. No major changes since previous visit; patient is stable overall. Patient reports pain as 7/10 today.     Interval History (5/26/2023): Patient was last seen on 3/22/2023. he presents today for follow-up for medication refill. he feels the pain medication is providing adequate pain relief and reduces the negative effects of chronic pain that affects quality of life. No major SE from medications. No major changes since previous visit; patient is stable overall. Patient reports pain as 7/10 today.     Interval History (5/8/2023):  Leo Roblero presents today for follow-up visit. At that visit, the plan was to switch oxycodone to [place]. Patient feels this is helping.  Initially, he felt that it was making him hyper, but since then, the side effects have resolved.  He feels the pain relief is  provided with this medication.  He is tolerating well with minimal side effects.    Interval History (3/22/2023): Leo Roblero  was last seen on 1/25/2023. he presents today for follow-up for medication refill. he feels the pain medication is providing adequate pain relief and reduces the negative effects of chronic pain that affects quality of life. No major SE from medications.  Patient reports pain as 8/10 today.   He recently did some weed eating, which caused right lower back pain into the buttock.  Pain started about 3 weeks ago during this activity and has not subsided.  He feels the pain is slowly improving now several weeks later.  He is using the back brace as needed in his wearing today.    Interval History 1/25/2023: Patient presents today for follow-up visit.  he underwent C6/7 IL MADISON on 1/17/23.  The patient reports that he is/was better following the procedure.  he reports 70% pain relief.  The changes lasted 1 week so far.  The changes have continued through this visit.     he also presents today for follow-up for medication refill. he feels the pain medication is providing adequate pain relief and reduces the negative effects of chronic pain that affects quality of life. No major SE from medications. No major changes since previous visit; patient is stable overall. Patient reports pain as 7/10 today.     Interval History (11/29/2022): Leo Roblero was last seen on 8/30/2022. he presents today for follow-up for medication refill.  Medication is providing adequate pain relief. he feels the pain medication reduces the negative effects of chronic pain that affects quality of life. Patient reports pain as 9/10 today.  He c/o neck pain that radiates but mostly axial. Also c/o low back pain as well.     Interval History (8/30/2022):  Leo Roblero presents today for follow-up visit. Patient was last seen on 6/28/2022. he presents today for follow-up for medication refill.  Medication is providing adequate  pain relief. he feels the pain medication reduces the negative effects of chronic pain that affects day-to-day function and quality of life.Patient reports pain as 8/10 today. Pain has Increased over last month; patient admits to being out of medication today.    Interval History (12/7/2021):  Leo Roblero presents today for follow-up visit for medication refill.  Patient was last seen on 10/7/2021.  He reports that he has been having low back pain for the past 3 weeks or so.  He has had low back pain in the past, but it has not been a real issue lately.  He does not feel that the pain is currently controlled.  Patient reports pain as 9/10 today.    Interval History (9/23/2020): Patient was last seen on 7/29/2020. At that visit, the plan was to Refill Norco 5/325mg BID.  Patient reports pain as 10/10 today.  He is requesting a higher dose of the Norco today.  He continues to take naproxen and Tylenol as adjuvant medications.  He reports that the pain seems to be worse at night.    Interval HPI (7/29/2020):  Leo Roblero is a 60 y.o. male who presents to the clinic for a follow-up appointment for neck and bilateral shoulder pain.  At the last clinic visit, I expressed to the patient that he could continue on NSAIDs and Tylenol for his chronic pain control and to start with physical therapy to help stabilize his shoulder musculature.  He had failed subacromial bursa injections without much relief.  Since the last visit, Leo Roblero states the pain has been persistant. Current pain intensity is 10/10.    Initial HPI 02/19/2020:  Leo Roblero is a 59 y.o. male who presents to the clinic for the evaluation of neck and bilateral shoulder pain. He was referred by the Orthopedics Department for further evaluation and management of neck pain. Of note, patient has a past medical history of hyperlipidemia, and tobacco abuse, traumatic rotator cuff tear, tendinitis of the bilateral biceps tendon, and other medical  comorbidities as listed below.  The pain started several years ago without any significant injury or trauma and symptoms have been worsening.  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  The pain is described as shooting and throbbing and is rated as 6/10. The pain is rated with a score of  4/10 on the BEST day and a score of 10/10 on the WORST day.  Symptoms interfere with daily activity, sleeping and work. The pain is exacerbated by work.  The pain is mitigated by rest. The patient reports spending less than 2 hours per day reclining. The patient reports 6-8 hours of uninterrupted sleep per night.  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  Patient denies night fever/night sweats, urinary incontinence, bowel incontinence, significant weight loss, significant motor weakness and loss of sensations.    Pain Disability Index Review:      9/5/2024    12:39 PM 2/26/2024    11:55 AM 11/29/2023    10:29 AM   Last 3 PDI Scores   Pain Disability Index (PDI) [phone]       Non-Pharmacologic Treatments:  Physical Therapy/Home Exercise:  No, pending start  Ice/Heat:yes  TENS: no  Acupuncture: no  Massage: no  Chiropractic: no    Other: no     Pain Medications:  - Opioids: Percocet (Oxycodone/Acetaminophen)  - Adjuvant Medications: Mobic  - Anti-Coagulants: Aspirin        Pain Procedures:   -12/05/2019:  Bilateral subacromial bursa shoulder injections, limited relief  -02/14/2020:  IM Toradol to the right deltoid  - C6/7 IL MADISON on 1/17/23 with 70% pain relief   - right SIJ injection on 7/13/23 with 80% pain relief          Imaging & EMG/NCS (Reviewed on 9/5/2024):    1/23/23 X-Ray Shoulder Complete Bilateral  FINDINGS: Clinic joint spaces are well-maintained.  Mild narrowing with spurring of  the lateral acromioclavicular joint spaces.  Lung apices are clear.  No acute fracture or dislocation.  Impression:  Bilateral minimal osteoarthritis of the acromioclavicular joints.     EMG/NCS bilateral upper extremities 03/06/2020:  1. ABNORMAL study  2. 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  There is additional evidence of a CHRONIC radiculopathy of the C7,C8, T1 nerve roots    X-ray cervical spine 02/19/2020:  Reversal of normal cervical lordosis.  Grade 1 anterolisthesis of C3 on C4 and C4 on C5.  These appear to mostly reduce with extension maneuver.  Grade 1 anterolisthesis of C7 on T1 which does not change with provocative maneuvers.  No acute fracture.  Prevertebral soft tissues are maintained.  Multilevel discogenic degenerative changes are seen with findings most severe at C5-C6 and C6-C7 with disc space narrowing marginal spurring.  Intact odontoid.  Multilevel bilateral areas of neural foraminal encroachment, right greater than left.  Clear lung apices.  Calcification of the bilateral carotids is seen.    MRI cervical spine 06/01/2017 (via [facility]):  There is a mild cervical levoscoliosis with slight straightening of normal cervical lordosis in the superior aspect of the cervical spine. Cervical vertebral body stature is preserved. No prevertebral edema is noted.  The visualized anatomy at the skull base is normal.  C2-C3: Mild disc space narrowing. Mild bilateral posterior facet and ligamentum flavum hypertrophy which is effacing the posterior theca and causing subtle posterior impression on the cervical spinal cord, although the thecal sac still measures 1.2 cm in AP dimension. There is minimal posterior spondylosis.  C3-C4: Disc desiccation with right greater than left posterior facet arthropathy and with right uncinate hypertrophy, with mild disc  space narrowing. There is mild ligamentum flavum hypertrophy. There is mild AP narrowing of the thecal sac, consistent with a spinal stenosis, but there is no significant deformity on the cervical spinal cord. There is right lateral recess/foraminal origin stenosis.  C4-C5: Mild to moderate disc space narrowing with disc desiccation and with right greater than left posterior facet hypertrophy. There is right uncinate hypertrophy at this level. There is bilateral ligamentum flavum hypertrophy. This combination of findings is causing a spinal stenosis, thecal sac measuring only 7.7 mm in AP dimension, and with a trefoil deformity of the thecal sac. There is also some mass effect and deformity on the cervical spinal cord. In addition to a spinal stenosis, there is right greater than left lateral recess/foraminal origin stenosis.  C5-C6: Prominent disc space narrowing with disc desiccation, bridging anterior bony spurring, right greater than left posterior facet arthropathy, and mild right uncinate hypertrophy. There is a broad-based posterior osteophyte disc complex which effaces the anterior theca and narrows the AP dimension of the cervical thecal sac, but which does not cause any significant mass effect on the cervical spinal cord. There is mild bilateral lateral recess/foraminal origin stenosis at this level, right side greater than left.  C6-C7: Prominent disc space narrowing with disc desiccation, anterior and posterior spondylosis, and right uncinate hypertrophy. There is a broad-based, posterior osteophyte disc complex which partially effaces the anterior theca but which does not deform the cervical spinal cord. There is a mild spinal stenosis with mild, bilateral foraminal origin stenosis, slightly greater on the left.  C7-T1: Mild to moderate, bilateral posterior facet arthropathy. There is mild disc desiccation and disc space narrowing with approximately 2 mm of anterolisthesis C7 on T1. There is no obvious  "stenosis.  Signal intensity in the cervical thecal sac is normal. There is no convincing evidence of any significant edema or gliosis in the cervical spinal cord.     X-ray left shoulder 02/14/2020:  There is no radiographic evidence of acute osseous, articular, or soft tissue abnormality.  Joint spaces are preserved.     MRI left shoulder 2/10/20:  There is moderate rotator cuff tendinosis, supraspinatus and infraspinatus, with superimposed moderate grade partial thickness tear with fluid-filled defect posterior supraspinatus footprint with bursal and interstitial fiber tearing.  Supraspinatus tendon articular surface fraying is also noted.  There is also low-grade interstitial partial-thickness tear at the blending of the supraspinatus and infraspinatus.  There are few clustered small subcortical cysts and associated minimal edema like signal greater tuberosity.  The subscapularis tendon is unremarkable.  Long head biceps tendon is intact with mild intra-articular tendinosis.  Superior labral degenerative signal with a sublabral foramen versus SLAP tear.  Labrum is otherwise unremarkable.  Glenohumeral chondral thinning.  No joint effusion.  Normal IGHL.  Type 2 acromion without tilting minimal AC joint spurring.  There is no subacromial subdeltoid bursal fluid collection.  The bone marrow signal intensity is otherwise unremarkable.  The signal intensity of the muscle groups is normal.  No atrophy.          REVIEW OF SYSTEMS:    Vitals:    09/05/24 1239   BP: (!) 151/84   Pulse: 67   Weight: 49.3 kg (108 lb 11 oz)   Height: 5' 8" (1.727 m)     Body mass index is 16.53 kg/m².   (reviewed on 9/5/2024)   Physical Exam:  General: alert and oriented, in no apparent distress.  Gait: normal gait.  Skin: no rashes, no discoloration, no obvious lesions  HEENT: normocephalic, atraumatic.    Respiratory: without use of accessory muscles of respiration.  No audible wheezing.    Musculoskeletal - Cervical Spine:  - Pain on " flexion of cervical spine: Present   - Pain on extension of cervical spine: Present   - Cervical facet loading: Present   - TTP over the cervical facet joints: Present   - TTP over the cervical paraspinals: Present     Shoulder:Bilateral  - Pain on abduction: Present  - ROM:  Decreased secondary to pain  - TTP over the AC and GH joint: Present  - Neer's: Positive   - Hawkin's: Positive       Neuro - Upper Extremities:  - BUE Strength:R/L: D: 5/5; B: 5/5; T: 5/5; WF: 5/5; WE: 5/5; IO: 5/5  - Extremity Reflexes: Brisk and symmetric throughout  - Sensory: Sensation to light touch intact bilaterally    Neuro - Lower Extremities:  - RLE Strength:     >> 5/5 strength with right hip flexion/ extension    >> 5/5 strength with right knee flexion/ extension    >> 5/5 strength in right ankle with plantar and dorsiflexion  - LLE Strength:     >> 5/5 strength with left hip flexion/ extension    >> 5/5 strength with knee flexion extension on the left     >> 5/5 strength in left ankle with plantar and dorsiflexion  - Extremity Reflexes: Brisk and symmetric throughout  - Sensory: Sensation to light touch intact bilaterally      Psych:  Mood and affect is appropriate            ASSESSMENT: 63 y.o. year old male with neck and bilateral shoulder pain, consistent with     1. Opioid use agreement exists  Pain Clinic Drug Screen    methylPREDNISolone acetate injection 40 mg    methylPREDNISolone acetate injection 40 mg      2. Chronic pain of both shoulders  methylPREDNISolone acetate injection 40 mg    methylPREDNISolone acetate injection 40 mg      3. Cervical spondylosis        4. Sacroiliitis        5. Rotator cuff arthropathy of both shoulders                PLAN:   - Interventional: Subacromial bursa injections performed in clinic today.    - Consider cervical MBB (for axial neck pain) vs repeat C6/7 IL MADISON (with left paramedian approach).    - S/p right SIJ injection on 7/13/23 with 80% pain relief.   - S/p C6/7 IL MADISON on 1/17/23  with 70% pain relief.   - Also consider lumbar treatment.    - Pharmacologic:   - Refill Norco 7.5/325mg Q8-Q12H PRN pain (70 tablets). Will e-prescribe x 3 months to fill on 9/27/24, 10/28/24 and 11/27/24 (due to Thanksgiving holiday). Patient understands this is the highest dose that we prescribe; we have discussed this at great detail at previous appts.  Patient tolerating opioids with no side effects, obtaining some pain control with functional improvement.  We have discussed the risks of chronic opioid medication management.    - Continue gabapentin (Neurontin) 600mg TID - increased last visit, which seemed to be helping.  - Continue nabumetone (Relafen) 500mg BID PRN pain and Zanaflex 4mg BID PRN (60 tablets). Patient understands this may cause drowsiness.     - Can take additional 4 tablets of Tylenol 500mg during the day in addition to Percocet 5/325mg (3x) per day. Total of acetaminophen daily - cannot exceed 3000mg; patient verbalized understanding.  - Anticoagulation use: None.   - Opioid contract signed on 7/29/2020.   Opioid risk tool completed on 7/29/2020: low risk.  *last seen Dr. Monet on 2/26/24.  - [place]  reviewed and appropriate.        - Will order UDS today to ensure medication compliance.       - Rehabilitative: Encouraged regular exercise.    - Diagnostic/ Imaging: No new imaging ordered. Previous imaging reviewed.     - Consults/Referrals:   - Given several recommendations for medical marijuana providers previously.  - Referral previously placed to Orthopedics for shoulder pain.      - Follow up: 12 week Medication Refill     - Patient Questions: Answered all of the patient's questions regarding diagnosis, therapy, and treatment.    - This condition does not require this patient to take time off of work, and the primary goal of our Pain Management services is to improve the patient's functional capacity.   - I discussed the risks, benefits, and alternatives to potential treatment options.  All questions and concerns were fully addressed today in clinic.       PROCEDURE NOTE:  Bilateral subacromial bursal injections:   The procedure was discussed with the patient including complications of nerve damage,  bleeding, infection, and failure of pain relief.   Using a posterior lateral approach, the bony landmarks were identified by palpation and marked.  ChloraPrep of site was done. A mixture of 9 mL 1% lidocaine + 40 mg Depo-Medrol was prepared (10 mL total).   A 25-gauge needle was into the right subacromial bursal space, and the medication mixture was injected after negative aspiration.  The same was done for the left side.  Patient tolerated the procedure well and without complications. Patient was monitored for 15 min following the injection before discharged from the clinic.      Samantha Murphy PA-C  Interventional Pain Management - [facility]    >>UDS:  9/23/2020 :: appropriate   1/22/2021 :: appropriate   5/28/2021 :: appropriate  12/7/2021 :: appropriate  6/28/2022 :: appropriate  11/29/2022 :: appropriate   3/22/2023 :: appropriate for oxycodone with metabolites but also +THC -   7/7/2023 :: appropriate  11/29/2023 ::  Appropriate with Norco use  9/5/2024: ordered, pending results    Warnings:  8/30/2022 - patient given warning for taking more than prescribed    5/2023 - patient given warning for inconsistent drug screen in May 2023  patient given warning for +THC on 3/22/24 UDS

## 2024-12-04 ENCOUNTER — OFFICE VISIT (OUTPATIENT)
Dept: PAIN MEDICINE | Facility: CLINIC | Age: 64
End: 2024-12-04
Payer: MEDICARE

## 2024-12-04 VITALS
BODY MASS INDEX: 17.39 KG/M2 | HEIGHT: 68 IN | HEART RATE: 75 BPM | SYSTOLIC BLOOD PRESSURE: 156 MMHG | WEIGHT: 114.75 LBS | DIASTOLIC BLOOD PRESSURE: 90 MMHG

## 2024-12-04 DIAGNOSIS — M60.9 MYOSITIS, UNSPECIFIED MYOSITIS TYPE, UNSPECIFIED SITE: ICD-10-CM

## 2024-12-04 DIAGNOSIS — G89.29 CHRONIC PAIN OF BOTH SHOULDERS: ICD-10-CM

## 2024-12-04 DIAGNOSIS — M47.812 CERVICAL SPONDYLOSIS: ICD-10-CM

## 2024-12-04 DIAGNOSIS — M25.512 CHRONIC PAIN OF BOTH SHOULDERS: ICD-10-CM

## 2024-12-04 DIAGNOSIS — M60.88 OTHER MYOSITIS, OTHER SITE: ICD-10-CM

## 2024-12-04 DIAGNOSIS — M25.511 CHRONIC PAIN OF BOTH SHOULDERS: ICD-10-CM

## 2024-12-04 DIAGNOSIS — M46.1 SACROILIITIS: Primary | ICD-10-CM

## 2024-12-04 DIAGNOSIS — Z79.891 OPIOID USE AGREEMENT EXISTS: ICD-10-CM

## 2024-12-04 DIAGNOSIS — M54.12 CERVICAL RADICULAR PAIN: ICD-10-CM

## 2024-12-04 DIAGNOSIS — M50.30 DDD (DEGENERATIVE DISC DISEASE), CERVICAL: ICD-10-CM

## 2024-12-04 DIAGNOSIS — M46.1 SACROILIITIS: ICD-10-CM

## 2024-12-04 PROCEDURE — 99214 OFFICE O/P EST MOD 30 MIN: CPT | Mod: S$GLB,,, | Performed by: PHYSICIAN ASSISTANT

## 2024-12-04 PROCEDURE — 1159F MED LIST DOCD IN RCRD: CPT | Mod: CPTII,S$GLB,, | Performed by: PHYSICIAN ASSISTANT

## 2024-12-04 PROCEDURE — 3008F BODY MASS INDEX DOCD: CPT | Mod: CPTII,S$GLB,, | Performed by: PHYSICIAN ASSISTANT

## 2024-12-04 PROCEDURE — 3077F SYST BP >= 140 MM HG: CPT | Mod: CPTII,S$GLB,, | Performed by: PHYSICIAN ASSISTANT

## 2024-12-04 PROCEDURE — 3080F DIAST BP >= 90 MM HG: CPT | Mod: CPTII,S$GLB,, | Performed by: PHYSICIAN ASSISTANT

## 2024-12-04 PROCEDURE — 99999 PR PBB SHADOW E&M-EST. PATIENT-LVL III: CPT | Mod: PBBFAC,HCNC,, | Performed by: PHYSICIAN ASSISTANT

## 2024-12-04 RX ORDER — HYDROCODONE BITARTRATE AND ACETAMINOPHEN 7.5; 325 MG/1; MG/1
TABLET ORAL
Qty: 70 TABLET | Refills: 0 | Status: SHIPPED | OUTPATIENT
Start: 2025-01-21

## 2024-12-04 RX ORDER — HYDROCODONE BITARTRATE AND ACETAMINOPHEN 7.5; 325 MG/1; MG/1
TABLET ORAL
Qty: 70 TABLET | Refills: 0 | Status: SHIPPED | OUTPATIENT
Start: 2024-12-24

## 2024-12-04 NOTE — PROGRESS NOTES
Established Patient Chronic Pain Note (Follow up visit)    Chief Complaint:   Chief Complaint   Patient presents with    Low-back Pain    Neck Pain    Shoulder Pain         SUBJECTIVE:      Interval History (12/4/2024):  Leo Roblero presents today for follow-up visit.  Patient was last seen on 9/5/2024.  Patient reports pain as 10/10 today. The patient currently c/o  pain the right lower back and buttock as well as neck and upper extremities. Right upper extremity is more painful.   He continues to utilize his pain medications without any issues.      Interval History (9/5/24):  Leo Roblero presents today for follow-up visit.  He presents today for medication refills. He feels that the medications have his pain under decent control. He has shoulder , lower back and neck pain. Today both shoulders are bothering him. Patient reports pain as 6/10 today.    Interval History (6/12/2024): Patient was last seen over 3 months ago. he presents today for follow-up for medication refill. he feels the pain medication is providing adequate pain relief and reduces the negative effects of chronic pain that affects quality of life. No major SE from medications. No major changes since previous visit; patient is stable overall. Patient reports pain as 8/10 today.      Interval HPI (2/26/24):  Leo Roblero is a 63 y.o. male presents today for follow-up and medication refill.  He continues to utilize Norco, gabapentin, Relafen, and Zanaflex as prescribed.  He has previously been instructed to use Tylenol as needed for breakthrough.  Current pain intensity is 7/10.  He reports at least 60% pain reduction without any adverse effects with this medication regimen.    Interval History (11/29/2023): Leo Roblero was last seen on 10/3/2023. he presents today for follow-up for medication refill. he feels the pain medication is providing adequate pain relief and reduces the negative effects of chronic pain that affects quality of  life. No major SE from medications. No major changes since previous visit; patient is stable overall. Patient reports pain as 8/10 today.     Interval History (10/3/2023): Patient was last seen on 8/23/2023. he presents today for follow-up for medication refill. he feels the pain medication is providing adequate pain relief and reduces the negative effects of chronic pain that affects quality of life. No major SE from medications. No major changes since previous visit; patient is stable overall. Patient reports pain as 7/10 today.     Interval History (8/23/2023): Leo Roblero presents today for follow-up visit.  he underwent right SIJ injection on 7/13/23 (6 weeks ago).  The patient reports that he is/was better following the procedure.  he reports 80% pain relief.  The changes lasted 4 weeks so far.  The changes have continued through this visit.    he also presents today for follow-up for medication refill. he feels the pain medication is providing adequate pain relief and reduces the negative effects of chronic pain that affects quality of life. No major SE from medications. No major changes since previous visit; patient is stable overall.   Patient reports pain as 8/10 today-due to neck pain.    Interval History (7/7/2023): Leo Roblero was last seen on 5/26/2023. he presents today for follow-up for medication refill. he feels the pain medication is providing adequate pain relief and reduces the negative effects of chronic pain that affects quality of life. No major SE from medications. No major changes since previous visit; patient is stable overall. Patient reports pain as 7/10 today.     Interval History (5/26/2023): Patient was last seen on 3/22/2023. he presents today for follow-up for medication refill. he feels the pain medication is providing adequate pain relief and reduces the negative effects of chronic pain that affects quality of life. No major SE from medications. No major changes since  previous visit; patient is stable overall. Patient reports pain as 7/10 today.     Interval History (5/8/2023):  Leo Roblero presents today for follow-up visit. At that visit, the plan was to switch oxycodone to [place]. Patient feels this is helping.  Initially, he felt that it was making him hyper, but since then, the side effects have resolved.  He feels the pain relief is provided with this medication.  He is tolerating well with minimal side effects.    Interval History (3/22/2023): Leo Roblero  was last seen on 1/25/2023. he presents today for follow-up for medication refill. he feels the pain medication is providing adequate pain relief and reduces the negative effects of chronic pain that affects quality of life. No major SE from medications.  Patient reports pain as 8/10 today.   He recently did some weed eating, which caused right lower back pain into the buttock.  Pain started about 3 weeks ago during this activity and has not subsided.  He feels the pain is slowly improving now several weeks later.  He is using the back brace as needed in his wearing today.    Interval History 1/25/2023: Patient presents today for follow-up visit.  he underwent C6/7 IL MADISON on 1/17/23.  The patient reports that he is/was better following the procedure.  he reports 70% pain relief.  The changes lasted 1 week so far.  The changes have continued through this visit.     he also presents today for follow-up for medication refill. he feels the pain medication is providing adequate pain relief and reduces the negative effects of chronic pain that affects quality of life. No major SE from medications. No major changes since previous visit; patient is stable overall. Patient reports pain as 7/10 today.     Interval History (11/29/2022): Leo Roblero was last seen on 8/30/2022. he presents today for follow-up for medication refill.  Medication is providing adequate pain relief. he feels the pain medication reduces the  negative effects of chronic pain that affects quality of life. Patient reports pain as 9/10 today.  He c/o neck pain that radiates but mostly axial. Also c/o low back pain as well.     Interval History (8/30/2022):  Leo Roblero presents today for follow-up visit. Patient was last seen on 6/28/2022. he presents today for follow-up for medication refill.  Medication is providing adequate pain relief. he feels the pain medication reduces the negative effects of chronic pain that affects day-to-day function and quality of life.Patient reports pain as 8/10 today. Pain has Increased over last month; patient admits to being out of medication today.    Interval History (12/7/2021):  Leo Roblero presents today for follow-up visit for medication refill.  Patient was last seen on 10/7/2021.  He reports that he has been having low back pain for the past 3 weeks or so.  He has had low back pain in the past, but it has not been a real issue lately.  He does not feel that the pain is currently controlled.  Patient reports pain as 9/10 today.    Interval History (9/23/2020): Patient was last seen on 7/29/2020. At that visit, the plan was to Refill Norco 5/325mg BID.  Patient reports pain as 10/10 today.  He is requesting a higher dose of the Norco today.  He continues to take naproxen and Tylenol as adjuvant medications.  He reports that the pain seems to be worse at night.    Interval HPI (7/29/2020):  Leo Roblero is a 60 y.o. male who presents to the clinic for a follow-up appointment for neck and bilateral shoulder pain.  At the last clinic visit, I expressed to the patient that he could continue on NSAIDs and Tylenol for his chronic pain control and to start with physical therapy to help stabilize his shoulder musculature.  He had failed subacromial bursa injections without much relief.  Since the last visit, Leo Roblero states the pain has been persistant. Current pain intensity is 10/10.    Initial HPI 02/19/2020:  Leo  Annika is a 59 y.o. male who presents to the clinic for the evaluation of neck and bilateral shoulder pain. He was referred by the Orthopedics Department for further evaluation and management of neck pain. Of note, patient has a past medical history of hyperlipidemia, and tobacco abuse, traumatic rotator cuff tear, tendinitis of the bilateral biceps tendon, and other medical comorbidities as listed below.  The pain started several years ago without any significant injury or trauma and symptoms have been worsening.  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  The pain is described as shooting and throbbing and is rated as 6/10. The pain is rated with a score of  4/10 on the BEST day and a score of 10/10 on the WORST day.  Symptoms interfere with daily activity, sleeping and work. The pain is exacerbated by work.  The pain is mitigated by rest. The patient reports spending less than 2 hours per day reclining. The patient reports 6-8 hours of uninterrupted sleep per night.  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  Patient denies night fever/night sweats, urinary incontinence, bowel incontinence, significant weight loss, significant motor weakness and loss of sensations.    Pain Disability Index Review:      12/4/2024     1:05 PM 9/5/2024    12:39 PM 2/26/2024    11:55 AM   Last 3 PDI Scores   Pain Disability Index (PDI) 70 [phone]       Non-Pharmacologic Treatments:  Physical Therapy/Home Exercise:  No, pending start  Ice/Heat:yes  TENS: no  Acupuncture: no  Massage: no  Chiropractic: no    Other: no     Pain Medications:  - Opioids: Percocet (Oxycodone/Acetaminophen)  - Adjuvant Medications: Mobic  - Anti-Coagulants: Aspirin        Pain Procedures:   -12/05/2019:  Bilateral  subacromial bursa shoulder injections, limited relief  -02/14/2020:  IM Toradol to the right deltoid  - C6/7 IL MADISON on 1/17/23 with 70% pain relief   - right SIJ injection on 7/13/23 with 80% pain relief          Imaging & EMG/NCS (Reviewed on 12/4/2024):    1/23/23 X-Ray Shoulder Complete Bilateral  FINDINGS: Clinic joint spaces are well-maintained.  Mild narrowing with spurring of the lateral acromioclavicular joint spaces.  Lung apices are clear.  No acute fracture or dislocation.  Impression:  Bilateral minimal osteoarthritis of the acromioclavicular joints.     EMG/NCS bilateral upper extremities 03/06/2020:  1. ABNORMAL study  2. 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  There is additional evidence of a CHRONIC radiculopathy of the C7,C8, T1 nerve roots    X-ray cervical spine 02/19/2020:  Reversal of normal cervical lordosis.  Grade 1 anterolisthesis of C3 on C4 and C4 on C5.  These appear to mostly reduce with extension maneuver.  Grade 1 anterolisthesis of C7 on T1 which does not change with provocative maneuvers.  No acute fracture.  Prevertebral soft tissues are maintained.  Multilevel discogenic degenerative changes are seen with findings most severe at C5-C6 and C6-C7 with disc space narrowing marginal spurring.  Intact odontoid.  Multilevel bilateral areas of neural foraminal encroachment, right greater than left.  Clear lung apices.  Calcification of the bilateral carotids is seen.    MRI cervical spine 06/01/2017 ([facility]):  There is a mild cervical levoscoliosis with slight straightening of normal cervical lordosis in the superior aspect of the cervical spine. Cervical vertebral body stature is preserved. No prevertebral edema is noted.  The visualized anatomy at the skull base is normal.  C2-C3: Mild disc space narrowing. Mild bilateral posterior  facet and ligamentum flavum hypertrophy which is effacing the posterior theca and causing subtle posterior impression on the cervical spinal cord, although the thecal sac still measures 1.2 cm in AP dimension. There is minimal posterior spondylosis.  C3-C4: Disc desiccation with right greater than left posterior facet arthropathy and with right uncinate hypertrophy, with mild disc space narrowing. There is mild ligamentum flavum hypertrophy. There is mild AP narrowing of the thecal sac, consistent with a spinal stenosis, but there is no significant deformity on the cervical spinal cord. There is right lateral recess/foraminal origin stenosis.  C4-C5: Mild to moderate disc space narrowing with disc desiccation and with right greater than left posterior facet hypertrophy. There is right uncinate hypertrophy at this level. There is bilateral ligamentum flavum hypertrophy. This combination of findings is causing a spinal stenosis, thecal sac measuring only 7.7 mm in AP dimension, and with a trefoil deformity of the thecal sac. There is also some mass effect and deformity on the cervical spinal cord. In addition to a spinal stenosis, there is right greater than left lateral recess/foraminal origin stenosis.  C5-C6: Prominent disc space narrowing with disc desiccation, bridging anterior bony spurring, right greater than left posterior facet arthropathy, and mild right uncinate hypertrophy. There is a broad-based posterior osteophyte disc complex which effaces the anterior theca and narrows the AP dimension of the cervical thecal sac, but which does not cause any significant mass effect on the cervical spinal cord. There is mild bilateral lateral recess/foraminal origin stenosis at this level, right side greater than left.  C6-C7: Prominent disc space narrowing with disc desiccation, anterior and posterior spondylosis, and right uncinate hypertrophy. There is a broad-based, posterior osteophyte disc complex which partially  effaces the anterior theca but which does not deform the cervical spinal cord. There is a mild spinal stenosis with mild, bilateral foraminal origin stenosis, slightly greater on the left.  C7-T1: Mild to moderate, bilateral posterior facet arthropathy. There is mild disc desiccation and disc space narrowing with approximately 2 mm of anterolisthesis C7 on T1. There is no obvious stenosis.  Signal intensity in the cervical thecal sac is normal. There is no convincing evidence of any significant edema or gliosis in the cervical spinal cord.     X-ray left shoulder 02/14/2020:  There is no radiographic evidence of acute osseous, articular, or soft tissue abnormality.  Joint spaces are preserved.     MRI left shoulder 2/10/20:  There is moderate rotator cuff tendinosis, supraspinatus and infraspinatus, with superimposed moderate grade partial thickness tear with fluid-filled defect posterior supraspinatus footprint with bursal and interstitial fiber tearing.  Supraspinatus tendon articular surface fraying is also noted.  There is also low-grade interstitial partial-thickness tear at the blending of the supraspinatus and infraspinatus.  There are few clustered small subcortical cysts and associated minimal edema like signal greater tuberosity.  The subscapularis tendon is unremarkable.  Long head biceps tendon is intact with mild intra-articular tendinosis.  Superior labral degenerative signal with a sublabral foramen versus SLAP tear.  Labrum is otherwise unremarkable.  Glenohumeral chondral thinning.  No joint effusion.  Normal IGHL.  Type 2 acromion without tilting minimal AC joint spurring.  There is no subacromial subdeltoid bursal fluid collection.  The bone marrow signal intensity is otherwise unremarkable.  The signal intensity of the muscle groups is normal.  No atrophy.      REVIEW OF SYSTEMS:  Review of Systems   Constitutional:  Negative for chills and fever.   HENT:  Negative for congestion and nosebleeds.   "  Respiratory:  Negative for cough.    Cardiovascular:  Negative for chest pain.   Gastrointestinal:  Negative for diarrhea and vomiting.   Musculoskeletal:  Positive for back pain, joint pain, myalgias and neck pain.   Skin:  Negative for rash.   Neurological:  Negative for tremors.   Psychiatric/Behavioral:  Negative for hallucinations. The patient is not nervous/anxious.        Vitals:    12/04/24 1305   BP: (!) 156/90   Pulse: 75   Weight: 52 kg (114 lb 12 oz)   Height: 5' 8" (1.727 m)     Body mass index is 17.45 kg/m².   (reviewed on 12/4/2024)   Physical Exam:  General: alert and oriented, in no apparent distress.  Gait: normal gait.  Skin: no rashes, no discoloration, no obvious lesions  HEENT: normocephalic, atraumatic.    Respiratory: without use of accessory muscles of respiration.  No audible wheezing.    Musculoskeletal - Cervical Spine:  - Pain on flexion of cervical spine: Present   - Pain on extension of cervical spine: Present   - Cervical facet loading: Present   - TTP over the cervical facet joints: Present   - TTP over the cervical paraspinals: Present   - Spurlings: Equivocal    SIJ testing:  - TTP over SI joint: Present on the right  - Su's/ Wang's: Positive  on the right  - Sacroiliac Distraction Test (anterior pressure): Positive  - Sacroiliac Compression Test (lateral pressure): Positive   -TTP over Piriformis: Present on the right        Neuro - Upper Extremities:  - BUE Strength:R/L: D: 5/5; B: 5/5; T: 5/5; WF: 5/5; WE: 5/5; IO: 5/5  - Extremity Reflexes: Brisk and symmetric throughout  - Sensory: Sensation to light touch intact bilaterally    Neuro - Lower Extremities:  - RLE Strength:     >> 5/5 strength with right hip flexion/ extension    >> 5/5 strength with right knee flexion/ extension    >> 5/5 strength in right ankle with plantar and dorsiflexion  - LLE Strength:     >> 5/5 strength with left hip flexion/ extension    >> 5/5 strength with knee flexion extension on the left "     >> 5/5 strength in left ankle with plantar and dorsiflexion  - Extremity Reflexes: Brisk and symmetric throughout  - Sensory: Sensation to light touch intact bilaterally      Psych:  Mood and affect is appropriate            ASSESSMENT: 64 y.o. year old male with neck and bilateral shoulder pain, consistent with     1. Sacroiliitis  Case Request-RAD/Other Procedure Area: Right Sacroiliac Joint Injection and Piriformis Injection      2. Myositis, unspecified myositis type, unspecified site  Case Request-RAD/Other Procedure Area: Right Sacroiliac Joint Injection and Piriformis Injection      3. Other myositis, other site  Case Request-RAD/Other Procedure Area: Right Sacroiliac Joint Injection and Piriformis Injection      4. Cervical spondylosis  Case Request-RAD/Other Procedure Area: Cervical C6/7 Interlaminar MADISON      5. Cervical radicular pain  Case Request-RAD/Other Procedure Area: Cervical C6/7 Interlaminar MADISON      6. DDD (degenerative disc disease), cervical  Case Request-RAD/Other Procedure Area: Cervical C6/7 Interlaminar MADISON                PLAN:   - Interventional: Schedule patient for Right Sacroiliac Joint and Piriformis Injections first and then a few weeks later    repeat C6/7 IL MADISON.   Explained the risks and benefits of the procedure in detail with the patient today in clinic along with alternative treatment options, and the patient elected to pursue the intervention.      - S/p right SIJ injection on 7/13/23 with 80% pain relief.   - S/p C6/7 IL MADISON on 1/17/23 with 70% pain relief.       - Pharmacologic:   - Continue Norco 7.5/325mg Q8-Q12H PRN pain (70 tablets). Will e-prescribe x 2 months to fill on 12/24/24 and 1/21/25.  Patient understands this is the highest dose that we prescribe; we have discussed this at great detail at previous appts.  Patient tolerating opioids with no side effects, obtaining some pain control with functional improvement.  We have discussed the risks of chronic opioid  medication management.      - Continue gabapentin (Neurontin) 600mg TID .  - Continue nabumetone (Relafen) 500mg BID PRN pain and Zanaflex 4mg BID PRN (60 tablets). Patient understands this may cause drowsiness.       - Anticoagulation use: None.   - Opioid contract signed on 7/29/2020.   Opioid risk tool completed on 7/29/2020: low risk.  *last seen Dr. Monet on 2/26/24.  - LA  reviewed and appropriate.      - Rehabilitative: Encouraged regular exercise.    - Diagnostic/ Imaging: Previous imaging reviewed.     - Follow up: return to clinic 4 weeks after Cervical MADISON.    - Patient Questions: Answered all of the patient's questions regarding diagnosis, therapy, and treatment.    - This condition does not require this patient to take time off of work, and the primary goal of our Pain Management services is to improve the patient's functional capacity.     - I discussed the risks, benefits, and alternatives to potential treatment options. All questions and concerns were fully addressed today in clinic.         Samantha Murphy PA-C  Interventional Pain Management - [facility]    >>UDS:  9/23/2020 :: appropriate   1/22/2021 :: appropriate   5/28/2021 :: appropriate  12/7/2021 :: appropriate  6/28/2022 :: appropriate  11/29/2022 :: appropriate   3/22/2023 :: appropriate for oxycodone with metabolites but also +THC -   7/7/2023 :: appropriate  11/29/2023 ::  Appropriate with Norco use  9/5/2024: Appropriate with Norco use    Warnings:  8/30/2022 - patient given warning for taking more than prescribed    5/2023 - patient given warning for inconsistent drug screen in May 2023  patient given warning for +THC on 3/22/24 UDS

## 2024-12-05 ENCOUNTER — TELEPHONE (OUTPATIENT)
Dept: PAIN MEDICINE | Facility: CLINIC | Age: 64
End: 2024-12-05
Payer: MEDICARE

## 2024-12-05 NOTE — TELEPHONE ENCOUNTER
I called the patient and his wife back and was able to reschedule his procedure to 01/07/2025 because they will be out on vacation. NO ANTICOAGULATION.

## 2024-12-05 NOTE — TELEPHONE ENCOUNTER
----- Message from Fahad sent at 12/5/2024  8:50 AM CST -----  Contact: Amanda Quezadaignac (wife )  ..Type:  Patient Requesting Call    Who Called:Amanda Annika (wife )  Does the patient know what this is regarding?:rescheduling appt on 01/02/2025  Would the patient rather a call back or a response via [facility]? Call   Best Call Back Number:.[phone] (primary)     Additional Information:

## 2024-12-09 ENCOUNTER — TELEPHONE (OUTPATIENT)
Dept: PAIN MEDICINE | Facility: CLINIC | Age: 64
End: 2024-12-09
Payer: MEDICARE

## 2024-12-09 NOTE — TELEPHONE ENCOUNTER
----- Message from Carmen sent at 12/9/2024 12:17 PM CST -----  Type:  Patient Returning Call    Who Called:Wife  Who Left Message for Patient:Nurse  Does the patient know what this is regarding?:Injection  Would the patient rather a call back or a response via [facility]? callback  Best Call Back Number:[phone]  Additional Information: Missed call

## 2024-12-09 NOTE — TELEPHONE ENCOUNTER
Called patient in regards to injection ut patient didn't answer the phone LVM to call back at earliest convenience.    Aniket BEARD

## 2024-12-10 ENCOUNTER — TELEPHONE (OUTPATIENT)
Dept: FAMILY MEDICINE | Facility: CLINIC | Age: 64
End: 2024-12-10
Payer: MEDICARE

## 2024-12-10 NOTE — TELEPHONE ENCOUNTER
----- Message from Saira sent at 12/10/2024  9:22 AM CST -----  Contact: wife Amanda   Patient is returning a phone call.    Who left a message for the patient: Yissel     Does patient know what this is regarding:  not sure     Would you like a call back, or a response through your [facility] portal?:   call back     Comments:

## 2024-12-12 NOTE — PRE-PROCEDURE INSTRUCTIONS
Spoke with patient regarding procedure scheduled on 12.19     Arrival time 1000     Has patient been sick with fever or on antibiotics within the last 7 days? No     Does the patient have any open wounds, sores or rashes? No     Does the patient have any recent fractures? no     Has patient received a vaccination within the last 7 days? No     Received the COVID vaccination?      Has the patient stopped all medications as directed? na     Does patient have a pacemaker, defibrillator, or implantable stimulator? No     Does the patient have a ride to and from procedure and someone reliable to remain with patient?  wife     Is the patient diabetic? no     Does the patient have sleep apnea? Or use O2 at home? no     Is the patient receiving sedation?      Is the patient instructed to remain NPO beginning at midnight the night before their procedure? yes     Procedure location confirmed with patient? Yes     Covid- Denies signs/symptoms. Instructed to notify PAT/MD if any changes.

## 2024-12-19 ENCOUNTER — HOSPITAL ENCOUNTER (OUTPATIENT)
Facility: HOSPITAL | Age: 64
Discharge: HOME | End: 2024-12-19
Attending: PHYSICAL MEDICINE & REHABILITATION | Admitting: PHYSICAL MEDICINE & REHABILITATION
Payer: MEDICARE

## 2024-12-19 VITALS
HEART RATE: 72 BPM | HEIGHT: 68 IN | DIASTOLIC BLOOD PRESSURE: 80 MMHG | WEIGHT: 114.63 LBS | TEMPERATURE: 98 F | RESPIRATION RATE: 17 BRPM | BODY MASS INDEX: 17.37 KG/M2 | SYSTOLIC BLOOD PRESSURE: 171 MMHG | OXYGEN SATURATION: 100 %

## 2024-12-19 DIAGNOSIS — M46.1 SACROILIITIS: Primary | ICD-10-CM

## 2024-12-19 PROCEDURE — 77002 NEEDLE LOCALIZATION BY XRAY: CPT | Mod: 26,59,, | Performed by: PHYSICAL MEDICINE & REHABILITATION

## 2024-12-19 PROCEDURE — 20552 NJX 1/MLT TRIGGER POINT 1/2: CPT | Mod: 59,HCNC,, | Performed by: PHYSICAL MEDICINE & REHABILITATION

## 2024-12-19 PROCEDURE — 25500020 PHARM REV CODE 255: Mod: HCNC | Performed by: PHYSICAL MEDICINE & REHABILITATION

## 2024-12-19 PROCEDURE — 20552 NJX 1/MLT TRIGGER POINT 1/2: CPT | Mod: 59,HCNC | Performed by: PHYSICAL MEDICINE & REHABILITATION

## 2024-12-19 PROCEDURE — 27096 INJECT SACROILIAC JOINT: CPT | Mod: HCNC,RT,, | Performed by: PHYSICAL MEDICINE & REHABILITATION

## 2024-12-19 PROCEDURE — 25000003 PHARM REV CODE 250: Mod: HCNC | Performed by: PHYSICAL MEDICINE & REHABILITATION

## 2024-12-19 PROCEDURE — 63600175 PHARM REV CODE 636 W HCPCS: Mod: JZ,JG,HCNC | Performed by: PHYSICAL MEDICINE & REHABILITATION

## 2024-12-19 PROCEDURE — 27096 INJECT SACROILIAC JOINT: CPT | Mod: HCNC,RT | Performed by: PHYSICAL MEDICINE & REHABILITATION

## 2024-12-19 RX ORDER — BUPIVACAINE HYDROCHLORIDE 2.5 MG/ML
INJECTION, SOLUTION EPIDURAL; INFILTRATION; INTRACAUDAL
Status: DISCONTINUED | OUTPATIENT
Start: 2024-12-19 | End: 2024-12-19 | Stop reason: HOSPADM

## 2024-12-19 RX ORDER — ONDANSETRON HYDROCHLORIDE 2 MG/ML
4 INJECTION, SOLUTION INTRAVENOUS ONCE AS NEEDED
Status: DISCONTINUED | OUTPATIENT
Start: 2024-12-19 | End: 2024-12-19 | Stop reason: HOSPADM

## 2024-12-19 RX ORDER — INDOMETHACIN 25 MG/1
CAPSULE ORAL
Status: DISCONTINUED | OUTPATIENT
Start: 2024-12-19 | End: 2024-12-19 | Stop reason: HOSPADM

## 2024-12-19 RX ORDER — METHYLPREDNISOLONE ACETATE 40 MG/ML
INJECTION, SUSPENSION INTRA-ARTICULAR; INTRALESIONAL; INTRAMUSCULAR; SOFT TISSUE
Status: DISCONTINUED | OUTPATIENT
Start: 2024-12-19 | End: 2024-12-19 | Stop reason: HOSPADM

## 2024-12-19 NOTE — H&P
HPI  Patient presenting for Procedure(s) (LRB):  Right Sacroiliac Joint Injection and Piriformis Injection (Right)     No health changes since previous encounter    History reviewed. No pertinent past medical history.  Past Surgical History:   Procedure Laterality Date    EPIDURAL STEROID INJECTION INTO CERVICAL SPINE N/A 1/17/2023    Procedure: C6/7 IL MADISON;  Surgeon: Tavares Monet MD;  Location: [facility]IN [facility];  Service: Pain Management;  Laterality: N/A;    INJECTION OF ANESTHETIC AGENT AROUND MEDIAL BRANCH NERVES INNERVATING CERVICAL FACET JOINT Bilateral 12/8/2022    Procedure: diagnostic Bilateral C5-7 MBB with RN IV sedation;  Surgeon: Tavares Monet MD;  Location: [facility];  Service: Pain Management;  Laterality: Bilateral;    INJECTION OF ANESTHETIC AGENT INTO SACROILIAC JOINT Right 7/13/2023    Procedure: Right SIJ Injection - PAT: ask about sedation;  Surgeon: Tavares Monet MD;  Location: [facility]IN [facility];  Service: Pain Management;  Laterality: Right;     Review of patient's allergies indicates:  No Known Allergies     No current facility-administered medications on file prior to encounter.     Current Outpatient Medications on File Prior to Encounter   Medication Sig Dispense Refill    gabapentin (NEURONTIN) 600 MG tablet Take 1 tablet (600 mg total) by mouth 3 (three) times daily. 90 tablet 2    oxyCODONE-acetaminophen (PERCOCET) 7.5-325 mg per tablet Take 1 tablet by mouth every 4 (four) hours as needed for Pain.      pravastatin (PRAVACHOL) 20 MG tablet Take 1 tablet (20 mg total) by mouth once daily. 90 tablet 3    acetaminophen (TYLENOL) 500 MG tablet Take 1 tablet (500 mg total) by mouth every 8 (eight) hours as needed for Pain. 90 tablet 11    diclofenac sodium (VOLTAREN) 1 % Gel Apply 2 g topically 4 (four) times daily. 100 g 3    fluticasone propionate (FLONASE) 50 mcg/actuation nasal spray 2 PUFFS EACH NOSTRIL ONCE DAILY 16 g 3    HYDROcodone-acetaminophen (NORCO) 5-325 mg per  "tablet Take 1.5 tablets Q8-12H PRN pain. *30 DAY SUPPLY* 100 tablet 0    HYDROcodone-acetaminophen (NORCO) 7.5-325 mg per tablet Take 1 tablet Q8-12H PRN pain. *30 DAY SUPPLY* 70 tablet 0    nabumetone (RELAFEN) 500 MG tablet Take 1 tablet (500 mg total) by mouth 2 (two) times daily as needed for Pain. take with food. 60 tablet 0    tiZANidine (ZANAFLEX) 4 MG tablet Take 0.5-1 tablets (2-4 mg total) by mouth 2 (two) times daily as needed (muscle spasms). May cause drowsiness. 60 tablet 0        PMHx, PSHx, Allergies, Medications reviewed in epic    ROS negative except pain complaints in HPI    OBJECTIVE:    BP (!) 166/76 (BP Location: Right arm, Patient Position: Sitting)   Pulse 72   Temp 97.3 °F (36.3 °C) (Temporal)   Resp 16   Ht 5' 8" (1.727 m)   Wt 52 kg (114 lb 10.2 oz)   SpO2 100%   BMI 17.43 kg/m²     PHYSICAL EXAMINATION:    GENERAL: Well appearing, in no acute distress, alert and oriented x3.  PSYCH:  Mood and affect appropriate.  SKIN: Skin color, texture, turgor normal, no rashes or lesions which will impact the procedure.  CV: RRR with palpation of the radial artery.  PULM: No evidence of respiratory difficulty, symmetric chest rise. Clear to auscultation.  NEURO: Cranial nerves grossly intact.    Plan:    Proceed with procedure as planned Procedure(s) (LRB):  Right Sacroiliac Joint Injection and Piriformis Injection (Right)    Tavares Monet MD  12/19/2024            "

## 2024-12-19 NOTE — OP NOTE
"Time-out taken to identify patient and procedure side prior to starting the procedure.     12/19/2024      PROCEDURE:  1) Right piriformis trigger point injection  2) Right sacroiliac joint injection                            REASON FOR PROCEDURE:   Sacroiliitis [M46.1]  Myalgia other site    PHYSICIAN: Tavares Monet MD  ASSISTANTS: None    SEDATION: None        MEDICATIONS INJECTED: 1mL 40mg/ml Depo-Medrol and 4mL Bupivacaine 0.25% into each site    LOCAL ANESTHETIC USED: Xylocaine 1% 6ml     ESTIMATED BLOOD LOSS: None.   COMPLICATIONS: None.     TECHNIQUE:   Piriformis trigger point injection:   The skin was prepped with chlorhexidine, and draped in a sterile fashion. The area overlying the right piriformis muscle was identified under fluoroscopy in the AP view, then 5 mL 1% lidocaine was injected into the subcutaneous tissue and deeper tissues over this area through a 25 gauge needle 1.5" needle. The right hip joint was visualized in an AP view. The tip of a 22-gauge 3 1/2 inch Quincke-type spinal needle was advanced 3 cm inferior and 3 cm lateral to the inferior aspect of the SI joint.  Once the piriformis muscle was thought to be encountered, 3 ml of Omnipaque 300 contrast agent was slowly injected. Confirmation of spread of contrast agent within the piriformis muscle was made in the AP fluoroscopic view. Subsequently,  4 ml of Bupivacaine 0.25% and 1mL of  40 mg/mL depomedrol was slowly administered. Displacement of the radio opaque contrast after injection of the medication confirmed that the medication went into the area of the piriformis muscle. The needle was removed and bleeding was nil.  A sterile dressing was applied. Leo was taken back to the recovery room for further observation.         Sacroiliac joint injection:   Laying in the prone position, the patient was prepped and draped in the usual sterile fashion using ChloraPrep and fenestrated drape.  The area was determined under fluoroscopy.  " Local Xylocaine was injected by raising a wheel and going down to the periosteum using a 27-gauge hypodermic needle.  The 3.5 inch 22-gauge spinal needle was introduce into the Right sacroiliac joint.  Negative pressure applied to confirm no intravascular placement.  Omnipaque was injected to confirm placement and to confirm that there was no vascular runoff.  The medication was then injected slowly.  The patient tolerated the procedure well.                       The patient was monitored for approximately 30 minutes after the procedure. Patient was given post procedure and discharge instructions to follow at home. We will see the patient back in two weeks or the patient may call to inform of status. The patient was discharged in a stable condition

## 2024-12-19 NOTE — DISCHARGE INSTRUCTIONS

## 2024-12-19 NOTE — DISCHARGE SUMMARY
Discharge Note  Short Stay      SUMMARY     Admit Date: 12/19/2024    Attending Physician: Tavares Monet MD        Discharge Physician: Tavares Monet MD        Discharge Date: 12/19/2024 11:09 AM    Procedure(s) (LRB):  Right Sacroiliac Joint Injection and Piriformis Injection (Right)    Final Diagnosis: Sacroiliitis [M46.1]  Myositis, unspecified myositis type, unspecified site [M60.9]  Other myositis, other site [M60.88]    Disposition: Home or self care    Patient Instructions:   Current Discharge Medication List        CONTINUE these medications which have NOT CHANGED    Details   gabapentin (NEURONTIN) 600 MG tablet Take 1 tablet (600 mg total) by mouth 3 (three) times daily.  Qty: 90 tablet, Refills: 2    Comments: Dr. Tavares Monet - LIVIER# [id number]  Associated Diagnoses: Cervical radicular pain      !! HYDROcodone-acetaminophen (NORCO) 7.5-325 mg per tablet Take 1 tablet Q8-12H PRN pain. *30 DAY SUPPLY*  Qty: 70 tablet, Refills: 0    Comments: Greater than 7 day supply medically necessary. *30 DAY SUPPLY*  Associated Diagnoses: Opioid use agreement exists; Chronic pain of both shoulders; Cervical spondylosis; Sacroiliitis      oxyCODONE-acetaminophen (PERCOCET) 7.5-325 mg per tablet Take 1 tablet by mouth every 4 (four) hours as needed for Pain.      pravastatin (PRAVACHOL) 20 MG tablet Take 1 tablet (20 mg total) by mouth once daily.  Qty: 90 tablet, Refills: 3    Associated Diagnoses: Mixed hyperlipidemia      acetaminophen (TYLENOL) 500 MG tablet Take 1 tablet (500 mg total) by mouth every 8 (eight) hours as needed for Pain.  Qty: 90 tablet, Refills: 11    Associated Diagnoses: Traumatic incomplete tear of left rotator cuff, sequela; DDD (degenerative disc disease), cervical; Myalgia of auxiliary muscles, head and neck; Cervical spondylosis with radiculopathy; Cervical radiculopathy      diclofenac sodium (VOLTAREN) 1 % Gel Apply 2 g topically 4 (four) times daily.  Qty: 100 g, Refills: 3       fluticasone propionate (FLONASE) 50 mcg/actuation nasal spray 2 PUFFS EACH NOSTRIL ONCE DAILY  Qty: 16 g, Refills: 3      HYDROcodone-acetaminophen (NORCO) 5-325 mg per tablet Take 1.5 tablets Q8-12H PRN pain. *30 DAY SUPPLY*  Qty: 100 tablet, Refills: 0    Comments: Greater than 7 day supply medically necessary. *to replace Norco 7.5/325mg not in stock      !! HYDROcodone-acetaminophen (NORCO) 7.5-325 mg per tablet Take 1 tablet Q8-12H PRN pain. *30 DAY SUPPLY*  Qty: 70 tablet, Refills: 0    Comments: Greater than 7 day supply medically necessary. *30 DAY SUPPLY*  Associated Diagnoses: Opioid use agreement exists; Chronic pain of both shoulders; Cervical spondylosis; Sacroiliitis      !! HYDROcodone-acetaminophen (NORCO) 7.5-325 mg per tablet Take 1 tablet Q8-12H PRN pain. *30 DAY SUPPLY*  Qty: 70 tablet, Refills: 0    Comments: Greater than 7 day supply medically necessary. *30 DAY SUPPLY*  Associated Diagnoses: Opioid use agreement exists; Chronic pain of both shoulders; Cervical spondylosis; Sacroiliitis      nabumetone (RELAFEN) 500 MG tablet Take 1 tablet (500 mg total) by mouth 2 (two) times daily as needed for Pain. take with food.  Qty: 60 tablet, Refills: 0    Associated Diagnoses: Cervical spondylosis      tiZANidine (ZANAFLEX) 4 MG tablet Take 0.5-1 tablets (2-4 mg total) by mouth 2 (two) times daily as needed (muscle spasms). May cause drowsiness.  Qty: 60 tablet, Refills: 0    Associated Diagnoses: Muscle pain       !! - Potential duplicate medications found. Please discuss with provider.              Discharge Diagnosis: Sacroiliitis [M46.1]  Myositis, unspecified myositis type, unspecified site [M60.9]  Other myositis, other site [M60.88]  Condition on Discharge: Stable with no complications to procedure   Diet on Discharge: Same as before.  Activity: as per instruction sheet.  Discharge to: Home with a responsible adult.  Follow up: 2-4 weeks       Please call the office at [phone] if you  experience any weakness or loss of sensation, fever > 101.5, pain uncontrolled with oral medications, persistent nausea/vomiting/or diarrhea, redness or drainage from the incisions, or any other worrisome concerns. If physician on call was not reached or could not communicate with our office for any reason please go to the nearest emergency department

## 2025-01-02 NOTE — PRE-PROCEDURE INSTRUCTIONS
Spoke with patient wife regarding procedure scheduled on 1.7     Arrival time 0730     Has patient been sick with fever or on antibiotics within the last 7 days? No     Does the patient have any open wounds, sores or rashes? No     Does the patient have any recent fractures? no     Has patient received a vaccination within the last 7 days? No     Received the COVID vaccination?      Has the patient stopped all medications as directed? na     Does patient have a pacemaker, defibrillator, or implantable stimulator? No     Does the patient have a ride to and from procedure and someone reliable to remain with patient?  wife     Is the patient diabetic? no     Does the patient have sleep apnea? Or use O2 at home? no     Is the patient receiving sedation?     Is the patient instructed to remain NPO beginning at midnight the night before their procedure? yes     Procedure location confirmed with patient? Yes     Covid- Denies signs/symptoms. Instructed to notify PAT/MD if any changes.

## 2025-01-07 ENCOUNTER — HOSPITAL ENCOUNTER (OUTPATIENT)
Facility: HOSPITAL | Age: 65
Discharge: HOME | End: 2025-01-07
Attending: PHYSICAL MEDICINE & REHABILITATION | Admitting: PHYSICAL MEDICINE & REHABILITATION
Payer: MEDICARE

## 2025-01-07 VITALS
OXYGEN SATURATION: 100 % | HEIGHT: 68 IN | HEART RATE: 57 BPM | SYSTOLIC BLOOD PRESSURE: 117 MMHG | DIASTOLIC BLOOD PRESSURE: 67 MMHG | BODY MASS INDEX: 17.24 KG/M2 | WEIGHT: 113.75 LBS | RESPIRATION RATE: 15 BRPM | TEMPERATURE: 98 F

## 2025-01-07 DIAGNOSIS — M54.12 CERVICAL RADICULOPATHY: Primary | ICD-10-CM

## 2025-01-07 PROCEDURE — 63600175 PHARM REV CODE 636 W HCPCS: Mod: HCNC | Performed by: PHYSICAL MEDICINE & REHABILITATION

## 2025-01-07 PROCEDURE — 62321 NJX INTERLAMINAR CRV/THRC: CPT | Mod: HCNC,,, | Performed by: PHYSICAL MEDICINE & REHABILITATION

## 2025-01-07 PROCEDURE — 62321 NJX INTERLAMINAR CRV/THRC: CPT | Mod: HCNC | Performed by: PHYSICAL MEDICINE & REHABILITATION

## 2025-01-07 PROCEDURE — 25500020 PHARM REV CODE 255: Mod: HCNC | Performed by: PHYSICAL MEDICINE & REHABILITATION

## 2025-01-07 RX ORDER — BUPIVACAINE HYDROCHLORIDE 2.5 MG/ML
INJECTION, SOLUTION EPIDURAL; INFILTRATION; INTRACAUDAL
Status: DISCONTINUED | OUTPATIENT
Start: 2025-01-07 | End: 2025-01-07 | Stop reason: HOSPADM

## 2025-01-07 RX ORDER — DEXAMETHASONE SODIUM PHOSPHATE 10 MG/ML
INJECTION INTRAMUSCULAR; INTRAVENOUS
Status: DISCONTINUED | OUTPATIENT
Start: 2025-01-07 | End: 2025-01-07 | Stop reason: HOSPADM

## 2025-01-07 RX ORDER — FENTANYL CITRATE 50 UG/ML
INJECTION, SOLUTION INTRAMUSCULAR; INTRAVENOUS
Status: DISCONTINUED | OUTPATIENT
Start: 2025-01-07 | End: 2025-01-07 | Stop reason: HOSPADM

## 2025-01-07 RX ORDER — MIDAZOLAM HYDROCHLORIDE 1 MG/ML
INJECTION, SOLUTION INTRAMUSCULAR; INTRAVENOUS
Status: DISCONTINUED | OUTPATIENT
Start: 2025-01-07 | End: 2025-01-07 | Stop reason: HOSPADM

## 2025-01-07 RX ORDER — ONDANSETRON HYDROCHLORIDE 2 MG/ML
4 INJECTION, SOLUTION INTRAVENOUS ONCE AS NEEDED
Status: DISCONTINUED | OUTPATIENT
Start: 2025-01-07 | End: 2025-01-07 | Stop reason: HOSPADM

## 2025-01-07 NOTE — H&P
HPI  Patient presenting for Procedure(s) (LRB):  Cervical C6/7 Interlaminar MADISON (N/A)     No health changes since previous encounter    History reviewed. No pertinent past medical history.  Past Surgical History:   Procedure Laterality Date    EPIDURAL STEROID INJECTION INTO CERVICAL SPINE N/A 1/17/2023    Procedure: C6/7 IL MADISON;  Surgeon: Tavares Monet MD;  Location: [facility]IN [facility];  Service: Pain Management;  Laterality: N/A;    INJECTION OF ANESTHETIC AGENT AROUND MEDIAL BRANCH NERVES INNERVATING CERVICAL FACET JOINT Bilateral 12/8/2022    Procedure: diagnostic Bilateral C5-7 MBB with RN IV sedation;  Surgeon: Tavares Monet MD;  Location: [facility]IN [facility];  Service: Pain Management;  Laterality: Bilateral;    INJECTION OF ANESTHETIC AGENT INTO SACROILIAC JOINT Right 7/13/2023    Procedure: Right SIJ Injection - PAT: ask about sedation;  Surgeon: Tavares Monet MD;  Location: [facility]IN [facility];  Service: Pain Management;  Laterality: Right;    INJECTION OF ANESTHETIC AGENT INTO SACROILIAC JOINT Right 12/19/2024    Procedure: Right Sacroiliac Joint Injection and Piriformis Injection;  Surgeon: Tavares Monet MD;  Location: [facility];  Service: Pain Management;  Laterality: Right;     Review of patient's allergies indicates:  No Known Allergies     No current facility-administered medications on file prior to encounter.     Current Outpatient Medications on File Prior to Encounter   Medication Sig Dispense Refill    HYDROcodone-acetaminophen (NORCO) 5-325 mg per tablet Take 1.5 tablets Q8-12H PRN pain. *30 DAY SUPPLY* 100 tablet 0    acetaminophen (TYLENOL) 500 MG tablet Take 1 tablet (500 mg total) by mouth every 8 (eight) hours as needed for Pain. 90 tablet 11    diclofenac sodium (VOLTAREN) 1 % Gel Apply 2 g topically 4 (four) times daily. 100 g 3    fluticasone propionate (FLONASE) 50 mcg/actuation nasal spray 2 PUFFS EACH NOSTRIL ONCE DAILY 16 g 3    gabapentin (NEURONTIN) 600 MG tablet Take 1 tablet  "(600 mg total) by mouth 3 (three) times daily. 90 tablet 2    HYDROcodone-acetaminophen (NORCO) 7.5-325 mg per tablet Take 1 tablet Q8-12H PRN pain. *30 DAY SUPPLY* 70 tablet 0    nabumetone (RELAFEN) 500 MG tablet Take 1 tablet (500 mg total) by mouth 2 (two) times daily as needed for Pain. take with food. 60 tablet 0    oxyCODONE-acetaminophen (PERCOCET) 7.5-325 mg per tablet Take 1 tablet by mouth every 4 (four) hours as needed for Pain.      pravastatin (PRAVACHOL) 20 MG tablet Take 1 tablet (20 mg total) by mouth once daily. 90 tablet 3    tiZANidine (ZANAFLEX) 4 MG tablet Take 0.5-1 tablets (2-4 mg total) by mouth 2 (two) times daily as needed (muscle spasms). May cause drowsiness. 60 tablet 0        PMHx, PSHx, Allergies, Medications reviewed in epic    ROS negative except pain complaints in HPI    OBJECTIVE:    BP (!) 169/79 (BP Location: Right arm, Patient Position: Sitting)   Pulse 71   Temp 97.8 °F (36.6 °C) (Temporal)   Resp 16   Ht 5' 8" (1.727 m)   Wt 51.6 kg (113 lb 12.1 oz)   SpO2 99%   BMI 17.30 kg/m²     PHYSICAL EXAMINATION:    GENERAL: Well appearing, in no acute distress, alert and oriented x3.  PSYCH:  Mood and affect appropriate.  SKIN: Skin color, texture, turgor normal, no rashes or lesions which will impact the procedure.  CV: RRR with palpation of the radial artery.  PULM: No evidence of respiratory difficulty, symmetric chest rise. Clear to auscultation.  NEURO: Cranial nerves grossly intact.    Plan:    Proceed with procedure as planned Procedure(s) (LRB):  Cervical C6/7 Interlaminar MADISON (N/A)    Tavares Monet MD  01/07/2025            "

## 2025-01-07 NOTE — OP NOTE
Cervical Interlaminar Epidural Steroid Injection under Fluoroscopic Guidance.   Time-out taken to identify patient and procedure prior to starting the procedure.     Date of Procedure: 01/07/2025    PROCEDURE: Cervical Interlaminar epidural steroid injection C6-7 under fluoroscopic guidance.     Pre-Op diagnosis: Cervical spondylosis [M47.812]  Cervical radicular pain [M54.12]  DDD (degenerative disc disease), cervical [M50.30]    Post-Op diagnosis: Cervical spondylosis [M47.812]  Cervical radicular pain [M54.12]  DDD (degenerative disc disease), cervical [M50.30]    PHYSICIAN: Tavares Monet MD    ASSISTANTS: None     SEDATION: Conscious sedation provided by M.D    The patient was monitored with continuous pulse oximetry, EKG, and intermittent blood pressure monitors, immediately prior to administration of sedation.  The patient was hemodynamically stable throughout the entire process was responsive to voice, and breathing spontaneously.  Supplemental O2 was provided at 2L/min via nasal cannula.  Patient was comfortable for the duration of the procedure.     There was a total of 2mg IV Midazolam and 50mcg Fentanyl titrated for the procedure    Total sedation time was >10minutes and <20minutes      MEDICATIONS INJECTED: Preservative-free Decadron 10 mg with 1mL of sterile 0.25%Bupivicaine and 3ml of preservative free normal saline.     LOCAL ANESTHETIC INJECTED: Xylocaine 1% 3mL    ESTIMATED BLOOD LOSS: none.     COMPLICATIONS: none.     TECHNIQUE: With the patient laying in a prone position, the area was prepped and draped in the usual sterile fashion using ChloraPrep and a fenestrated drape. Local anesthetic was given using a 27-gauge needle by raising a wheal and going down to the hub of the needle over the C6-7interlaminar space.  The interlaminar space was then approached with a 3.5 inch 18-gauge Touhy needle was introduced under fluoroscopic guidance in the AP and Lateral view. Once the Ligamentum flavum  was encountered loss of resistance to saline was used to enter the epidural space. With positive loss of resistance and negative CSF or Blood, 2mL contrast dye Omnipaque (300mg/ml) was injected to confirm placement and there was no vascular runoff. The medication was then injected slowly. The patient tolerated the procedure well.     The patient was monitored after the procedure.   They were given post-procedure and discharge instructions to follow at home.  The patient was discharged in a stable condition.

## 2025-01-07 NOTE — DISCHARGE SUMMARY
Discharge Note  Short Stay      SUMMARY     Admit Date: 1/7/2025    Attending Physician: Tavares Monet MD        Discharge Physician: Tavares Monet MD        Discharge Date: 1/7/2025 8:55 AM    Procedure(s) (LRB):  Cervical C6/7 Interlaminar MADISON (N/A)    Final Diagnosis: Cervical spondylosis [M47.812]  Cervical radicular pain [M54.12]  DDD (degenerative disc disease), cervical [M50.30]    Disposition: Home or self care    Patient Instructions:   Current Discharge Medication List        CONTINUE these medications which have NOT CHANGED    Details   HYDROcodone-acetaminophen (NORCO) 5-325 mg per tablet Take 1.5 tablets Q8-12H PRN pain. *30 DAY SUPPLY*  Qty: 100 tablet, Refills: 0    Comments: Greater than 7 day supply medically necessary. *to replace Norco 7.5/325mg not in stock      acetaminophen (TYLENOL) 500 MG tablet Take 1 tablet (500 mg total) by mouth every 8 (eight) hours as needed for Pain.  Qty: 90 tablet, Refills: 11    Associated Diagnoses: Traumatic incomplete tear of left rotator cuff, sequela; DDD (degenerative disc disease), cervical; Myalgia of auxiliary muscles, head and neck; Cervical spondylosis with radiculopathy; Cervical radiculopathy      diclofenac sodium (VOLTAREN) 1 % Gel Apply 2 g topically 4 (four) times daily.  Qty: 100 g, Refills: 3      fluticasone propionate (FLONASE) 50 mcg/actuation nasal spray 2 PUFFS EACH NOSTRIL ONCE DAILY  Qty: 16 g, Refills: 3      gabapentin (NEURONTIN) 600 MG tablet Take 1 tablet (600 mg total) by mouth 3 (three) times daily.  Qty: 90 tablet, Refills: 2    Comments: Dr. Tavares Monet - LIVIER# [id number]  Associated Diagnoses: Cervical radicular pain      !! HYDROcodone-acetaminophen (NORCO) 7.5-325 mg per tablet Take 1 tablet Q8-12H PRN pain. *30 DAY SUPPLY*  Qty: 70 tablet, Refills: 0    Comments: Greater than 7 day supply medically necessary. *30 DAY SUPPLY*  Associated Diagnoses: Opioid use agreement exists; Chronic pain of both shoulders; Cervical  spondylosis; Sacroiliitis      !! HYDROcodone-acetaminophen (NORCO) 7.5-325 mg per tablet Take 1 tablet Q8-12H PRN pain. *30 DAY SUPPLY*  Qty: 70 tablet, Refills: 0    Comments: Greater than 7 day supply medically necessary. *30 DAY SUPPLY*  Associated Diagnoses: Opioid use agreement exists; Chronic pain of both shoulders; Cervical spondylosis; Sacroiliitis      !! HYDROcodone-acetaminophen (NORCO) 7.5-325 mg per tablet Take 1 tablet Q8-12H PRN pain. *30 DAY SUPPLY*  Qty: 70 tablet, Refills: 0    Comments: Greater than 7 day supply medically necessary. *30 DAY SUPPLY*  Associated Diagnoses: Opioid use agreement exists; Chronic pain of both shoulders; Cervical spondylosis; Sacroiliitis      nabumetone (RELAFEN) 500 MG tablet Take 1 tablet (500 mg total) by mouth 2 (two) times daily as needed for Pain. take with food.  Qty: 60 tablet, Refills: 0    Associated Diagnoses: Cervical spondylosis      oxyCODONE-acetaminophen (PERCOCET) 7.5-325 mg per tablet Take 1 tablet by mouth every 4 (four) hours as needed for Pain.      pravastatin (PRAVACHOL) 20 MG tablet Take 1 tablet (20 mg total) by mouth once daily.  Qty: 90 tablet, Refills: 3    Associated Diagnoses: Mixed hyperlipidemia      tiZANidine (ZANAFLEX) 4 MG tablet Take 0.5-1 tablets (2-4 mg total) by mouth 2 (two) times daily as needed (muscle spasms). May cause drowsiness.  Qty: 60 tablet, Refills: 0    Associated Diagnoses: Muscle pain       !! - Potential duplicate medications found. Please discuss with provider.              Discharge Diagnosis: Cervical spondylosis [M47.812]  Cervical radicular pain [M54.12]  DDD (degenerative disc disease), cervical [M50.30]  Condition on Discharge: Stable with no complications to procedure   Diet on Discharge: Same as before.  Activity: as per instruction sheet.  Discharge to: Home with a responsible adult.  Follow up: 2-4 weeks       Please call the office at [phone] if you experience any weakness or loss of sensation,  fever > 101.5, pain uncontrolled with oral medications, persistent nausea/vomiting/or diarrhea, redness or drainage from the incisions, or any other worrisome concerns. If physician on call was not reached or could not communicate with our office for any reason please go to the nearest emergency department

## 2025-01-07 NOTE — DISCHARGE INSTRUCTIONS

## 2025-01-29 ENCOUNTER — OFFICE VISIT (OUTPATIENT)
Dept: PAIN MEDICINE | Facility: CLINIC | Age: 65
End: 2025-01-29
Payer: MEDICARE

## 2025-01-29 VITALS
BODY MASS INDEX: 17.7 KG/M2 | HEART RATE: 77 BPM | HEIGHT: 68 IN | WEIGHT: 116.75 LBS | SYSTOLIC BLOOD PRESSURE: 128 MMHG | DIASTOLIC BLOOD PRESSURE: 73 MMHG

## 2025-01-29 DIAGNOSIS — M54.12 CERVICAL RADICULAR PAIN: ICD-10-CM

## 2025-01-29 DIAGNOSIS — M47.816 LUMBAR SPONDYLOSIS: Primary | ICD-10-CM

## 2025-01-29 DIAGNOSIS — M47.812 CERVICAL SPONDYLOSIS: ICD-10-CM

## 2025-01-29 DIAGNOSIS — M46.1 SACROILIITIS: ICD-10-CM

## 2025-01-29 PROCEDURE — 99214 OFFICE O/P EST MOD 30 MIN: CPT | Mod: HCNC,S$GLB,, | Performed by: PHYSICIAN ASSISTANT

## 2025-01-29 PROCEDURE — 3008F BODY MASS INDEX DOCD: CPT | Mod: HCNC,CPTII,S$GLB, | Performed by: PHYSICIAN ASSISTANT

## 2025-01-29 PROCEDURE — 1159F MED LIST DOCD IN RCRD: CPT | Mod: HCNC,CPTII,S$GLB, | Performed by: PHYSICIAN ASSISTANT

## 2025-01-29 PROCEDURE — 3078F DIAST BP <80 MM HG: CPT | Mod: HCNC,CPTII,S$GLB, | Performed by: PHYSICIAN ASSISTANT

## 2025-01-29 PROCEDURE — 99999 PR PBB SHADOW E&M-EST. PATIENT-LVL III: CPT | Mod: PBBFAC,HCNC,, | Performed by: PHYSICIAN ASSISTANT

## 2025-01-29 PROCEDURE — 3074F SYST BP LT 130 MM HG: CPT | Mod: HCNC,CPTII,S$GLB, | Performed by: PHYSICIAN ASSISTANT

## 2025-01-29 RX ORDER — METHYLPREDNISOLONE 4 MG/1
TABLET ORAL
Qty: 21 TABLET | Refills: 0 | Status: SHIPPED | OUTPATIENT
Start: 2025-01-29 | End: 2025-02-19

## 2025-01-29 RX ORDER — GABAPENTIN 600 MG/1
600 TABLET ORAL 3 TIMES DAILY
Qty: 90 TABLET | Refills: 2 | Status: SHIPPED | OUTPATIENT
Start: 2025-01-29

## 2025-01-29 NOTE — PROGRESS NOTES
"Established Patient Chronic Pain Note (Follow up visit)    Chief Complaint:   Chief Complaint   Patient presents with    Low-back Pain    Shoulder Pain     SUBJECTIVE:    Interval History (1/29/2025):  Leo Roblero presents today for follow-up visit.  he underwent Right  Sacroiliac joint and piriformis injections on 12/19/24 and then    Cervical C6/7 IL MADISON   on 1/7/25.  The patient reports that he is/was better following the procedure.  he reports 80% pain relief.  The changes lasted 4 weeks so far.  The changes have continued through this visit.  Patient reports pain as 7/10 today.    At this time, patient denies neck pain.  He has burning in in both shoulders-"they are just wore out". His most painful area is the lower back. He denies pain radiating down the legs.     Interval History (1/7/25):  Leo Roblero presents today for follow-up visit.  Patient was last seen on 12/4/2024.  Patient reports pain as 10/10 today. The patient currently c/o  pain the right lower back and buttock as well as neck and upper extremities. Right upper extremity is more painful.   He continues to utilize his pain medications without any issues.      Interval History (9/5/24):  Leo Roblero presents today for follow-up visit.  He presents today for medication refills. He feels that the medications have his pain under decent control. He has shoulder , lower back and neck pain. Today both shoulders are bothering him. Patient reports pain as 6/10 today.    Interval History (6/12/2024): Patient was last seen over 3 months ago. he presents today for follow-up for medication refill. he feels the pain medication is providing adequate pain relief and reduces the negative effects of chronic pain that affects quality of life. No major SE from medications. No major changes since previous visit; patient is stable overall. Patient reports pain as 8/10 today.      Interval HPI (2/26/24):  Leo Roblero is a 63 y.o. male presents today for " follow-up and medication refill.  He continues to utilize Norco, gabapentin, Relafen, and Zanaflex as prescribed.  He has previously been instructed to use Tylenol as needed for breakthrough.  Current pain intensity is 7/10.  He reports at least 60% pain reduction without any adverse effects with this medication regimen.    Interval History (11/29/2023): Leo Roblero was last seen on 10/3/2023. he presents today for follow-up for medication refill. he feels the pain medication is providing adequate pain relief and reduces the negative effects of chronic pain that affects quality of life. No major SE from medications. No major changes since previous visit; patient is stable overall. Patient reports pain as 8/10 today.     Interval History (10/3/2023): Patient was last seen on 8/23/2023. he presents today for follow-up for medication refill. he feels the pain medication is providing adequate pain relief and reduces the negative effects of chronic pain that affects quality of life. No major SE from medications. No major changes since previous visit; patient is stable overall. Patient reports pain as 7/10 today.     Interval History (8/23/2023): Leo Roblero presents today for follow-up visit.  he underwent right SIJ injection on 7/13/23 (6 weeks ago).  The patient reports that he is/was better following the procedure.  he reports 80% pain relief.  The changes lasted 4 weeks so far.  The changes have continued through this visit.    he also presents today for follow-up for medication refill. he feels the pain medication is providing adequate pain relief and reduces the negative effects of chronic pain that affects quality of life. No major SE from medications. No major changes since previous visit; patient is stable overall.   Patient reports pain as 8/10 today-due to neck pain.    Interval History (7/7/2023): Leo Roblero was last seen on 5/26/2023. he presents today for follow-up for medication refill. he feels  the pain medication is providing adequate pain relief and reduces the negative effects of chronic pain that affects quality of life. No major SE from medications. No major changes since previous visit; patient is stable overall. Patient reports pain as 7/10 today.     Interval History (5/26/2023): Patient was last seen on 3/22/2023. he presents today for follow-up for medication refill. he feels the pain medication is providing adequate pain relief and reduces the negative effects of chronic pain that affects quality of life. No major SE from medications. No major changes since previous visit; patient is stable overall. Patient reports pain as 7/10 today.     Interval History (5/8/2023):  Leo Roblero presents today for follow-up visit. At that visit, the plan was to switch oxycodone to [place]. Patient feels this is helping.  Initially, he felt that it was making him hyper, but since then, the side effects have resolved.  He feels the pain relief is provided with this medication.  He is tolerating well with minimal side effects.    Interval History (3/22/2023): Leo Roblero  was last seen on 1/25/2023. he presents today for follow-up for medication refill. he feels the pain medication is providing adequate pain relief and reduces the negative effects of chronic pain that affects quality of life. No major SE from medications.  Patient reports pain as 8/10 today.   He recently did some weed eating, which caused right lower back pain into the buttock.  Pain started about 3 weeks ago during this activity and has not subsided.  He feels the pain is slowly improving now several weeks later.  He is using the back brace as needed in his wearing today.    Interval History 1/25/2023: Patient presents today for follow-up visit.  he underwent C6/7 IL MADISON on 1/17/23.  The patient reports that he is/was better following the procedure.  he reports 70% pain relief.  The changes lasted 1 week so far.  The changes have continued  through this visit.     he also presents today for follow-up for medication refill. he feels the pain medication is providing adequate pain relief and reduces the negative effects of chronic pain that affects quality of life. No major SE from medications. No major changes since previous visit; patient is stable overall. Patient reports pain as 7/10 today.     Interval History (11/29/2022): Leo Roblero was last seen on 8/30/2022. he presents today for follow-up for medication refill.  Medication is providing adequate pain relief. he feels the pain medication reduces the negative effects of chronic pain that affects quality of life. Patient reports pain as 9/10 today.  He c/o neck pain that radiates but mostly axial. Also c/o low back pain as well.     Interval History (8/30/2022):  Leo Roblero presents today for follow-up visit. Patient was last seen on 6/28/2022. he presents today for follow-up for medication refill.  Medication is providing adequate pain relief. he feels the pain medication reduces the negative effects of chronic pain that affects day-to-day function and quality of life.Patient reports pain as 8/10 today. Pain has Increased over last month; patient admits to being out of medication today.    Interval History (12/7/2021):  Leo Roblero presents today for follow-up visit for medication refill.  Patient was last seen on 10/7/2021.  He reports that he has been having low back pain for the past 3 weeks or so.  He has had low back pain in the past, but it has not been a real issue lately.  He does not feel that the pain is currently controlled.  Patient reports pain as 9/10 today.    Interval History (9/23/2020): Patient was last seen on 7/29/2020. At that visit, the plan was to Refill Norco 5/325mg BID.  Patient reports pain as 10/10 today.  He is requesting a higher dose of the Norco today.  He continues to take naproxen and Tylenol as adjuvant medications.  He reports that the pain seems to be worse  at night.    Interval HPI (7/29/2020):  Leo Roblero is a 60 y.o. male who presents to the clinic for a follow-up appointment for neck and bilateral shoulder pain.  At the last clinic visit, I expressed to the patient that he could continue on NSAIDs and Tylenol for his chronic pain control and to start with physical therapy to help stabilize his shoulder musculature.  He had failed subacromial bursa injections without much relief.  Since the last visit, Leo Roblero states the pain has been persistant. Current pain intensity is 10/10.    Initial HPI 02/19/2020:  Leo Roblero is a 59 y.o. male who presents to the clinic for the evaluation of neck and bilateral shoulder pain. He was referred by the Orthopedics Department for further evaluation and management of neck pain. Of note, patient has a past medical history of hyperlipidemia, and tobacco abuse, traumatic rotator cuff tear, tendinitis of the bilateral biceps tendon, and other medical comorbidities as listed below.  The pain started several years ago without any significant injury or trauma and symptoms have been worsening.  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  The pain is described as shooting and throbbing and is rated as 6/10. The pain is rated with a score of  4/10 on the BEST day and a score of 10/10 on the WORST day.  Symptoms interfere with daily activity, sleeping and work. The pain is exacerbated by work.  The pain is mitigated by rest. The patient reports spending less than 2 hours per day reclining. The patient reports 6-8 hours of uninterrupted sleep per night.  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  Patient denies night fever/night sweats, urinary incontinence, bowel incontinence, significant weight  loss, significant motor weakness and loss of sensations.    Pain Disability Index Review:      1/29/2025    10:48 AM 12/4/2024     1:05 PM 9/5/2024    12:39 PM   Last 3 PDI Scores   Pain Disability Index (PDI) 49 [phone]       Non-Pharmacologic Treatments:  Physical Therapy/Home Exercise:  No, pending start  Ice/Heat:yes  TENS: no  Acupuncture: no  Massage: no  Chiropractic: no    Other: no     Pain Medications:  - Opioids: Percocet (Oxycodone/Acetaminophen)  - Adjuvant Medications: Mobic  - Anti-Coagulants: Aspirin        Pain Procedures:   -12/05/2019:  Bilateral subacromial bursa shoulder injections, limited relief  -02/14/2020:  IM Toradol to the right deltoid  - C6/7 IL MADISON on 1/17/23 with 70% pain relief   - right SIJ injection on 7/13/23 with 80% pain relief   -Bilateral SI Joint +piriformis injections on 12/19/24 with 80% relief  -Cervical C6/7 IL MADISON on 1/7/25 with 80% relief         Imaging & EMG/NCS (Reviewed on 1/29/2025):    1/23/23 X-Ray Shoulder Complete Bilateral  FINDINGS: Clinic joint spaces are well-maintained.  Mild narrowing with spurring of the lateral acromioclavicular joint spaces.  Lung apices are clear.  No acute fracture or dislocation.  Impression:  Bilateral minimal osteoarthritis of the acromioclavicular joints.     EMG/NCS bilateral upper extremities 03/06/2020:  1. ABNORMAL study  2. 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  There is additional evidence of a CHRONIC radiculopathy of the C7,C8, T1 nerve roots    X-ray cervical spine 02/19/2020:  Reversal of normal cervical lordosis.  Grade 1 anterolisthesis of C3 on C4 and C4 on C5.  These appear to mostly reduce with extension maneuver.  Grade 1 anterolisthesis of C7 on T1 which does not change with provocative maneuvers.  No acute fracture.  Prevertebral soft tissues are maintained.  Multilevel  discogenic degenerative changes are seen with findings most severe at C5-C6 and C6-C7 with disc space narrowing marginal spurring.  Intact odontoid.  Multilevel bilateral areas of neural foraminal encroachment, right greater than left.  Clear lung apices.  Calcification of the bilateral carotids is seen.    MRI cervical spine 06/01/2017 ([facility]):  There is a mild cervical levoscoliosis with slight straightening of normal cervical lordosis in the superior aspect of the cervical spine. Cervical vertebral body stature is preserved. No prevertebral edema is noted.  The visualized anatomy at the skull base is normal.  C2-C3: Mild disc space narrowing. Mild bilateral posterior facet and ligamentum flavum hypertrophy which is effacing the posterior theca and causing subtle posterior impression on the cervical spinal cord, although the thecal sac still measures 1.2 cm in AP dimension. There is minimal posterior spondylosis.  C3-C4: Disc desiccation with right greater than left posterior facet arthropathy and with right uncinate hypertrophy, with mild disc space narrowing. There is mild ligamentum flavum hypertrophy. There is mild AP narrowing of the thecal sac, consistent with a spinal stenosis, but there is no significant deformity on the cervical spinal cord. There is right lateral recess/foraminal origin stenosis.  C4-C5: Mild to moderate disc space narrowing with disc desiccation and with right greater than left posterior facet hypertrophy. There is right uncinate hypertrophy at this level. There is bilateral ligamentum flavum hypertrophy. This combination of findings is causing a spinal stenosis, thecal sac measuring only 7.7 mm in AP dimension, and with a trefoil deformity of the thecal sac. There is also some mass effect and deformity on the cervical spinal cord. In addition to a spinal stenosis, there is right greater than left lateral recess/foraminal origin stenosis.  C5-C6: Prominent disc space  narrowing with disc desiccation, bridging anterior bony spurring, right greater than left posterior facet arthropathy, and mild right uncinate hypertrophy. There is a broad-based posterior osteophyte disc complex which effaces the anterior theca and narrows the AP dimension of the cervical thecal sac, but which does not cause any significant mass effect on the cervical spinal cord. There is mild bilateral lateral recess/foraminal origin stenosis at this level, right side greater than left.  C6-C7: Prominent disc space narrowing with disc desiccation, anterior and posterior spondylosis, and right uncinate hypertrophy. There is a broad-based, posterior osteophyte disc complex which partially effaces the anterior theca but which does not deform the cervical spinal cord. There is a mild spinal stenosis with mild, bilateral foraminal origin stenosis, slightly greater on the left.  C7-T1: Mild to moderate, bilateral posterior facet arthropathy. There is mild disc desiccation and disc space narrowing with approximately 2 mm of anterolisthesis C7 on T1. There is no obvious stenosis.  Signal intensity in the cervical thecal sac is normal. There is no convincing evidence of any significant edema or gliosis in the cervical spinal cord.     X-ray left shoulder 02/14/2020:  There is no radiographic evidence of acute osseous, articular, or soft tissue abnormality.  Joint spaces are preserved.     MRI left shoulder 2/10/20:  There is moderate rotator cuff tendinosis, supraspinatus and infraspinatus, with superimposed moderate grade partial thickness tear with fluid-filled defect posterior supraspinatus footprint with bursal and interstitial fiber tearing.  Supraspinatus tendon articular surface fraying is also noted.  There is also low-grade interstitial partial-thickness tear at the blending of the supraspinatus and infraspinatus.  There are few clustered small subcortical cysts and associated minimal edema like signal greater  "tuberosity.  The subscapularis tendon is unremarkable.  Long head biceps tendon is intact with mild intra-articular tendinosis.  Superior labral degenerative signal with a sublabral foramen versus SLAP tear.  Labrum is otherwise unremarkable.  Glenohumeral chondral thinning.  No joint effusion.  Normal IGHL.  Type 2 acromion without tilting minimal AC joint spurring.  There is no subacromial subdeltoid bursal fluid collection.  The bone marrow signal intensity is otherwise unremarkable.  The signal intensity of the muscle groups is normal.  No atrophy.      REVIEW OF SYSTEMS:  Review of Systems   Constitutional:  Negative for chills and fever.   HENT:  Negative for congestion and nosebleeds.    Respiratory:  Negative for cough.    Cardiovascular:  Negative for chest pain.   Gastrointestinal:  Negative for diarrhea and vomiting.   Musculoskeletal:  Positive for back pain, joint pain, myalgias and neck pain.   Skin:  Negative for rash.   Neurological:  Negative for tremors.   Psychiatric/Behavioral:  Negative for hallucinations. The patient is not nervous/anxious.        Vitals:    01/29/25 1049   BP: 128/73   Pulse: 77   Weight: 53 kg (116 lb 11.7 oz)   Height: 5' 8" (1.727 m)       Body mass index is 17.75 kg/m².   (reviewed on 1/29/2025)   Physical Exam:  General: alert and oriented, in no apparent distress.  Gait: normal gait.  Skin: no rashes, no discoloration, no obvious lesions  HEENT: normocephalic, atraumatic.    Respiratory: without use of accessory muscles of respiration.  No audible wheezing.    Musculoskeletal - Cervical Spine:- Improved  - Pain on flexion of cervical spine: Present   - Pain on extension of cervical spine: Present   - Cervical facet loading: Present   - TTP over the cervical facet joints: Present   - TTP over the cervical paraspinals: Present   - Spurlings: Equivocal    SIJ testing:-Improved  - TTP over SI joint: Present on the right  - Su's/ Wang's: Positive  on the right  - " Sacroiliac Distraction Test (anterior pressure): Positive  - Sacroiliac Compression Test (lateral pressure): Positive   -TTP over Piriformis: Present on the right    Musculoskeletal - Lumbar Spine:  - Spinal Sensation: Normal   - Pain on flexion of lumbar spine: Present  - Pain on extension of lumbar spine: Present   - TTP over the lumbar paraspinals: Present   - Straight Leg Raise: Negative bilaterally  - IRAJ/ Wang's: Positive      Neuro - Upper Extremities:  - BUE Strength:R/L: D: 5/5; B: 5/5; T: 5/5; WF: 5/5; WE: 5/5; IO: 5/5  - Extremity Reflexes: Brisk and symmetric throughout  - Sensory: Sensation to light touch intact bilaterally    Neuro - Lower Extremities:  - RLE Strength:     >> 5/5 strength with right hip flexion/ extension    >> 5/5 strength with right knee flexion/ extension    >> 5/5 strength in right ankle with plantar and dorsiflexion  - LLE Strength:     >> 5/5 strength with left hip flexion/ extension    >> 5/5 strength with knee flexion extension on the left     >> 5/5 strength in left ankle with plantar and dorsiflexion  - Extremity Reflexes: Brisk and symmetric throughout  - Sensory: Sensation to light touch intact bilaterally      Psych:  Mood and affect is appropriate      ASSESSMENT: 64 y.o. year old male with neck and bilateral shoulder pain, consistent with     1. Lumbar spondylosis  gabapentin (NEURONTIN) 600 MG tablet    methylPREDNISolone (MEDROL DOSEPACK) 4 mg tablet      2. Cervical radicular pain  gabapentin (NEURONTIN) 600 MG tablet    methylPREDNISolone (MEDROL DOSEPACK) 4 mg tablet      3. Cervical spondylosis  gabapentin (NEURONTIN) 600 MG tablet    methylPREDNISolone (MEDROL DOSEPACK) 4 mg tablet      4. Sacroiliitis  gabapentin (NEURONTIN) 600 MG tablet    methylPREDNISolone (MEDROL DOSEPACK) 4 mg tablet          PLAN:   - Interventional: None at this time.     -S/p  Right Sacroiliac Joint and Piriformis Injections on 12/19/24 with 80% relief  -S/p repeat C6/7 IL MADISON on  1/7/25 with 80% relief  - S/p right SIJ injection on 7/13/23 with 80% pain relief.   - S/p C6/7 IL MADISON on 1/17/23 with 70% pain relief.       - Pharmacologic:   - Continue Norco 7.5/325mg Q8-Q12H PRN pain (70 tablets). Last filled 1/23/25. Will e-prescribe x 2 months to fill on 2/20/25 and 3/20/25.  Patient understands this is the highest dose that we prescribe; we have discussed this at great detail at previous appts.  Patient tolerating opioids with no side effects, obtaining some pain control with functional improvement.  We have discussed the risks of chronic opioid medication management.      - Continue gabapentin (Neurontin) 600mg TID . Refill provided today.   - Continue nabumetone (Relafen) 500mg BID PRN pain and Zanaflex 4mg BID PRN (60 tablets). Patient understands this may cause drowsiness.         - Will prescribe Medrol Dose pack with instructions - for acute pain flare:  Day 1: 2 tablets before breakfast, 1 tablet after lunch, 1 tablet after dinner, 2 tablets at bedtime   Day 2: 1 tablet before breakfast, 1 tablet after lunch, 1 tablet after dinner, 2 tablets at bedtime   Day 3: 1 tablet before breakfast, 1 tablet after lunch, 1 tablet after dinner, 1 tablet at bedtime   Day 4: 1 tablet before breakfast, 1 tablet after lunch, 1 tablet at bedtime   Day 5: 1 tablet before breakfast, 1 tablet at bedtime   Day 6: 1 tablet before breakfast.    - Anticoagulation use: None.   - Opioid contract signed on 7/29/2020.   Opioid risk tool completed on 7/29/2020: low risk.  *last seen Dr. Monet on 2/26/24.    - LA  reviewed and appropriate.        - Rehabilitative: Encouraged regular exercise.    - Diagnostic/ Imaging: Previous imaging reviewed.     - Follow up: return to clinic 3-4 weeks or as needed.    - Patient Questions: Answered all of the patient's questions regarding diagnosis, therapy, and treatment.    - This condition does not require this patient to take time off of work, and the primary goal of our  Pain Management services is to improve the patient's functional capacity.     - I discussed the risks, benefits, and alternatives to potential treatment options. All questions and concerns were fully addressed today in clinic.         Samantha Murphy PA-C  Interventional Pain Management - [facility]    >>UDS:  9/23/2020 :: appropriate   1/22/2021 :: appropriate   5/28/2021 :: appropriate  12/7/2021 :: appropriate  6/28/2022 :: appropriate  11/29/2022 :: appropriate   3/22/2023 :: appropriate for oxycodone with metabolites but also +THC -   7/7/2023 :: appropriate  11/29/2023 ::  Appropriate with Norco use  9/5/2024: Appropriate with Norco use    Warnings:  8/30/2022 - patient given warning for taking more than prescribed    5/2023 - patient given warning for inconsistent drug screen in May 2023  patient given warning for +THC on 3/22/24 UDS

## 2025-02-22 DIAGNOSIS — Z00.00 ENCOUNTER FOR MEDICARE ANNUAL WELLNESS EXAM: ICD-10-CM

## 2025-02-24 DIAGNOSIS — M46.1 SACROILIITIS: ICD-10-CM

## 2025-02-24 DIAGNOSIS — M47.812 CERVICAL SPONDYLOSIS: ICD-10-CM

## 2025-02-24 DIAGNOSIS — M25.512 CHRONIC PAIN OF BOTH SHOULDERS: ICD-10-CM

## 2025-02-24 DIAGNOSIS — Z79.891 OPIOID USE AGREEMENT EXISTS: ICD-10-CM

## 2025-02-24 DIAGNOSIS — M25.511 CHRONIC PAIN OF BOTH SHOULDERS: ICD-10-CM

## 2025-02-24 DIAGNOSIS — G89.29 CHRONIC PAIN OF BOTH SHOULDERS: ICD-10-CM

## 2025-02-24 RX ORDER — HYDROCODONE BITARTRATE AND ACETAMINOPHEN 7.5; 325 MG/1; MG/1
TABLET ORAL
Qty: 70 TABLET | Refills: 0 | Status: SHIPPED | OUTPATIENT
Start: 2025-02-24

## 2025-02-24 NOTE — TELEPHONE ENCOUNTER
Good Morning/Afternoon,     Pt is requesting for a refill of: HYDROcodone-acetaminophen (NORCO) 7.5-325 mg per tablet   Last filed:01/21/2025  Last encounter:01/29/2025  Up coming apt: N/A  Pharmacy: [facility] [id number] [address]     Medical Assistant  BRI DAVE ([facility])

## 2025-03-25 DIAGNOSIS — M25.511 CHRONIC PAIN OF BOTH SHOULDERS: ICD-10-CM

## 2025-03-25 DIAGNOSIS — M47.812 CERVICAL SPONDYLOSIS: ICD-10-CM

## 2025-03-25 DIAGNOSIS — G89.29 CHRONIC PAIN OF BOTH SHOULDERS: ICD-10-CM

## 2025-03-25 DIAGNOSIS — M25.512 CHRONIC PAIN OF BOTH SHOULDERS: ICD-10-CM

## 2025-03-25 DIAGNOSIS — M46.1 SACROILIITIS: ICD-10-CM

## 2025-03-25 DIAGNOSIS — Z79.891 OPIOID USE AGREEMENT EXISTS: ICD-10-CM

## 2025-03-25 RX ORDER — HYDROCODONE BITARTRATE AND ACETAMINOPHEN 7.5; 325 MG/1; MG/1
TABLET ORAL
Qty: 70 TABLET | Refills: 0 | Status: SHIPPED | OUTPATIENT
Start: 2025-03-25

## 2025-03-25 NOTE — TELEPHONE ENCOUNTER
Good Morning/Afternoon,     Pt is requesting for a refill of: HYDROcodone-acetaminophen (NORCO) 7.5-325 mg per tablet   Last filed:02/24/2025  Last encounter:01/29/2025  Up coming apt: PRN  Pharmacy: [facility] [id number] [address]     Medical Assistant  Nivia DAVE ([facility])

## 2025-04-29 ENCOUNTER — TELEPHONE (OUTPATIENT)
Dept: PAIN MEDICINE | Facility: CLINIC | Age: 65
End: 2025-04-29
Payer: MEDICARE

## 2025-04-29 DIAGNOSIS — M47.812 CERVICAL SPONDYLOSIS: ICD-10-CM

## 2025-04-29 DIAGNOSIS — M46.1 SACROILIITIS: ICD-10-CM

## 2025-04-29 DIAGNOSIS — G89.29 CHRONIC PAIN OF BOTH SHOULDERS: ICD-10-CM

## 2025-04-29 DIAGNOSIS — Z79.891 OPIOID USE AGREEMENT EXISTS: ICD-10-CM

## 2025-04-29 DIAGNOSIS — M25.511 CHRONIC PAIN OF BOTH SHOULDERS: ICD-10-CM

## 2025-04-29 DIAGNOSIS — M25.512 CHRONIC PAIN OF BOTH SHOULDERS: ICD-10-CM

## 2025-04-29 RX ORDER — HYDROCODONE BITARTRATE AND ACETAMINOPHEN 7.5; 325 MG/1; MG/1
TABLET ORAL
Qty: 70 TABLET | Refills: 0 | Status: CANCELLED | OUTPATIENT
Start: 2025-04-29

## 2025-04-29 NOTE — TELEPHONE ENCOUNTER
----- Message from Mallorie sent at 4/29/2025  8:22 AM CDT -----  Contact: wife  Type:  RX Refill RequestWho Called: wife Mrs. Fitzgeraldill or New Rx:refillRX Name and Strength:HYDROcodone-acetaminophen (NORCO) 7.5-325 mg per tabletHow is the patient currently taking it? (ex. 1XDay):every 12 hours as needed Is this a 30 day or 90 day RX: 30 dayPreferred Pharmacy with phone number:[facility] [address] [facility] [id number] [address]Psquo: [phone] Fax: [phone] or Mail Order:localOrdering Provider:Jalilould the patient rather a call back or a response via [facility]? Call [facility] Call Back Number:[phone] Information:

## 2025-04-29 NOTE — TELEPHONE ENCOUNTER
Good Morning/Afternoon,     Pt is requesting for a refill of: HYDROcodone-acetaminophen (NORCO) 7.5-325 mg per tablet   Last filed: 03/25/2025  Last encounter: 01/29/2025  Up coming apt: PRN  Pharmacy: [facility]     Medical Assistant  Nivia DAVE (CCMA, Interventional Pain Management Surgery Scheduler)

## 2025-04-30 ENCOUNTER — TELEPHONE (OUTPATIENT)
Dept: PAIN MEDICINE | Facility: CLINIC | Age: 65
End: 2025-04-30
Payer: MEDICARE

## 2025-04-30 DIAGNOSIS — M47.812 CERVICAL SPONDYLOSIS: ICD-10-CM

## 2025-04-30 DIAGNOSIS — Z79.891 OPIOID USE AGREEMENT EXISTS: ICD-10-CM

## 2025-04-30 DIAGNOSIS — M50.30 DDD (DEGENERATIVE DISC DISEASE), CERVICAL: ICD-10-CM

## 2025-04-30 DIAGNOSIS — G89.29 CHRONIC PAIN OF BOTH SHOULDERS: ICD-10-CM

## 2025-04-30 DIAGNOSIS — M46.1 SACROILIITIS: ICD-10-CM

## 2025-04-30 DIAGNOSIS — Z79.891 OPIOID USE AGREEMENT EXISTS: Primary | ICD-10-CM

## 2025-04-30 DIAGNOSIS — M25.512 CHRONIC PAIN OF BOTH SHOULDERS: ICD-10-CM

## 2025-04-30 DIAGNOSIS — M25.511 CHRONIC PAIN OF BOTH SHOULDERS: ICD-10-CM

## 2025-04-30 NOTE — TELEPHONE ENCOUNTER
Good Morning/Afternoon,     Pt is requesting for a refill of: oxyCODONE-acetaminophen (PERCOCET) 7.5-325 mg per tablet   Last filed: 03/25/2025  Last encounter:01/29/2025  Up coming apt: 06/05/2025  Pharmacy: [facility]     Medical Assistant  Nivia DAVE (CCMA, Interventional Pain Management Surgery Scheduler)

## 2025-04-30 NOTE — TELEPHONE ENCOUNTER
Pt is requesting for a refill of:   Last filled: 3/25/25  Last encounter: 1/29/25  Up coming apt: 6/5/25  Pharmacy:   [facility] [address]  [address] [address]

## 2025-04-30 NOTE — TELEPHONE ENCOUNTER
----- Message from Tammy sent at 4/30/2025  3:12 PM CDT -----  Contact: Wife  TYPE: Patient Call BackWho called:PatientWhat is the request in details: Rx oxyCODONE-acetaminophen (PERCOCET) 7.5-325 mg per tablet patient is schedule for 06/05/2025 f/u visit. TYCan the clinic reply by [facility]?   Would the patient rather a call back or a response via [facility] call back number:.[phone]

## 2025-05-01 RX ORDER — OXYCODONE AND ACETAMINOPHEN 7.5; 325 MG/1; MG/1
1 TABLET ORAL EVERY 4 HOURS PRN
Qty: 70 TABLET | Refills: 0 | Status: SHIPPED | OUTPATIENT
Start: 2025-05-01

## 2025-05-06 RX ORDER — HYDROCODONE BITARTRATE AND ACETAMINOPHEN 7.5; 325 MG/1; MG/1
TABLET ORAL
Qty: 70 TABLET | Refills: 0 | OUTPATIENT
Start: 2025-05-06

## 2025-06-05 ENCOUNTER — OFFICE VISIT (OUTPATIENT)
Dept: PAIN MEDICINE | Facility: CLINIC | Age: 65
End: 2025-06-05
Payer: MEDICARE

## 2025-06-05 VITALS
HEART RATE: 84 BPM | DIASTOLIC BLOOD PRESSURE: 83 MMHG | SYSTOLIC BLOOD PRESSURE: 149 MMHG | WEIGHT: 109.56 LBS | BODY MASS INDEX: 16.61 KG/M2 | HEIGHT: 68 IN

## 2025-06-05 DIAGNOSIS — M47.812 CERVICAL SPONDYLOSIS: ICD-10-CM

## 2025-06-05 DIAGNOSIS — M47.816 LUMBAR SPONDYLOSIS: ICD-10-CM

## 2025-06-05 DIAGNOSIS — M47.816 LUMBAR SPONDYLOSIS: Primary | ICD-10-CM

## 2025-06-05 DIAGNOSIS — M50.30 DDD (DEGENERATIVE DISC DISEASE), CERVICAL: ICD-10-CM

## 2025-06-05 DIAGNOSIS — M46.1 SACROILIITIS: ICD-10-CM

## 2025-06-05 DIAGNOSIS — Z79.891 OPIOID USE AGREEMENT EXISTS: ICD-10-CM

## 2025-06-05 PROCEDURE — 99999 PR PBB SHADOW E&M-EST. PATIENT-LVL III: CPT | Mod: PBBFAC,HCNC,, | Performed by: PHYSICIAN ASSISTANT

## 2025-06-05 RX ORDER — OXYCODONE AND ACETAMINOPHEN 7.5; 325 MG/1; MG/1
1 TABLET ORAL EVERY 12 HOURS PRN
Qty: 60 TABLET | Refills: 0 | Status: SHIPPED | OUTPATIENT
Start: 2025-06-05

## 2025-06-09 RX ORDER — OXYCODONE AND ACETAMINOPHEN 7.5; 325 MG/1; MG/1
1 TABLET ORAL EVERY 12 HOURS PRN
Qty: 60 TABLET | Refills: 0 | Status: SHIPPED | OUTPATIENT
Start: 2025-07-02

## 2025-06-09 RX ORDER — OXYCODONE AND ACETAMINOPHEN 7.5; 325 MG/1; MG/1
1 TABLET ORAL EVERY 12 HOURS PRN
Qty: 60 TABLET | Refills: 0 | Status: SHIPPED | OUTPATIENT
Start: 2025-08-01 | End: 2025-08-31

## 2025-06-11 ENCOUNTER — RESULTS FOLLOW-UP (OUTPATIENT)
Dept: FAMILY MEDICINE | Facility: CLINIC | Age: 65
End: 2025-06-11

## 2025-06-11 ENCOUNTER — OFFICE VISIT (OUTPATIENT)
Dept: FAMILY MEDICINE | Facility: CLINIC | Age: 65
End: 2025-06-11
Payer: MEDICARE

## 2025-06-11 ENCOUNTER — LAB VISIT (OUTPATIENT)
Dept: LAB | Facility: HOSPITAL | Age: 65
End: 2025-06-11
Attending: FAMILY MEDICINE
Payer: MEDICARE

## 2025-06-11 VITALS
BODY MASS INDEX: 17.06 KG/M2 | SYSTOLIC BLOOD PRESSURE: 90 MMHG | TEMPERATURE: 98 F | WEIGHT: 112.56 LBS | OXYGEN SATURATION: 94 % | DIASTOLIC BLOOD PRESSURE: 60 MMHG | HEART RATE: 88 BPM | HEIGHT: 68 IN

## 2025-06-11 DIAGNOSIS — Z00.00 WELL ADULT EXAM: Primary | ICD-10-CM

## 2025-06-11 DIAGNOSIS — Z12.5 PROSTATE CANCER SCREENING ENCOUNTER, OPTIONS AND RISKS DISCUSSED: ICD-10-CM

## 2025-06-11 DIAGNOSIS — M54.12 CERVICAL RADICULOPATHY: ICD-10-CM

## 2025-06-11 DIAGNOSIS — Z00.00 WELL ADULT EXAM: ICD-10-CM

## 2025-06-11 DIAGNOSIS — Z12.2 ENCOUNTER FOR SCREENING FOR LUNG CANCER: ICD-10-CM

## 2025-06-11 DIAGNOSIS — Z78.9 DRINKS BEER: ICD-10-CM

## 2025-06-11 DIAGNOSIS — Z72.0 NOT CONSIDERING QUITTING USE OF TOBACCO: ICD-10-CM

## 2025-06-11 LAB
ABSOLUTE EOSINOPHIL (OHS): 0.18 K/UL
ABSOLUTE MONOCYTE (OHS): 0.73 K/UL (ref 0.3–1)
ABSOLUTE NEUTROPHIL COUNT (OHS): 4.6 K/UL (ref 1.8–7.7)
ALBUMIN SERPL BCP-MCNC: 4.2 G/DL (ref 3.5–5.2)
ALP SERPL-CCNC: 89 UNIT/L (ref 40–150)
ALT SERPL W/O P-5'-P-CCNC: 31 UNIT/L (ref 10–44)
ANION GAP (OHS): 9 MMOL/L (ref 8–16)
AST SERPL-CCNC: 29 UNIT/L (ref 11–45)
BASOPHILS # BLD AUTO: 0.05 K/UL
BASOPHILS NFR BLD AUTO: 0.6 %
BILIRUB SERPL-MCNC: 0.5 MG/DL (ref 0.1–1)
BUN SERPL-MCNC: 7 MG/DL (ref 8–23)
CALCIUM SERPL-MCNC: 9.1 MG/DL (ref 8.7–10.5)
CHLORIDE SERPL-SCNC: 102 MMOL/L (ref 95–110)
CHOLEST SERPL-MCNC: 199 MG/DL (ref 120–199)
CHOLEST/HDLC SERPL: 2.5 {RATIO} (ref 2–5)
CO2 SERPL-SCNC: 25 MMOL/L (ref 23–29)
CREAT SERPL-MCNC: 0.8 MG/DL (ref 0.5–1.4)
EAG (OHS): 97 MG/DL (ref 68–131)
ERYTHROCYTE [DISTWIDTH] IN BLOOD BY AUTOMATED COUNT: 13.1 % (ref 11.5–14.5)
GFR SERPLBLD CREATININE-BSD FMLA CKD-EPI: >60 ML/MIN/1.73/M2
GLUCOSE SERPL-MCNC: 94 MG/DL (ref 70–110)
HBA1C MFR BLD: 5 % (ref 4–5.6)
HCT VFR BLD AUTO: 42.7 % (ref 40–54)
HDLC SERPL-MCNC: 79 MG/DL (ref 40–75)
HDLC SERPL: 39.7 % (ref 20–50)
HGB BLD-MCNC: 14.1 GM/DL (ref 14–18)
HIV 1+2 AB+HIV1 P24 AG SERPL QL IA: NORMAL
IMM GRANULOCYTES # BLD AUTO: 0.03 K/UL (ref 0–0.04)
IMM GRANULOCYTES NFR BLD AUTO: 0.3 % (ref 0–0.5)
LDLC SERPL CALC-MCNC: 93.8 MG/DL (ref 63–159)
LYMPHOCYTES # BLD AUTO: 3.1 K/UL (ref 1–4.8)
MCH RBC QN AUTO: 32.2 PG (ref 27–31)
MCHC RBC AUTO-ENTMCNC: 33 G/DL (ref 32–36)
MCV RBC AUTO: 98 FL (ref 82–98)
NONHDLC SERPL-MCNC: 120 MG/DL
NUCLEATED RBC (/100WBC) (OHS): 0 /100 WBC
PLATELET # BLD AUTO: 325 K/UL (ref 150–450)
PMV BLD AUTO: 11 FL (ref 9.2–12.9)
POTASSIUM SERPL-SCNC: 3.9 MMOL/L (ref 3.5–5.1)
PROT SERPL-MCNC: 7.1 GM/DL (ref 6–8.4)
PSA SERPL-MCNC: 0.43 NG/ML
RBC # BLD AUTO: 4.38 M/UL (ref 4.6–6.2)
RELATIVE EOSINOPHIL (OHS): 2.1 %
RELATIVE LYMPHOCYTE (OHS): 35.7 % (ref 18–48)
RELATIVE MONOCYTE (OHS): 8.4 % (ref 4–15)
RELATIVE NEUTROPHIL (OHS): 52.9 % (ref 38–73)
SODIUM SERPL-SCNC: 136 MMOL/L (ref 136–145)
TRIGL SERPL-MCNC: 131 MG/DL (ref 30–150)
WBC # BLD AUTO: 8.69 K/UL (ref 3.9–12.7)

## 2025-06-11 PROCEDURE — 87389 HIV-1 AG W/HIV-1&-2 AB AG IA: CPT | Mod: HCNC

## 2025-06-11 PROCEDURE — 85025 COMPLETE CBC W/AUTO DIFF WBC: CPT | Mod: HCNC

## 2025-06-11 PROCEDURE — 99999 PR PBB SHADOW E&M-EST. PATIENT-LVL IV: CPT | Mod: PBBFAC,HCNC,, | Performed by: FAMILY MEDICINE

## 2025-06-11 PROCEDURE — 82310 ASSAY OF CALCIUM: CPT | Mod: HCNC

## 2025-06-11 PROCEDURE — 36415 COLL VENOUS BLD VENIPUNCTURE: CPT | Mod: HCNC,PN

## 2025-06-11 PROCEDURE — 83036 HEMOGLOBIN GLYCOSYLATED A1C: CPT | Mod: HCNC

## 2025-06-11 PROCEDURE — 84153 ASSAY OF PSA TOTAL: CPT | Mod: HCNC

## 2025-06-11 PROCEDURE — 82465 ASSAY BLD/SERUM CHOLESTEROL: CPT | Mod: HCNC

## 2025-06-11 NOTE — PROGRESS NOTES
Chief Complaint:    Chief Complaint   Patient presents with    Annual Exam       History of Present Illness:    Patient presents today for six month follow-up,    History of Present Illness    Patient presents today for follow up He currently takes Gabapentin for pain management and Flonase. He uses medical marijuana through [facility] daily in the evening with positive response. He currently smokes one pack of cigarettes daily and consumes 3 beers in the evening, reduced from previous consumption of 12 beers. He denies hard liquor use.      ROS:  General: denies fever, denies chills, denies fatigue, denies weight gain, denies weight loss, denies loss of appetite, admits change in activities of daily living, admits increased substance use  Eyes: denies vision changes, denies blurry vision, denies eye pain, denies eye discharge  ENT: denies ear pain, denies hearing loss, denies tinnitus, denies nasal congestion, denies sore throat  Cardiovascular: denies chest pain, denies palpitations, denies lower extremity edema  Respiratory: denies cough, denies shortness of breath, denies wheezing, denies sputum production  Endocrine: denies polyuria, denies polydipsia, denies heat intolerance, denies cold intolerance  Gastrointestinal: denies abdominal pain, denies heartburn, denies nausea, denies vomiting, denies diarrhea, denies constipation, denies blood in stool  Genitourinary: denies dysuria, denies urgency, denies frequency, denies hematuria, denies nocturia, denies incontinence  Heme & Lymphatic: denies easy or excessive bleeding, denies easy bruising, denies swollen lymph nodes  Musculoskeletal: denies muscle pain, denies back pain, denies joint pain, denies joint swelling  Skin: denies rash, denies lesion, denies itching, denies skin texture changes, denies skin color changes  Neurological: denies headache, denies dizziness, denies numbness, denies tingling, denies seizure activity, denies speech difficulty,  "denies memory loss, denies confusion  Psychiatric: denies anxiety, denies depression, denies sleep difficulty         ROS:  Review of Systems    History reviewed. No pertinent past medical history.    Social History:  Social History     Socioeconomic History    Marital status:    Tobacco Use    Smoking status: Every Day     Current packs/day: 1.00     Average packs/day: 1 pack/day for 40.0 years (40.0 ttl pk-yrs)     Types: Cigarettes    Smokeless tobacco: Never   Substance and Sexual Activity    Alcohol use: Not Currently     Alcohol/week: 6.0 standard drinks of alcohol     Types: 6 Cans of beer per week    Drug use: No    Sexual activity: Yes     Partners: Female       Family History:   family history includes Cancer in his father; Heart disease in his mother.    Health Maintenance   Topic Date Due    HIV Screening  Never done    Shingles Vaccine (1 of 2) Never done    COVID-19 Vaccine (1 - 2024-25 season) Never done    LDCT Lung Screen  06/26/2025    Influenza Vaccine (Season Ended) 09/01/2025    Colorectal Cancer Screening  06/12/2026    TETANUS VACCINE  01/25/2028    Lipid Panel  07/15/2029    RSV Vaccine (Age 60+ and Pregnant patients) (1 - 1-dose 75+ series) 09/15/2035    Hepatitis C Screening  Completed    Sign Pain Contract  Completed    Complete Opioid Risk Tool  Completed    Pneumococcal Vaccines (Age 50+)  Completed       Exam:Physical     Vital Signs  Temp: 98.4 °F (36.9 °C)  Temp Source: Oral  Pulse: 88  SpO2: (!) 94 %  BP: 90/60  BP Location: Left arm  Patient Position: Sitting  Pain Score: 0-No pain  Height and Weight  Height: 5' 8" (172.7 cm)  Weight: 51.1 kg (112 lb 8.7 oz)  BSA (Calculated - sq m): 1.56 sq meters  BMI (Calculated): 17.1  Weight in (lb) to have BMI = 25: 164.1]    Body mass index is 17.11 kg/m².    Physical Exam  Constitutional:       Appearance: He is well-developed.   Eyes:      Conjunctiva/sclera: Conjunctivae normal.      Pupils: Pupils are equal, round, and reactive to " light.   Cardiovascular:      Rate and Rhythm: Normal rate and regular rhythm.      Heart sounds: Normal heart sounds. No murmur heard.  Pulmonary:      Effort: Pulmonary effort is normal. No respiratory distress.      Breath sounds: Normal breath sounds. No wheezing or rales.   Chest:      Chest wall: No tenderness.   Abdominal:      General: There is no distension.      Palpations: Abdomen is soft. There is no mass.      Tenderness: There is no abdominal tenderness. There is no guarding.   Musculoskeletal:         General: No tenderness.      Right hand: Bony tenderness present.      Left hand: Bony tenderness present.      Cervical back: Normal range of motion and neck supple.   Lymphadenopathy:      Cervical: No cervical adenopathy.   Skin:     General: Skin is warm and dry.   Neurological:      Mental Status: He is alert and oriented to person, place, and time.      Deep Tendon Reflexes: Reflexes are normal and symmetric.   Psychiatric:         Behavior: Behavior normal.         Thought Content: Thought content normal.         Judgment: Judgment normal.         Assessment:      ICD-10-CM ICD-9-CM   1. Well adult exam  Z00.00 V70.0   2. Encounter for screening for lung cancer  Z12.2 V76.0   3. Not considering quitting use of tobacco  Z72.0 305.1   4. Cervical radiculopathy  M54.12 723.4   5. Drinks beer  Z78.9 V49.89   6. Prostate cancer screening encounter, options and risks discussed  Z12.5 V76.44             Plan:    Assessment & Plan    MEDICAL DECISION MAKING:  - Reviewed current medications, including pain medicines, gabapentin, and Flonase.  - Discussed use of medical marijuana for pain management, noting its potential benefits over narcotic pain medications.  - Noted reduction in alcohol intake and patient to continue efforts to reduce alcohol consumption.    PATIENT EDUCATION:  - Educated on benefits of smoking cessation, including improved lung and skin health.  - Explained continued risk of lung  cancer even after quitting smoking.    ACTION ITEMS/LIFESTYLE:  - Patient to continue efforts to reduce alcohol consumption.    MEDICATIONS:  - Discussed nicotine pouches as smoking cessation aid, including appropriate use as short-term and step-down approach to quit smoking.    ORDERS:  - Ordered annual CT Chest for lung cancer screening due to continued smoking.  - Ordered routine HIV test as per CDC requirements.    FOLLOW UP:  - Follow up to schedule CT at the [facility].           Orders Placed This Encounter   Procedures    CT Chest Lung Screening Low Dose    CBC Auto Differential    Comprehensive Metabolic Panel    Hemoglobin A1C    Lipid Panel    PSA, Screening    HIV 1/2 Ag/Ab (4th Gen)     Current Outpatient Medications   Medication Sig Dispense Refill    acetaminophen (TYLENOL) 500 MG tablet Take 1 tablet (500 mg total) by mouth every 8 (eight) hours as needed for Pain. 90 tablet 11    diclofenac sodium (VOLTAREN) 1 % Gel Apply 2 g topically 4 (four) times daily. 100 g 3    fluticasone propionate (FLONASE) 50 mcg/actuation nasal spray 2 PUFFS EACH NOSTRIL ONCE DAILY 16 g 3    gabapentin (NEURONTIN) 600 MG tablet Take 1 tablet (600 mg total) by mouth 3 (three) times daily. 90 tablet 2    [START ON 7/2/2025] oxyCODONE-acetaminophen (PERCOCET) 7.5-325 mg per tablet Take 1 tablet by mouth every 12 (twelve) hours as needed for Pain. 60 tablet 0    [START ON 8/1/2025] oxyCODONE-acetaminophen (PERCOCET) 7.5-325 mg per tablet Take 1 tablet by mouth every 12 (twelve) hours as needed for Pain. 60 tablet 0    pravastatin (PRAVACHOL) 20 MG tablet Take 1 tablet (20 mg total) by mouth once daily. (Patient not taking: Reported on 6/11/2025) 90 tablet 3     No current facility-administered medications for this visit.     There are no discontinued medications.        Follow up in about 1 year (around 6/11/2026).      Reuben Banuelos MD    Scribe Attestation:

## 2025-06-12 ENCOUNTER — HOSPITAL ENCOUNTER (OUTPATIENT)
Dept: RADIOLOGY | Facility: HOSPITAL | Age: 65
Discharge: HOME | End: 2025-06-12
Attending: FAMILY MEDICINE
Payer: MEDICARE

## 2025-06-12 DIAGNOSIS — Z12.2 ENCOUNTER FOR SCREENING FOR LUNG CANCER: ICD-10-CM

## 2025-06-12 PROCEDURE — 71271 CT THORAX LUNG CANCER SCR C-: CPT | Mod: 26,DBM,HCNC, | Performed by: RADIOLOGY

## 2025-06-12 PROCEDURE — 71271 CT THORAX LUNG CANCER SCR C-: CPT | Mod: DBM,TC,HCNC

## 2025-06-30 ENCOUNTER — TELEPHONE (OUTPATIENT)
Dept: FAMILY MEDICINE | Facility: CLINIC | Age: 65
End: 2025-06-30
Payer: MEDICARE

## 2025-06-30 ENCOUNTER — OFFICE VISIT (OUTPATIENT)
Dept: FAMILY MEDICINE | Facility: CLINIC | Age: 65
End: 2025-06-30
Payer: MEDICARE

## 2025-06-30 VITALS
OXYGEN SATURATION: 96 % | HEART RATE: 66 BPM | RESPIRATION RATE: 18 BRPM | HEIGHT: 68 IN | BODY MASS INDEX: 17.11 KG/M2 | DIASTOLIC BLOOD PRESSURE: 70 MMHG | SYSTOLIC BLOOD PRESSURE: 120 MMHG

## 2025-06-30 DIAGNOSIS — E78.2 MIXED HYPERLIPIDEMIA: ICD-10-CM

## 2025-06-30 DIAGNOSIS — F17.210 CIGARETTE NICOTINE DEPENDENCE WITHOUT COMPLICATION: ICD-10-CM

## 2025-06-30 DIAGNOSIS — Z00.00 ENCOUNTER FOR MEDICARE ANNUAL WELLNESS EXAM: Primary | ICD-10-CM

## 2025-06-30 DIAGNOSIS — R63.6 UNDERWEIGHT: ICD-10-CM

## 2025-06-30 DIAGNOSIS — M43.06 LUMBAR SPONDYLOLYSIS: ICD-10-CM

## 2025-06-30 DIAGNOSIS — M54.12 CERVICAL RADICULOPATHY: ICD-10-CM

## 2025-06-30 PROCEDURE — 99999 PR PBB SHADOW E&M-EST. PATIENT-LVL IV: CPT | Mod: PBBFAC,HCNC,, | Performed by: NURSE PRACTITIONER

## 2025-06-30 NOTE — PATIENT INSTRUCTIONS
Counseling and Referral of Other Preventative  (Italic type indicates deductible and co-insurance are waived)    Patient Name: Leo Roblero  Today's Date: 6/30/2025    Health Maintenance       Date Due Completion Date    Shingles Vaccine (1 of 2) Never done ---    COVID-19 Vaccine (1 - 2024-25 season) Never done ---    Influenza Vaccine (Season Ended) 09/01/2025 11/14/2019    LDCT Lung Screen 06/12/2026 6/12/2025    Colorectal Cancer Screening 06/12/2026 6/12/2023    TETANUS VACCINE 01/25/2028 1/25/2018    Lipid Panel 06/11/2030 6/11/2025    RSV Vaccine (Age 60+ and Pregnant patients) (1 - 1-dose 75+ series) 09/15/2035 ---        Orders Placed This Encounter   Procedures    US Abdominal Aorta       The following information is provided to all patients.  This information is to help you find resources for any of the problems found today that may be affecting your health:                  Living healthy guide: www.[facility].[place].[facility]      Understanding Diabetes: www.diabetes.org      Eating healthy: www.cdc.gov/healthyweight      [facility] home safety checklist: www.cdc.gov/steadi/patient.html      Agency on Aging: www.goea.[place].gov      Alcoholics anonymous (AA): www.aa.org      Physical Activity: www.lalo.nih.gov/ji1guuh      Tobacco use: www.quitwithusla.org

## 2025-06-30 NOTE — PROGRESS NOTES
Leo Roblero presented for a  Medicare AWV and comprehensive Health Risk Assessment today. The following components were reviewed and updated:    Medical history  Family History  Social history  Allergies and Current Medications  Health Risk Assessment  Health Maintenance  Care Team         ** See Completed Assessments for Annual Wellness Visit within the encounter summary.**         The following assessments were completed:  Living Situation  CAGE  Depression Screening  Timed Get Up and Go  Whisper Test  Cognitive Function Screening    Nutrition Screening  ADL Screening  PAQ Screening  Has urine leakage ever interrupted your daily activites or sleep? No  Do you think you could use some help to better manage urine leakage?No       Opioid documentation:      Patient does have a current opioid prescription.      Patient accepted further discussion regarding opioid medication use.      Patient is currently taking oxycodone narcotic for neck pain.        Pain level today is 0/10.       In addition to narcotic pain medications, patient is also using (no other oral, topical, or alternative treatments) for pain control.       Patient is followed by a specialist currently for their pain and will not be referred today.       Patient's opioid risk potential based on ORT-OUD tool:       Dav each box that applies   No   Yes     Family history of substance abuse   Alcohol [x] []   Illegal drugs [x] []   Rx drugs [x] []     Personal history of substance abuse   Alcohol [x] []   Illegal drugs [x] []   Rx drugs [x] []     Age between 16-45 years   [x]   []     Patient with ADD, OCD, Bipolar disorder, schizoprenia   [x]   []     Patient with depression   [x]   []                         Scoring total                                                                 Non-opioid treatment options have been discussed today and added to the patient's after visit summary.        Vitals:    06/30/25 0829   BP: 120/70   Pulse: 66   Resp:  "18   SpO2: 96%   Height: 5' 8" (1.727 m)     Body mass index is 17.11 kg/m².  Physical Exam          Diagnoses and health risks identified today and associated recommendations/orders:    1. Encounter for Medicare annual wellness exam  Screenings performed, as noted above.  Personal preventative testing needs reviewed.    - Referral to Enhanced Annual Wellness Visit (eAWV) W+1    2. Cigarette nicotine dependence without complication  Due for screening of aorta, still smoking, declines cessation at this time, will schedule  - US Abdominal Aorta; Future    3. Underweight  Assessed, unstable, discussed ways to replace some of the beer with Ensure daily, encouraging more calories    4. Cervical radiculopathy  Treated with Percocet, followed by Dr Monet    5. Mixed hyperlipidemia  Treated with pravastatin in the past, states has stopped taking it, he will discuss with his pcp    6. Lumbar spondylolysis  Treated with Percocet, followed by Dr Monet    Discussed vaccines, cutting back on beer and cigarettes, declines smoking cessation classes at this time      Provided Leo with a 5-10 year written screening schedule and personal prevention plan. Recommendations were developed using the USPSTF age appropriate recommendations. Education, counseling, and referrals were provided as needed. After Visit Summary printed and given to patient which includes a list of additional screenings\tests needed.    No follow-ups on file.    Jarad Day NP  I offered to discuss advanced care planning, including how to pick a person who would make decisions for you if you were unable to make them for yourself, called a health care power of , and what kind of decisions you might make such as use of life sustaining treatments such as ventilators and tube feeding when faced with a life limiting illness recorded on a living will that they will need to know. (How you want to be cared for as you near the end of your natural life)     X " Patient is interested in learning more about how to make advanced directives.  I provided them paperwork and offered to discuss this with them.

## 2025-06-30 NOTE — TELEPHONE ENCOUNTER
----- Message from Jarad Day NP sent at 6/30/2025 10:08 AM CDT -----  Regarding: appt  Please call him to schedule his abdominal aortic aneurysm US at [facility].  thanks

## 2025-07-01 ENCOUNTER — TELEPHONE (OUTPATIENT)
Dept: FAMILY MEDICINE | Facility: CLINIC | Age: 65
End: 2025-07-01
Payer: MEDICARE

## 2025-07-01 DIAGNOSIS — H57.89 RED EYES: Primary | ICD-10-CM

## 2025-07-01 NOTE — TELEPHONE ENCOUNTER
Copied from CRM #[id number]. Topic: General Inquiry - Patient Advice  >> Jul 1, 2025  4:18 PM Arvin wrote:  Type:  Needs Medical Advice    Who Called: Kimmie  Symptoms (please be specific): red and swollen eye   How long has patient had these symptoms:    Pharmacy name and phone #:    [facility] [address] [address]  [address] [address] [address]  Phone: [phone] Fax: [phone]   Would the patient rather a call back or a response via [facility]? Call back  Best Call Back Number: [phone]  Additional Information:

## 2025-07-01 NOTE — TELEPHONE ENCOUNTER
Have him see dr. Colin or optometry in the next few days.    Order has been signed, please make an appointment

## 2025-07-02 ENCOUNTER — OFFICE VISIT (OUTPATIENT)
Dept: FAMILY MEDICINE | Facility: CLINIC | Age: 65
End: 2025-07-02
Payer: MEDICARE

## 2025-07-02 VITALS
DIASTOLIC BLOOD PRESSURE: 62 MMHG | SYSTOLIC BLOOD PRESSURE: 104 MMHG | BODY MASS INDEX: 16.51 KG/M2 | OXYGEN SATURATION: 97 % | WEIGHT: 108.94 LBS | HEART RATE: 74 BPM | HEIGHT: 68 IN

## 2025-07-02 DIAGNOSIS — H00.014 HORDEOLUM EXTERNUM OF LEFT UPPER EYELID: Primary | ICD-10-CM

## 2025-07-02 PROCEDURE — 1159F MED LIST DOCD IN RCRD: CPT | Mod: CPTII,HCNC,S$GLB, | Performed by: FAMILY MEDICINE

## 2025-07-02 PROCEDURE — 3008F BODY MASS INDEX DOCD: CPT | Mod: CPTII,HCNC,S$GLB, | Performed by: FAMILY MEDICINE

## 2025-07-02 PROCEDURE — 3044F HG A1C LEVEL LT 7.0%: CPT | Mod: CPTII,HCNC,S$GLB, | Performed by: FAMILY MEDICINE

## 2025-07-02 PROCEDURE — 99999 PR PBB SHADOW E&M-EST. PATIENT-LVL III: CPT | Mod: PBBFAC,HCNC,, | Performed by: FAMILY MEDICINE

## 2025-07-02 PROCEDURE — 99213 OFFICE O/P EST LOW 20 MIN: CPT | Mod: HCNC,S$GLB,, | Performed by: FAMILY MEDICINE

## 2025-07-02 PROCEDURE — 3074F SYST BP LT 130 MM HG: CPT | Mod: CPTII,HCNC,S$GLB, | Performed by: FAMILY MEDICINE

## 2025-07-02 PROCEDURE — 3078F DIAST BP <80 MM HG: CPT | Mod: CPTII,HCNC,S$GLB, | Performed by: FAMILY MEDICINE

## 2025-07-02 RX ORDER — ERYTHROMYCIN 5 MG/G
OINTMENT OPHTHALMIC NIGHTLY
Qty: 3.5 G | Refills: 0 | Status: SHIPPED | OUTPATIENT
Start: 2025-07-02 | End: 2025-07-12

## 2025-07-02 NOTE — PROGRESS NOTES
Chief Complaint:    Chief Complaint   Patient presents with    Eye Problem       History of Present Illness:    History of Present Illness    Patient presents today with eye discomfort He reports right eyelid symptoms consistent with a stye. He describes pain and soreness in the eyelid that has been keeping him up at night. He denies eye involvement, noting the symptoms are limited to the eyelid. He has been applying hot rags to the affected area and rubbing the eyelid to alleviate discomfort. He describes the sensation as sore rather than intensely itchy. Symptoms have been persistent enough to disrupt his sleep.      ROS:  General: denies fever, denies chills, denies fatigue, denies weight gain, denies weight loss, denies loss of appetite, admits sleep disturbances  Eyes: denies vision changes, denies blurry vision, admits eye pain, denies eye discharge, admits eye irritation  ENT: denies ear pain, denies hearing loss, denies tinnitus, denies nasal congestion, denies sore throat  Cardiovascular: denies chest pain, denies palpitations, denies lower extremity edema  Respiratory: denies cough, denies shortness of breath, denies wheezing, denies sputum production  Endocrine: denies polyuria, denies polydipsia, denies heat intolerance, denies cold intolerance  Gastrointestinal: denies abdominal pain, denies heartburn, denies nausea, denies vomiting, denies diarrhea, denies constipation, denies blood in stool  Genitourinary: denies dysuria, denies urgency, denies frequency, denies hematuria, denies nocturia, denies incontinence  Heme & Lymphatic: denies easy or excessive bleeding, denies easy bruising, denies swollen lymph nodes  Musculoskeletal: denies muscle pain, denies back pain, denies joint pain, denies joint swelling  Skin: denies rash, denies lesion, denies itching, denies skin texture changes, denies skin color changes  Neurological: denies headache, denies dizziness, denies numbness, denies tingling, denies  seizure activity, denies speech difficulty, denies memory loss, denies confusion  Psychiatric: denies anxiety, denies depression, denies sleep difficulty           History reviewed. No pertinent past medical history.    Social History:  Social History     Socioeconomic History    Marital status:    Tobacco Use    Smoking status: Every Day     Current packs/day: 1.00     Average packs/day: 1 pack/day for 40.0 years (40.0 ttl pk-yrs)     Types: Cigarettes    Smokeless tobacco: Never   Substance and Sexual Activity    Alcohol use: Not Currently     Alcohol/week: 6.0 standard drinks of alcohol     Types: 6 Cans of beer per week    Drug use: No    Sexual activity: Yes     Partners: Female     Social Drivers of Health     Financial Resource Strain: Low Risk  (6/30/2025)    Overall Financial Resource Strain (CARDIA)     Difficulty of Paying Living Expenses: Not hard at all   Food Insecurity: No Food Insecurity (6/30/2025)    Hunger Vital Sign     Worried About Running Out of Food in the Last Year: Never true     Ran Out of Food in the Last Year: Never true   Transportation Needs: No Transportation Needs (6/30/2025)    PRAPARE - Transportation     Lack of Transportation (Medical): No     Lack of Transportation (Non-Medical): No   Physical Activity: Inactive (6/30/2025)    Exercise Vital Sign     Days of Exercise per Week: 0 days     Minutes of Exercise per Session: 0 min   Stress: No Stress Concern Present (6/30/2025)    [employer] - Occupational Stress Questionnaire     Feeling of Stress : Not at all   Housing Stability: Low Risk  (6/30/2025)    Housing Stability Vital Sign     Unable to Pay for Housing in the Last Year: No     Number of Times Moved in the Last Year: 0     Homeless in the Last Year: No       Family History:   family history includes Cancer in his father; Heart disease in his mother.    Health Maintenance   Topic Date Due    Shingles Vaccine (1 of 2) Never done     "COVID-19 Vaccine (1 - 2024-25 season) Never done    Influenza Vaccine (1) 09/01/2025    LDCT Lung Screen  06/12/2026    Colorectal Cancer Screening  06/12/2026    TETANUS VACCINE  01/25/2028    Lipid Panel  06/11/2030    RSV Vaccine (Age 60+ and Pregnant patients) (1 - 1-dose 75+ series) 09/15/2035    Hepatitis C Screening  Completed    Sign Pain Contract  Completed    Complete Opioid Risk Tool  Completed    HIV Screening  Completed    Pneumococcal Vaccines (Age 50+)  Completed       Exam:Physical     Vital Signs  Pulse: 74  SpO2: 97 %  BP: 104/62  Pain Score: 0-No pain  Height and Weight  Height: 5' 8" (172.7 cm)  Weight: 49.4 kg (108 lb 14.5 oz)  BSA (Calculated - sq m): 1.54 sq meters  BMI (Calculated): 16.6  Weight in (lb) to have BMI = 25: 164.1]    Body mass index is 16.56 kg/m².    Physical Exam    General: Well-developed. Well-nourished. No acute distress.  Eyes: EOMI. Sclerae anicteric. Small stye with erythema on Left upper lid  No conjuctival congestion   HENT: Normocephalic. Atraumatic. Nares patent. Moist oral mucosa.  Cardiovascular: Regular rate. Regular rhythm. No murmurs. No rubs. No gallops. Normal S1, S2.  Respiratory: Normal respiratory effort. Clear to auscultation bilaterally. No rales. No rhonchi. No wheezing.  Musculoskeletal: No  obvious deformity.  Extremities: No lower extremity edema.  Neurological: Alert & oriented x3. No slurred speech. Normal gait.  Psychiatric: Normal mood. Normal affect. Good insight. Good judgment.  Skin: Warm. Dry. No rash.           Assessment:        ICD-10-CM ICD-9-CM   1. Hordeolum externum of left upper eyelid  H00.014 373.11         Plan:    Assessment & Plan    MEDICAL DECISION MAKING:  - Identified small stye on right eyelid causing soreness and discomfort.  - Determined topical antibiotic ointment appropriate for treatment.    PATIENT EDUCATION:  - Explained the nature of the eye condition as a stye.    ACTION ITEMS/LIFESTYLE:  - Advised against rubbing " the affected area.  - Patient to apply warm compresses to the affected eyelid.    MEDICATIONS:  - Started antibiotic eye ointment: Apply to affected eye twice daily. Instructions: Open eye, spread ointment on lid, move eye around to distribute.    FOLLOW UP:  - Contact the office if condition does not improve in 2-3 days.         Leo was seen today for eye problem.    Diagnoses and all orders for this visit:    Hordeolum externum of left upper eyelid    Other orders  -     erythromycin (ROMYCIN) ophthalmic ointment; Place into both eyes every evening. for 10 days        No follow-ups on file.      Reuben Banuelos MD

## 2025-07-15 ENCOUNTER — HOSPITAL ENCOUNTER (OUTPATIENT)
Dept: RADIOLOGY | Facility: HOSPITAL | Age: 65
Discharge: HOME | End: 2025-07-15
Attending: NURSE PRACTITIONER
Payer: MEDICARE

## 2025-07-15 DIAGNOSIS — F17.210 CIGARETTE NICOTINE DEPENDENCE WITHOUT COMPLICATION: ICD-10-CM

## 2025-07-15 PROCEDURE — 76775 US EXAM ABDO BACK WALL LIM: CPT | Mod: 26,HCNC,, | Performed by: RADIOLOGY

## 2025-07-15 PROCEDURE — 76775 US EXAM ABDO BACK WALL LIM: CPT | Mod: TC,HCNC

## 2025-08-21 ENCOUNTER — OFFICE VISIT (OUTPATIENT)
Dept: PAIN MEDICINE | Facility: CLINIC | Age: 65
End: 2025-08-21
Payer: MEDICARE

## 2025-08-21 VITALS
DIASTOLIC BLOOD PRESSURE: 67 MMHG | HEIGHT: 68 IN | HEART RATE: 63 BPM | BODY MASS INDEX: 17.04 KG/M2 | WEIGHT: 112.44 LBS | RESPIRATION RATE: 17 BRPM | SYSTOLIC BLOOD PRESSURE: 106 MMHG

## 2025-08-21 DIAGNOSIS — M47.812 CERVICAL SPONDYLOSIS: ICD-10-CM

## 2025-08-21 DIAGNOSIS — Z79.891 OPIOID USE AGREEMENT EXISTS: ICD-10-CM

## 2025-08-21 DIAGNOSIS — M50.30 DDD (DEGENERATIVE DISC DISEASE), CERVICAL: ICD-10-CM

## 2025-08-21 DIAGNOSIS — M47.816 LUMBAR SPONDYLOSIS: ICD-10-CM

## 2025-08-21 DIAGNOSIS — Z79.891 OPIOID USE AGREEMENT EXISTS: Primary | ICD-10-CM

## 2025-08-21 PROCEDURE — 1159F MED LIST DOCD IN RCRD: CPT | Mod: CPTII,HCNC,S$GLB, | Performed by: PHYSICIAN ASSISTANT

## 2025-08-21 PROCEDURE — 99214 OFFICE O/P EST MOD 30 MIN: CPT | Mod: HCNC,S$GLB,, | Performed by: PHYSICIAN ASSISTANT

## 2025-08-21 PROCEDURE — 3074F SYST BP LT 130 MM HG: CPT | Mod: CPTII,HCNC,S$GLB, | Performed by: PHYSICIAN ASSISTANT

## 2025-08-21 PROCEDURE — 3078F DIAST BP <80 MM HG: CPT | Mod: CPTII,HCNC,S$GLB, | Performed by: PHYSICIAN ASSISTANT

## 2025-08-21 PROCEDURE — 3008F BODY MASS INDEX DOCD: CPT | Mod: CPTII,HCNC,S$GLB, | Performed by: PHYSICIAN ASSISTANT

## 2025-08-21 PROCEDURE — 80307 DRUG TEST PRSMV CHEM ANLYZR: CPT | Mod: HCNC | Performed by: PHYSICIAN ASSISTANT

## 2025-08-21 PROCEDURE — 99999 PR PBB SHADOW E&M-EST. PATIENT-LVL III: CPT | Mod: PBBFAC,HCNC,, | Performed by: PHYSICIAN ASSISTANT

## 2025-08-21 PROCEDURE — 3044F HG A1C LEVEL LT 7.0%: CPT | Mod: CPTII,HCNC,S$GLB, | Performed by: PHYSICIAN ASSISTANT

## 2025-08-21 RX ORDER — OXYCODONE AND ACETAMINOPHEN 7.5; 325 MG/1; MG/1
1 TABLET ORAL EVERY 12 HOURS PRN
Qty: 70 TABLET | Refills: 0 | Status: SHIPPED | OUTPATIENT
Start: 2025-09-24 | End: 2025-10-24

## 2025-08-21 RX ORDER — OXYCODONE AND ACETAMINOPHEN 7.5; 325 MG/1; MG/1
1 TABLET ORAL EVERY 12 HOURS PRN
Qty: 70 TABLET | Refills: 0 | Status: SHIPPED | OUTPATIENT
Start: 2025-08-28

## 2025-08-21 RX ORDER — OXYCODONE AND ACETAMINOPHEN 7.5; 325 MG/1; MG/1
1 TABLET ORAL EVERY 8 HOURS PRN
Qty: 70 TABLET | Refills: 0 | Status: SHIPPED | OUTPATIENT
Start: 2025-10-21 | End: 2025-11-20